# Patient Record
Sex: MALE | Race: WHITE | NOT HISPANIC OR LATINO | URBAN - METROPOLITAN AREA
[De-identification: names, ages, dates, MRNs, and addresses within clinical notes are randomized per-mention and may not be internally consistent; named-entity substitution may affect disease eponyms.]

---

## 2017-01-03 ENCOUNTER — OFFICE VISIT - RIVER FALLS (OUTPATIENT)
Dept: FAMILY MEDICINE | Facility: CLINIC | Age: 82
End: 2017-01-03

## 2017-01-09 ENCOUNTER — OFFICE VISIT - RIVER FALLS (OUTPATIENT)
Dept: FAMILY MEDICINE | Facility: CLINIC | Age: 82
End: 2017-01-09

## 2017-01-09 ASSESSMENT — MIFFLIN-ST. JEOR: SCORE: 1741.79

## 2017-01-17 ENCOUNTER — OFFICE VISIT - RIVER FALLS (OUTPATIENT)
Dept: FAMILY MEDICINE | Facility: CLINIC | Age: 82
End: 2017-01-17

## 2017-01-23 ENCOUNTER — OFFICE VISIT - RIVER FALLS (OUTPATIENT)
Dept: FAMILY MEDICINE | Facility: CLINIC | Age: 82
End: 2017-01-23

## 2017-01-31 ENCOUNTER — OFFICE VISIT - RIVER FALLS (OUTPATIENT)
Dept: FAMILY MEDICINE | Facility: CLINIC | Age: 82
End: 2017-01-31

## 2017-02-02 ENCOUNTER — OFFICE VISIT - RIVER FALLS (OUTPATIENT)
Dept: FAMILY MEDICINE | Facility: CLINIC | Age: 82
End: 2017-02-02

## 2017-02-02 ASSESSMENT — MIFFLIN-ST. JEOR: SCORE: 1728.19

## 2017-02-10 ENCOUNTER — OFFICE VISIT - RIVER FALLS (OUTPATIENT)
Dept: FAMILY MEDICINE | Facility: CLINIC | Age: 82
End: 2017-02-10

## 2017-03-01 ENCOUNTER — OFFICE VISIT - RIVER FALLS (OUTPATIENT)
Dept: FAMILY MEDICINE | Facility: CLINIC | Age: 82
End: 2017-03-01

## 2017-03-07 ENCOUNTER — OFFICE VISIT - RIVER FALLS (OUTPATIENT)
Dept: FAMILY MEDICINE | Facility: CLINIC | Age: 82
End: 2017-03-07

## 2017-03-07 ASSESSMENT — MIFFLIN-ST. JEOR: SCORE: 1736.35

## 2017-03-13 ENCOUNTER — OFFICE VISIT - RIVER FALLS (OUTPATIENT)
Dept: FAMILY MEDICINE | Facility: CLINIC | Age: 82
End: 2017-03-13

## 2017-03-21 ENCOUNTER — OFFICE VISIT - RIVER FALLS (OUTPATIENT)
Dept: FAMILY MEDICINE | Facility: CLINIC | Age: 82
End: 2017-03-21

## 2017-03-29 ENCOUNTER — OFFICE VISIT - RIVER FALLS (OUTPATIENT)
Dept: FAMILY MEDICINE | Facility: CLINIC | Age: 82
End: 2017-03-29

## 2017-04-06 ENCOUNTER — OFFICE VISIT - RIVER FALLS (OUTPATIENT)
Dept: FAMILY MEDICINE | Facility: CLINIC | Age: 82
End: 2017-04-06

## 2017-04-06 ASSESSMENT — MIFFLIN-ST. JEOR: SCORE: 1750.87

## 2017-04-13 ENCOUNTER — OFFICE VISIT - RIVER FALLS (OUTPATIENT)
Dept: FAMILY MEDICINE | Facility: CLINIC | Age: 82
End: 2017-04-13

## 2017-04-25 LAB
CREAT SERPL-MCNC: 1.81 MG/DL
GLUCOSE BLD-MCNC: 106 MG/DL

## 2017-04-28 ENCOUNTER — OFFICE VISIT - RIVER FALLS (OUTPATIENT)
Dept: FAMILY MEDICINE | Facility: CLINIC | Age: 82
End: 2017-04-28

## 2017-04-28 LAB
CREAT SERPL-MCNC: 1.81 MG/DL
GLUCOSE BLD-MCNC: 106 MG/DL

## 2017-04-28 ASSESSMENT — MIFFLIN-ST. JEOR: SCORE: 1732.72

## 2017-05-02 ENCOUNTER — OFFICE VISIT - RIVER FALLS (OUTPATIENT)
Dept: FAMILY MEDICINE | Facility: CLINIC | Age: 82
End: 2017-05-02

## 2017-05-03 LAB
CREAT SERPL-MCNC: 2.07 MG/DL (ref 0.7–1.18)
GLUCOSE BLD-MCNC: 172 MG/DL (ref 65–99)

## 2017-05-12 ENCOUNTER — OFFICE VISIT - RIVER FALLS (OUTPATIENT)
Dept: FAMILY MEDICINE | Facility: CLINIC | Age: 82
End: 2017-05-12

## 2017-05-12 ASSESSMENT — MIFFLIN-ST. JEOR: SCORE: 1755.4

## 2017-05-22 ENCOUNTER — OFFICE VISIT - RIVER FALLS (OUTPATIENT)
Dept: FAMILY MEDICINE | Facility: CLINIC | Age: 82
End: 2017-05-22

## 2017-05-23 ENCOUNTER — COMMUNICATION - RIVER FALLS (OUTPATIENT)
Dept: FAMILY MEDICINE | Facility: CLINIC | Age: 82
End: 2017-05-23

## 2017-05-23 LAB
CREAT SERPL-MCNC: 1.8 MG/DL (ref 0.7–1.18)
GLUCOSE BLD-MCNC: 99 MG/DL (ref 65–99)

## 2017-05-26 ENCOUNTER — OFFICE VISIT - RIVER FALLS (OUTPATIENT)
Dept: FAMILY MEDICINE | Facility: CLINIC | Age: 82
End: 2017-05-26

## 2017-05-30 LAB
CREAT SERPL-MCNC: 1.68 MG/DL
GLUCOSE BLD-MCNC: 102 MG/DL

## 2017-06-01 ENCOUNTER — OFFICE VISIT - RIVER FALLS (OUTPATIENT)
Dept: FAMILY MEDICINE | Facility: CLINIC | Age: 82
End: 2017-06-01

## 2017-06-01 ASSESSMENT — MIFFLIN-ST. JEOR
SCORE: 1782.62
SCORE: 1786.25

## 2017-06-06 ENCOUNTER — OFFICE VISIT - RIVER FALLS (OUTPATIENT)
Dept: FAMILY MEDICINE | Facility: CLINIC | Age: 82
End: 2017-06-06

## 2017-06-06 ENCOUNTER — AMBULATORY - RIVER FALLS (OUTPATIENT)
Dept: FAMILY MEDICINE | Facility: CLINIC | Age: 82
End: 2017-06-06

## 2017-06-06 ASSESSMENT — MIFFLIN-ST. JEOR: SCORE: 1782.62

## 2017-06-21 ENCOUNTER — AMBULATORY - RIVER FALLS (OUTPATIENT)
Dept: FAMILY MEDICINE | Facility: CLINIC | Age: 82
End: 2017-06-21

## 2017-06-22 ENCOUNTER — OFFICE VISIT - RIVER FALLS (OUTPATIENT)
Dept: FAMILY MEDICINE | Facility: CLINIC | Age: 82
End: 2017-06-22

## 2017-06-22 ASSESSMENT — MIFFLIN-ST. JEOR: SCORE: 1796.22

## 2017-07-06 ENCOUNTER — AMBULATORY - RIVER FALLS (OUTPATIENT)
Dept: FAMILY MEDICINE | Facility: CLINIC | Age: 82
End: 2017-07-06

## 2017-07-07 LAB
CREAT SERPL-MCNC: 2.05 MG/DL (ref 0.7–1.18)
GLUCOSE BLD-MCNC: 113 MG/DL (ref 65–99)

## 2017-07-20 ENCOUNTER — OFFICE VISIT - RIVER FALLS (OUTPATIENT)
Dept: FAMILY MEDICINE | Facility: CLINIC | Age: 82
End: 2017-07-20

## 2017-07-27 ENCOUNTER — AMBULATORY - RIVER FALLS (OUTPATIENT)
Dept: FAMILY MEDICINE | Facility: CLINIC | Age: 82
End: 2017-07-27

## 2017-07-31 ENCOUNTER — AMBULATORY - RIVER FALLS (OUTPATIENT)
Dept: FAMILY MEDICINE | Facility: CLINIC | Age: 82
End: 2017-07-31

## 2017-08-01 LAB
CREAT SERPL-MCNC: 1.64 MG/DL (ref 0.7–1.18)
GLUCOSE BLD-MCNC: 111 MG/DL (ref 65–99)

## 2017-08-07 ENCOUNTER — OFFICE VISIT - RIVER FALLS (OUTPATIENT)
Dept: FAMILY MEDICINE | Facility: CLINIC | Age: 82
End: 2017-08-07

## 2017-08-07 ASSESSMENT — MIFFLIN-ST. JEOR: SCORE: 1791.69

## 2017-08-08 LAB
CREAT SERPL-MCNC: 1.82 MG/DL
GLUCOSE BLD-MCNC: 92 MG/DL

## 2017-08-15 ENCOUNTER — AMBULATORY - RIVER FALLS (OUTPATIENT)
Dept: FAMILY MEDICINE | Facility: CLINIC | Age: 82
End: 2017-08-15

## 2017-08-16 LAB
CREAT SERPL-MCNC: 1.35 MG/DL (ref 0.7–1.18)
GLUCOSE BLD-MCNC: 96 MG/DL (ref 65–99)
HBA1C MFR BLD: 5.3 %

## 2017-08-24 ENCOUNTER — AMBULATORY - RIVER FALLS (OUTPATIENT)
Dept: FAMILY MEDICINE | Facility: CLINIC | Age: 82
End: 2017-08-24

## 2017-08-31 ENCOUNTER — AMBULATORY - RIVER FALLS (OUTPATIENT)
Dept: FAMILY MEDICINE | Facility: CLINIC | Age: 82
End: 2017-08-31

## 2017-09-14 ENCOUNTER — AMBULATORY - RIVER FALLS (OUTPATIENT)
Dept: FAMILY MEDICINE | Facility: CLINIC | Age: 82
End: 2017-09-14

## 2017-09-28 ENCOUNTER — AMBULATORY - RIVER FALLS (OUTPATIENT)
Dept: FAMILY MEDICINE | Facility: CLINIC | Age: 82
End: 2017-09-28

## 2017-10-27 ENCOUNTER — AMBULATORY - RIVER FALLS (OUTPATIENT)
Dept: FAMILY MEDICINE | Facility: CLINIC | Age: 82
End: 2017-10-27

## 2017-12-04 ENCOUNTER — AMBULATORY - RIVER FALLS (OUTPATIENT)
Dept: FAMILY MEDICINE | Facility: CLINIC | Age: 82
End: 2017-12-04

## 2017-12-12 ENCOUNTER — OFFICE VISIT - RIVER FALLS (OUTPATIENT)
Dept: FAMILY MEDICINE | Facility: CLINIC | Age: 82
End: 2017-12-12

## 2017-12-12 ENCOUNTER — COMMUNICATION - RIVER FALLS (OUTPATIENT)
Dept: FAMILY MEDICINE | Facility: CLINIC | Age: 82
End: 2017-12-12

## 2017-12-12 ASSESSMENT — MIFFLIN-ST. JEOR: SCORE: 1791.69

## 2017-12-13 LAB
CREAT SERPL-MCNC: 1.6 MG/DL (ref 0.7–1.11)
GLUCOSE BLD-MCNC: 83 MG/DL (ref 65–99)

## 2017-12-19 ENCOUNTER — AMBULATORY - RIVER FALLS (OUTPATIENT)
Dept: FAMILY MEDICINE | Facility: CLINIC | Age: 82
End: 2017-12-19

## 2017-12-29 ENCOUNTER — OFFICE VISIT - RIVER FALLS (OUTPATIENT)
Dept: FAMILY MEDICINE | Facility: CLINIC | Age: 82
End: 2017-12-29

## 2017-12-29 ASSESSMENT — MIFFLIN-ST. JEOR: SCORE: 1787.15

## 2017-12-30 LAB
CREAT SERPL-MCNC: 1.55 MG/DL (ref 0.7–1.11)
GLUCOSE BLD-MCNC: 95 MG/DL (ref 65–99)
HBA1C MFR BLD: 5.5 %

## 2018-01-01 ENCOUNTER — OFFICE VISIT - RIVER FALLS (OUTPATIENT)
Dept: FAMILY MEDICINE | Facility: CLINIC | Age: 83
End: 2018-01-01

## 2018-01-01 ENCOUNTER — AMBULATORY - RIVER FALLS (OUTPATIENT)
Dept: FAMILY MEDICINE | Facility: CLINIC | Age: 83
End: 2018-01-01

## 2018-01-01 LAB
CREAT SERPL-MCNC: 1.84 MG/DL (ref 0.7–1.11)
GLUCOSE BLD-MCNC: 107 MG/DL (ref 65–99)

## 2018-01-01 ASSESSMENT — MIFFLIN-ST. JEOR
SCORE: 1809.83
SCORE: 1823.44

## 2018-02-05 ENCOUNTER — AMBULATORY - RIVER FALLS (OUTPATIENT)
Dept: FAMILY MEDICINE | Facility: CLINIC | Age: 83
End: 2018-02-05

## 2018-02-23 ENCOUNTER — AMBULATORY - RIVER FALLS (OUTPATIENT)
Dept: FAMILY MEDICINE | Facility: CLINIC | Age: 83
End: 2018-02-23

## 2018-02-26 ENCOUNTER — OFFICE VISIT - RIVER FALLS (OUTPATIENT)
Dept: FAMILY MEDICINE | Facility: CLINIC | Age: 83
End: 2018-02-26

## 2018-02-26 ASSESSMENT — MIFFLIN-ST. JEOR: SCORE: 1832.51

## 2018-02-27 LAB
CREAT SERPL-MCNC: 1.55 MG/DL (ref 0.7–1.11)
GLUCOSE BLD-MCNC: 79 MG/DL (ref 65–99)

## 2018-03-13 ENCOUNTER — AMBULATORY - RIVER FALLS (OUTPATIENT)
Dept: FAMILY MEDICINE | Facility: CLINIC | Age: 83
End: 2018-03-13

## 2018-03-14 LAB
CHOLEST SERPL-MCNC: 89 MG/DL
CHOLEST/HDLC SERPL: 3 {RATIO}
HDLC SERPL-MCNC: 30 MG/DL
LDLC SERPL CALC-MCNC: 42 MG/DL
NONHDLC SERPL-MCNC: 59 MG/DL
TRIGL SERPL-MCNC: 85 MG/DL

## 2018-03-27 ENCOUNTER — OFFICE VISIT - RIVER FALLS (OUTPATIENT)
Dept: FAMILY MEDICINE | Facility: CLINIC | Age: 83
End: 2018-03-27

## 2018-03-27 ENCOUNTER — AMBULATORY - RIVER FALLS (OUTPATIENT)
Dept: FAMILY MEDICINE | Facility: CLINIC | Age: 83
End: 2018-03-27

## 2018-03-28 LAB
CREAT SERPL-MCNC: 1.56 MG/DL (ref 0.7–1.11)
GLUCOSE BLD-MCNC: 98 MG/DL (ref 65–99)

## 2018-04-24 ENCOUNTER — OFFICE VISIT - RIVER FALLS (OUTPATIENT)
Dept: FAMILY MEDICINE | Facility: CLINIC | Age: 83
End: 2018-04-24

## 2018-04-24 ENCOUNTER — AMBULATORY - RIVER FALLS (OUTPATIENT)
Dept: FAMILY MEDICINE | Facility: CLINIC | Age: 83
End: 2018-04-24

## 2018-04-24 ASSESSMENT — MIFFLIN-ST. JEOR: SCORE: 1825.25

## 2018-05-01 ENCOUNTER — OFFICE VISIT - RIVER FALLS (OUTPATIENT)
Dept: FAMILY MEDICINE | Facility: CLINIC | Age: 83
End: 2018-05-01

## 2018-05-01 ASSESSMENT — MIFFLIN-ST. JEOR: SCORE: 1814.37

## 2018-05-02 LAB — PSA SERPL-MCNC: 0.5 NG/ML

## 2018-05-04 ENCOUNTER — AMBULATORY - RIVER FALLS (OUTPATIENT)
Dept: FAMILY MEDICINE | Facility: CLINIC | Age: 83
End: 2018-05-04

## 2018-05-04 LAB
CREAT SERPL-MCNC: 1.83 MG/DL
GLUCOSE BLD-MCNC: 118 MG/DL

## 2018-05-24 ENCOUNTER — OFFICE VISIT - RIVER FALLS (OUTPATIENT)
Dept: FAMILY MEDICINE | Facility: CLINIC | Age: 83
End: 2018-05-24

## 2018-05-24 ASSESSMENT — MIFFLIN-ST. JEOR: SCORE: 1846.12

## 2018-05-31 ENCOUNTER — OFFICE VISIT - RIVER FALLS (OUTPATIENT)
Dept: FAMILY MEDICINE | Facility: CLINIC | Age: 83
End: 2018-05-31

## 2018-05-31 ASSESSMENT — MIFFLIN-ST. JEOR: SCORE: 1805.3

## 2018-06-01 ENCOUNTER — AMBULATORY - RIVER FALLS (OUTPATIENT)
Dept: FAMILY MEDICINE | Facility: CLINIC | Age: 83
End: 2018-06-01

## 2018-06-01 LAB
CREAT SERPL-MCNC: 2.03 MG/DL (ref 0.7–1.11)
GLUCOSE BLD-MCNC: 92 MG/DL (ref 65–99)

## 2018-06-05 LAB
CREAT SERPL-MCNC: 2.02 MG/DL
GLUCOSE BLD-MCNC: 98 MG/DL

## 2018-06-18 ENCOUNTER — AMBULATORY - RIVER FALLS (OUTPATIENT)
Dept: FAMILY MEDICINE | Facility: CLINIC | Age: 83
End: 2018-06-18

## 2018-06-19 LAB
CREAT SERPL-MCNC: 1.83 MG/DL (ref 0.7–1.11)
GLUCOSE BLD-MCNC: 114 MG/DL (ref 65–99)

## 2018-06-22 ENCOUNTER — OFFICE VISIT - RIVER FALLS (OUTPATIENT)
Dept: FAMILY MEDICINE | Facility: CLINIC | Age: 83
End: 2018-06-22

## 2018-06-29 ENCOUNTER — OFFICE VISIT - RIVER FALLS (OUTPATIENT)
Dept: FAMILY MEDICINE | Facility: CLINIC | Age: 83
End: 2018-06-29

## 2018-06-29 ASSESSMENT — MIFFLIN-ST. JEOR: SCORE: 1841.58

## 2018-07-02 ENCOUNTER — OFFICE VISIT - RIVER FALLS (OUTPATIENT)
Dept: FAMILY MEDICINE | Facility: CLINIC | Age: 83
End: 2018-07-02

## 2018-07-09 ENCOUNTER — OFFICE VISIT - RIVER FALLS (OUTPATIENT)
Dept: FAMILY MEDICINE | Facility: CLINIC | Age: 83
End: 2018-07-09

## 2018-07-09 LAB
BUN SERPL-MCNC: 61 MG/DL (ref 7–25)
BUN/CREAT RATIO - HISTORICAL: 27 (ref 6–22)
CALCIUM SERPL-MCNC: 9.5 MG/DL (ref 8.6–10.3)
CHLORIDE BLD-SCNC: 102 MMOL/L (ref 98–110)
CO2 SERPL-SCNC: 26 MMOL/L (ref 20–32)
CREAT SERPL-MCNC: 2.26 MG/DL (ref 0.7–1.11)
EGFRCR SERPLBLD CKD-EPI 2021: 26 ML/MIN/1.73M2
GLUCOSE BLD-MCNC: 98 MG/DL (ref 65–99)
POTASSIUM BLD-SCNC: 4.8 MMOL/L (ref 3.5–5.3)
SODIUM SERPL-SCNC: 137 MMOL/L (ref 135–146)

## 2018-07-10 ENCOUNTER — AMBULATORY - RIVER FALLS (OUTPATIENT)
Dept: FAMILY MEDICINE | Facility: CLINIC | Age: 83
End: 2018-07-10

## 2018-07-11 LAB
CREAT SERPL-MCNC: 1.92 MG/DL (ref 0.7–1.11)
GLUCOSE BLD-MCNC: 108 MG/DL (ref 65–99)

## 2018-07-20 ENCOUNTER — OFFICE VISIT - RIVER FALLS (OUTPATIENT)
Dept: FAMILY MEDICINE | Facility: CLINIC | Age: 83
End: 2018-07-20

## 2018-07-20 ENCOUNTER — AMBULATORY - RIVER FALLS (OUTPATIENT)
Dept: FAMILY MEDICINE | Facility: CLINIC | Age: 83
End: 2018-07-20

## 2018-07-20 ASSESSMENT — MIFFLIN-ST. JEOR: SCORE: 1864.26

## 2018-07-21 LAB — HBA1C MFR BLD: 5.9 %

## 2018-07-23 ENCOUNTER — OFFICE VISIT - RIVER FALLS (OUTPATIENT)
Dept: FAMILY MEDICINE | Facility: CLINIC | Age: 83
End: 2018-07-23

## 2018-07-24 ENCOUNTER — OFFICE VISIT - RIVER FALLS (OUTPATIENT)
Dept: FAMILY MEDICINE | Facility: CLINIC | Age: 83
End: 2018-07-24

## 2018-07-27 ENCOUNTER — AMBULATORY - RIVER FALLS (OUTPATIENT)
Dept: FAMILY MEDICINE | Facility: CLINIC | Age: 83
End: 2018-07-27

## 2018-07-28 LAB
CREAT SERPL-MCNC: 1.82 MG/DL (ref 0.7–1.11)
GLUCOSE BLD-MCNC: 129 MG/DL (ref 65–99)

## 2018-08-07 ENCOUNTER — AMBULATORY - RIVER FALLS (OUTPATIENT)
Dept: FAMILY MEDICINE | Facility: CLINIC | Age: 83
End: 2018-08-07

## 2018-08-20 ENCOUNTER — OFFICE VISIT - RIVER FALLS (OUTPATIENT)
Dept: FAMILY MEDICINE | Facility: CLINIC | Age: 83
End: 2018-08-20

## 2018-08-23 ENCOUNTER — OFFICE VISIT - RIVER FALLS (OUTPATIENT)
Dept: FAMILY MEDICINE | Facility: CLINIC | Age: 83
End: 2018-08-23

## 2018-08-23 ASSESSMENT — MIFFLIN-ST. JEOR: SCORE: 1855.19

## 2018-09-17 ENCOUNTER — AMBULATORY - RIVER FALLS (OUTPATIENT)
Dept: FAMILY MEDICINE | Facility: CLINIC | Age: 83
End: 2018-09-17

## 2018-09-21 LAB
CREAT SERPL-MCNC: 2.13 MG/DL
GLUCOSE BLD-MCNC: 120 MG/DL

## 2019-01-01 ENCOUNTER — AMBULATORY - RIVER FALLS (OUTPATIENT)
Dept: FAMILY MEDICINE | Facility: CLINIC | Age: 84
End: 2019-01-01

## 2019-01-01 ENCOUNTER — OFFICE VISIT - RIVER FALLS (OUTPATIENT)
Dept: FAMILY MEDICINE | Facility: CLINIC | Age: 84
End: 2019-01-01

## 2019-01-01 ENCOUNTER — COMMUNICATION - RIVER FALLS (OUTPATIENT)
Dept: FAMILY MEDICINE | Facility: CLINIC | Age: 84
End: 2019-01-01

## 2019-01-01 LAB
A/G RATIO - HISTORICAL: 0.8 MG/DL
A/G RATIO - HISTORICAL: 0.8 MG/DL
A/G RATIO - HISTORICAL: 0.9
A/G RATIO - HISTORICAL: 1.09
A/G RATIO - HISTORICAL: 1.4 (ref 1–2.5)
AFP: NORMAL
AFP: NORMAL
ALBUMIN SERPL-MCNC: 2.9 G/DL
ALBUMIN SERPL-MCNC: 2.9 G/DL
ALBUMIN SERPL-MCNC: 3.1 G/DL
ALBUMIN SERPL-MCNC: 3.7 G/DL
ALBUMIN SERPL-MCNC: 3.7 G/DL
ALBUMIN SERPL-MCNC: 4 GM/DL (ref 3.6–5.1)
ALBUMIN UR-MCNC: ABNORMAL G/DL
ALP SERPL-CCNC: 215 UNIT/L
ALP SERPL-CCNC: 215 UNIT/L
ALP SERPL-CCNC: 247 UNIT/L (ref 40–115)
ALP SERPL-CCNC: 294 UNIT/L
ALP SERPL-CCNC: 317 UNIT/L
ALP SERPL-CCNC: 332 UNIT/L
ALT SERPL W P-5'-P-CCNC: 11 UNIT/L
ALT SERPL W P-5'-P-CCNC: 17 UNIT/L (ref 9–46)
ALT SERPL W P-5'-P-CCNC: 20 UNIT/L
ALT SERPL W P-5'-P-CCNC: 25 UNIT/L
ANION GAP SERPL CALCULATED.3IONS-SCNC: 11 MEQ/L
ANION GAP SERPL CALCULATED.3IONS-SCNC: 12.6 MEQ/L
ANION GAP SERPL CALCULATED.3IONS-SCNC: 7 MEQ/L
ANION GAP SERPL CALCULATED.3IONS-SCNC: 8 MEQ/L
ANION GAP SERPL CALCULATED.3IONS-SCNC: 9 MEQ/L
ANION GAP SERPL CALCULATED.3IONS-SCNC: 9 MEQ/L
AST SERPL W P-5'-P-CCNC: 17 UNIT/L
AST SERPL W P-5'-P-CCNC: 20 UNIT/L (ref 10–35)
AST SERPL W P-5'-P-CCNC: 21 UNIT/L
AST SERPL W P-5'-P-CCNC: 21 UNIT/L
AST SERPL W P-5'-P-CCNC: 24 UNIT/L
AST SERPL W P-5'-P-CCNC: 27 UNIT/L
BACTERIA SPEC CULT: NORMAL
BASOPHILS # BLD MANUAL: 0 CELLS/UL
BASOPHILS NFR BLD AUTO: 0.3 %
BASOPHILS NFR BLD AUTO: 0.3 %
BASOPHILS NFR BLD AUTO: 0.4 %
BILIRUB SERPL-MCNC: 0.5 MG/DL
BILIRUB SERPL-MCNC: 0.5 MG/DL
BILIRUB SERPL-MCNC: 0.8 MG/DL (ref 0.2–1.2)
BILIRUB SERPL-MCNC: 0.9 MG/DL
BILIRUB SERPL-MCNC: 1.8 MG/DL
BILIRUB SERPL-MCNC: 1.8 MG/DL
BILIRUB UR QL STRIP: NEGATIVE
BUN SERPL-MCNC: 16 MG/DL
BUN SERPL-MCNC: 37 MG/DL
BUN SERPL-MCNC: 39 MG/DL (ref 7–25)
BUN SERPL-MCNC: 40 MG/DL (ref 7–25)
BUN SERPL-MCNC: 41 MG/DL
BUN SERPL-MCNC: 41 MG/DL (ref 7–25)
BUN SERPL-MCNC: 45 MG/DL
BUN SERPL-MCNC: 45 MG/DL (ref 7–25)
BUN SERPL-MCNC: 46 MG/DL (ref 7–25)
BUN SERPL-MCNC: 48 MG/DL (ref 7–25)
BUN SERPL-MCNC: 55 MG/DL (ref 7–25)
BUN SERPL-MCNC: 56 MG/DL
BUN/CREAT RATIO - HISTORICAL: 19
BUN/CREAT RATIO - HISTORICAL: 20.27
BUN/CREAT RATIO - HISTORICAL: 20.27
BUN/CREAT RATIO - HISTORICAL: 22
BUN/CREAT RATIO - HISTORICAL: 22 (ref 6–22)
BUN/CREAT RATIO - HISTORICAL: 23
BUN/CREAT RATIO - HISTORICAL: 23 (ref 6–22)
BUN/CREAT RATIO - HISTORICAL: 24 (ref 6–22)
BUN/CREAT RATIO - HISTORICAL: 25 (ref 6–22)
BUN/CREAT RATIO - HISTORICAL: 26
BUN/CREAT RATIO - HISTORICAL: 26
BUN/CREAT RATIO - HISTORICAL: 28 (ref 6–22)
CALCIUM SERPL-MCNC: 8.5 MG/DL (ref 8.6–10.3)
CALCIUM SERPL-MCNC: 8.6 MG/DL (ref 8.6–10.3)
CALCIUM SERPL-MCNC: 8.6 MG/DL (ref 8.6–10.3)
CALCIUM SERPL-MCNC: 8.7 MEQ/DL
CALCIUM SERPL-MCNC: 8.8 MEQ/DL
CALCIUM SERPL-MCNC: 8.9 MEQ/DL
CALCIUM SERPL-MCNC: 9 MG/DL (ref 8.6–10.3)
CALCIUM SERPL-MCNC: 9.1 MEQ/DL
CALCIUM SERPL-MCNC: 9.2 MG/DL (ref 8.6–10.3)
CALCIUM SERPL-MCNC: 9.3 MG/DL (ref 8.6–10.3)
CALCIUM SERPL-MCNC: 9.4 MG/DL (ref 8.6–10.3)
CALCIUM SERPL-MCNC: 9.7 MEQ/DL
CHLORIDE BLD-SCNC: 100 MEQ/L
CHLORIDE BLD-SCNC: 100 MMOL/L (ref 98–110)
CHLORIDE BLD-SCNC: 101 MEQ/L
CHLORIDE BLD-SCNC: 101 MMOL/L (ref 98–110)
CHLORIDE BLD-SCNC: 102 MEQ/L
CHLORIDE BLD-SCNC: 102 MMOL/L (ref 98–110)
CHLORIDE BLD-SCNC: 103 MMOL/L (ref 98–110)
CHLORIDE BLD-SCNC: 105 MMOL/L (ref 98–110)
CHLORIDE BLD-SCNC: 96 MEQ/L
CHLORIDE BLD-SCNC: 97 MEQ/L
CHLORIDE BLD-SCNC: 97 MEQ/L
CHLORIDE BLD-SCNC: 98 MMOL/L (ref 98–110)
CHLORIDE BLD-SCNC: 99 MEQ/L
CHLORIDE BLD-SCNC: 99 MMOL/L (ref 98–110)
CHOLEST SERPL-MCNC: 82 MG/DL
CHOLEST/HDLC SERPL: 2.5 {RATIO}
CO2 SERPL-SCNC: 25 MEQ/L
CO2 SERPL-SCNC: 26 MEQ/L
CO2 SERPL-SCNC: 26 MEQ/L
CO2 SERPL-SCNC: 26 MMOL/L (ref 20–32)
CO2 SERPL-SCNC: 27 MEQ/L
CO2 SERPL-SCNC: 27 MMOL/L (ref 20–32)
CO2 SERPL-SCNC: 29 MEQ/L
CO2 SERPL-SCNC: 29 MMOL/L (ref 20–32)
CO2 SERPL-SCNC: 29 MMOL/L (ref 20–32)
CO2 SERPL-SCNC: 32 MEQ/L
CO2 SERPL-SCNC: 32 MEQ/L
CO2 SERPL-SCNC: 32 MMOL/L (ref 20–32)
CREAT SERPL-MCNC: 0.84 MG/DL
CREAT SERPL-MCNC: 1.58 MG/DL
CREAT SERPL-MCNC: 1.63 MG/DL (ref 0.7–1.11)
CREAT SERPL-MCNC: 1.64 MG/DL (ref 0.7–1.11)
CREAT SERPL-MCNC: 1.66 MG/DL
CREAT SERPL-MCNC: 1.7 MG/DL (ref 0.7–1.11)
CREAT SERPL-MCNC: 1.73 MG/DL
CREAT SERPL-MCNC: 1.78 MG/DL (ref 0.7–1.11)
CREAT SERPL-MCNC: 1.94 MG/DL (ref 0.7–1.11)
CREAT SERPL-MCNC: 1.98 MG/DL (ref 0.7–1.11)
CREAT SERPL-MCNC: 2.22 MG/DL
CREAT SERPL-MCNC: 2.22 MG/DL
CREAT SERPL-MCNC: 2.34 MG/DL (ref 0.7–1.11)
CREAT SERPL-MCNC: 2.44 MG/DL
CREAT SERPL-MCNC: 2.56 MG/DL
DIGOXIN - HISTORICAL: 1.2 MCG/L (ref 0.8–2)
EGFRCR SERPLBLD CKD-EPI 2021: 25 ML/MIN/1.73M2
EGFRCR SERPLBLD CKD-EPI 2021: 31 ML/MIN/1.73M2
EGFRCR SERPLBLD CKD-EPI 2021: 32 ML/MIN/1.73M2
EGFRCR SERPLBLD CKD-EPI 2021: 35 ML/MIN/1.73M2
EGFRCR SERPLBLD CKD-EPI 2021: 37 ML/MIN/1.73M2
EGFRCR SERPLBLD CKD-EPI 2021: 39 ML/MIN/1.73M2
EGFRCR SERPLBLD CKD-EPI 2021: 39 ML/MIN/1.73M2
EOSINOPHIL # BLD MANUAL: 0.1 CELLS/UL
EOSINOPHIL # BLD MANUAL: 0.2 CELLS/UL
EOSINOPHIL # BLD MANUAL: 0.3 CELLS/UL
EOSINOPHIL # BLD MANUAL: 0.3 CELLS/UL
EOSINOPHIL # BLD MANUAL: 0.4 CELLS/UL
EOSINOPHIL NFR BLD AUTO: 2.5 %
EOSINOPHIL NFR BLD AUTO: 3 %
EOSINOPHIL NFR BLD AUTO: 3.2 %
EOSINOPHIL NFR BLD AUTO: 4.3 %
EOSINOPHIL NFR BLD AUTO: 4.4 %
EOSINOPHIL NFR BLD AUTO: 5.5 %
EOSINOPHIL NFR BLD AUTO: 6.5 %
ERYTHROCYTE [DISTWIDTH] IN BLOOD BY AUTOMATED COUNT: 14.9 %
ERYTHROCYTE [DISTWIDTH] IN BLOOD BY AUTOMATED COUNT: 15.6 % (ref 11–15)
ERYTHROCYTE [DISTWIDTH] IN BLOOD BY AUTOMATED COUNT: 16.9 % (ref 11–15)
ERYTHROCYTE [DISTWIDTH] IN BLOOD BY AUTOMATED COUNT: 17 %
ERYTHROCYTE [DISTWIDTH] IN BLOOD BY AUTOMATED COUNT: 17.6 %
ERYTHROCYTE [DISTWIDTH] IN BLOOD BY AUTOMATED COUNT: 18 %
ERYTHROCYTE [DISTWIDTH] IN BLOOD BY AUTOMATED COUNT: 18.6 %
ERYTHROCYTE [DISTWIDTH] IN BLOOD BY AUTOMATED COUNT: 19.1 %
ERYTHROCYTE [DISTWIDTH] IN BLOOD BY AUTOMATED COUNT: 20 %
ERYTHROCYTE [DISTWIDTH] IN BLOOD BY AUTOMATED COUNT: 22.4 %
FERRITIN SERPL-MCNC: 754.1 NG/ML
FERRITIN SERPL-MCNC: 919.4 NG/ML
GFR ESTIMATE EXT - HISTORICAL: 26 ML/MIN
GFR ESTIMATE EXT - HISTORICAL: 30 ML/MIN
GFR ESTIMATE EXT - HISTORICAL: 30 ML/MIN
GFR ESTIMATE EXT - HISTORICAL: 38 ML/MIN
GFR ESTIMATE EXT - HISTORICAL: 40 ML/MIN
GFR ESTIMATE EXT - HISTORICAL: 42 ML/MIN
GLOBULIN: 2.9 (ref 1.9–3.7)
GLOBULIN: 3.4 G/DL
GLOBULIN: 3.6 G/DL
GLOBULIN: 3.9 G/DL
GLUCOSE BLD-MCNC: 100 MG/DL
GLUCOSE BLD-MCNC: 117 MG/DL
GLUCOSE BLD-MCNC: 85 MG/DL (ref 65–99)
GLUCOSE BLD-MCNC: 90 MG/DL
GLUCOSE BLD-MCNC: 91 MG/DL
GLUCOSE BLD-MCNC: 91 MG/DL
GLUCOSE BLD-MCNC: 91 MG/DL (ref 65–99)
GLUCOSE BLD-MCNC: 92 MG/DL
GLUCOSE BLD-MCNC: 92 MG/DL
GLUCOSE BLD-MCNC: 94 MG/DL (ref 65–99)
GLUCOSE BLD-MCNC: 95 MG/DL (ref 65–99)
GLUCOSE BLD-MCNC: 97 MG/DL (ref 65–99)
GLUCOSE BLD-MCNC: 97 MG/DL (ref 65–99)
GLUCOSE BLD-MCNC: 98 MG/DL (ref 65–99)
GLUCOSE UR STRIP-MCNC: NEGATIVE MG/DL
HBV SURFACE AG SERPL QL IA: NORMAL
HBV SURFACE AG SERPL QL IA: NORMAL
HCT VFR BLD AUTO: 23.3 %
HCT VFR BLD AUTO: 25 %
HCT VFR BLD AUTO: 25.4 %
HCT VFR BLD AUTO: 25.4 % (ref 38.5–50)
HCT VFR BLD AUTO: 25.9 %
HCT VFR BLD AUTO: 26.1 % (ref 38.5–50)
HCT VFR BLD AUTO: 26.5 %
HCT VFR BLD AUTO: 28.2 %
HCT VFR BLD AUTO: 33.1 %
HCT VFR BLD AUTO: 38.2 %
HCV AB SERPL QL IA: NORMAL
HDLC SERPL-MCNC: 33 MG/DL
HEP A AB, TOTAL: REACTIVE
HEP A AB, TOTAL: REACTIVE
HEP B CORE ANTIBODY: NEGATIVE
HEP B CORE ANTIBODY: NEGATIVE
HEP BE AB: NEGATIVE
HGB BLD-MCNC: 10.3 G/DL
HGB BLD-MCNC: 12.9 G/DL
HGB BLD-MCNC: 7.2 G/DL
HGB BLD-MCNC: 7.7 G/DL
HGB BLD-MCNC: 7.7 G/DL
HGB BLD-MCNC: 7.7 GM/DL (ref 13.2–17.1)
HGB BLD-MCNC: 7.9 G/DL
HGB BLD-MCNC: 8.1 G/DL
HGB BLD-MCNC: 8.2 GM/DL (ref 13.2–17.1)
HGB BLD-MCNC: 8.4 G/DL
HGB BLD-MCNC: 8.6 G/DL
HGB BLD-MCNC: 8.6 GM/DL (ref 13.2–17.1)
HGB UR QL STRIP: NEGATIVE
INR PPP: 1.1
INR PPP: 1.3
INR PPP: 1.4
INR PPP: 1.7
INR PPP: 1.8
INR PPP: 1.9
INR PPP: 1.9
INR PPP: 2
INR PPP: 2
INR PPP: 2.3
INR PPP: 2.4
INR PPP: 4
INR PPP: 4.1
INR PPP: 4.5
IRON: 15 NMOL/L
IRON: 21 NMOL/L
IRON: 22 NMOL/L
KETONES UR STRIP-MCNC: NEGATIVE MG/DL
LDLC SERPL CALC-MCNC: 35 MG/DL
LEUKOCYTE ESTERASE UR QL STRIP: NEGATIVE
LYMPHOCYTES # BLD MANUAL: 0.5 CELLS/UL
LYMPHOCYTES # BLD MANUAL: 0.6 CELLS/UL
LYMPHOCYTES # BLD MANUAL: 0.6 CELLS/UL
LYMPHOCYTES # BLD MANUAL: 0.8 CELLS/UL
LYMPHOCYTES # BLD MANUAL: 0.8 CELLS/UL
LYMPHOCYTES # BLD MANUAL: 1 CELLS/UL
LYMPHOCYTES # BLD MANUAL: 1 CELLS/UL
LYMPHOCYTES NFR BLD AUTO: 11 %
LYMPHOCYTES NFR BLD AUTO: 13.6 %
LYMPHOCYTES NFR BLD AUTO: 16.5 %
LYMPHOCYTES NFR BLD AUTO: 16.8 %
LYMPHOCYTES NFR BLD AUTO: 18.7 %
LYMPHOCYTES NFR BLD AUTO: 8.3 %
LYMPHOCYTES NFR BLD AUTO: 9.6 %
MCH RBC QN AUTO: 27.8 PG
MCH RBC QN AUTO: 28.1 PG (ref 27–33)
MCH RBC QN AUTO: 28.3 PG
MCH RBC QN AUTO: 28.6 PG
MCH RBC QN AUTO: 28.8 PG
MCH RBC QN AUTO: 28.8 PG (ref 27–33)
MCH RBC QN AUTO: 28.9 PG
MCH RBC QN AUTO: 28.9 PG
MCH RBC QN AUTO: 29.3 PG
MCH RBC QN AUTO: 31.2 PG
MCHC RBC AUTO-ENTMCNC: 30.3 GM/DL
MCHC RBC AUTO-ENTMCNC: 30.3 GM/DL (ref 32–36)
MCHC RBC AUTO-ENTMCNC: 30.5 GM/DL
MCHC RBC AUTO-ENTMCNC: 30.5 GM/DL
MCHC RBC AUTO-ENTMCNC: 30.6 GM/DL
MCHC RBC AUTO-ENTMCNC: 30.8 GM/DL
MCHC RBC AUTO-ENTMCNC: 30.9 GM/DL
MCHC RBC AUTO-ENTMCNC: 31.1 GM/DL
MCHC RBC AUTO-ENTMCNC: 31.4 GM/DL (ref 32–36)
MCHC RBC AUTO-ENTMCNC: 33.8 GM/DL
MCV RBC AUTO: 91 FL
MCV RBC AUTO: 91.6 FL (ref 80–100)
MCV RBC AUTO: 92 FL
MCV RBC AUTO: 92.7 FL (ref 80–100)
MCV RBC AUTO: 93 FL
MCV RBC AUTO: 93 FL
MCV RBC AUTO: 94 FL
MCV RBC AUTO: 94 FL
MCV RBC AUTO: 95 FL
MCV RBC AUTO: 96 FL
MONOCYTES # BLD MANUAL: 0.5 CELLS/UL
MONOCYTES # BLD MANUAL: 0.5 CELLS/UL
MONOCYTES # BLD MANUAL: 0.6 CELLS/UL
MONOCYTES NFR BLD AUTO: 10.4 %
MONOCYTES NFR BLD AUTO: 10.4 %
MONOCYTES NFR BLD AUTO: 10.5 %
MONOCYTES NFR BLD AUTO: 11 %
MONOCYTES NFR BLD AUTO: 12 %
MONOCYTES NFR BLD AUTO: 9 %
MONOCYTES NFR BLD AUTO: 9 %
NEUTROPHILS # BLD AUTO: 75.9 %
NEUTROPHILS # BLD AUTO: 76.4 %
NEUTROPHILS # BLD MANUAL: 3.3 CELLS/UL
NEUTROPHILS # BLD MANUAL: 3.3 CELLS/UL
NEUTROPHILS # BLD MANUAL: 4 CELLS/UL
NEUTROPHILS # BLD MANUAL: 4.3 CELLS/UL
NEUTROPHILS # BLD MANUAL: 5 CELLS/UL
NEUTROPHILS NFR BLD AUTO: 65.9 %
NEUTROPHILS NFR BLD AUTO: 66.3 %
NEUTROPHILS NFR BLD AUTO: 68.4 %
NEUTROPHILS NFR BLD AUTO: 73 %
NEUTROPHILS NFR BLD AUTO: 75.3 %
NITRATE UR QL: NEGATIVE
NONHDLC SERPL-MCNC: 49 MG/DL
PH UR STRIP: 5.5 [PH] (ref 5–8)
PLATELET # BLD AUTO: 129 X10
PLATELET # BLD AUTO: 155 X10
PLATELET # BLD AUTO: 160 X10
PLATELET # BLD AUTO: 203 X10
PLATELET # BLD AUTO: 210 X10
PLATELET # BLD AUTO: 211 X10
PLATELET # BLD AUTO: 217 10*3/UL (ref 140–400)
PLATELET # BLD AUTO: 228 X10
PLATELET # BLD AUTO: 231 10*3/UL (ref 140–400)
PLATELET # BLD AUTO: 240 X10
PMV BLD: 10.2 FL
PMV BLD: 10.7 FL
PMV BLD: 10.9 FL
PMV BLD: 8.2 FL
PMV BLD: 8.6 FL
PMV BLD: 8.9 FL
PMV BLD: 9.3 FL (ref 7.5–12.5)
PMV BLD: 9.4 FL
PMV BLD: 9.7 FL
PMV BLD: 9.9 FL (ref 7.5–12.5)
POTASSIUM BLD-SCNC: 3.4 MEQ/L
POTASSIUM BLD-SCNC: 3.7 MMOL/L (ref 3.5–5.3)
POTASSIUM BLD-SCNC: 3.9 MEQ/L
POTASSIUM BLD-SCNC: 3.9 MEQ/L
POTASSIUM BLD-SCNC: 4.2 MEQ/L
POTASSIUM BLD-SCNC: 4.2 MEQ/L
POTASSIUM BLD-SCNC: 4.2 MMOL/L (ref 3.5–5.3)
POTASSIUM BLD-SCNC: 4.4 MMOL/L (ref 3.5–5.3)
POTASSIUM BLD-SCNC: 4.4 MMOL/L (ref 3.5–5.3)
POTASSIUM BLD-SCNC: 4.5 MMOL/L (ref 3.5–5.3)
POTASSIUM BLD-SCNC: 4.6 MMOL/L (ref 3.5–5.3)
POTASSIUM BLD-SCNC: 4.7 MEQ/L
POTASSIUM BLD-SCNC: 5 MEQ/L
POTASSIUM BLD-SCNC: 5 MMOL/L (ref 3.5–5.3)
PROT SERPL-MCNC: 6.3 GM/DL
PROT SERPL-MCNC: 6.5 GM/DL
PROT SERPL-MCNC: 6.9 GM/DL (ref 6.1–8.1)
PROT SERPL-MCNC: 7 GM/DL
PROT SERPL-MCNC: 7.1 GM/DL
PROT SERPL-MCNC: 7.1 GM/DL
PROTHROMBIN TIME: 10.8 S (ref 9–11.5)
PROTHROMBIN TIME: 13.9 S (ref 9–11.5)
PROTHROMBIN TIME: 15.8 S
PROTHROMBIN TIME: 17.5 S (ref 9–11.5)
PROTHROMBIN TIME: 18.4 S (ref 9–11.5)
PROTHROMBIN TIME: 22 S
RBC # BLD AUTO: 2.46 X10
RBC # BLD AUTO: 2.66 X10
RBC # BLD AUTO: 2.67 X10
RBC # BLD AUTO: 2.74 10*6/UL (ref 4.2–5.8)
RBC # BLD AUTO: 2.76 X10
RBC # BLD AUTO: 2.85 10*6/UL (ref 4.2–5.8)
RBC # BLD AUTO: 2.86 X10
RBC # BLD AUTO: 3.09 X10
RBC # BLD AUTO: 3.56 X10
RBC # BLD AUTO: 4.14 X10
SODIUM SERPL-SCNC: 131 MMOL/L (ref 135–146)
SODIUM SERPL-SCNC: 132 MEQ/L
SODIUM SERPL-SCNC: 134 MEQ/L
SODIUM SERPL-SCNC: 134 MEQ/L
SODIUM SERPL-SCNC: 134 MMOL/L (ref 135–146)
SODIUM SERPL-SCNC: 136 MEQ/L
SODIUM SERPL-SCNC: 136 MMOL/L (ref 135–146)
SODIUM SERPL-SCNC: 137 MMOL/L (ref 135–146)
SODIUM SERPL-SCNC: 137 MMOL/L (ref 135–146)
SODIUM SERPL-SCNC: 138 MEQ/L
SODIUM SERPL-SCNC: 138 MEQ/L
SODIUM SERPL-SCNC: 138 MMOL/L (ref 135–146)
SODIUM SERPL-SCNC: 139 MEQ/L
SODIUM SERPL-SCNC: 139 MMOL/L (ref 135–146)
SP GR UR STRIP: 1.01 (ref 1–1.03)
TRIGL SERPL-MCNC: 64 MG/DL
WBC # BLD AUTO: 4.7 X10
WBC # BLD AUTO: 4.9 X10
WBC # BLD AUTO: 5.1 X10
WBC # BLD AUTO: 5.3 10*3/UL (ref 3.8–10.8)
WBC # BLD AUTO: 5.3 X10
WBC # BLD AUTO: 5.4 10*3/UL (ref 3.8–10.8)
WBC # BLD AUTO: 5.5 X10
WBC # BLD AUTO: 5.6 X10
WBC # BLD AUTO: 5.9 X10
WBC # BLD AUTO: 6.6 X10

## 2019-01-01 ASSESSMENT — MIFFLIN-ST. JEOR
SCORE: 1750.86
SCORE: 1764.47
SCORE: 1601.18
SCORE: 1755.4
SCORE: 1651.07
SCORE: 1632.93
SCORE: 1818.9
SCORE: 1673.75
SCORE: 1764.47
SCORE: 1750.86
SCORE: 1660.14
SCORE: 1800.76
SCORE: 1687.36
SCORE: 1773.54

## 2019-11-11 ENCOUNTER — OFFICE VISIT - RIVER FALLS (OUTPATIENT)
Dept: FAMILY MEDICINE | Facility: CLINIC | Age: 84
End: 2019-11-11

## 2022-02-11 VITALS
WEIGHT: 182 LBS | DIASTOLIC BLOOD PRESSURE: 60 MMHG | BODY MASS INDEX: 25.2 KG/M2 | TEMPERATURE: 97.6 F | RESPIRATION RATE: 20 BRPM | HEART RATE: 82 BPM | BODY MASS INDEX: 24 KG/M2 | HEIGHT: 75 IN | BODY MASS INDEX: 24.87 KG/M2 | TEMPERATURE: 96.8 F | HEART RATE: 76 BPM | DIASTOLIC BLOOD PRESSURE: 58 MMHG | OXYGEN SATURATION: 91 % | HEART RATE: 99 BPM | HEIGHT: 75 IN | WEIGHT: 201.6 LBS | WEIGHT: 189 LBS | TEMPERATURE: 97 F | HEART RATE: 80 BPM | DIASTOLIC BLOOD PRESSURE: 50 MMHG | SYSTOLIC BLOOD PRESSURE: 103 MMHG | SYSTOLIC BLOOD PRESSURE: 98 MMHG | DIASTOLIC BLOOD PRESSURE: 58 MMHG | TEMPERATURE: 97.4 F | BODY MASS INDEX: 23.5 KG/M2 | WEIGHT: 218 LBS | SYSTOLIC BLOOD PRESSURE: 90 MMHG | SYSTOLIC BLOOD PRESSURE: 96 MMHG | DIASTOLIC BLOOD PRESSURE: 54 MMHG | HEART RATE: 70 BPM | HEART RATE: 77 BPM | SYSTOLIC BLOOD PRESSURE: 96 MMHG | BODY MASS INDEX: 24.62 KG/M2 | HEIGHT: 75 IN | OXYGEN SATURATION: 94 % | WEIGHT: 199 LBS | TEMPERATURE: 97.1 F | BODY MASS INDEX: 22.63 KG/M2 | SYSTOLIC BLOOD PRESSURE: 94 MMHG | WEIGHT: 198 LBS | SYSTOLIC BLOOD PRESSURE: 90 MMHG | TEMPERATURE: 97.5 F | WEIGHT: 195 LBS | WEIGHT: 193 LBS | BODY MASS INDEX: 24.25 KG/M2 | HEIGHT: 75 IN | HEART RATE: 76 BPM | HEART RATE: 72 BPM | WEIGHT: 201 LBS | TEMPERATURE: 97.6 F | DIASTOLIC BLOOD PRESSURE: 60 MMHG | DIASTOLIC BLOOD PRESSURE: 56 MMHG | HEIGHT: 75 IN | SYSTOLIC BLOOD PRESSURE: 96 MMHG | DIASTOLIC BLOOD PRESSURE: 64 MMHG | DIASTOLIC BLOOD PRESSURE: 62 MMHG | HEIGHT: 75 IN | BODY MASS INDEX: 24.99 KG/M2 | HEIGHT: 75 IN | SYSTOLIC BLOOD PRESSURE: 90 MMHG | BODY MASS INDEX: 27.1 KG/M2 | HEART RATE: 72 BPM | OXYGEN SATURATION: 95 %

## 2022-02-11 VITALS
HEART RATE: 77 BPM | DIASTOLIC BLOOD PRESSURE: 75 MMHG | DIASTOLIC BLOOD PRESSURE: 52 MMHG | BODY MASS INDEX: 26.75 KG/M2 | SYSTOLIC BLOOD PRESSURE: 102 MMHG | OXYGEN SATURATION: 98 % | HEART RATE: 74 BPM | HEIGHT: 75 IN | WEIGHT: 218 LBS | SYSTOLIC BLOOD PRESSURE: 107 MMHG | TEMPERATURE: 97.3 F | SYSTOLIC BLOOD PRESSURE: 98 MMHG | HEIGHT: 75 IN | BODY MASS INDEX: 27.1 KG/M2 | WEIGHT: 220 LBS | DIASTOLIC BLOOD PRESSURE: 68 MMHG | WEIGHT: 215 LBS | SYSTOLIC BLOOD PRESSURE: 112 MMHG | HEIGHT: 75 IN | DIASTOLIC BLOOD PRESSURE: 68 MMHG | HEIGHT: 75 IN | DIASTOLIC BLOOD PRESSURE: 69 MMHG | HEART RATE: 92 BPM | WEIGHT: 215 LBS | BODY MASS INDEX: 27.35 KG/M2 | BODY MASS INDEX: 26.73 KG/M2 | WEIGHT: 204.6 LBS | SYSTOLIC BLOOD PRESSURE: 100 MMHG | SYSTOLIC BLOOD PRESSURE: 96 MMHG | DIASTOLIC BLOOD PRESSURE: 66 MMHG | WEIGHT: 216 LBS | HEART RATE: 88 BPM | WEIGHT: 214 LBS | SYSTOLIC BLOOD PRESSURE: 102 MMHG | DIASTOLIC BLOOD PRESSURE: 67 MMHG | SYSTOLIC BLOOD PRESSURE: 103 MMHG | BODY MASS INDEX: 26.73 KG/M2 | DIASTOLIC BLOOD PRESSURE: 60 MMHG | HEIGHT: 75 IN | HEART RATE: 89 BPM | BODY MASS INDEX: 25.57 KG/M2 | SYSTOLIC BLOOD PRESSURE: 100 MMHG | OXYGEN SATURATION: 98 % | HEART RATE: 96 BPM | BODY MASS INDEX: 26.86 KG/M2 | DIASTOLIC BLOOD PRESSURE: 58 MMHG | HEART RATE: 76 BPM

## 2022-02-11 VITALS
BODY MASS INDEX: 30.35 KG/M2 | BODY MASS INDEX: 29 KG/M2 | HEIGHT: 73 IN | WEIGHT: 218.8 LBS | HEIGHT: 73 IN | DIASTOLIC BLOOD PRESSURE: 70 MMHG | DIASTOLIC BLOOD PRESSURE: 58 MMHG | BODY MASS INDEX: 29.42 KG/M2 | BODY MASS INDEX: 28.89 KG/M2 | DIASTOLIC BLOOD PRESSURE: 56 MMHG | WEIGHT: 218 LBS | SYSTOLIC BLOOD PRESSURE: 122 MMHG | HEART RATE: 72 BPM | WEIGHT: 223 LBS | DIASTOLIC BLOOD PRESSURE: 62 MMHG | HEIGHT: 73 IN | HEART RATE: 78 BPM | SYSTOLIC BLOOD PRESSURE: 88 MMHG | HEIGHT: 73 IN | DIASTOLIC BLOOD PRESSURE: 65 MMHG | HEART RATE: 73 BPM | DIASTOLIC BLOOD PRESSURE: 73 MMHG | SYSTOLIC BLOOD PRESSURE: 100 MMHG | WEIGHT: 229.8 LBS | WEIGHT: 225 LBS | SYSTOLIC BLOOD PRESSURE: 96 MMHG | BODY MASS INDEX: 30.46 KG/M2 | SYSTOLIC BLOOD PRESSURE: 101 MMHG | HEART RATE: 89 BPM | TEMPERATURE: 98.8 F | BODY MASS INDEX: 29.55 KG/M2 | HEART RATE: 80 BPM | BODY MASS INDEX: 29.69 KG/M2 | SYSTOLIC BLOOD PRESSURE: 111 MMHG | WEIGHT: 222 LBS | DIASTOLIC BLOOD PRESSURE: 69 MMHG | HEART RATE: 85 BPM | SYSTOLIC BLOOD PRESSURE: 95 MMHG | HEIGHT: 73 IN | WEIGHT: 229 LBS | DIASTOLIC BLOOD PRESSURE: 64 MMHG | HEART RATE: 80 BPM | HEIGHT: 73 IN | SYSTOLIC BLOOD PRESSURE: 100 MMHG

## 2022-02-11 VITALS
DIASTOLIC BLOOD PRESSURE: 66 MMHG | DIASTOLIC BLOOD PRESSURE: 62 MMHG | SYSTOLIC BLOOD PRESSURE: 108 MMHG | DIASTOLIC BLOOD PRESSURE: 74 MMHG | HEART RATE: 76 BPM | SYSTOLIC BLOOD PRESSURE: 96 MMHG | SYSTOLIC BLOOD PRESSURE: 108 MMHG | HEART RATE: 76 BPM

## 2022-02-11 VITALS
DIASTOLIC BLOOD PRESSURE: 70 MMHG | BODY MASS INDEX: 28.77 KG/M2 | TEMPERATURE: 97.9 F | HEIGHT: 75 IN | HEIGHT: 75 IN | SYSTOLIC BLOOD PRESSURE: 122 MMHG | WEIGHT: 229 LBS | DIASTOLIC BLOOD PRESSURE: 76 MMHG | SYSTOLIC BLOOD PRESSURE: 102 MMHG | HEART RATE: 81 BPM | WEIGHT: 236 LBS | BODY MASS INDEX: 29.34 KG/M2 | DIASTOLIC BLOOD PRESSURE: 78 MMHG | HEIGHT: 75 IN | HEART RATE: 74 BPM | SYSTOLIC BLOOD PRESSURE: 121 MMHG | BODY MASS INDEX: 29.12 KG/M2 | WEIGHT: 231.4 LBS | HEART RATE: 89 BPM | SYSTOLIC BLOOD PRESSURE: 115 MMHG | TEMPERATURE: 97.7 F | OXYGEN SATURATION: 96 % | BODY MASS INDEX: 28.47 KG/M2 | HEART RATE: 88 BPM | DIASTOLIC BLOOD PRESSURE: 64 MMHG | HEIGHT: 75 IN

## 2022-02-11 VITALS
HEART RATE: 72 BPM | WEIGHT: 217 LBS | HEART RATE: 80 BPM | WEIGHT: 215 LBS | DIASTOLIC BLOOD PRESSURE: 72 MMHG | WEIGHT: 220 LBS | BODY MASS INDEX: 29.16 KG/M2 | HEIGHT: 73 IN | SYSTOLIC BLOOD PRESSURE: 114 MMHG | SYSTOLIC BLOOD PRESSURE: 106 MMHG | BODY MASS INDEX: 28.37 KG/M2 | SYSTOLIC BLOOD PRESSURE: 127 MMHG | BODY MASS INDEX: 28.76 KG/M2 | SYSTOLIC BLOOD PRESSURE: 108 MMHG | HEIGHT: 73 IN | DIASTOLIC BLOOD PRESSURE: 80 MMHG | HEART RATE: 80 BPM | DIASTOLIC BLOOD PRESSURE: 62 MMHG | DIASTOLIC BLOOD PRESSURE: 60 MMHG | HEART RATE: 64 BPM | TEMPERATURE: 97.6 F

## 2022-02-11 VITALS
BODY MASS INDEX: 27.85 KG/M2 | HEART RATE: 69 BPM | DIASTOLIC BLOOD PRESSURE: 58 MMHG | DIASTOLIC BLOOD PRESSURE: 66 MMHG | SYSTOLIC BLOOD PRESSURE: 98 MMHG | WEIGHT: 224 LBS | SYSTOLIC BLOOD PRESSURE: 110 MMHG | BODY MASS INDEX: 28.97 KG/M2 | HEART RATE: 80 BPM | HEART RATE: 68 BPM | WEIGHT: 223 LBS | HEIGHT: 75 IN | SYSTOLIC BLOOD PRESSURE: 94 MMHG | DIASTOLIC BLOOD PRESSURE: 58 MMHG | DIASTOLIC BLOOD PRESSURE: 71 MMHG | DIASTOLIC BLOOD PRESSURE: 70 MMHG | DIASTOLIC BLOOD PRESSURE: 54 MMHG | OXYGEN SATURATION: 96 % | HEART RATE: 85 BPM | HEIGHT: 75 IN | BODY MASS INDEX: 27.73 KG/M2 | WEIGHT: 233 LBS | HEIGHT: 75 IN | SYSTOLIC BLOOD PRESSURE: 114 MMHG | HEART RATE: 78 BPM | DIASTOLIC BLOOD PRESSURE: 66 MMHG | TEMPERATURE: 97.5 F | SYSTOLIC BLOOD PRESSURE: 110 MMHG | SYSTOLIC BLOOD PRESSURE: 113 MMHG | HEART RATE: 72 BPM | SYSTOLIC BLOOD PRESSURE: 126 MMHG

## 2022-02-11 VITALS
SYSTOLIC BLOOD PRESSURE: 105 MMHG | BODY MASS INDEX: 27.98 KG/M2 | SYSTOLIC BLOOD PRESSURE: 113 MMHG | SYSTOLIC BLOOD PRESSURE: 108 MMHG | HEART RATE: 91 BPM | TEMPERATURE: 98.1 F | BODY MASS INDEX: 28.07 KG/M2 | HEIGHT: 75 IN | WEIGHT: 229 LBS | BODY MASS INDEX: 27.85 KG/M2 | DIASTOLIC BLOOD PRESSURE: 60 MMHG | TEMPERATURE: 97 F | WEIGHT: 224 LBS | SYSTOLIC BLOOD PRESSURE: 108 MMHG | HEART RATE: 72 BPM | HEART RATE: 72 BPM | HEART RATE: 77 BPM | DIASTOLIC BLOOD PRESSURE: 62 MMHG | HEIGHT: 73 IN | DIASTOLIC BLOOD PRESSURE: 62 MMHG | DIASTOLIC BLOOD PRESSURE: 62 MMHG | SYSTOLIC BLOOD PRESSURE: 100 MMHG | SYSTOLIC BLOOD PRESSURE: 102 MMHG | HEIGHT: 75 IN | HEART RATE: 64 BPM | DIASTOLIC BLOOD PRESSURE: 74 MMHG | WEIGHT: 224.6 LBS | HEART RATE: 96 BPM | BODY MASS INDEX: 30.35 KG/M2 | DIASTOLIC BLOOD PRESSURE: 70 MMHG | WEIGHT: 225 LBS

## 2022-02-11 VITALS
HEIGHT: 75 IN | DIASTOLIC BLOOD PRESSURE: 60 MMHG | BODY MASS INDEX: 28.6 KG/M2 | BODY MASS INDEX: 28.35 KG/M2 | SYSTOLIC BLOOD PRESSURE: 91 MMHG | TEMPERATURE: 96.9 F | WEIGHT: 230 LBS | OXYGEN SATURATION: 97 % | DIASTOLIC BLOOD PRESSURE: 70 MMHG | TEMPERATURE: 97.2 F | DIASTOLIC BLOOD PRESSURE: 65 MMHG | HEIGHT: 75 IN | HEART RATE: 77 BPM | WEIGHT: 229.4 LBS | HEART RATE: 108 BPM | TEMPERATURE: 97.5 F | DIASTOLIC BLOOD PRESSURE: 64 MMHG | DIASTOLIC BLOOD PRESSURE: 60 MMHG | DIASTOLIC BLOOD PRESSURE: 60 MMHG | WEIGHT: 226 LBS | SYSTOLIC BLOOD PRESSURE: 106 MMHG | HEART RATE: 73 BPM | SYSTOLIC BLOOD PRESSURE: 106 MMHG | DIASTOLIC BLOOD PRESSURE: 64 MMHG | HEIGHT: 75 IN | HEIGHT: 75 IN | SYSTOLIC BLOOD PRESSURE: 104 MMHG | SYSTOLIC BLOOD PRESSURE: 102 MMHG | BODY MASS INDEX: 28.1 KG/M2 | SYSTOLIC BLOOD PRESSURE: 108 MMHG | WEIGHT: 228 LBS | BODY MASS INDEX: 28.67 KG/M2 | BODY MASS INDEX: 28.72 KG/M2 | SYSTOLIC BLOOD PRESSURE: 113 MMHG | HEART RATE: 90 BPM | WEIGHT: 231 LBS | HEART RATE: 76 BPM

## 2022-02-11 VITALS
DIASTOLIC BLOOD PRESSURE: 60 MMHG | SYSTOLIC BLOOD PRESSURE: 118 MMHG | SYSTOLIC BLOOD PRESSURE: 102 MMHG | DIASTOLIC BLOOD PRESSURE: 56 MMHG

## 2022-02-11 VITALS — DIASTOLIC BLOOD PRESSURE: 48 MMHG | SYSTOLIC BLOOD PRESSURE: 100 MMHG | TEMPERATURE: 99.5 F | HEART RATE: 64 BPM

## 2022-02-12 VITALS
BODY MASS INDEX: 29.84 KG/M2 | HEIGHT: 75 IN | SYSTOLIC BLOOD PRESSURE: 115 MMHG | BODY MASS INDEX: 27.5 KG/M2 | TEMPERATURE: 97.9 F | OXYGEN SATURATION: 96 % | SYSTOLIC BLOOD PRESSURE: 108 MMHG | DIASTOLIC BLOOD PRESSURE: 68 MMHG | SYSTOLIC BLOOD PRESSURE: 110 MMHG | SYSTOLIC BLOOD PRESSURE: 104 MMHG | WEIGHT: 235 LBS | HEART RATE: 84 BPM | HEART RATE: 80 BPM | BODY MASS INDEX: 28.23 KG/M2 | SYSTOLIC BLOOD PRESSURE: 100 MMHG | OXYGEN SATURATION: 95 % | HEART RATE: 68 BPM | DIASTOLIC BLOOD PRESSURE: 62 MMHG | RESPIRATION RATE: 20 BRPM | SYSTOLIC BLOOD PRESSURE: 120 MMHG | WEIGHT: 75 LBS | HEART RATE: 84 BPM | DIASTOLIC BLOOD PRESSURE: 68 MMHG | HEIGHT: 75 IN | WEIGHT: 240 LBS | BODY MASS INDEX: 29.22 KG/M2 | SYSTOLIC BLOOD PRESSURE: 106 MMHG | DIASTOLIC BLOOD PRESSURE: 73 MMHG | BODY MASS INDEX: 9.37 KG/M2 | WEIGHT: 220 LBS | WEIGHT: 238 LBS | HEART RATE: 91 BPM | WEIGHT: 227 LBS | BODY MASS INDEX: 29.59 KG/M2 | DIASTOLIC BLOOD PRESSURE: 79 MMHG | BODY MASS INDEX: 29 KG/M2 | TEMPERATURE: 98.1 F | HEART RATE: 85 BPM | DIASTOLIC BLOOD PRESSURE: 65 MMHG | HEART RATE: 70 BPM | DIASTOLIC BLOOD PRESSURE: 62 MMHG | HEIGHT: 75 IN | HEIGHT: 75 IN | WEIGHT: 232 LBS

## 2022-02-16 NOTE — NURSING NOTE
Comprehensive Intake Entered On:  3/21/2019 7:57 AM CDT    Performed On:  3/21/2019 7:49 AM CDT by Radha Juan               Summary   Chief Complaint :   Cardio f/u.    Menstrual Status :   N/A   Weight Measured :   215 lb(Converted to: 215 lb 0 oz, 97.52 kg)    Height Measured :   75 in(Converted to: 6 ft 3 in, 190.50 cm)    Body Mass Index :   26.87 kg/m2 (HI)    Body Surface Area :   2.27 m2   Systolic Blood Pressure :   107 mmHg   Diastolic Blood Pressure :   68 mmHg   Mean Arterial Pressure :   81 mmHg   Peripheral Pulse Rate :   76 bpm   Radha Juan - 3/21/2019 7:49 AM CDT   Health Status   Allergies Verified? :   Yes   Medication History Verified? :   Yes   Medical History Verified? :   Yes   Pre-Visit Planning Status :   Completed   Tobacco Use? :   Never smoker   Radha Juan - 3/21/2019 7:49 AM CDT   Consents   Consent for Immunization Exchange :   Consent Granted   Consent for Immunizations to Providers :   Consent Granted   Radha Juan - 3/21/2019 7:49 AM CDT   Meds / Allergies   (As Of: 3/21/2019 7:57:03 AM CDT)   Allergies (Active)   adhesive tape  Estimated Onset Date:   Unspecified ; Created By:   Floridalma Calderon; Reaction Status:   Active ; Category:   Other ; Substance:   adhesive tape ; Type:   Allergy ; Updated By:   Floridalma Calderon; Reviewed Date:   3/21/2019 7:52 AM CDT      No Known Medication Allergies  Estimated Onset Date:   Unspecified ; Created By:   Ellen Parker MA; Reaction Status:   Active ; Category:   Drug ; Substance:   No Known Medication Allergies ; Type:   Allergy ; Updated By:   Ellen Parker MA; Reviewed Date:   3/21/2019 7:52 AM CDT        Medication List   (As Of: 3/21/2019 7:57:03 AM CDT)   Prescription/Discharge Order    warfarin 3 mg oral tablet  :   warfarin 3 mg oral tablet ; Status:   Prescribed ; Ordered As Mnemonic:   warfarin 3 mg oral tablet ; Simple Display Line:   1 tab(s), Oral, daily, 90 unknown unit ; Ordering Provider:    Shravan Berrios MD; Catalog Code:   warfarin ; Order Dt/Tm:   1/8/2019 3:42:05 PM          tamsulosin  :   tamsulosin ; Status:   Prescribed ; Ordered As Mnemonic:   tamsulosin 0.4 mg oral capsule ; Simple Display Line:   0.4 mg, 1 cap(s), Oral, daily, 30 cap(s), 0 Refill(s) ; Ordering Provider:   Shravan Berrios MD; Catalog Code:   tamsulosin ; Order Dt/Tm:   7/20/2018 2:46:14 PM          metroNIDAZOLE topical  :   metroNIDAZOLE topical ; Status:   Prescribed ; Ordered As Mnemonic:   MetroGel 1% topical gel ; Simple Display Line:   1 nazario, Topical, daily, 1 tubes, 5 Refill(s) ; Ordering Provider:   Rissa Arauz MD; Catalog Code:   metroNIDAZOLE topical ; Order Dt/Tm:   6/29/2018 3:46:29 PM          clotrimazole topical  :   clotrimazole topical ; Status:   Prescribed ; Ordered As Mnemonic:   clotrimazole 1% topical cream ; Simple Display Line:   1 nazario, TOP, BID, for 7 day(s), 30 gm, 0 Refill(s) ; Ordering Provider:   Rissa Arauz MD; Catalog Code:   clotrimazole topical ; Order Dt/Tm:   6/22/2018 3:35:41 PM          Misc Prescription  :   Misc Prescription ; Status:   Prescribed ; Ordered As Mnemonic:   [id number] URINARY DRAIN BAG 2000ML MG BEAD ; Simple Display Line:   See Instructions, USE AS DIRECTED, 1 unknown unit ; Ordering Provider:   Shravan Berrios MD; Catalog Code:   Miscellaneous Prescription ; Order Dt/Tm:   6/4/2018 7:34:17 AM          allopurinol 300 mg oral tablet  :   allopurinol 300 mg oral tablet ; Status:   Prescribed ; Ordered As Mnemonic:   allopurinol 300 mg oral tablet ; Simple Display Line:   See Instructions, TAKE ONE TABLET BY MOUTH ONCE DAILY, 90 unknown unit, 3 Refill(s) ; Ordering Provider:   Shravan Berrios MD; Catalog Code:   allopurinol ; Order Dt/Tm:   5/10/2018 11:15:36 AM          mirtazapine  :   mirtazapine ; Status:   Prescribed ; Ordered As Mnemonic:   mirtazapine 15 mg oral tablet ; Simple Display Line:   15 mg, 1 tab(s), PO, Once a day (at bedtime), 30 tab(s), 5  Refill(s) ; Ordering Provider:   Shravan Berrios MD; Catalog Code:   mirtazapine ; Order Dt/Tm:   3/27/2018 11:38:20 AM          predniSONE  :   predniSONE ; Status:   Prescribed ; Ordered As Mnemonic:   predniSONE 20 mg oral tablet ; Simple Display Line:   See Instructions, 2 po daily for 3-5 days prn gout, 30 EA, 0 Refill(s) ; Ordering Provider:   Shravan Berrios MD; Catalog Code:   predniSONE ; Order Dt/Tm:   12/29/2017 10:06:41 AM          atorvastatin 40 mg oral tablet  :   atorvastatin 40 mg oral tablet ; Status:   Prescribed ; Ordered As Mnemonic:   atorvastatin 40 mg oral tablet ; Simple Display Line:   See Instructions, TAKE ONE TABLET BY MOUTH AT BEDTIME, 90 unknown unit ; Ordering Provider:   Shravan Berrios MD; Catalog Code:   atorvastatin ; Order Dt/Tm:   11/13/2017 11:13:28 AM          Metoprolol Succinate ER 25 mg oral tablet, extended release  :   Metoprolol Succinate ER 25 mg oral tablet, extended release ; Status:   Prescribed ; Ordered As Mnemonic:   Metoprolol Succinate ER 25 mg oral tablet, extended release ; Simple Display Line:   See Instructions, TAKE ONE-HALF TABLET BY MOUTH TWICE DAILY, 30 unknown unit, 11 Refill(s) ; Ordering Provider:   Shravan Berrios MD; Catalog Code:   metoprolol ; Order Dt/Tm:   9/25/2017 1:33:08 PM          omeprazole  :   omeprazole ; Status:   Prescribed ; Ordered As Mnemonic:   omeprazole 20 mg oral delayed release capsule ; Simple Display Line:   20 mg, 1 cap(s), po, daily, 90 cap(s), 3 Refill(s) ; Ordering Provider:   Shravan Berrios MD; Catalog Code:   omeprazole ; Order Dt/Tm:   1/9/2017 12:26:06 PM          potassium chloride 20 mEq oral tablet, extended release  :   potassium chloride 20 mEq oral tablet, extended release ; Status:   Prescribed ; Ordered As Mnemonic:   potassium chloride 20 mEq oral tablet, extended release ; Simple Display Line:   See Instructions, 3 tab BID, 30 unknown unit, 5 Refill(s) ; Ordering Provider:   Shravan Berrios MD; Catalog  Code:   potassium chloride ; Order Dt/Tm:   4/13/2016 11:25:17 AM          nitroglycerin  :   nitroglycerin ; Status:   Prescribed ; Ordered As Mnemonic:   nitroglycerin 0.4 mg sublingual tablet ; Simple Display Line:   0.4 mg, 1 tab(s), SL, q5min, not to exceed 3 doses/15 min--if pain persists, seek medical attention, 25 tab(s), PRN: for chest pain ; Ordering Provider:   Shravan Berrios MD; Catalog Code:   nitroglycerin ; Order Dt/Tm:   8/28/2014 11:35:30 AM            Home Meds    spironolactone  :   spironolactone ; Status:   Documented ; Ordered As Mnemonic:   spironolactone 50 mg oral tablet ; Simple Display Line:   50 mg, 1 tab(s), Oral, daily, 0 Refill(s) ; Catalog Code:   spironolactone ; Order Dt/Tm:   3/1/2019 3:52:14 PM          cyanocobalamin  :   cyanocobalamin ; Status:   Documented ; Ordered As Mnemonic:   Vitamin B12 1000 mcg/mL injectable solution ; Simple Display Line:   1,000 mcg, im, qmonth, 0 Refill(s) ; Catalog Code:   cyanocobalamin ; Order Dt/Tm:   7/24/2017 11:12:05 AM          metOLazone  :   metOLazone ; Status:   Documented ; Ordered As Mnemonic:   metOLazone 2.5 mg oral tablet ; Simple Display Line:   See Instructions, 1 tab(s) po every other daily for weight > 224#. Per Dr Quintana 2/7/19 increase metolazone 1-2 times per week to acheive weight of 220 lbs, 0 Refill(s) ; Catalog Code:   metOLazone ; Order Dt/Tm:   4/19/2017 10:48:13 AM          digoxin  :   digoxin ; Status:   Documented ; Ordered As Mnemonic:   digoxin 125 mcg (0.125 mg) oral tablet ; Simple Display Line:   125 mcg, 1 tab(s), po, daily, 0 Refill(s) ; Catalog Code:   digoxin ; Order Dt/Tm:   4/19/2017 10:47:22 AM          furosemide  :   furosemide ; Status:   Documented ; Ordered As Mnemonic:   furosemide 80 mg oral tablet ; Simple Display Line:   80 mg, 1 tab(s), po, bid, 80 mg AM and 40 mg PM, 0 Refill(s) ; Catalog Code:   furosemide ; Order Dt/Tm:   3/15/2017 3:19:58 PM          senna  :   senna ; Status:   Documented  ; Ordered As Mnemonic:   Senokot 8.6 mg oral tablet ; Simple Display Line:   1-2 tab(s), PO, Once a day (at bedtime), PRN: for constipation ; Catalog Code:   senna ; Order Dt/Tm:   12/20/2016 2:00:45 PM          acetaminophen  :   acetaminophen ; Status:   Documented ; Ordered As Mnemonic:   acetaminophen 325 mg oral tablet ; Simple Display Line:   650 mg, 2 tab(s), po, q4 hrs, not to exceed 4000 mg/day, 0 Refill(s) ; Catalog Code:   acetaminophen ; Order Dt/Tm:   12/20/2016 1:58:24 PM          multivitamin  :   multivitamin ; Status:   Documented ; Ordered As Mnemonic:   Protegra oral tablet ; Simple Display Line:   1 tab(s), po, daily ; Catalog Code:   multivitamin ; Order Dt/Tm:   6/1/2015 8:38:03 AM

## 2022-02-16 NOTE — PROGRESS NOTES
Chief Complaint    f/u [facility] 6/7-6/13 for GI bleed. EGD done 6/8. Productive cough with yellow/milky sputum.  History of Present Illness      Patient was hospitalized at [facility] from June 7-13 with upper GI bleeding due to gastric polyps.  He had an EGD.  Was transfused a unit of FFP and 5 units of red cells.  Tight red cell was negative.  He underwent therapeutic paracentesis.  He has had a cough with yellowish sputum.  This is been going on prior to hospitalization.  He feels tired.  His stools are brown.  No abdominal pain or confusion.  His diuretic doses were decreased to 40 daily of furosemide and 50 daily of spironolactone.  His warfarin was discontinued.  Review of Systems      No PND orthopnea.  He has developed edema.  No chest pain.  No fever or chills.  No abdominal pain or confusion.  No dysuria.  No incontinence.  Physical Exam   Vitals & Measurements    T: 97.5   F (Tympanic)  HR: 70(Peripheral)  BP: 96/60  SpO2: 91%     WT: 199 lb       Weight is down 20 pounds with paracentesis.  Patient appears comfortable.  Alert and oriented.  Sclera anicteric.  Device right chest.  Cardiac exam is regular.  Lungs are clear to auscultation and percussion.  Abdomen is soft and nontender.  2+ edema about a third of the way up the shin..  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         Ordered:          44493 transitional care manage service 7 day discharge (Charge), Quantity: 1, 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, chronic  Acquired arteriovenous malformation of colon  AF (Atrial Fibrillation)  Cirrhosis  Right heart failure                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,  chronic (D50.0)         We will check hemoglobin, INR, BMP today.         Ordered:          53272 transitional care manage service 7 day discharge (Charge), Quantity: 1, 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, chronic  Acquired arteriovenous malformation of colon  AF (Atrial Fibrillation)  Cirrhosis  Right heart failure                Cirrhosis (K74.60)         Status post paracentesis.  Esophageal varices not noted.         Ordered:          16452 transitional care manage service 7 day discharge (Charge), Quantity: 1, 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, chronic  Acquired arteriovenous malformation of colon  AF (Atrial Fibrillation)  Cirrhosis  Right heart failure                Right heart failure (I50.9)         I think this is worsened with some pulmonary congestion and edema.  Will check lab work today.  Likely increase diuretics by doubling them.  Follow-up next week.         Ordered:          [id number] transitional care manage service 7 day discharge (Charge), Quantity: 1, 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, chronic  Acquired arteriovenous malformation of colon  AF (Atrial Fibrillation)  Cirrhosis  Right heart failure                Orders:         omeprazole, = 1 cap(s) ( 20 mg ), po, daily, # 90 cap(s), 3 Refill(s), Type: Maintenance, Pharmacy: [facility], (Completed)         potassium chloride, See Instructions, Instructions: 2 tab BID, # 30 unknown unit, 5 Refill(s), Type: Soft Stop, Pharmacy: [facility], (Completed)         warfarin, See Instructions, Instructions: 3mg T, TH, S Yung. 1.5 mg MWF, # 100 EA, 3 Refill(s), Type: Soft Stop, Pharmacy: [facility], (Completed)         Basic Metabolic Panel* ([facility]), Specimen Type: Serum, Collection Date: 06/18/19 12:09:00 CDT         CBC (h/h, RBC, indices, WBC, Plt)* ([facility]), Specimen Type: Blood, Collection Date: 06/18/19 12:09:00 CDT         Prothrombin time-INR* ([facility]), Specimen Type: Blood, Collection Date: 06/18/19 12:09:00 CDT         Return to Clinic (Request), RFV: Cardio FU  with Dr Quintana, Return in 1 month         Return to Clinic (Request), RFV: labs per JDL         Return to Clinic (Request), Return in 2 weeks         Return to Clinic (Request), RFV: Waiting INR, BMP, CBC., Return in 5/8/19, Instructions: Copy to Dr. Joyner         Return to Clinic (Request), Return in 1 week         Return to Clinic (Request), RFV: F/U CHF, Return in 6-8 weeks  Patient Information     Name:JACINTO JIN      Address:      [address] [place] [id number]     Sex:Male     YOB: 1935     [phone]     Emergency Contact:GERA JIN     MRN:[id number]     FIN:[id number]     Location:[facility]     Date of Service:06/18/2019      Primary Care Physician:       Shravan Berrios MD, [phone]      Attending Physician:       Shravan Berrios MD, [phone]  Problem List/Past Medical History    Ongoing     Acquired arteriovenous malformation of colon       Comments: Treated.     Acute kidney injury (nontraumatic)     AF (Atrial Fibrillation)     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, chronic     Anemia of renal disease     Anticardiolipin syndrome     Anticoagulated     Ascites       Comments: Negative cytology 12/6/18     B12 deficiency     BPH with urinary obstruction     CAD (coronary artery disease)     Cardiorenal syndrome     CHB (complete heart block)     Chronic diastolic CHF (congestive heart failure), NYHA class 3     Cirrhosis     Depression     Diverticulosis     Dupuytren's contracture of right hand     Gout     H/O acute pancreatitis     High cholesterol     History of acute bacterial endocarditis     History of coronary artery bypass graft     History of GI bleed       Comments: Again 04/30/2019     History of tricuspid valve replacement     Hx of colonic polyp     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     IBS (irritable bowel syndrome)     ICD (implantable cardioverter-defibrillator) infection     Inguinal hernia, right      Iron deficiency anemia     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     Nonischemic dilated cardiomyopathy     Obesity     KEYONA (obstructive sleep apnea)       Comments: Adequate titration to BIPAP 16/11 on 10/15/2013     Osteoarthritis of both knees     Paralysis, diaphragm       Comments: Left     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     PUD (peptic ulcer disease)     Right heart failure     Stage 3 chronic kidney disease     Tricuspid valve insufficiency     Vitamin B 12 deficiency    Historical     BE (bacterial endocarditis)     Colon polyps     History of sepsis     Inpatient stay       Comments: @[facility] - Jaw pain, possible anginal equivalent     Inpatient stay       Comments: @[facility] - Infected pacemaker     Inpatient stay       Comments: @[facility] - Diverticulitis     Inpatient stay       Comments: @[facility] - S/P tricuspid valve replacement with worsening right ventricular function     Inpatient stay       Comments: @[facility] - Severe symptomatic tricuspid valvular insufficiency. Chronic right heart failure.     Inpatient stay       Comments: @[facility] - Acute on chronic right heart failure     Inpatient stay       Comments: @[facility] - Anemia     Inpatient stay       Comments: @[facility], [place] - GI bleeding, suspected upper source     Inpatient stay       Comments: @[facility], [place] - Acute GI bleeding     Other and Unspecified Noninfectious Gastroenteritis and Colitis  Procedure/Surgical History     Esophagogastroduodenoscopy (06/08/2019)      Comments: Indication: GI bleeding      Sedation: Fentanyl 50 mcg, Versed 2 mg      Findings: Multiple gastric polyps one of which was oozing. Esophagus and duodenum normal      Rec: D/C PPI, H2 blocker, hold Warfarin.     Left Extracapsular Cataract extraction with intraocular lens implant (05/28/2019)      Comments: Left.. Dr. Cornelius.     Esophagogastroduodenoscopy (02/12/2019)      Colonoscopy (12/07/2018)      Comments: Indication: GIB      Sedation: fentanyl 50 mcg, Versed 1 mg      Findings: Diverticulosis, 2 small AVMs with bleeding in ascending treated with APC      Rec: Consider further workup based on tu to treatemetn.     Esophagogastroduodenoscopy and biopsy (12/07/2018)      Comments: Indication: GIB      Sedatoin: Fentanyl 50 mcg, Versed 2 mg      findings: Negative with negative duodenal biopsy      Rec: Colonoscopy.     Abdominal paracentesis (12/06/2018)      Comments: Negative cytology.     Bone marrow biopsy (07/12/2017)     Colonoscopy (05/27/2017)      Comments: Cecal tubular adenoma, pandiverticulosis. w/polypectomy.     Esophagogastroduodenoscopy (05/27/2017)      Comments: gastritis, fundic gland polyps.     TVR - Tricuspid valve replacement (11/29/2016)     Cardiac angiogram (11/01/2016)      Comments: Summary/Conclusions      PRESENTATION / INDICATIONS        Pre-surgical evaluation for cardiac surgery        Severe TR by echo      DIAGNOSTIC - CORONARY        Co-dominant coronary artery system        The left main artery was normal in appearance and free of obstructive disease.        Chronic total occlusion of the proximal LAD        The circumflex artery has minimal disease.        The RCA has moderate disease.        No change since 2013      DIAGNOSTIC - GRAFTS        Chronic total occlusion in the proximal anastomosis of the SVG graft to the RCA        The SVG to the ramus intermediate is patent        The sequential graft to the 1st diagonal is patent.        The sequential graft limb from the diagonal to the LAD is patent.        No change since 2013      HEMODYNAMICS        The LVEDP is mildly elevated        Severely elevated right atrial pressures        No evidence of intracardiac shunting.     Limited thoracotomy (08/23/2016)      Comments: Left mini thoracotomy and placement of two epicardial screw in leads.  Implant of left pectoral pacer  estefania..     Implantation of intravenous single chamber cardiac pacemaker system (08/04/2016)     Removal of automatic cardiac defibrillator (08/04/2016)      Comments: Generator and leads.     Pacemaker change (04/08/2015)     Cardiac angiogram (08/02/2013)      Comments: Summary/Conclusions      PRESENTATION / INDICATIONS      Chest pain      DIAGNOSTIC - CORONARY      Right dominant coronary artery system      DIAGNOSTIC SUMMARY      100% stenosis in the Proximal LAD      30% stenosis in the 1st Marginal      30% stenosis in the Proximal RCA      50% stenosis in the Mid RCA      100% stenosis in the Aorta graft to Distal RCA      DIAGNOSTIC - GRAFTS      The sequential graft to the diagonal branch is patent.      The sequential graft from the diagonal to the LAD is patent.      The SVG to the ramus intermediate is patent      The SVG to RCA is chronically occluded      HEMODYNAMICS      The LVEDP is mildly elevated      RECOMMENDATIONS & PLAN      Medical Rx- no significant change from previous angiogram, there is dramatic mismatch in size between the SVG's and the target arteries with slow filling of the LAD.     angiogrqam (02/03/2012)      Comments: Summary/Conclusions      PRESENTATION / INDICATIONS      Dyspnea and fatigue.      DIAGNOSTIC - CORONARY      Right dominant coronary artery system.      LMCA is normal.      LAD is occluded proximally after the 1st septal branch.      LCx has mild luminal irregularities.  OM1 has 20-30% proximal stenosis.      RCA has 30-40% diffuse disease in the mid segment and otherwise has mild diffuse luminal irregularities.      DIAGNOSTIC - GRAFTS      The sequential SVG to the diagonal and then LAD is widely patent.  There is a significant size mismatch between the graft and the native vessels.  The diagonal and distal LAD have no obvious lesions but are small caliber (< 2.0 mm vessels).  There is some backfilling into the mid LAD.      The SVG to the ramus is widely  "patent.  There is a significant size mismatch between the graft and the native vessel.  The ramus has no obvious lesion but is small caliber (< 2.0 mm vessel).        The SVG to the RCA is chronically occluded.      LEFT VENTRICULAR FUNCTION      Left ventricular function is mildly reduced with EF of 42% and mild global hypokinesis.  No significant MR.      HEMODYNAMICS      The LVEDP is 6 mmHg.  No significant gradient across the aortic valve.      RA 11, RV 29/10, PA 34/17 (mean 25), PCWP 18      Amarilys CO 5.1, TDCO 4.6      RECOMMENDATIONS & PLAN      Consider alternative etiology for symptoms.      Lasix dose decreased to 40 mg QD given relative hypotension and patient reporting feeling \"dry membranes.\".     Colonoscopy (12/15/2006)      Comments: Diverticulosis. No polyps..     Colonoscopy (06/22/2001)      Comments: hyperplastic polyp.     Cholecystectomy (06.2001)     CABG - Coronary artery bypass graft (2000)     History of Back Surgery (2000)     Colonoscopy (02/17/1993)      Comments: 1 adenoma, 1 hyperplastic, diverticulosis..     Left shoulder surgery (1991)     Ablation for atrial fibrillation      Comments: Subsequent pacemaker dependence..     Cholecystectomy     Colonoscopy     Esophagogastroduodenoscopy  Medications        nitroglycerin 0.4 mg sublingual tablet: 0.4 mg, 1 tab(s), SL, q5min, not to exceed 3 doses/15 min--if pain persists, seek medical attention, 25 tab(s), PRN: for chest pain.        Protegra oral tablet: 1 tab(s), po, daily.        acetaminophen 325 mg oral tablet: 650 mg, 2 tab(s), po, q4 hrs, not to exceed 4000 mg/day, 0 Refill(s).        furosemide 80 mg oral tablet: 40 mg, 0.5 tab(s), po, daily, 0 Refill(s).        digoxin 125 mcg (0.125 mg) oral tablet: 125 mcg, 1 tab(s), po, daily, 0 Refill(s).        Vitamin B12 1000 mcg/mL injectable solution: 1,000 mcg, im, qmonth, 0 Refill(s).        predniSONE 20 mg oral tablet: See Instructions, 2 po daily for 3-5 days prn gout, 30 EA, 0 " Refill(s).        672209 URINARY DRAIN BAG 2000ML MG BEAD: See Instructions, USE AS DIRECTED, 1 unknown unit.        clotrimazole 1% topical cream: 1 nazario, TOP, BID, for 7 day(s), 30 gm, 0 Refill(s).        MetroGel 1% topical gel: 1 nazario, Topical, daily, 1 tubes, 5 Refill(s).        tamsulosin 0.4 mg oral capsule: 0.4 mg, 1 cap(s), Oral, daily, 30 cap(s), 0 Refill(s).        spironolactone 50 mg oral tablet: 50 mg, 1 tab(s), Oral, daily, 0 Refill(s).        mirtazapine 15 mg oral tablet: 1 tab(s), Oral, qhs, 30 unknown unit, 11 Refill(s).        atorvastatin 10 mg oral tablet: 10 mg, 1 tab(s), Oral, hs, 30 tab(s), 0 Refill(s).        Metoprolol Succinate ER 25 mg oral tablet, extended release: See Instructions, TAKE ONE-HALF TABLET BY MOUTH TWICE DAILY, 180 EA, 3 Refill(s).        allopurinol 300 mg oral tablet: 300 mg, 1 tab(s), Oral, daily, for 90 day(s), 90 tab(s), 3 Refill(s).        Vitamin K1: 100 mcg, Oral, daily, for 14 day(s), 0 Refill(s).        famotidine 20 mg oral tablet: 20 mg, 1 tab(s), Oral, bid, 0 Refill(s).         Allergies    No Known Medication Allergies    adhesive tape  Social History    Smoking Status - 05/31/2019     Former smoker     Alcohol      Past, 03/30/2018     Employment/School      Employed, Work/School description: [employer] 11/11/2010     Exercise      Exercise type: Walking., 11/11/2010     Tobacco      Former smoker, quit more than 30 days ago, 08/23/2018  Family History    Aneurysm: Father.    Heart disease: Mother.  Immunizations      Vaccine Date Status      influenza virus vaccine, inactivated 11/27/2018 Given      influenza virus vaccine, inactivated 09/28/2015 Given      pneumococcal (PCV13) 04/21/2015 Given      influenza virus vaccine, inactivated 08/28/2014 Given      influenza virus vaccine, inactivated 10/29/2012 Given      influenza virus vaccine, inactivated 09/23/2011 Given      influenza virus vaccine, inactivated 10/26/2010 Given      influenza 10/23/2008  Recorded      Td 09/01/2005 Recorded      pneumococcal 11/28/2001 Recorded  Lab Results          Lab Results (Last 4 results within 90 days)           Sodium Level: 136 [135 mmol/L - 145 mmol/L] (05/08/19 08:48:00)          Sodium Level: 135 [135 mmol/L - 145 mmol/L] (04/30/19 14:45:00)          Sodium Level: 136 mmol/L [135 mmol/L - 146 mmol/L] (04/18/19 08:48:00)          Sodium Level: 137 mmol/L [135 mmol/L - 146 mmol/L] (04/02/19 09:01:00)          Sodium Level TR: 132 mEq/L (06/14/19 09:51:00)          Sodium Level TR: 134 mEq/L (05/17/19 12:02:00)          Potassium Level: 5.5 High [3.5 mmol/L - 5 mmol/L] (05/08/19 08:48:00)          Potassium Level: 4.8 [3.5 mmol/L - 5 mmol/L] (04/30/19 14:45:00)          Potassium Level: 4.6 mmol/L [3.5 mmol/L - 5.3 mmol/L] (04/18/19 08:48:00)          Potassium Level: 4.8 mmol/L [3.5 mmol/L - 5.3 mmol/L] (04/02/19 09:01:00)          Potassium Level TR: 5 mEq/L (06/14/19 09:51:00)          Potassium Level TR: 4.7 mEq/L (05/17/19 12:02:00)          Chloride Level: 100 [98 mmol/L - 110 mmol/L] (05/08/19 08:48:00)          Chloride Level: 99 [98 mmol/L - 110 mmol/L] (04/30/19 14:45:00)          Chloride Level: 102 mmol/L [98 mmol/L - 110 mmol/L] (04/18/19 08:48:00)          Chloride Level: 102 mmol/L [98 mmol/L - 110 mmol/L] (04/02/19 09:01:00)          Chloride TR: 100 mEq/L (06/14/19 09:51:00)          Chloride TR: 96 mEq/L (05/17/19 12:02:00)          CO2 Level: 27 [21 mmol/L - 31 mmol/L] (05/08/19 08:48:00)          CO2 Level: 27 [21 mmol/L - 31 mmol/L] (04/30/19 14:45:00)          CO2 Level: 27 mmol/L [20 mmol/L - 32 mmol/L] (04/18/19 08:48:00)          CO2 Level: 26 mmol/L [20 mmol/L - 32 mmol/L] (04/02/19 09:01:00)          CO2 TR: 25 mEq/L (06/14/19 09:51:00)          CO2 TR: 29 mEq/L (05/17/19 12:02:00)          AGAP: 9 [5  - 18] (05/08/19 08:48:00)          AGAP: 9 [5  - 18] (04/30/19 14:45:00)          Anion Gap TR: 7 mEq/L (06/14/19 09:51:00)          Anion Gap TR:  9 mEq/L (05/17/19 12:02:00)          Glucose Level: 109 High [65 mg/dL - 100 mg/dL] (05/08/19 08:48:00)          Glucose Level: 109 High [65 mg/dL - 100 mg/dL] (04/30/19 14:45:00)          Glucose Level: 98 mg/dL [65 mg/dL - 99 mg/dL] (04/18/19 08:48:00)          Glucose Level: 98 mg/dL [65 mg/dL - 99 mg/dL] (04/02/19 09:01:00)          Glucose Level TR: 91 mg/dL (06/14/19 09:51:00)          Glucose Level TR: 91 mg/dL (05/17/19 12:02:00)          BUN: 64 High [8 mg/dL - 25 mg/dL] (05/08/19 08:48:00)          BUN: 67 High [8 mg/dL - 25 mg/dL] (04/30/19 14:45:00)          BUN: 55 mg/dL High [7 mg/dL - 25 mg/dL] (04/18/19 08:48:00)          BUN: 61 mg/dL High [7 mg/dL - 25 mg/dL] (04/02/19 09:01:00)          BUN TR: 37 mg/dL (06/14/19 09:51:00)          BUN TR: 56 mg/dL (05/17/19 12:02:00)          Creatinine Level: 2.51 High [0.72 mg/dL - 1.25 mg/dL] (05/08/19 08:48:00)          Creatinine Level: 2.46 High [0.72 mg/dL - 1.25 mg/dL] (04/30/19 14:45:00)          Creatinine Level: 2.34 mg/dL High [0.7 mg/dL - 1.11 mg/dL] (04/18/19 08:48:00)          Creatinine Level: 2.26 mg/dL High [0.7 mg/dL - 1.11 mg/dL] (04/02/19 09:01:00)          Creatinine TR: 1.66 mg/dL (06/14/19 09:51:00)          Creatinine TR: 2.44 mg/dL (05/17/19 12:02:00)          Creatinine TR: 2.56 mg/dL (05/02/19 05:32:00)          BUN/Creat Ratio: 25 High [10  - 20] (05/08/19 08:48:00)          BUN/Creat Ratio: 27 High [10  - 20] (04/30/19 14:45:00)          BUN/Creat Ratio: 24 High [6  - 22] (04/18/19 08:48:00)          BUN/Creat Ratio: 27 High [6  - 22] (04/02/19 09:01:00)          BUN/Creatinine Ratio TR: 22 (06/14/19 09:51:00)          BUN/Creatinine Ratio TR: 23 (05/17/19 12:02:00)          eGFR: 25 mL/min/1.73m2 Low (04/18/19 08:48:00)          eGFR: 26 mL/min/1.73m2 Low (04/02/19 09:01:00)          eGFR TR: 40 mL/min (06/14/19 09:51:00)          eGFR TR: 26 mL/min (05/17/19 12:02:00)          eGFR : 30 Low (05/08/19 08:48:00)           eGFR : 31 Low (04/30/19 14:45:00)          eGFR African American: 29 mL/min/1.73m2 Low (04/18/19 08:48:00)          eGFR African American: 30 mL/min/1.73m2 Low (04/02/19 09:01:00)          eGFR Non-: 25 Low (05/08/19 08:48:00)          eGFR Non-: 25 Low (04/30/19 14:45:00)          Calcium Level: 9.5 [8.5 mg/dL - 10.5 mg/dL] (05/08/19 08:48:00)          Calcium Level: 9.7 [8.5 mg/dL - 10.5 mg/dL] (04/30/19 14:45:00)          Calcium Level: 9.4 mg/dL [8.6 mg/dL - 10.3 mg/dL] (04/18/19 08:48:00)          Calcium Level: 9.5 mg/dL [8.6 mg/dL - 10.3 mg/dL] (04/02/19 09:01:00)          Calcium TR: 8.7 mEq/dL (06/14/19 09:51:00)          Calcium TR: 8.9 mEq/dL (05/17/19 12:02:00)          Bilirubin Total TR: 0.9 mg/dL (06/14/19 09:51:00)          Alkaline Phosphatase TR: 294 unit/L (06/14/19 09:51:00)          AST TR: 17 unit/L (06/14/19 09:51:00)          ALT TR: 11 unit/L (06/14/19 09:51:00)          Protein Total TR: 6.3 gm/dL (06/14/19 09:51:00)          Albumin Level TR: 2.9 g/dL (06/14/19 09:51:00)          Globulin TR: 3.4 g/dL (06/14/19 09:51:00)          A/G Ratio TR: 0.9 (06/14/19 09:51:00)          Cholesterol: 82 mg/dL (04/02/19 09:01:00)          Non-HDL Cholesterol: 49 (04/02/19 09:01:00)          HDL: 33 mg/dL Low (04/02/19 09:01:00)          Cholesterol/HDL Ratio: 2.5 (04/02/19 09:01:00)          LDL: 35 (04/02/19 09:01:00)          Triglyceride: 64 mg/dL (04/02/19 09:01:00)          Iron TR: 15 nmol/L (06/14/19 09:51:00)          Iron TR: 22 nmol/L (05/30/19 12:10:00)          Ferritin TR: 919.4 ng/mL (06/14/19 09:51:00)          WBC: 6.5 [4.5  - 11] (05/08/19 08:48:00)          WBC TR: 5.3 x10^3/uL (06/14/19 09:42:00)          WBC TR: 6.6 x10^3/uL (05/30/19 12:10:00)          WBC TR: 5.6 x10^3/uL (05/17/19 12:02:00)          RBC: 2.95 Low [4.3  - 5.9] (05/08/19 08:48:00)          RBC TR: 2.86 x10^6/uL (06/14/19 09:42:00)          RBC TR: 2.46 x10^6/uL  (05/30/19 12:10:00)          RBC TR: 2.67 x10^6/uL (05/17/19 12:02:00)          Hgb: 8.6 Low [13.5 g/dL - 17.5 g/dL] (05/08/19 08:48:00)          Hgb: 7.7 Low [13.5 g/dL - 17.5 g/dL] (04/30/19 14:45:00)          Hgb: 8.6 gm/dL Low [13.2 gm/dL - 17.1 gm/dL] (04/18/19 08:48:00)          Hgb TR: 8.1 g/dL (06/14/19 09:42:00)          Hgb TR: 7.2 g/dL (05/30/19 12:10:00)          Hgb TR: 7.7 g/dL (05/17/19 12:02:00)          Hgb TR: 8.4 g/dL (05/02/19 05:32:00)          Hct: 28.0 Low [37 % - 53 %] (05/08/19 08:48:00)          Hct TR: 26.5 % (06/14/19 09:42:00)          Hct TR: 23.3 % (05/30/19 12:10:00)          Hct TR: 25 % (05/17/19 12:02:00)          MCV: 95 [80 fL - 100 fL] (05/08/19 08:48:00)          MCV: 94 [80 fL - 100 fL] (04/30/19 14:45:00)          MCV TR: 93 fL (06/14/19 09:42:00)          MCV TR: 95 fL (05/30/19 12:10:00)          MCV TR: 94 fL (05/17/19 12:02:00)          MCH: 29.2 [26 pg - 34 pg] (05/08/19 08:48:00)          MCH TR: 28.3 pg (06/14/19 09:42:00)          MCH TR: 29.3 pg (05/30/19 12:10:00)          MCH TR: 28.8 pg (05/17/19 12:02:00)          MCHC: 30.7 Low [32 g/dL - 36 g/dL] (05/08/19 08:48:00)          MCHC TR: 30.6 gm/dL (06/14/19 09:42:00)          MCHC TR: 30.9 gm/dL (05/30/19 12:10:00)          MCHC TR: 30.8 gm/dL (05/17/19 12:02:00)          RDW: 17.4 High [11.5 % - 15.5 %] (05/08/19 08:48:00)          RDW TR: 17.6 % (06/14/19 09:42:00)          RDW TR: 18 % (05/30/19 12:10:00)          RDW TR: 17 % (05/17/19 12:02:00)          Platelet: 143 [140  - 440] (05/08/19 08:48:00)          Platelet TR: 203 x10^3/uL (06/14/19 09:42:00)          Platelet TR: 211 x10^3/uL (05/30/19 12:10:00)          Platelet TR: 160 x10^3/uL (05/17/19 12:02:00)          MPV: 11.0 [6.5 fL - 11 fL] (05/08/19 08:48:00)          MPV (Mean Platelet Volume) TR: 8.9 fL (06/14/19 09:42:00)          MPV (Mean Platelet Volume) TR: 9.4 fL (05/30/19 12:10:00)          MPV (Mean Platelet Volume) TR: 10.7 fL (05/17/19  12:02:00)          Neutrophils Abs# TR: 75.9 % (05/30/19 12:10:00)          Neutrophils Abs# TR: 76.4 % (05/17/19 12:02:00)          Lymphocytes TR: 11 % (06/14/19 09:42:00)          Lymphocytes TR: 8.3 % (05/30/19 12:10:00)          Lymphocytes TR: 9.6 % (05/17/19 12:02:00)          Absolute Lymphocytes TR: 0.6 cells/uL (06/14/19 09:42:00)          Absolute Lymphocytes TR: 0.6 cells/uL (05/30/19 12:10:00)          Absolute Lymphocytes TR: 0.5 cells/uL (05/17/19 12:02:00)          Neutrophils TR: 75.3 % (06/14/19 09:42:00)          Absolute Neutrophils TR: 4 cells/uL (06/14/19 09:42:00)          Absolute Neutrophils TR: 5 cells/uL (05/30/19 12:10:00)          Absolute Neutrophils TR: 4.3 cells/uL (05/17/19 12:02:00)          Monocytes TR: 9 % (06/14/19 09:42:00)          Monocytes TR: 9 % (05/30/19 12:10:00)          Monocytes TR: 10.4 % (05/17/19 12:02:00)          Absolute Monocytes TR: 0.5 cells/uL (06/14/19 09:42:00)          Absolute Monocytes TR: 0.6 cells/uL (05/30/19 12:10:00)          Absolute Monocytes TR: 0.6 cells/uL (05/17/19 12:02:00)          Eosinophils TR: 4.3 % (06/14/19 09:42:00)          Eosinophils TR: 6.5 % (05/30/19 12:10:00)          Eosinophils TR: 3.2 % (05/17/19 12:02:00)          Absolute Eosinophils TR: 0.2 cells/uL (06/14/19 09:42:00)          Absolute Eosinophils TR: 0.4 cells/uL (05/30/19 12:10:00)          Absolute Eosinophils TR: 0.2 cells/uL (05/17/19 12:02:00)          Basophils TR: 0.4 % (06/14/19 09:42:00)          Basophils TR: 0.3 % (05/30/19 12:10:00)          Basophils TR: 0.4 % (05/17/19 12:02:00)          Absolute Basophils TR: 0 cells/uL (06/14/19 09:42:00)          Absolute Basophils TR: 0 cells/uL (05/30/19 12:10:00)          Absolute Basophils TR: 0 cells/uL (05/17/19 12:02:00)          Protime: 21.0 High [11.9  - 13.9] (05/08/19 08:48:00)          Protime: 24.1 High [11.9  - 13.9] (04/30/19 14:45:00)          PT: 17.5 High [9  - 11.5] (04/02/19 09:01:00)          PT  TR: 15.8 (06/13/19 10:07:00)          PT TR: 22 (05/17/19 12:02:00)          INR: 4.5 (06/07/19 14:33:00)          INR: 2 (05/17/19 17:18:00)          INR: 1.9 (05/08/19 12:23:00)          INR: 1.9 High (05/08/19 08:48:00)          INR TR: 1.3 (06/13/19 10:07:00)          INR TR: 2 (05/17/19 12:02:00)          INR TR: 1.9 (05/02/19 05:32:00)          Digoxin Level: 1.2 mcg/L [0.8 mcg/L - 2 mcg/L] (04/18/19 08:48:00)

## 2022-02-16 NOTE — RESULTS
Anticoagulation Therapy Entered On:  1/18/2019 3:44 PM CST    Performed On:  1/18/2019 3:40 PM CST by Mackenzie Quintana RN               Warfarin Management   Week 1 Sunday Dose :   1.5    Week 1 Monday Dose :   3    Week 1 Tuesday Dose :   1.5    Week 1 Wednesday Dose :   1.5    Week 1 Thursday Dose :   3    Week 1 Friday Dose :   1.5    Week 1 Saturday Dose :   1.5    Week 1 Dose Total :   13.5    Anticoagulation Goal :    Lab Test performed by:  [facility]  [address] [place] [id number]   Phone # [phone]  Fax # [phone]  Lab Draw   Planned Duration :   Indefinite   Indication :   Atrial fibrillation   Warfarin Management Comments :   [facility]  [address] [facility], [address]  [phone]  Capillary Specimen     International Normalization Ratio :   2.4    INR Range :   2.0 - 3.0   INR Therapeutic Range :   Yes   Warfarin Abnormal Findings :   none   Anticoagulation Recheck :   1 week   Warfarin Physician :   Shravan Berrios MD   Warfarin Special Instructions :   INR = 2.4 per POC Patient is to stay on 3mg warfarin on Mon and Thurs and 1.5mg the rest of the days of the week. Recheck INR in 1 week per protocol. Patient advised in office.,   Mackenzie Quintana RN - 1/18/2019 3:41 PM CST

## 2022-02-16 NOTE — TELEPHONE ENCOUNTER
---------------------  From: Domenica CANTU, Flavia MERCADO   Sent: 6/19/2019 1:59:04 PM CDT  Subject: General Message     Warfarin discontinued while pt in hospital for Upper GI bleed  INR card sent to [facility] for scanning  Patient de-activated in [facility] system

## 2022-02-16 NOTE — PROGRESS NOTES
Chief Complaint  follow up CHF  History of Present Illness  Oriented has completed his 5 iron infusions the next and last one is in 2 days. ?He feels his energy level and exercise tolerance has improved. ?His weight has been holding steady at 223 pounds and he will use metolazone the days he weighs more than that. ?He has a skin tear on his right arm from trauma yesterday been difficult to stop the bleeding may have addressed. ?No edema or chest pain. ?No other bleeding. ?No rectal bleeding or melena though he did have a Hemoccult positive's stool in the emergency room. ?Appetite is improved  Review of Systems  No fever, chills, dysuria, headache. ?He does not snore.  Problem List/Past Medical History  Ongoing  Acute kidney injury (nontraumatic)  AF (Atrial Fibrillation)  Anticardiolipin Syndrome  Anticoagulated  Ascites  CAD (Coronary Artery Disease)  CHB (complete heart block)  CHF (Congestive Heart Failure)  Colon Polyps  Diverticulosis  Dupuytren's contracture of right hand  GOUT  H/O acute pancreatitis  High Cholesterol  History of acute bacterial endocarditis  History of coronary artery bypass graft  History of tricuspid valve replacement  IBS (Irritable Bowel Syndrome)  ICD (implantable cardioverter-defibrillator) infection  Iron deficiency anemia  Nonischemic dilated cardiomyopathy  Obesity  KEYONA (Obstructive Sleep Apnea)  Osteoarthritis of both knees  Paralysis, diaphragm  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  Right heart failure  Tricuspid valve insufficiency  Type 2 diabetes mellitus  Resolved  BE (bacterial endocarditis)  History of sepsis  Inpatient stay  Inpatient stay  Inpatient stay  Inpatient stay  Inpatient stay  Inpatient stay  Other and Unspecified Noninfectious Gastroenteritis and Colitis  Procedure/Surgical History  TVR - Tricuspid valve replacement (11/29/2016),  Cardiac angiogram (11/01/2016),  Limited thoracotomy (08/23/2016),  Implantation of intravenous  single chamber cardiac pacemaker system (08/04/2016),  Removal of automatic cardiac defibrillator (08/04/2016),  Pacemaker change (04/08/2015),  Cardiac angiogram (08/02/2013),  angiogrqam (02/03/2012),  Colonoscopy (12/15/2006),  Colonoscopy (06/22/2001),  Cholecystectomy (06/2001),  CABG - Coronary artery bypass graft (2000),  History of Back Surgery (2000),  Colonoscopy (02/17/1993),  Left shoulder surgery (1991),  Ablation for atrial fibrillation,  Cholecystectomy,  Esophagogastroduodenoscopy.  Home Medications  acetaminophen 325 mg oral tablet, 650 mg, 2 tab(s), po, q4 hrs  aspirin 81 mg oral tablet, 81 mg, 1 tab(s), po, daily  atorvastatin 40 mg oral tablet, 2 refills  digoxin 125 mcg (0.125 mg) oral tablet, 125 mcg, 1 tab(s), po, daily  furosemide 80 mg oral tablet, 80 mg, 1 tab(s), po, bid  Lactobacillus Caps  melatonin 3 mg oral tablet, 3 mg, 1 tab(s), po, hs  metOLazone 2.5 mg oral tablet, See Instructions  Metoprolol Succinate ER 25 mg oral tablet, extended release, 5 refills  nitroglycerin 0.4 mg sublingual tablet, 0.4 mg, 1 tab(s), sl, q5 min, PRN, 3 refills  omeprazole 20 mg oral delayed release capsule, 20 mg, 1 cap(s), po, bid, 3 refills  potassium chloride 20 mEq oral tablet, extended release, 5 refills  Protegra oral tablet, 1 tab(s), po, daily  Sen[place]on-S, 2 tab(s), po, hs  Senokot 8.6 mg oral tablet, 1-2 tab(s), po, hs, PRN  Theragran, 1 tab(s), po, daily  triamcinolone 0.1% topical cream, 1 nazario, top, bid  Venofer 20 mg/mL intravenous solution, 200 mg, iv, 2x/wk  warfarin 3 mg oral tablet, 11 refills  Allergies  adhesive tape  Social History  Employment and Education  Employed, Work/School description: Farmer.  Exercise and Physical Activity  Exercise type: Walking.  Family History  Aneurysm: Father.  Heart disease: Mother.  Immunizations  Physical Exam  Vitals & Measurements  HR:?89?(Peripheral)?  BP:?122/73?  HT:?73?in?  WT:?223?lb?  BMI:?29.42?  Patient appears comfortable. ?Good spirits.  ?Alert and oriented. ?Chest is clear. ?Cardiac exam is regular. ?No edema. ?Right forearm has about 11 cm skin tear still losing a little bit on the dressing when and it is redressed. ?No evidence of infection.  Lab Results  INR today is 3.2. ?On May 7 electrolytes normal BUN 36 creatinine improved to 1.95 calcium normal hemoglobin stable at 8.5.  Diagnostic Results  Assessment/Plan  AF (Atrial Fibrillation)  Paced.  Anticoagulated  He is having some bleeding and INR is a little supratherapeutic. ?Will decrease warfarin slightly and repeat an INR next week.  CAD (Coronary Artery Disease)  No angina.  Iron deficiency anemia  Completing an iron infusion. ?Retic, hemoglobin next week. ?Endoscopies when stabilized.  Paralysis, diaphragm  Right heart failure  Seems relatively stable he will take his metolazone 4 weeks over 223 pounds  Skin tear of right upper arm without complication  We will keep it covered until healed and report any problems.  Orders:  Return (Request)

## 2022-02-16 NOTE — NURSING NOTE
Comprehensive Intake Entered On:  5/3/2019 4:40 PM CDT    Performed On:  5/3/2019 4:36 PM CDT by Radha Brooks CMA               Summary   Chief Complaint :   Hospital f/u   Menstrual Status :   N/A   Weight Measured :   214 lb(Converted to: 214 lb 0 oz, 97.07 kg)    Height/Length Estimated :   75 in(Converted to: 6 ft 3 in, 190.50 cm)    Systolic Blood Pressure :   100 mmHg   Diastolic Blood Pressure :   66 mmHg   Mean Arterial Pressure :   77 mmHg   Peripheral Pulse Rate :   74 bpm   BP Site :   Left arm   BP Method :   Manual   Oxygen Saturation :   98 %   Radha Brooks CMA - 5/3/2019 4:36 PM CDT   Health Status   Allergies Verified? :   Yes   Medication History Verified? :   Yes   Medical History Verified? :   Yes   Pre-Visit Planning Status :   Completed   Tobacco Use? :   Never smoker   Radha Brooks CMA - 5/3/2019 4:36 PM CDT   Consents   Consent for Immunization Exchange :   Consent Granted   Consent for Immunizations to Providers :   Consent Granted   Radha Brooks CMA - 5/3/2019 4:36 PM CDT   Meds / Allergies   (As Of: 5/3/2019 4:40:46 PM CDT)   Allergies (Active)   adhesive tape  Estimated Onset Date:   Unspecified ; Created By:   Floridalma Calderon; Reaction Status:   Active ; Category:   Other ; Substance:   adhesive tape ; Type:   Allergy ; Updated By:   Floridalma Calderon; Reviewed Date:   5/3/2019 4:37 PM CDT      No Known Medication Allergies  Estimated Onset Date:   Unspecified ; Created By:   Ellen Parker MA; Reaction Status:   Active ; Category:   Drug ; Substance:   No Known Medication Allergies ; Type:   Allergy ; Updated By:   Ellen Parker MA; Reviewed Date:   5/3/2019 4:37 PM CDT        Medication List   (As Of: 5/3/2019 4:40:46 PM CDT)   Prescription/Discharge Order    allopurinol 300 mg oral tablet  :   allopurinol 300 mg oral tablet ; Status:   Prescribed ; Ordered As Mnemonic:   allopurinol 300 mg oral tablet ; Simple Display Line:   See Instructions, TAKE ONE TABLET BY MOUTH ONCE DAILY, 90  unknown unit, 3 Refill(s) ; Ordering Provider:   Shravan Berrios MD; Catalog Code:   allopurinol ; Order Dt/Tm:   5/10/2018 11:15:36 AM          atorvastatin  :   atorvastatin ; Status:   Prescribed ; Ordered As Mnemonic:   atorvastatin 10 mg oral tablet ; Simple Display Line:   10 mg, 1 tab(s), Oral, hs, 30 tab(s), 0 Refill(s) ; Ordering Provider:   Shravan Berrios MD; Catalog Code:   atorvastatin ; Order Dt/Tm:   5/3/2019 11:54:45 AM          atorvastatin 40 mg oral tablet  :   atorvastatin 40 mg oral tablet ; Status:   Discontinued ; Ordered As Mnemonic:   atorvastatin 40 mg oral tablet ; Simple Display Line:   See Instructions, TAKE ONE TABLET BY MOUTH AT BEDTIME, 90 unknown unit ; Ordering Provider:   Shravan Berrios MD; Catalog Code:   atorvastatin ; Order Dt/Tm:   11/13/2017 11:13:28 AM          clotrimazole topical  :   clotrimazole topical ; Status:   Prescribed ; Ordered As Mnemonic:   clotrimazole 1% topical cream ; Simple Display Line:   1 nazario, TOP, BID, for 7 day(s), 30 gm, 0 Refill(s) ; Ordering Provider:   Rissa Arauz MD; Catalog Code:   clotrimazole topical ; Order Dt/Tm:   6/22/2018 3:35:41 PM          Metoprolol Succinate ER 25 mg oral tablet, extended release  :   Metoprolol Succinate ER 25 mg oral tablet, extended release ; Status:   Prescribed ; Ordered As Mnemonic:   Metoprolol Succinate ER 25 mg oral tablet, extended release ; Simple Display Line:   See Instructions, TAKE ONE-HALF TABLET BY MOUTH TWICE DAILY, 30 unknown unit, 11 Refill(s) ; Ordering Provider:   Shravan Berrios MD; Catalog Code:   metoprolol ; Order Dt/Tm:   9/25/2017 1:33:08 PM          metroNIDAZOLE topical  :   metroNIDAZOLE topical ; Status:   Prescribed ; Ordered As Mnemonic:   MetroGel 1% topical gel ; Simple Display Line:   1 nazario, Topical, daily, 1 tubes, 5 Refill(s) ; Ordering Provider:   Rissa Arauz MD; Catalog Code:   metroNIDAZOLE topical ; Order Dt/Tm:   6/29/2018 3:46:29 PM          mirtazapine 15 mg  oral tablet  :   mirtazapine 15 mg oral tablet ; Status:   Prescribed ; Ordered As Mnemonic:   mirtazapine 15 mg oral tablet ; Simple Display Line:   1 tab(s), Oral, qhs, 30 unknown unit, 11 Refill(s) ; Ordering Provider:   Shravan Berrios MD; Catalog Code:   mirtazapine ; Order Dt/Tm:   4/18/2019 8:36:08 AM          Misc Prescription  :   Misc Prescription ; Status:   Prescribed ; Ordered As Mnemonic:   [id number] URINARY DRAIN BAG 2000ML MG BEAD ; Simple Display Line:   See Instructions, USE AS DIRECTED, 1 unknown unit ; Ordering Provider:   Shravan Berrios MD; Catalog Code:   Miscellaneous Prescription ; Order Dt/Tm:   6/4/2018 7:34:17 AM          nitroglycerin  :   nitroglycerin ; Status:   Prescribed ; Ordered As Mnemonic:   nitroglycerin 0.4 mg sublingual tablet ; Simple Display Line:   0.4 mg, 1 tab(s), SL, q5min, not to exceed 3 doses/15 min--if pain persists, seek medical attention, 25 tab(s), PRN: for chest pain ; Ordering Provider:   Shravan Berrios MD; Catalog Code:   nitroglycerin ; Order Dt/Tm:   8/28/2014 11:35:30 AM          omeprazole  :   omeprazole ; Status:   Prescribed ; Ordered As Mnemonic:   omeprazole 20 mg oral delayed release capsule ; Simple Display Line:   20 mg, 1 cap(s), po, daily, 90 cap(s), 3 Refill(s) ; Ordering Provider:   Shravan Berrios MD; Catalog Code:   omeprazole ; Order Dt/Tm:   1/9/2017 12:26:06 PM          potassium chloride 20 mEq oral tablet, extended release  :   potassium chloride 20 mEq oral tablet, extended release ; Status:   Prescribed ; Ordered As Mnemonic:   potassium chloride 20 mEq oral tablet, extended release ; Simple Display Line:   See Instructions, 3 tab BID, 30 unknown unit, 5 Refill(s) ; Ordering Provider:   Shravan Berrios MD; Catalog Code:   potassium chloride ; Order Dt/Tm:   4/13/2016 11:25:17 AM          predniSONE  :   predniSONE ; Status:   Prescribed ; Ordered As Mnemonic:   predniSONE 20 mg oral tablet ; Simple Display Line:   See Instructions, 2 po  daily for 3-5 days prn gout, 30 EA, 0 Refill(s) ; Ordering Provider:   Shravan Berrios MD; Catalog Code:   predniSONE ; Order Dt/Tm:   12/29/2017 10:06:41 AM          tamsulosin  :   tamsulosin ; Status:   Prescribed ; Ordered As Mnemonic:   tamsulosin 0.4 mg oral capsule ; Simple Display Line:   0.4 mg, 1 cap(s), Oral, daily, 30 cap(s), 0 Refill(s) ; Ordering Provider:   Shravan Berrios MD; Catalog Code:   tamsulosin ; Order Dt/Tm:   7/20/2018 2:46:14 PM          warfarin  :   warfarin ; Status:   Prescribed ; Ordered As Mnemonic:   warfarin 3 mg oral tablet ; Simple Display Line:   1 tab(s), Oral, daily, or use as directed, 90 tab(s), 3 Refill(s) ; Ordering Provider:   Shravan Berrios MD; Catalog Code:   warfarin ; Order Dt/Tm:   4/23/2019 7:29:25 PM            Home Meds    acetaminophen  :   acetaminophen ; Status:   Documented ; Ordered As Mnemonic:   acetaminophen 325 mg oral tablet ; Simple Display Line:   650 mg, 2 tab(s), po, q4 hrs, not to exceed 4000 mg/day, 0 Refill(s) ; Catalog Code:   acetaminophen ; Order Dt/Tm:   12/20/2016 1:58:24 PM          cyanocobalamin  :   cyanocobalamin ; Status:   Documented ; Ordered As Mnemonic:   Vitamin B12 1000 mcg/mL injectable solution ; Simple Display Line:   1,000 mcg, im, qmonth, 0 Refill(s) ; Catalog Code:   cyanocobalamin ; Order Dt/Tm:   7/24/2017 11:12:05 AM          digoxin  :   digoxin ; Status:   Documented ; Ordered As Mnemonic:   digoxin 125 mcg (0.125 mg) oral tablet ; Simple Display Line:   125 mcg, 1 tab(s), po, daily, 0 Refill(s) ; Catalog Code:   digoxin ; Order Dt/Tm:   4/19/2017 10:47:22 AM          ferrous sulfate  :   ferrous sulfate ; Status:   Documented ; Ordered As Mnemonic:   ferrous sulfate ; Simple Display Line:   Oral, 0 Refill(s) ; Catalog Code:   ferrous sulfate ; Order Dt/Tm:   4/30/2019 2:48:23 PM          furosemide  :   furosemide ; Status:   Documented ; Ordered As Mnemonic:   furosemide 80 mg oral tablet ; Simple Display Line:   40  mg, 0.5 tab(s), po, bid, 0 Refill(s) ; Catalog Code:   furosemide ; Order Dt/Tm:   3/15/2017 3:19:58 PM          multivitamin  :   multivitamin ; Status:   Documented ; Ordered As Mnemonic:   Protegra oral tablet ; Simple Display Line:   1 tab(s), po, daily ; Catalog Code:   multivitamin ; Order Dt/Tm:   6/1/2015 8:38:03 AM          senna  :   senna ; Status:   Documented ; Ordered As Mnemonic:   Senokot 8.6 mg oral tablet ; Simple Display Line:   1-2 tab(s), PO, Once a day (at bedtime), PRN: for constipation ; Catalog Code:   senna ; Order Dt/Tm:   12/20/2016 2:00:45 PM          spironolactone  :   spironolactone ; Status:   Documented ; Ordered As Mnemonic:   spironolactone 50 mg oral tablet ; Simple Display Line:   50 mg, 1 tab(s), Oral, bid, increased to 50 mg BID per Dr Quintana 3/21/19, 0 Refill(s) ; Catalog Code:   spironolactone ; Order Dt/Tm:   3/1/2019 3:52:14 PM

## 2022-02-16 NOTE — NURSING NOTE
Comprehensive Intake Entered On:  2/7/2019 10:51 AM CST    Performed On:  2/7/2019 10:43 AM CST by Radha Juan               Summary   Chief Complaint :   F/u afib.   Menstrual Status :   N/A   Weight Measured :   226 lb(Converted to: 226 lb 0 oz, 102.51 kg)    Height Measured :   75 in(Converted to: 6 ft 3 in, 190.50 cm)    Body Mass Index :   28.24 kg/m2 (HI)    Body Surface Area :   2.33 m2   Systolic Blood Pressure :   91 mmHg   Diastolic Blood Pressure :   60 mmHg   Mean Arterial Pressure :   70 mmHg   Peripheral Pulse Rate :   73 bpm   Galarza RMRadha FAN - 2/7/2019 10:43 AM CST   Health Status   Allergies Verified? :   Yes   Medication History Verified? :   Yes   Medical History Verified? :   Yes   Pre-Visit Planning Status :   Completed   Tobacco Use? :   Never smoker   Radha Juan - 2/7/2019 10:43 AM CST   Meds / Allergies   (As Of: 2/7/2019 10:51:22 AM CST)   Allergies (Active)   adhesive tape  Estimated Onset Date:   Unspecified ; Created By:   Floridalma Calderon; Reaction Status:   Active ; Category:   Other ; Substance:   adhesive tape ; Type:   Allergy ; Updated By:   Floridalma Calderon; Reviewed Date:   2/7/2019 10:51 AM CST      No Known Medication Allergies  Estimated Onset Date:   Unspecified ; Created By:   Ellen Parker MA; Reaction Status:   Active ; Category:   Drug ; Substance:   No Known Medication Allergies ; Type:   Allergy ; Updated By:   Ellen Parker MA; Reviewed Date:   2/7/2019 10:51 AM CST        Medication List   (As Of: 2/7/2019 10:51:22 AM CST)   Prescription/Discharge Order    warfarin 3 mg oral tablet  :   warfarin 3 mg oral tablet ; Status:   Prescribed ; Ordered As Mnemonic:   warfarin 3 mg oral tablet ; Simple Display Line:   1 tab(s), Oral, daily, 90 unknown unit ; Ordering Provider:   Shravan Berrios MD; Catalog Code:   warfarin ; Order Dt/Tm:   1/8/2019 3:42:05 PM          tamsulosin  :   tamsulosin ; Status:   Prescribed ; Ordered As Mnemonic:   tamsulosin 0.4  mg oral capsule ; Simple Display Line:   0.4 mg, 1 cap(s), Oral, daily, 30 cap(s), 0 Refill(s) ; Ordering Provider:   Shravan Berrios MD; Catalog Code:   tamsulosin ; Order Dt/Tm:   7/20/2018 2:46:14 PM          metroNIDAZOLE topical  :   metroNIDAZOLE topical ; Status:   Prescribed ; Ordered As Mnemonic:   MetroGel 1% topical gel ; Simple Display Line:   1 nazario, Topical, daily, 1 tubes, 5 Refill(s) ; Ordering Provider:   Rissa Arauz MD; Catalog Code:   metroNIDAZOLE topical ; Order Dt/Tm:   6/29/2018 3:46:29 PM          clotrimazole topical  :   clotrimazole topical ; Status:   Prescribed ; Ordered As Mnemonic:   clotrimazole 1% topical cream ; Simple Display Line:   1 nazario, TOP, BID, for 7 day(s), 30 gm, 0 Refill(s) ; Ordering Provider:   Rissa Arauz MD; Catalog Code:   clotrimazole topical ; Order Dt/Tm:   6/22/2018 3:35:41 PM          Misc Prescription  :   Misc Prescription ; Status:   Prescribed ; Ordered As Mnemonic:   [id number] URINARY DRAIN BAG 2000ML MG BEAD ; Simple Display Line:   See Instructions, USE AS DIRECTED, 1 unknown unit ; Ordering Provider:   Shravan Berrios MD; Catalog Code:   Miscellaneous Prescription ; Order Dt/Tm:   6/4/2018 7:34:17 AM          allopurinol 300 mg oral tablet  :   allopurinol 300 mg oral tablet ; Status:   Prescribed ; Ordered As Mnemonic:   allopurinol 300 mg oral tablet ; Simple Display Line:   See Instructions, TAKE ONE TABLET BY MOUTH ONCE DAILY, 90 unknown unit, 3 Refill(s) ; Ordering Provider:   Shravan Berrios MD; Catalog Code:   allopurinol ; Order Dt/Tm:   5/10/2018 11:15:36 AM          mirtazapine  :   mirtazapine ; Status:   Prescribed ; Ordered As Mnemonic:   mirtazapine 15 mg oral tablet ; Simple Display Line:   15 mg, 1 tab(s), PO, Once a day (at bedtime), 30 tab(s), 5 Refill(s) ; Ordering Provider:   Shravan Berrios MD; Catalog Code:   mirtazapine ; Order Dt/Tm:   3/27/2018 11:38:20 AM          predniSONE  :   predniSONE ; Status:   Prescribed ;  Ordered As Mnemonic:   predniSONE 20 mg oral tablet ; Simple Display Line:   See Instructions, 2 po daily for 3-5 days prn gout, 30 EA, 0 Refill(s) ; Ordering Provider:   Shravan Berrios MD; Catalog Code:   predniSONE ; Order Dt/Tm:   12/29/2017 10:06:41 AM          atorvastatin 40 mg oral tablet  :   atorvastatin 40 mg oral tablet ; Status:   Prescribed ; Ordered As Mnemonic:   atorvastatin 40 mg oral tablet ; Simple Display Line:   See Instructions, TAKE ONE TABLET BY MOUTH AT BEDTIME, 90 unknown unit ; Ordering Provider:   Shravan Berrios MD; Catalog Code:   atorvastatin ; Order Dt/Tm:   11/13/2017 11:13:28 AM          Metoprolol Succinate ER 25 mg oral tablet, extended release  :   Metoprolol Succinate ER 25 mg oral tablet, extended release ; Status:   Prescribed ; Ordered As Mnemonic:   Metoprolol Succinate ER 25 mg oral tablet, extended release ; Simple Display Line:   See Instructions, TAKE ONE-HALF TABLET BY MOUTH TWICE DAILY, 30 unknown unit, 11 Refill(s) ; Ordering Provider:   Shravan Berrios MD; Catalog Code:   metoprolol ; Order Dt/Tm:   9/25/2017 1:33:08 PM          omeprazole  :   omeprazole ; Status:   Prescribed ; Ordered As Mnemonic:   omeprazole 20 mg oral delayed release capsule ; Simple Display Line:   20 mg, 1 cap(s), po, daily, 90 cap(s), 3 Refill(s) ; Ordering Provider:   Shravan Berrios MD; Catalog Code:   omeprazole ; Order Dt/Tm:   1/9/2017 12:26:06 PM          potassium chloride 20 mEq oral tablet, extended release  :   potassium chloride 20 mEq oral tablet, extended release ; Status:   Prescribed ; Ordered As Mnemonic:   potassium chloride 20 mEq oral tablet, extended release ; Simple Display Line:   See Instructions, 3 tab BID, 30 unknown unit, 5 Refill(s) ; Ordering Provider:   Shravan Berrios MD; Catalog Code:   potassium chloride ; Order Dt/Tm:   4/13/2016 11:25:17 AM          nitroglycerin  :   nitroglycerin ; Status:   Prescribed ; Ordered As Mnemonic:   nitroglycerin 0.4 mg  sublingual tablet ; Simple Display Line:   0.4 mg, 1 tab(s), SL, q5min, not to exceed 3 doses/15 min--if pain persists, seek medical attention, 25 tab(s), PRN: for chest pain ; Ordering Provider:   Shravan Berrios MD; Catalog Code:   nitroglycerin ; Order Dt/Tm:   8/28/2014 11:35:30 AM            Home Meds    cyanocobalamin  :   cyanocobalamin ; Status:   Documented ; Ordered As Mnemonic:   Vitamin B12 1000 mcg/mL injectable solution ; Simple Display Line:   1,000 mcg, im, qmonth, 0 Refill(s) ; Catalog Code:   cyanocobalamin ; Order Dt/Tm:   7/24/2017 11:12:05 AM          metOLazone  :   metOLazone ; Status:   Documented ; Ordered As Mnemonic:   metOLazone 2.5 mg oral tablet ; Simple Display Line:   See Instructions, 1 tab(s) po every other daily for weight > 224#, 0 Refill(s) ; Catalog Code:   metOLazone ; Order Dt/Tm:   4/19/2017 10:48:13 AM          digoxin  :   digoxin ; Status:   Documented ; Ordered As Mnemonic:   digoxin 125 mcg (0.125 mg) oral tablet ; Simple Display Line:   125 mcg, 1 tab(s), po, daily, 0 Refill(s) ; Catalog Code:   digoxin ; Order Dt/Tm:   4/19/2017 10:47:22 AM          furosemide  :   furosemide ; Status:   Documented ; Ordered As Mnemonic:   furosemide 80 mg oral tablet ; Simple Display Line:   80 mg, 1 tab(s), po, bid, take at 8am and 2pm, 0 Refill(s) ; Catalog Code:   furosemide ; Order Dt/Tm:   3/15/2017 3:19:58 PM          senna  :   senna ; Status:   Documented ; Ordered As Mnemonic:   Senokot 8.6 mg oral tablet ; Simple Display Line:   1-2 tab(s), PO, Once a day (at bedtime), PRN: for constipation ; Catalog Code:   senna ; Order Dt/Tm:   12/20/2016 2:00:45 PM          acetaminophen  :   acetaminophen ; Status:   Documented ; Ordered As Mnemonic:   acetaminophen 325 mg oral tablet ; Simple Display Line:   650 mg, 2 tab(s), po, q4 hrs, not to exceed 4000 mg/day, 0 Refill(s) ; Catalog Code:   acetaminophen ; Order Dt/Tm:   12/20/2016 1:58:24 PM          multivitamin  :   multivitamin  ; Status:   Documented ; Ordered As Mnemonic:   Protegra oral tablet ; Simple Display Line:   1 tab(s), po, daily ; Catalog Code:   multivitamin ; Order Dt/Tm:   6/1/2015 8:38:03 AM

## 2022-02-16 NOTE — CARE COORDINATION
Sources of Information:  [ ] Patient, family member, or caregiver (Please list):  [ ] Hospital discharge summary  [ ] Hospital fax  [x ] List of recent hospitalizations or ED visits  [ ] Other:     Discharged From:  ED   Discharge Date: 7/26/17    Diagnosis/Problem: diarrhea, shoulder pain/problem, ans SOB    Medication Changes: [ ] Yes [ x] No   Medication List Updated: [ ] Yes [ x] No    Needs Referral or Lab: [ x] Yes [ ] No  Stool studies ordered today    Needs Follow-up Appointment:  [x ] Within 7 days of discharge (highly complex visit)  [ ] Within 14 days of discharge (moderately complex visit)    Appointment Made With: none  Date: none    I called and spoke to Orin-he states he is not feeling very well today. Denied admission at ED visit. I asked if he was planning to f/u with JDL and he thinks he will probably wait until next week. Did drop off stool sample today-said he needed to use an enema today to get his bowels moving...wonders if his diverticulitis is acting up.     Advised will take about 1 week to get results back. Advised to cont. to monitor but to just call if he wants to come in sooner-he agreed.

## 2022-02-16 NOTE — CARE COORDINATION
Flavia advised CC yesterday that Mamta was going to be admitted for a possible GI bleed and pt's wife was concerned about all the appts she had arranged for him. I called and advised to call me if she needs any assistance getting appts cancelled/rescheduled.

## 2022-02-16 NOTE — LETTER
(Inserted Image. Unable to display)   June 26, 2019        JACINTO JIN  [address]  [place], [place] [id number]        Dear JACINTO,      Thank you for selecting [facility] (previously [facility]) for your healthcare needs.    Our records indicate you are due for the following services:     Follow-up office visit    To schedule an appointment or if you have further questions, please contact your primary clinic:   [facility]       [phone]   [facility]       [phone]              [facility]     [phone]      Powered by [employer]    Sincerely,    Shravan Berrios M.D., FACP

## 2022-02-16 NOTE — TELEPHONE ENCOUNTER
---------------------  From: Dianne GOFF, Natasha LAZCANO   To: Davonte MORELOS, Rissa; Phone Messages Pool ([phone]_[facility]);     Cc: Agustina MORELOS, [place];      Sent: 4/3/2019 12:10:08 PM CDT !  Subject: abnormal labs     Please see elevated creatinine. Reporting per protocol for creatinine >2. JDL out of clinic the rest of this week.  Previous creatinine from 3/14/19 was 1.94    Natasha Dean LPN      Results:  Date Result Name Ind Value Ref Range   4/2/2019 9:01 AM Sodium Level  137 mmol/L (135 - 146)   4/2/2019 9:01 AM Potassium Level  4.8 mmol/L (3.5 - 5.3)   4/2/2019 9:01 AM Chloride Level  102 mmol/L (98 - 110)   4/2/2019 9:01 AM CO2 Level  26 mmol/L (20 - 32)   4/2/2019 9:01 AM Glucose Level  98 mg/dL (65 - 99)   4/2/2019 9:01 AM BUN ((H)) 61 mg/dL (7 - 25)   4/2/2019 9:01 AM Creatinine Level ((H)) 2.26 mg/dL (0.70 - 1.11)   4/2/2019 9:01 AM BUN/Creat Ratio ((H)) 27 (6 - 22)   4/2/2019 9:01 AM eGFR ((L)) 26 mL/min/1.73m2 (> OR = 60 - )   4/2/2019 9:01 AM eGFR African American ((L)) 30 mL/min/1.73m2 (> OR = 60 - )   4/2/2019 9:01 AM Calcium Level  9.5 mg/dL (8.6 - 10.3)   4/2/2019 9:01 AM Cholesterol  82 mg/dL ( - <200)   4/2/2019 9:01 AM Non-HDL Cholesterol  49 ( - <130)   4/2/2019 9:01 AM HDL ((L)) 33 mg/dL (>40 - )   4/2/2019 9:01 AM Cholesterol/HDL Ratio  2.5 ( - <5.0)   4/2/2019 9:01 AM LDL  35    4/2/2019 9:01 AM Triglyceride  64 mg/dL ( - <150)   4/2/2019 9:01 AM PT ((H)) 17.5 (9.0 - 11.5)   4/2/2019 9:01 AM INR ((H)) 1.8---------------------  From: Rissa Arauz MD   To: [facility] ([facility]);     Sent: 4/3/2019 2:41:00 PM CDT  Subject: FW: abnormal labs     this is not new or patient, he could follow up with his pcp or with Dr. Pichardo---------------------  From: Zee Quezada CMA ([facility] ([facility])   To: Suresh Pichardo MD;     Sent: 4/3/2019 3:45:27 PM CDT  Subject: FW: abnormal labsfwd to BRM---------------------  From: Suresh Pichardo MD   To: [facility]  ([facility]);     Sent: 4/3/2019 3:50:54 PM CDT  Subject: RE: abnormal labs     can wait for JDLnoted---------------------  From: Mag Vargas CMA ([facility])   To: Shravan Berrios MD;     Sent: 4/4/2019 9:18:59 AM CDT  Subject: FW: abnormal labs

## 2022-02-16 NOTE — TELEPHONE ENCOUNTER
---------------------  From: Mackenzie Quintana RN   Sent: 5/8/2019 12:23:22 PM CDT    Per RIAN patient and wife advised the Hgb today is 8.6 stable. Potassium is 5.5 elevated. Creatinine and BUN are elevated but stable. He will need to stop potassium supplement today.  Recheck Hgb, INR and BMP in 2 weeks.

## 2022-02-16 NOTE — LETTER
(Inserted Image. Unable to display)                                                                                                [address] [address]                                                January 04, 2019Re: JACINTO JIN1935[facility] Facility[facility] [address]. [place], [place]  [address]Rf: Dr. JenkinsThe following patient has been referred to your office/practice:  JACINTO JOHNSONAppointment:  1/14/2019 @ 2:30pmLocation:  TellerPlease refer to the attached  clinical documentation for a summary of ORIN's care.  Please do not hesitate to contact our office if any additional clinical questions arise.  All relevant records and transition of care documents should be mailed or faxed.Your assistance in providing continuity of care is appreciated. Sincerely, [facility] [place] [place] [address](P) [phone](F) [phone]

## 2022-02-16 NOTE — PROGRESS NOTES
Patient:   JACINTO JIN            MRN: [id number]            FIN: [id number]               Age:   81 years     Sex:  Male     :  1935   Associated Diagnoses:   None   Author:   Agustina MORELOS, ALEX SwainN  2017  S: Patient presents with several days of right elbow swelling.  He has had olecranon bursitis in the past.  Has had gout.  No fever or systemic symptoms.  He has been ill all year and is finally getting better. [facility]  His PHQ9 score there was 15 and I reviewed that with him.  He and his wife both feel that he is getting better.  Has no suicidal thoughts.  He is beginning to sleep better.    MEDICATIONS, ALLERGIES, PROBLEM LIST:  Reviewed.    O: Blood pressure 127/80.  Pulse 80.  Weight stable at 220 pounds.  The right olecranon bursa is moderately swollen, not tense.  There is some tenderness.  Mild warmth.  There is no erythema.  Elbow itself is nontender with normal range of motion.  Strength, sensation, pulses intact in the right arm.    A: Olecranon bursitis in a patient who has had gout.   I discussed with him the risks and benefits of tapping it to relieve symptoms and make a diagnosis of crystalline arthropathy verses infection and he would like to proceed.  Risks of bleeding and infection were discussed.  PROCEDURE:  The right olecranon bursa is prepped with betadine solution. Using a 25 gauge needle, 1 cc of 1% lidocaine without epinephrine was injected into the subcutaneous area for anesthesia.  Following this a 22 gauge needle was introduced into the bursa and 2 cc of bloody fluid removed. Patient tolerated the procedure well and there were no complications.  The fluid will be sent for cell count crystal prep and culture.  P: Await results.    Does not appear to be an acute infectious process requiring immediate antibiotics.  If he has a fever or is worse in any way, has bleeding, he will return.  His INR was 2.3 on [place]. 3.    D:    01/09/2017  T:   01/09/2017     Shravan Berrios MD, FACP/st

## 2022-02-16 NOTE — LETTER
(Inserted Image. Unable to display)     August 16, 2019      JACINTO JIN  [address]  [place], [place] [id number]          Dear JACINTO,      Thank you for selecting [facility] ([facility]) for your healthcare needs.      Our records indicate you are due for the following services:     Follow-up office visit.      To schedule an appointment or if you have further questions, please contact your primary clinic:   [facility]       [phone]   [facility]       [phone]              [facility]     [phone]      Powered by [employer]    Sincerely,    Shravan Berrios MD, FACP

## 2022-02-16 NOTE — LETTER
(Inserted Image. Unable to display)   February 04, 2019      JACINTO JIN      [address]  [place], [place] [id number]        Dear JACINTO,    Thank you for selecting [facility] ([facility]viously [facility]) for your healthcare needs.  Below you will find the results of your recent tests done at our clinic.    Results are acceptable.      Result Name Current Result Previous Result Reference Range   Sodium Level (mmol/L)  138 2/1/2019  139 12/26/2018 135 - 146   Potassium Level (mmol/L)  3.7 2/1/2019  3.5 12/26/2018 3.5 - 5.3   Chloride Level (mmol/L)  98 2/1/2019  98 12/26/2018 98 - 110   CO2 Level (mmol/L)  32 2/1/2019  32 12/26/2018 20 - 32   Glucose Level (mg/dL)  94 2/1/2019 ((H)) 107 12/26/2018 65 - 99   BUN (mg/dL) ((H)) 40 2/1/2019 ((H)) 38 12/26/2018 7 - 25   Creatinine Level (mg/dL) ((H)) 1.78 2/1/2019 ((H)) 1.84 12/26/2018 0.70 - 1.11   eGFR (mL/min/1.73m2) ((L)) 35 2/1/2019  > OR = 60 -    Calcium Level (mg/dL)  9.0 2/1/2019  9.1 12/26/2018 8.6 - 10.3   INR ((H)) 1.8 2/1/2019  2.3 1/25/2019        Please contact me or my assistant at [phone] if you have any questions.     Sincerely,        Shravan Berrios MD

## 2022-02-16 NOTE — PROGRESS NOTES
Chief Complaint  2 days rectal bleeding, left wrist banged up yesterday  History of Present Illness  Vicki has had 2 bowel movements in 2 days with right red blood. ?No fever, chills, abdominal pain, melena, nausea, vomiting.  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 ?Recently ferritin is been low normal. ?He was hospitalized due to black watery stools recently, but has not had red rectal bleeding. ?On May 27 upper endoscopy revealed multiple gastric polyps and gastritis which was biopsied. ?Colonoscopy at that same time revealed pandiverticulosis a 9 mm polyp was hot snared in the cecum no other abnormalities were seen. ?Path report reveals a fundic gland polyp in the stomach negative for gastritis and Helicobacter pylori tubular adenoma of the colon.  Review of Systems  No increased dyspnea, chest pain, or edema. ?No headache. ?He has a new skin tear on the left forearm. ?Skin tear in the right arm is improving.  Problem List/Past Medical History  Ongoing  Acute kidney injury (nontraumatic)  AF (Atrial Fibrillation)  Anticardiolipin syndrome  Anticoagulated  Ascites  CAD (coronary artery disease)  Cardiorenal syndrome  CHB (complete heart block)  CHF (Congestive Heart Failure)  Colon polyps  Diverticulosis  Dupuytren's contracture of right hand  Gout  H/O acute pancreatitis  High cholesterol  History of acute bacterial endocarditis  History of coronary artery bypass graft  History of tricuspid valve replacement  IBS (irritable bowel syndrome)  ICD (implantable cardioverter-defibrillator) infection  Iron deficiency anemia  Nonischemic dilated cardiomyopathy  Obesity  KEYONA (obstructive sleep apnea)  Osteoarthritis of both knees  Paralysis, diaphragm  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  Right heart failure  Tricuspid valve insufficiency  Type 2 diabetes  mellitus  Resolved  BE (bacterial endocarditis)  History of sepsis  Inpatient stay  Inpatient stay  Inpatient stay  Inpatient stay  Inpatient stay  Inpatient stay  Inpatient stay  Other and Unspecified Noninfectious Gastroenteritis and Colitis  Procedure/Surgical History  Colonoscopy (05/27/2017),  Esophagogastroduodenoscopy (05/27/2017),  TVR - Tricuspid valve replacement (11/29/2016),  Cardiac angiogram (11/01/2016),  Limited thoracotomy (08/23/2016),  Implantation of intravenous single chamber cardiac pacemaker system (08/04/2016),  Removal of automatic cardiac defibrillator (08/04/2016),  Pacemaker change (04/08/2015),  Cardiac angiogram (08/02/2013),  angiogrqam (02/03/2012),  Colonoscopy (12/15/2006),  Colonoscopy (06/22/2001),  Cholecystectomy (06/2001),  CABG - Coronary artery bypass graft (2000),  History of Back Surgery (2000),  Colonoscopy (02/17/1993),  Left shoulder surgery (1991),  Ablation for atrial fibrillation,  Cholecystectomy,  Colonoscopy,  Esophagogastroduodenoscopy.  Home Medications  acetaminophen 325 mg oral tablet, 650 mg, 2 tab(s), po, q4 hrs  aspirin 81 mg oral tablet, 81 mg, 1 tab(s), po, daily  atorvastatin 40 mg oral tablet, 2 refills  digoxin 125 mcg (0.125 mg) oral tablet, 125 mcg, 1 tab(s), po, daily  furosemide 80 mg oral tablet, 80 mg, 1 tab(s), po, bid  metOLazone 2.5 mg oral tablet, See Instructions  Metoprolol Succinate ER 25 mg oral tablet, extended release, 5 refills  nitroglycerin 0.4 mg sublingual tablet, 0.4 mg, 1 tab(s), sl, q5 min, PRN, 3 refills  omeprazole 20 mg oral delayed release capsule, 40 mg, 2 cap(s), po, bid, 3 refills  potassium chloride 20 mEq oral tablet, extended release, 5 refills  Protegra oral tablet, 1 tab(s), po, daily  Senexon-S, 2 tab(s), po, hs  Senokot 8.6 mg oral tablet, 1-2 tab(s), po, hs, PRN  triamcinolone 0.1% topical cream, 1 nazario, top, bid  warfarin 3 mg oral tablet, 11 refills  Allergies  adhesive tape  Social History  Employment and  Education  Employed, Work/School description: Farmer.  Exercise and Physical Activity  Exercise type: Walking.  Family History  Aneurysm: Father.  Heart disease: Mother.  Immunizations  Physical Exam  Vitals & Measurements  HR:?91?(Peripheral)?  BP:?113/74?  HT:?73?in?  WT:?229?lb?  BMI:?30.21?  Patient is a bit pale which is not changed. ?Chest reveals modestly diminished breath sounds left base consistent with his paralyzed hemidiaphragm. ?Cardiac exam is regular systolic murmur unchanged. ?No edema. ?Abdomen soft and nontender and rectal exam there?red blood present no rectal mass. ?On anoscopy no other additional abnormalities were seen. ?No rectal tenderness.  Lab Results  Hemoglobin improved to 8.6 from 7.9. ?White count and platelets normal. ?INR 2.2. ?BUN 48 creatinine 2.1.  Diagnostic Results  Assessment/Plan  Anticoagulated  INR therapeutic. ?Repeating a week.  Hematochezia  Differential includes diverticular bleed, post polypectomy bleed less likely. ?Doubt diverticulitis or ischemic colitis given the absence of pain and tenderness. ?He is hemodynamically stable and hemoglobin has not dropped.  Patient will report any further bleeding. ?If he has significant bleeding or symptoms associated with it he will go to the emergency room immediately. ?We will repeat a CBC at follow-up next week.  Iron deficiency anemia  Iron has been repleted intravenously.  Right heart failure  No evidence of failure on exam today. ?We will repeat a basic metabolic profile at follow-up next week.  Orders:  Return to Clinic (Request)

## 2022-02-16 NOTE — CARE COORDINATION
ACTION PLAN   Goal(s): Cardiac management    CC to follow-up with Mamta REDDY. Do monthly chart review. Mamta/wife very good about calling if they need something.     Today s Date: 12/16/15                                                                     Goal(s) completion date: ongoing     Steps to be taken to manage CHF (cardio has been managing)    Baseline weight: 213-214lbs When will I accomplish these steps Barriers Status   (4/19/17) -Bumex d/c    -Lasix 80mg BID    -Digoxin 0.125mcg    -Metolazone prn to get weights down    Cardio f/u in 3 mo (9/2017)   (4/26/27) ED visit- was advised to take Metolazone qd until appt w/ [facility] on 4/28/17. Has lost 1lbs so far.    Cardio appt scheduled w/ Lilian on 6/1.    (5/2/17) Appt w/ [facility] completed. Metolazone now on hold again. Wt down to 214 lbs which is his dry weight. Recheck w/ [facility] in 2 weeks. Labs at Infusion    (5/4/17) Weight up to 220. Per [facility] no med change/no Metolazone. Needs to be on TID dosing of potassium d/t hypokalemia. Recheck BMP on Sun (5/8). Discussed sodium intake-sticking to 1500mg and will monitor this closely. CC to check in next wk.     (5/9/17) Wt up to 223lbs. Per [facility] OK to take qd metolazone if weight >222lbs. F/u appt w/ [facility] on Friday 5/12, repeat BMP and INR in 1 week.     (6/27/17) Per cardio-cont. w/ current diuretics. Need to see hematology ([facility]). Cardio f/u in 3 mo and BMP in 2 weeks.     (7/12/17) No change in labs-keep cardio f/u as scheduled (due in Sept 2017)       Steps to be taken to manage uncontrolled iron deficiency anemia When will I accomplish these steps Barriers Status   Saw Hugec on 6/23.   1. Scheduled bone marrow biopsy to r/o lymphoma.    2. Get Injectafer 750mg IV weekly x4    3. U/s of Liver    4. Have Methylmalonic acid level checked    5. F/u with Huge[facility] in 1mo w/ CBC and iron studies.    6. Also noted to have Vitamin B12 deficiency so will be getting wkly infusions x 3 and then switch to monthly  injections.   (7/12/17)    1. Bone marrow biopsy scheduled on 7/12/17    2. Completing infusions    3. U/s done    4. Labs done    5. F/u w/ [facility] scheduled on 7/21 and labs to be completed on 7/12    6. Completing infusions. Chart updated w/ this info.    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 Mamta will start Aranesp 200mcg q 2 weeks. Start Vitamin b12 mo & start allopurinol 300mg qd. F/u w/ [facility] in 3 mo plus labs.         Steps to be taken to manage When will I accomplish these steps Barriers Status

## 2022-02-16 NOTE — PROGRESS NOTES
Patient:   JACINTO JIN            MRN: [id number]            FIN: [id number]               Age:   83 years     Sex:  Male     :  1935   Associated Diagnoses:   Abdominal pain in male   Author:   Johnny Medellin MD      Visit Information      Date of Service: 2018 10:47 am  Performing Location: [facility]  Encounter#: [id number]      Primary Care Provider (PCP):  Shravan Berrios MD    NPI# [id number]      Referring Provider:  Johnny Medellin MD    NPI# [id number]      Chief Complaint   2018 10:54 AM CST   Black stool.  Concern for GI bleed.        History of Present Illness   stools darker for a week  Coal black today  1 stool a day  lower abd pain 1 week getting worse  feeling weaker  On keflex 1 day for uti      Review of Systems   Constitutional:  Negative except as documented in history of present illness.    Ear/Nose/Mouth/Throat:  Negative.    Respiratory:  Negative.    Cardiovascular:  Negative.    Gastrointestinal:  Negative except as documented in history of present illness.    Genitourinary:  Negative except as documented in history of present illness.    Integumentary:  Negative.    Neurologic:  Negative.       Health Status   Allergies:    Allergic Reactions (Selected)  Severity Not Documented  Adhesive tape (No reactions were documented)  No Known Medication Allergies   Medications:  (Selected)   Prescriptions  Prescribed  [id number] URINARY DRAIN BAG 2000ML MG BEAD: See Instructions, Instructions: USE AS DIRECTED, # 1 unknown unit, Type: Soft Stop, Pharmacy: [facility], USE AS DIRECTED  Keflex 500 mg oral capsule: = 1 cap(s) ( 500 mg ), PO, QID, # 28 cap(s), 0 Refill(s), Type: Maintenance, Pharmacy: [facility], 1 cap(s) Oral qid,x7 day(s)  Metoprolol Succinate ER 25 mg oral tablet, extended release: See Instructions, Instructions: TAKE ONE-HALF TABLET BY MOUTH TWICE DAILY, # 30 unknown unit, 11 Refill(s), Type: Soft Stop, Pharmacy: [facility]  MetroGel 1% topical  gel: 1 nazario, Topical, daily, # 1 tubes, 5 Refill(s), Type: Maintenance, Pharmacy: [facility], 1 nazario Topical daily  allopurinol 300 mg oral tablet: See Instructions, Instructions: TAKE ONE TABLET BY MOUTH ONCE DAILY, # 90 unknown unit, 3 Refill(s), Type: Soft Stop, Pharmacy: [facility], TAKE ONE TABLET BY MOUTH ONCE DAILY  atorvastatin 40 mg oral tablet: See Instructions, Instructions: TAKE ONE TABLET BY MOUTH AT BEDTIME, # 90 unknown unit, Type: Soft Stop, Pharmacy: [facility]  clotrimazole 1% topical cream: 1 nazario, TOP, BID, # 30 gm, 0 Refill(s), Type: Maintenance, Pharmacy: [facility], 1 nazario top bid,x7 day(s)  mirtazapine 15 mg oral tablet: 1 tab(s) ( 15 mg ), PO, Once a day (at bedtime), # 30 tab(s), 5 Refill(s), Type: Maintenance, Pharmacy: [facility], 1 tab(s) po hs  nitroglycerin 0.4 mg sublingual tablet: 1 tab(s) ( 0.4 mg ), SL, q5min, Instructions: not to exceed 3 doses/15 min--if pain persists, seek medical attention, PRN: for chest pain, # 25 tab(s), 3 Refill(s), Type: Maintenance, Pharmacy: [facility], 1 tab(s) sl q5 min,PRN:for chest pain,Instr...  omeprazole 20 mg oral delayed release capsule: = 1 cap(s) ( 20 mg ), po, daily, # 90 cap(s), 3 Refill(s), Type: Maintenance, Pharmacy: [facility]  potassium chloride 20 mEq oral tablet, extended release: See Instructions, Instructions: 3 tab BID, # 30 unknown unit, 5 Refill(s), Type: Soft Stop, Pharmacy: [facility]  predniSONE 20 mg oral tablet: See Instructions, Instructions: 2 po daily for 3-5 days prn gout, # 30 EA, 0 Refill(s), Type: Maintenance, Pharmacy: [facility], 2 po daily for 3-5 days prn gout  tamsulosin 0.4 mg oral capsule: 1 cap(s) ( 0.4 mg ), Oral, daily, # 30 cap(s), 0 Refill(s), Type: Maintenance, Pharmacy: [facility], 1 cap(s) Oral daily  warfarin 3 mg oral tablet: = 1 tab(s) ( 3 mg ), Oral, daily, # 90 tab(s), 0 Refill(s), Type: Maintenance, Pharmacy: [facility]  Documented Medications  Documented  Protegra oral tablet: 1  tab(s), po, daily, 0 Refill(s), Type: Maintenance  Senokot 8.6 mg oral tablet: 1-2 tab(s), PO, Once a day (at bedtime), PRN: for constipation, Type: Maintenance  Vitamin B12 1000 mcg/mL injectable solution: ( 1,000 mcg ), im, qmonth, 0 Refill(s), Type: Maintenance  acetaminophen 325 mg oral tablet: 2 tab(s) ( 650 mg ), po, q4 hrs, Instructions: not to exceed 4000 mg/day, 0 Refill(s), Type: Maintenance  digoxin 125 mcg (0.125 mg) oral tablet: 1 tab(s) ( 125 mcg ), po, daily, 0 Refill(s), Type: Maintenance  furosemide 80 mg oral tablet: 1 tab(s) ( 80 mg ), po, bid, Instructions: Per Dr. Quintana on 3/7/2017, 0 Refill(s), Type: Maintenance  metOLazone 2.5 mg oral tablet: See Instructions, Instructions: 1 tab(s) po every other daily for weight > 227#, 0 Refill(s), Type: Maintenance,    Medications          *denotes recorded medication          [id number] URINARY DRAIN BAG 2000ML MG BEAD: See Instructions, USE AS DIRECTED, 1 unknown unit.          *acetaminophen 325 mg oral tablet: 650 mg, 2 tab(s), po, q4 hrs, not to exceed 4000 mg/day, 0 Refill(s).          allopurinol 300 mg oral tablet: See Instructions, TAKE ONE TABLET BY MOUTH ONCE DAILY, 90 unknown unit, 3 Refill(s).          atorvastatin 40 mg oral tablet: See Instructions, TAKE ONE TABLET BY MOUTH AT BEDTIME, 90 unknown unit.          Keflex 500 mg oral capsule: 500 mg, 1 cap(s), PO, QID, for 7 day(s), 28 cap(s), 0 Refill(s).          clotrimazole 1% topical cream: 1 nazario, TOP, BID, for 7 day(s), 30 gm, 0 Refill(s).          *Vitamin B12 1000 mcg/mL injectable solution: 1,000 mcg, im, qmonth, 0 Refill(s).          *digoxin 125 mcg (0.125 mg) oral tablet: 125 mcg, 1 tab(s), po, daily, 0 Refill(s).          *furosemide 80 mg oral tablet: 80 mg, 1 tab(s), po, bid, Per Dr. Quintana on 3/7/2017, 0 Refill(s).          *metOLazone 2.5 mg oral tablet: See Instructions, 1 tab(s) po every other daily for weight > 227#, 0 Refill(s).          Metoprolol Succinate ER 25 mg oral  tablet, extended release: See Instructions, TAKE ONE-HALF TABLET BY MOUTH TWICE DAILY, 30 unknown unit, 11 Refill(s).          MetroGel 1% topical gel: 1 nazario, Topical, daily, 1 tubes, 5 Refill(s).          mirtazapine 15 mg oral tablet: 15 mg, 1 tab(s), PO, Once a day (at bedtime), 30 tab(s), 5 Refill(s).          *Protegra oral tablet: 1 tab(s), po, daily.          nitroglycerin 0.4 mg sublingual tablet: 0.4 mg, 1 tab(s), SL, q5min, not to exceed 3 doses/15 min--if pain persists, seek medical attention, 25 tab(s), PRN: for chest pain.          omeprazole 20 mg oral delayed release capsule: 20 mg, 1 cap(s), po, daily, 90 cap(s), 3 Refill(s).          potassium chloride 20 mEq oral tablet, extended release: See Instructions, 3 tab BID, 30 unknown unit, 5 Refill(s).          predniSONE 20 mg oral tablet: See Instructions, 2 po daily for 3-5 days prn gout, 30 EA, 0 Refill(s).          *Senokot 8.6 mg oral tablet: 1-2 tab(s), PO, Once a day (at bedtime), PRN: for constipation.          tamsulosin 0.4 mg oral capsule: 0.4 mg, 1 cap(s), Oral, daily, 30 cap(s), 0 Refill(s).          warfarin 3 mg oral tablet: 3 mg, 1 tab(s), Oral, daily, for 90 day(s), 90 tab(s), 0 Refill(s).     Problem list:    All Problems (Selected)  Anemia of renal disease / SNOMED CT 566907282 / Confirmed  Anticoagulated / SNOMED CT 37251057 / Confirmed  Anticardiolipin syndrome / SNOMED CT 3546148630 / Confirmed  CAD (coronary artery disease) / SNOMED CT 5209659532 / Confirmed  Ascites / SNOMED CT 8376129563 / Confirmed  Benign hypertensive CKD / SNOMED CT 2416603 / Confirmed  Benign prostatic hyperplasia (BPH) with urinary urgency / SNOMED CT 974235732 / Confirmed  BPH with urinary obstruction / SNOMED CT 0386135780 / Confirmed  Cardiorenal syndrome / SNOMED CT 6380365413 / Confirmed  AF (Atrial Fibrillation) / SNOMED CT 9750564673 / Confirmed  Stage 3 chronic kidney disease / SNOMED CT 4485766352 / Confirmed  B12 deficiency / SNOMED CT 650979063 /  Confirmed  IBS (irritable bowel syndrome) / SNOMED CT 7605940786 / Confirmed  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 / SNOMED CT 3536142069 / Confirmed  Vitamin B 12 deficiency / SNOMED CT 3404099724 / Confirmed  Osteoarthritis of both knees / SNOMED CT 2220292519 / Confirmed  Depression / SNOMED CT 66629097 / Confirmed  ICD (implantable cardioverter-defibrillator) infection / SNOMED CT 264889863 / Confirmed  Diverticulosis / SNOMED CT 5997119043 / Confirmed  Dupuytren's contracture of right hand / SNOMED CT 8946162887 / Confirmed  Gout / SNOMED CT 138911603 / Confirmed  CHF (Congestive Heart Failure) / SNOMED CT 962588546 / Confirmed  CHB (complete heart block) / SNOMED CT 1991946821 / Confirmed  H/O acute pancreatitis / SNOMED CT 2878944433 / Confirmed  History of acute bacterial endocarditis / SNOMED CT 2920376681 / Confirmed  Hx of colonic polyp / SNOMED CT 3837214991 / Confirmed  History of tricuspid valve replacement / SNOMED CT 8458150005 / Confirmed  High cholesterol / SNOMED CT 10510547 / Confirmed  Hypertensive HF (heart failure) / SNOMED CT 79730914 / Confirmed  Acute kidney injury (nontraumatic) / SNOMED CT 4390117825 / Confirmed  Iron deficiency anemia / SNOMED CT 225511148 / Confirmed  MGUS (monoclonal gammopathy of unknown significance) / SNOMED CT 369201234 / Confirmed  Nonischemic dilated cardiomyopathy / SNOMED CT 9817097189 / Confirmed  Obesity / SNOMED CT 4169765408 / Confirmed  KEYONA (obstructive sleep apnea) / SNOMED CT 727395448 / Confirmed  Paralysis, diaphragm / SNOMED CT 044497613 / Confirmed  History of coronary artery bypass graft / SNOMED CT 5931500034 / Confirmed  Right heart failure / SNOMED CT 360290557 / Confirmed  Inguinal hernia, right / SNOMED CT 657832111 / Confirmed  Tricuspid valve insufficiency / SNOMED CT [id number] / Confirmed      Histories   Past Medical History:    Active  H/O acute pancreatitis (3190343502):  Onset on 12/6/2016 at 81 years.  History of tricuspid valve replacement (7455476837): Onset in the month of 11/2016 at 80 years  History of acute bacterial endocarditis (5414298510): Onset on 7/23/2016 at 80 years.  Anticardiolipin syndrome (6310333631): Onset on 1/1/2005 at 69 years.  KEYONA (obstructive sleep apnea) (532386778): Onset on 1/1/2003 at 67 years.  Comments:  11/1/2013 CDT 10:58 AM CDT - Shravan Berrios MD  Adequate titration to BIPAP 16/11 on 10/15/2013  AF (Atrial Fibrillation) (4894507541)  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 (2726145592)  IBS (irritable bowel syndrome) (3292121198)  CAD (coronary artery disease) (7191414661)  Gout (507661504)  CHF (Congestive Heart Failure) (112010251)  Obesity (8923529519)  Anticoagulated (70920412)  High cholesterol (01537996)  Dupuytren's contracture of right hand (8818126572)  Acute kidney injury (nontraumatic) (1651202707)  CHB (complete heart block) (3248573490)  Nonischemic dilated cardiomyopathy (1673030313)  ICD (implantable cardioverter-defibrillator) infection (471879661)  History of coronary artery bypass graft (2318040909)  Tricuspid valve insufficiency ([id number])  Ascites (0172519237)  Right heart failure (645480419)  Cardiorenal syndrome (4103411897)  Vitamin B 12 deficiency (6225469293)  Resolved  Inpatient stay (785711331): Onset on 5/26/2017 at 81 years.  Resolved on 5/28/2017 at 81 years.  Comments:  5/31/2017 CDT 10:36 PM Floridalma Jarrell  @[facility] Anemia  Inpatient stay (161240033): Onset on 12/13/2016 at 81 years.  Resolved on 12/19/2016 at 81 years.  Comments:  1/3/2017 CST 9:01 PM Floridalma Brody  @[facility] on chronic right heart failure  Inpatient stay (068381663): Onset on 12/6/2016 at 81 years.  Resolved on 12/13/2016 at 81 years.  Comments:  12/20/2016 CST 6:02 AM Floridalma Brody  @[facility]  - S/P tricuspid valve replacement with worsening right ventricular  function  Inpatient stay (494486371): Onset on 11/29/2016 at 81 years.  Resolved on 12/6/2016 at 81 years.  Comments:  12/20/2016 CST 10:22 PM Floridalma Brody  @[facility] - Severe symptomatic tricuspid valvular insufficiency. Chronic right heart failure.  History of sepsis (4635312789): Onset on 7/26/2016 at 80 years.  Resolved.  Inpatient stay (565054204): Onset on 9/28/2015 at 79 years.  Resolved on 10/2/2015 at 79 years.  Comments:  12/20/2016 CST 6:03 AM Floridalma Brody  @[facility] - Diverticulitis  Inpatient stay (250099038): Onset on 5/17/2015 at 79 years.  Resolved on 5/29/2015 at 79 years.  Comments:  12/20/2016 CST 6:03 AM Floridalma Brody  @[facility] - Infected pacemaker  Inpatient stay (859767043): Onset on 7/30/2013 at 77 years.  Resolved on 7/30/2013 at 77 years.  Comments:  12/20/2016 CST 6:02 AM Floridalma Brody  @[facility] - Jaw pain, possible anginal equivalent  Colon polyps (991432049):  Resolved.  Other and Unspecified Noninfectious Gastroenteritis and Colitis (558.9):  Resolved.  BE (bacterial endocarditis) (283326867):  Resolved.   Family History:    Aneurysm  Father  Heart disease  Mother     Procedure history:    Bone marrow biopsy (SNOMED CT 351106320) performed by Codie Woods NP on 7/12/2017 at 81 Years.  Esophagogastroduodenoscopy (SNOMED CT 029001661) performed by Shaq Bailon MD on 5/27/2017 at 81 Years.  Comments:  6/1/2017 1:15 PM Shravan Alvarez MD  gastritis, fundic gland polyps  Colonoscopy (SNOMED CT 755240989) on 5/27/2017 at 81 Years.  Comments:  6/1/2017 1:16 PM Shravan Alvarez MD  Cecal tubular adenoma, pandiverticulosis    5/30/2017 3:36 PM CDT - Mag Vargas CMA  w/polypectomy  TVR - Tricuspid valve replacement (SNOMED CT 7614647476) performed by Ned Craig MD on 11/29/2016 at 81 Years.  Cardiac angiogram (SNOMED CT 7966665112) performed by Augustine on 11/1/2016 at 80 Years.  Comments:  11/3/2016 12:05 PM JANIE - Agustina MORELOS,  Shravan  Summary/Conclusions  PRESENTATION / INDICATIONS    Pre-surgical evaluation for cardiac surgery    Severe TR by echo  DIAGNOSTIC - CORONARY    Co-dominant coronary artery system    The left main artery was normal in appearance and free of obstructive disease.    Chronic total occlusion of the proximal LAD    The circumflex artery has minimal disease.    The RCA has moderate disease.    No change since 2013  DIAGNOSTIC - GRAFTS    Chronic total occlusion in the proximal anastomosis of the SVG graft to the RCA    The SVG to the ramus intermediate is patent    The sequential graft to the 1st diagonal is patent.    The sequential graft limb from the diagonal to the LAD is patent.    No change since 2013  HEMODYNAMICS    The LVEDP is mildly elevated    Severely elevated right atrial pressures    No evidence of intracardiac shunting  Limited thoracotomy (SNOMED CT 1478443928) performed by Sathya Romero MD on 8/23/2016 at 80 Years.  Comments:  8/29/2016 2:45 PM Floridalma Jarrell  Left mini thoracotomy and placement of two epicardial screw in leads.  Implant of left pectoral pacer generatoe.  Removal of automatic cardiac defibrillator (SNOMED CT 592237579) performed by Samson Ibrahim MD on 8/4/2016 at 80 Years.  Comments:  8/29/2016 2:49 PM Floridalma Jarrell  Generator and leads  Implantation of intravenous single chamber cardiac pacemaker system (SNOMED CT 3404862022) on 8/4/2016 at 80 Years.  Pacemaker change on 4/8/2015 at 79 Years.  Cardiac angiogram (SNOMED CT 8069591691) on 8/2/2013 at 77 Years.  Comments:  8/8/2013 2:45 PM JANIE - Shravan Berrios MD  Summary/Conclusions  PRESENTATION / INDICATIONS   Chest pain  DIAGNOSTIC - CORONARY  Right dominant coronary artery system  DIAGNOSTIC SUMMARY  100% stenosis in the Proximal LAD  30% stenosis in the 1st Marginal  30% stenosis in the Proximal RCA  50% stenosis in the Mid RCA  100% stenosis in the Aorta graft to Distal RCA  DIAGNOSTIC - GRAFTS  The  "sequential graft to the diagonal branch is patent.  The sequential graft from the diagonal to the LAD is patent.  The SVG to the ramus intermediate is patent  The SVG to RCA is chronically occluded  HEMODYNAMICS  The LVEDP is mildly elevated  RECOMMENDATIONS & PLAN  Medical Rx- no significant change from previous angiogram, there is dramatic mismatch in size between the SVG's and the target arteries with slow filling of the LAD        angiogrqam on 2/3/2012 at 76 Years.  Comments:  4/24/2012 3:33 PM CDT - Shravan Berrios MD  Summary/Conclusions  PRESENTATION / INDICATIONS   Dyspnea and fatigue.  DIAGNOSTIC - CORONARY  Right dominant coronary artery system.  LMCA is normal.  LAD is occluded proximally after the 1st septal branch.  LCx has mild luminal irregularities.  OM1 has 20-30% proximal stenosis.  RCA has 30-40% diffuse disease in the mid segment and otherwise has mild diffuse luminal irregularities.  DIAGNOSTIC - GRAFTS  The sequential SVG to the diagonal and then LAD is widely patent.  There is a significant size mismatch between the graft and the native vessels.  The diagonal and distal LAD have no obvious lesions but are small caliber (< 2.0 mm vessels).  There is some backfilling into the mid LAD.  The SVG to the ramus is widely patent.  There is a significant size mismatch between the graft and the native vessel.  The ramus has no obvious lesion but is small caliber (< 2.0 mm vessel).    The SVG to the RCA is chronically occluded.  LEFT VENTRICULAR FUNCTION  Left ventricular function is mildly reduced with EF of 42% and mild global hypokinesis.  No significant MR.  HEMODYNAMICS  The LVEDP is 6 mmHg.  No significant gradient across the aortic valve.  RA 11, RV 29/10, PA 34/17 (mean 25), PCWP 18  Amarilys CO 5.1, TDCO 4.6  RECOMMENDATIONS & PLAN  Consider alternative etiology for symptoms.  Lasix dose decreased to 40 mg QD given relative hypotension and patient reporting feeling \"dry membranes.\"       "       Colonoscopy (SNOMED CT 636684042) on 12/15/2006 at 71 Years.  Comments:  8/8/2013 4:06 PM Shravan Alvarez MD  Diverticulosis. No polyps.  Colonoscopy on 6/22/2001 at 65 Years.  Comments:  11/15/2010 8:21 AM CHYNA - Shravan Berrios MD  hyperplastic polyp  Cholecystectomy (SNOMED CT 43497745) in the month of 6/2001 at 65 Years.  CABG - Coronary artery bypass graft (SNOMED CT 328015877) in 2000 at 65 Years.  History of Back Surgery (ICD-9-CM V45.89) in 2000 at 65 Years.  Colonoscopy (SNOMED CT 292205636) performed by Rufus on 2/17/1993 at 57 Years.  Comments:  8/8/2013 4:06 PM Shravan Alvarez MD  1 adenoma, 1 hyperplastic, diverticulosis.  Left shoulder surgery in 1991 at 56 Years.  Esophagogastroduodenoscopy (SNOMED CT 002193761).  Cholecystectomy (SNOMED CT 58756474).  Ablation for atrial fibrillation.  Comments:  5/2/2011 1:56 PM Madeline Murray  Subsequent pacemaker dependence.  Colonoscopy (SNOMED CT 279575824) performed by Shaq Bailon MD.   Social History:        Alcohol Assessment            Past      Tobacco Assessment            Former smoker, quit more than 30 days ago                     Comments:                      08/23/2018 - Andrew Bailon CMA                     Pt quit 2/5/1963      Employment and Education Assessment            Employed, Work/School description: Farmer.      Exercise and Physical Activity Assessment            Exercise type: Walking.      Physical Examination   Vital Signs   12/5/2018 10:54 AM CST Temperature Tympanic 97.5 DegF  LOW    Peripheral Pulse Rate 77 bpm    Systolic Blood Pressure 108 mmHg    Diastolic Blood Pressure 65 mmHg    Mean Arterial Pressure 79 mmHg      Measurements from flowsheet : Measurements   12/5/2018 10:54 AM CST   Weight Measured - Standard                229.4 lb     General:  Alert and oriented, No acute distress.    Neck:  Supple.    Respiratory:  Lungs are clear to auscultation, Respirations are non-labored.    Cardiovascular:   Normal rate.    Gastrointestinal:  Soft.         Abdomen: Lower quadrant, Tenderness.    Genitourinary:  No costovertebral angle tenderness.    Neurologic:  Alert, Oriented.       Impression and Plan   Diagnosis     Abdominal pain in male (EPD10-GF R10.9).     Course:  Worsening.    Plan:  gi bleed Abd pain poss diverticulitis  admit to f[facility]ardo.    Patient Instructions:       Counseled: Patient, Spouse, Regarding diagnosis.

## 2022-02-16 NOTE — PROGRESS NOTES
Chief Complaint    follow up, black stool since starting ferrous sulfate  History of Present Illness      Patient complains that his stools have been dark black since starting iron about a week ago.  No abdominal pain.  Not having frequent stools.  Review of Systems      No chest pain, increased dyspnea, edema.  No confusion.  Physical Exam   Vitals & Measurements    HR: 92(Peripheral)  BP: 103/69     HT: 75 in  WT: 215 lb  BMI: 26.87       Patient appears comfortable.  Alert and oriented.  Chest clear.  Cardiac exam is regular.  Pacemaker present.  1+ edema at the top of the sock.  Abdomen is tense but nontender.  Large inguinal hernia.  Assessment/Plan       Acute blood loss anemia (D62)         One would have to suspect that the melanotic stools are related to iron, however hemoglobin has been dropping from over 10 in early March to 7.7 today.  Will send the patient to the ED likely need admission at [place].         Ordered:          Basic Metabolic Panel (Request), Priority: Urgent, Acute blood loss anemia  Chronic diastolic CHF (congestive heart failure), NYHA class 3  Stage 3 chronic kidney disease          Hemoglobin (Request), Priority: Urgent, Acute blood loss anemia                AF (Atrial Fibrillation) (I48.2)         Stable.         Ordered:          PT (Request), Priority: Urgent, Instructions: Please notify Unit [facility] INR staff with results, Anticoagulated  AF (Atrial Fibrillation)                Anticoagulated (Z79.01)         Therapeutic.         Ordered:          PT (Request), Priority: Urgent, Instructions: Please notify Unit 2 INR staff with results, Anticoagulated  AF (Atrial Fibrillation)                Chronic diastolic CHF (congestive heart failure), NYHA class 3 (I50.32)         Seems well compensated.         Ordered:          Basic Metabolic Panel (Request), Priority: Urgent, Acute blood loss anemia  Chronic diastolic CHF (congestive heart failure), NYHA class 3  Stage 3 chronic kidney  disease                Stage 3 chronic kidney disease (N18.3)         Creatinine increasing perhaps related to GI bleeding.         Ordered:          Basic Metabolic Panel (Request), Priority: Urgent, Acute blood loss anemia  Chronic diastolic CHF (congestive heart failure), NYHA class 3  Stage 3 chronic kidney disease           Patient Information     Name:JACINTO JIN      Address:      [address] [place] [id number]     Sex:Male     YOB: 1935     [phone]     Emergency Contact:GERA JIN     MRN:[id number]     FIN:[id number]     Location:[facility]     Date of Service:04/30/2019      Primary Care Physician:       Shravan Berrios MD, [phone]      Attending Physician:       Shravan Berrios MD, [phone]  Problem List/Past Medical History    Ongoing     Acquired arteriovenous malformation of colon       Comments: Treated.     Acute kidney injury (nontraumatic)     AF (Atrial Fibrillation)     Anemia of renal disease     Anticardiolipin syndrome     Anticoagulated     Ascites       Comments: Negative cytology 12/6/18     B12 deficiency     BPH with urinary obstruction     CAD (coronary artery disease)     Cardiorenal syndrome     CHB (complete heart block)     Chronic diastolic CHF (congestive heart failure), NYHA class 3     Cirrhosis     Depression     Diverticulosis     Dupuytren's contracture of right hand     Gout     H/O acute pancreatitis     High cholesterol     History of acute bacterial endocarditis     History of coronary artery bypass graft     History of GI bleed     History of tricuspid valve replacement     Hx of colonic polyp     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     IBS (irritable bowel syndrome)     ICD (implantable cardioverter-defibrillator) infection     Inguinal hernia, right     Iron deficiency anemia     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      Nonischemic dilated cardiomyopathy     Obesity     KEYONA (obstructive sleep apnea)       Comments: Adequate titration to BIPAP 16/11 on 10/15/2013     Osteoarthritis of both knees     Paralysis, diaphragm       Comments: Left     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     PUD (peptic ulcer disease)     Right heart failure     Stage 3 chronic kidney disease     Tricuspid valve insufficiency     Vitamin B 12 deficiency    Historical     BE (bacterial endocarditis)     Colon polyps     History of sepsis     Inpatient stay       Comments: @[facility] - Jaw pain, possible anginal equivalent     Inpatient stay       Comments: @[facility] - Infected pacemaker     Inpatient stay       Comments: @[facility] - Diverticulitis     Inpatient stay       Comments: @[facility] - S/P tricuspid valve replacement with worsening right ventricular function     Inpatient stay       Comments: @[facility] - Severe symptomatic tricuspid valvular insufficiency. Chronic right heart failure.     Inpatient stay       Comments: @[facility] - Acute on chronic right heart failure     Inpatient stay       Comments: @[facility] - Anemia     Inpatient stay       Comments: @[facility] [place] - GI bleeding, suspected upper source     Other and Unspecified Noninfectious Gastroenteritis and Colitis  Procedure/Surgical History     Esophagogastroduodenoscopy (02/12/2019)     Colonoscopy (12/07/2018)      Comments: Indication: [facility]      Sedation: fentanyl 50 mcg, Versed 1 mg      Findings: Diverticulosis, 2 small AVMs with bleeding in ascending treated with APC      Rec: Consider further workup based on resbridget to treatemetn.     Esophagogastroduodenoscopy and biopsy (12/07/2018)      Comments: Indication: [facility]      Sedatoin: Fentanyl 50 mcg, Versed 2 mg      findings: Negative with negative duodenal biopsy      Rec: Colonoscopy.     Abdominal paracentesis (12/06/2018)      Comments: Negative cytology.     Bone marrow biopsy (07/12/2017)      Colonoscopy (05/27/2017)      Comments: Cecal tubular adenoma, pandiverticulosis. w/polypectomy.     Esophagogastroduodenoscopy (05/27/2017)      Comments: gastritis, fundic gland polyps.     TVR - Tricuspid valve replacement (11/29/2016)     Cardiac angiogram (11/01/2016)      Comments: Summary/Conclusions      PRESENTATION / INDICATIONS        Pre-surgical evaluation for cardiac surgery        Severe TR by echo      DIAGNOSTIC - CORONARY        Co-dominant coronary artery system        The left main artery was normal in appearance and free of obstructive disease.        Chronic total occlusion of the proximal LAD        The circumflex artery has minimal disease.        The RCA has moderate disease.        No change since 2013      DIAGNOSTIC - GRAFTS        Chronic total occlusion in the proximal anastomosis of the SVG graft to the RCA        The SVG to the ramus intermediate is patent        The sequential graft to the 1st diagonal is patent.        The sequential graft limb from the diagonal to the LAD is patent.        No change since 2013      HEMODYNAMICS        The LVEDP is mildly elevated        Severely elevated right atrial pressures        No evidence of intracardiac shunting.     Limited thoracotomy (08/23/2016)      Comments: Left mini thoracotomy and placement of two epicardial screw in leads.  Implant of left pectoral pacer generatoe..     Implantation of intravenous single chamber cardiac pacemaker system (08/04/2016)     Removal of automatic cardiac defibrillator (08/04/2016)      Comments: Generator and leads.     Pacemaker change (04/08/2015)     Cardiac angiogram (08/02/2013)      Comments: Summary/Conclusions      PRESENTATION / INDICATIONS      Chest pain      DIAGNOSTIC - CORONARY      Right dominant coronary artery system      DIAGNOSTIC SUMMARY      100% stenosis in the Proximal LAD      30% stenosis in the 1st Marginal      30% stenosis in the Proximal RCA      50% stenosis in the Mid  RCA      100% stenosis in the Aorta graft to Distal RCA      DIAGNOSTIC - GRAFTS      The sequential graft to the diagonal branch is patent.      The sequential graft from the diagonal to the LAD is patent.      The SVG to the ramus intermediate is patent      The SVG to RCA is chronically occluded      HEMODYNAMICS      The LVEDP is mildly elevated      RECOMMENDATIONS & PLAN      Medical Rx- no significant change from previous angiogram, there is dramatic mismatch in size between the SVG's and the target arteries with slow filling of the LAD.     angiogrqam (02/03/2012)      Comments: Summary/Conclusions      PRESENTATION / INDICATIONS      Dyspnea and fatigue.      DIAGNOSTIC - CORONARY      Right dominant coronary artery system.      LMCA is normal.      LAD is occluded proximally after the 1st septal branch.      LCx has mild luminal irregularities.  OM1 has 20-30% proximal stenosis.      RCA has 30-40% diffuse disease in the mid segment and otherwise has mild diffuse luminal irregularities.      DIAGNOSTIC - GRAFTS      The sequential SVG to the diagonal and then LAD is widely patent.  There is a significant size mismatch between the graft and the native vessels.  The diagonal and distal LAD have no obvious lesions but are small caliber (< 2.0 mm vessels).  There is some backfilling into the mid LAD.      The SVG to the ramus is widely patent.  There is a significant size mismatch between the graft and the native vessel.  The ramus has no obvious lesion but is small caliber (< 2.0 mm vessel).        The SVG to the RCA is chronically occluded.      LEFT VENTRICULAR FUNCTION      Left ventricular function is mildly reduced with EF of 42% and mild global hypokinesis.  No significant MR.      HEMODYNAMICS      The LVEDP is 6 mmHg.  No significant gradient across the aortic valve.      RA 11, RV 29/10, PA 34/17 (mean 25), PCWP 18      Amarilys CO 5.1, TDCO 4.6      RECOMMENDATIONS & PLAN      Consider alternative  "etiology for symptoms.      Lasix dose decreased to 40 mg QD given relative hypotension and patient reporting feeling \"dry membranes.\".     Colonoscopy (12/15/2006)      Comments: Diverticulosis. No polyps..     Colonoscopy (06/22/2001)      Comments: hyperplastic polyp.     Cholecystectomy (06.2001)     CABG - Coronary artery bypass graft (2000)     History of Back Surgery (2000)     Colonoscopy (02/17/1993)      Comments: 1 adenoma, 1 hyperplastic, diverticulosis..     Left shoulder surgery (1991)     Ablation for atrial fibrillation      Comments: Subsequent pacemaker dependence..     Cholecystectomy     Colonoscopy     Esophagogastroduodenoscopy  Medications        nitroglycerin 0.4 mg sublingual tablet: 0.4 mg, 1 tab(s), SL, q5min, not to exceed 3 doses/15 min--if pain persists, seek medical attention, 25 tab(s), PRN: for chest pain.        Protegra oral tablet: 1 tab(s), po, daily.        potassium chloride 20 mEq oral tablet, extended release: See Instructions, 3 tab BID, 30 unknown unit, 5 Refill(s).        acetaminophen 325 mg oral tablet: 650 mg, 2 tab(s), po, q4 hrs, not to exceed 4000 mg/day, 0 Refill(s).        Senokot 8.6 mg oral tablet: 1-2 tab(s), PO, Once a day (at bedtime), PRN: for constipation.        omeprazole 20 mg oral delayed release capsule: 20 mg, 1 cap(s), po, daily, 90 cap(s), 3 Refill(s).        furosemide 80 mg oral tablet: 40 mg, 0.5 tab(s), po, bid, 0 Refill(s).        digoxin 125 mcg (0.125 mg) oral tablet: 125 mcg, 1 tab(s), po, daily, 0 Refill(s).        Vitamin B12 1000 mcg/mL injectable solution: 1,000 mcg, im, qmonth, 0 Refill(s).        Metoprolol Succinate ER 25 mg oral tablet, extended release: See Instructions, TAKE ONE-HALF TABLET BY MOUTH TWICE DAILY, 30 unknown unit, 11 Refill(s).        atorvastatin 40 mg oral tablet: See Instructions, TAKE ONE TABLET BY MOUTH AT BEDTIME, 90 unknown unit.        predniSONE 20 mg oral tablet: See Instructions, 2 po daily for 3-5 days prn " gout, 30 EA, 0 Refill(s).        allopurinol 300 mg oral tablet: See Instructions, TAKE ONE TABLET BY MOUTH ONCE DAILY, 90 unknown unit, 3 Refill(s).        [id number] URINARY DRAIN BAG 2000ML MG BEAD: See Instructions, USE AS DIRECTED, 1 unknown unit.        clotrimazole 1% topical cream: 1 nazario, TOP, BID, for 7 day(s), 30 gm, 0 Refill(s).        MetroGel 1% topical gel: 1 nazario, Topical, daily, 1 tubes, 5 Refill(s).        tamsulosin 0.4 mg oral capsule: 0.4 mg, 1 cap(s), Oral, daily, 30 cap(s), 0 Refill(s).        spironolactone 50 mg oral tablet: 50 mg, 1 tab(s), Oral, bid, increased to 50 mg BID per Dr Quintana 3/21/19, 0 Refill(s).        mirtazapine 15 mg oral tablet: 1 tab(s), Oral, qhs, 30 unknown unit, 11 Refill(s).        warfarin 3 mg oral tablet: 1 tab(s), Oral, daily, or use as directed, 90 tab(s), 3 Refill(s).        ferrous sulfate: Oral, 0 Refill(s).         Allergies    No Known Medication Allergies    adhesive tape  Social History    Smoking Status - 03/28/2019     Never smoker     Alcohol      Past, 03/30/2018     Employment and Education      Employed, Work/School description: [employer] 11/11/2010     Exercise and Physical Activity      Exercise type: Walking., 11/11/2010     Tobacco      Former smoker, quit more than 30 days ago, 08/23/2018  Family History    Aneurysm: Father.    Heart disease: Mother.  Immunizations      Vaccine Date Status      influenza virus vaccine, inactivated 11/27/2018 Given      influenza virus vaccine, inactivated 09/28/2015 Given      pneumococcal (PCV13) 04/21/2015 Given      influenza virus vaccine, inactivated 08/28/2014 Given      influenza virus vaccine, inactivated 10/29/2012 Given      influenza virus vaccine, inactivated 09/23/2011 Given      influenza virus vaccine, inactivated 10/26/2010 Given      influenza 10/23/2008 Recorded      Td 09/01/2005 Recorded      pneumococcal 11/28/2001 Recorded  Lab Results          Lab Results (Last 4 results within 90 days)            Sodium Level: 135 [135 mmol/L - 145 mmol/L] (04/30/19 14:45:00)          Sodium Level: 136 mmol/L [135 mmol/L - 146 mmol/L] (04/18/19 08:48:00)          Sodium Level: 137 mmol/L [135 mmol/L - 146 mmol/L] (04/02/19 09:01:00)          Sodium Level: 137 mmol/L [135 mmol/L - 146 mmol/L] (03/14/19 08:54:00)          Sodium Level TR: 138 mEq/L (02/27/19 10:02:00)          Sodium Level TR: 138 mEq/L (02/14/19 11:22:00)          Potassium Level: 4.8 [3.5 mmol/L - 5 mmol/L] (04/30/19 14:45:00)          Potassium Level: 4.6 mmol/L [3.5 mmol/L - 5.3 mmol/L] (04/18/19 08:48:00)          Potassium Level: 4.8 mmol/L [3.5 mmol/L - 5.3 mmol/L] (04/02/19 09:01:00)          Potassium Level: 4.2 mmol/L [3.5 mmol/L - 5.3 mmol/L] (03/14/19 08:54:00)          Potassium Level TR: 3.9 mEq/L (02/27/19 10:02:00)          Potassium Level TR: 3.9 mEq/L (02/14/19 11:22:00)          Chloride Level: 99 [98 mmol/L - 110 mmol/L] (04/30/19 14:45:00)          Chloride Level: 102 mmol/L [98 mmol/L - 110 mmol/L] (04/18/19 08:48:00)          Chloride Level: 102 mmol/L [98 mmol/L - 110 mmol/L] (04/02/19 09:01:00)          Chloride Level: 103 mmol/L [98 mmol/L - 110 mmol/L] (03/14/19 08:54:00)          Chloride TR: 97 mEq/L (02/27/19 10:02:00)          Chloride TR: 97 mEq/L (02/14/19 11:22:00)          CO2 Level: 27 [21 mmol/L - 31 mmol/L] (04/30/19 14:45:00)          CO2 Level: 27 mmol/L [20 mmol/L - 32 mmol/L] (04/18/19 08:48:00)          CO2 Level: 26 mmol/L [20 mmol/L - 32 mmol/L] (04/02/19 09:01:00)          CO2 Level: 27 mmol/L [20 mmol/L - 32 mmol/L] (03/14/19 08:54:00)          CO2 TR: 32 mEq/L (02/27/19 10:02:00)          CO2 TR: 32 mEq/L (02/14/19 11:22:00)          AGAP: 9 [5  - 18] (04/30/19 14:45:00)          Anion Gap TR: 12.6 mEq/L (02/27/19 10:02:00)          Glucose Level: 109 High [65 mg/dL - 100 mg/dL] (04/30/19 14:45:00)          Glucose Level: 98 mg/dL [65 mg/dL - 99 mg/dL] (04/18/19 08:48:00)          Glucose Level: 98 mg/dL [65  mg/dL - 99 mg/dL] (04/02/19 09:01:00)          Glucose Level: 91 mg/dL [65 mg/dL - 99 mg/dL] (03/14/19 08:54:00)          Glucose Level TR: 92 mg/dL (02/27/19 10:02:00)          Glucose Level TR: 92 mg/dL (02/14/19 11:22:00)          BUN: 67 High [8 mg/dL - 25 mg/dL] (04/30/19 14:45:00)          BUN: 55 mg/dL High [7 mg/dL - 25 mg/dL] (04/18/19 08:48:00)          BUN: 61 mg/dL High [7 mg/dL - 25 mg/dL] (04/02/19 09:01:00)          BUN: 48 mg/dL High [7 mg/dL - 25 mg/dL] (03/14/19 08:54:00)          BUN TR: 45 mg/dL (02/27/19 10:02:00)          BUN TR: 45 mg/dL (02/14/19 11:22:00)          Creatinine Level: 2.46 High [0.72 mg/dL - 1.25 mg/dL] (04/30/19 14:45:00)          Creatinine Level: 2.34 mg/dL High [0.7 mg/dL - 1.11 mg/dL] (04/18/19 08:48:00)          Creatinine Level: 2.26 mg/dL High [0.7 mg/dL - 1.11 mg/dL] (04/02/19 09:01:00)          Creatinine Level: 1.94 mg/dL High [0.7 mg/dL - 1.11 mg/dL] (03/14/19 08:54:00)          Creatinine TR: 2.22 mg/dL (02/27/19 10:02:00)          Creatinine TR: 2.22 mg/dL (02/14/19 11:22:00)          BUN/Creat Ratio: 27 High [10  - 20] (04/30/19 14:45:00)          BUN/Creat Ratio: 24 High [6  - 22] (04/18/19 08:48:00)          BUN/Creat Ratio: 27 High [6  - 22] (04/02/19 09:01:00)          BUN/Creat Ratio: 25 High [6  - 22] (03/14/19 08:54:00)          BUN/Creatinine Ratio TR: 20.27 (02/27/19 10:02:00)          BUN/Creatinine Ratio TR: 20.27 (02/14/19 11:22:00)          eGFR: 25 mL/min/1.73m2 Low (04/18/19 08:48:00)          eGFR: 26 mL/min/1.73m2 Low (04/02/19 09:01:00)          eGFR: 32 mL/min/1.73m2 Low (03/14/19 08:54:00)          eGFR: 31 mL/min/1.73m2 Low (03/01/19 16:05:00)          eGFR TR: 30 mL/min (02/27/19 10:02:00)          eGFR TR: 30 mL/min (02/14/19 11:22:00)          eGFR : 31 Low (04/30/19 14:45:00)          eGFR African American: 29 mL/min/1.73m2 Low (04/18/19 08:48:00)          eGFR African American: 30 mL/min/1.73m2 Low (04/02/19 09:01:00)           eGFR African American: 37 mL/min/1.73m2 Low (03/14/19 08:54:00)          eGFR Non-: 25 Low (04/30/19 14:45:00)          Calcium Level: 9.7 [8.5 mg/dL - 10.5 mg/dL] (04/30/19 14:45:00)          Calcium Level: 9.4 mg/dL [8.6 mg/dL - 10.3 mg/dL] (04/18/19 08:48:00)          Calcium Level: 9.5 mg/dL [8.6 mg/dL - 10.3 mg/dL] (04/02/19 09:01:00)          Calcium Level: 9.2 mg/dL [8.6 mg/dL - 10.3 mg/dL] (03/14/19 08:54:00)          Calcium TR: 8.7 mEq/dL (02/27/19 10:02:00)          Calcium TR: 8.7 mEq/dL (02/14/19 11:22:00)          Bilirubin Total: 0.8 mg/dL [0.2 mg/dL - 1.2 mg/dL] (03/14/19 08:54:00)          Bilirubin Total TR: 1.8 mg/dL (02/27/19 10:02:00)          Bilirubin Total TR: 1.8 mg/dL (02/14/19 11:22:00)          Alkaline Phosphatase: 247 unit/L High [40 unit/L - 115 unit/L] (03/14/19 08:54:00)          Alkaline Phosphatase TR: 215 unit/L (02/27/19 10:02:00)          Alkaline Phosphatase TR: 215 unit/L (02/14/19 11:22:00)          AST/SGOT: 20 unit/L [10 unit/L - 35 unit/L] (03/14/19 08:54:00)          AST TR: 21 unit/L (02/27/19 10:02:00)          AST TR: 21 unit/L (02/14/19 11:22:00)          ALT/SGPT: 17 unit/L [9 unit/L - 46 unit/L] (03/14/19 08:54:00)          ALT TR: 20 unit/L (02/27/19 10:02:00)          ALT TR: 20 unit/L (02/14/19 11:22:00)          Protein Total: 6.9 gm/dL [6.1 gm/dL - 8.1 gm/dL] (03/14/19 08:54:00)          Protein Total TR: 7.1 gm/dL (02/27/19 10:02:00)          Protein Total TR: 7.1 gm/dL (02/14/19 11:22:00)          Albumin Level: 4 gm/dL [3.6 gm/dL - 5.1 gm/dL] (03/14/19 08:54:00)          Albumin Level TR: 3.7 g/dL (02/27/19 10:02:00)          Albumin Level TR: 3.7 g/dL (02/14/19 11:22:00)          Globulin: 2.9 [1.9  - 3.7] (03/14/19 08:54:00)          A/G Ratio: 1.4 [1  - 2.5] (03/14/19 08:54:00)          A/G Ratio TR: 1.09 (02/27/19 10:02:00)          Cholesterol: 82 mg/dL (04/02/19 09:01:00)          Non-HDL Cholesterol: 49 (04/02/19 09:01:00)           HDL: 33 mg/dL Low (04/02/19 09:01:00)          Cholesterol/HDL Ratio: 2.5 (04/02/19 09:01:00)          LDL: 35 (04/02/19 09:01:00)          Triglyceride: 64 mg/dL (04/02/19 09:01:00)          AFP TR: Low (02/27/19 10:02:00)          AFP TR: Low (02/14/19 11:22:00)          WBC TR: 5.9 x10^3/uL (03/01/19 16:28:00)          WBC TR: 4.9 x10^3/uL (02/01/19 14:10:00)          RBC TR: 3.56 x10^6/uL (03/01/19 16:28:00)          RBC TR: 3.09 x10^6/uL (02/01/19 14:10:00)          Hgb: 7.7 Low [13.5 g/dL - 17.5 g/dL] (04/30/19 14:45:00)          Hgb: 8.6 gm/dL Low [13.2 gm/dL - 17.1 gm/dL] (04/18/19 08:48:00)          Hgb TR: 10.3 g/dL (03/01/19 16:28:00)          Hgb TR: 8.6 g/dL (02/01/19 14:10:00)          Hct TR: 33.1 % (03/01/19 16:28:00)          Hct TR: 28.2 % (02/01/19 14:10:00)          MCV: 94 [80 fL - 100 fL] (04/30/19 14:45:00)          MCV TR: 93 fL (03/01/19 16:28:00)          MCV TR: 91 fL (02/01/19 14:10:00)          MCH TR: 28.9 pg (03/01/19 16:28:00)          MCH TR: 27.8 pg (02/01/19 14:10:00)          MCHC TR: 31.1 gm/dL (03/01/19 16:28:00)          MCHC TR: 30.5 gm/dL (02/01/19 14:10:00)          RDW TR: 22.4 % (03/01/19 16:28:00)          RDW TR: 20 % (02/01/19 14:10:00)          Platelet TR: 155 x10^3/uL (03/01/19 16:28:00)          Platelet TR: 129 x10^3/uL (02/01/19 14:10:00)          MPV (Mean Platelet Volume) TR: 10.9 fL (03/01/19 16:28:00)          MPV (Mean Platelet Volume) TR: 9.7 fL (02/01/19 14:10:00)          Lymphocytes TR: 16.5 % (03/01/19 16:28:00)          Lymphocytes TR: 16.8 % (02/01/19 14:10:00)          Absolute Lymphocytes TR: 1 cells/uL (03/01/19 16:28:00)          Absolute Lymphocytes TR: 0.8 cells/uL (02/01/19 14:10:00)          Neutrophils TR: 68.4 % (03/01/19 16:28:00)          Neutrophils TR: 66.3 % (02/01/19 14:10:00)          Absolute Neutrophils TR: 4 cells/uL (03/01/19 16:28:00)          Absolute Neutrophils TR: 3.3 cells/uL (02/01/19 14:10:00)          Monocytes TR: 10.4 %  (03/01/19 16:28:00)          Monocytes TR: 11 % (02/01/19 14:10:00)          Absolute Monocytes TR: 0.6 cells/uL (03/01/19 16:28:00)          Absolute Monocytes TR: 0.5 cells/uL (02/01/19 14:10:00)          Eosinophils TR: 4.4 % (03/01/19 16:28:00)          Eosinophils TR: 5.5 % (02/01/19 14:10:00)          Absolute Eosinophils TR: 0.3 cells/uL (03/01/19 16:28:00)          Absolute Eosinophils TR: 0.3 cells/uL (02/01/19 14:10:00)          Basophils TR: 0.3 % (03/01/19 16:28:00)          Basophils TR: 0.4 % (02/01/19 14:10:00)          Absolute Basophils TR: 0 cells/uL (03/01/19 16:28:00)          Absolute Basophils TR: 0 cells/uL (02/01/19 14:10:00)          Protime: 24.1 High [11.9  - 13.9] (04/30/19 14:45:00)          PT: 17.5 High [9  - 11.5] (04/02/19 09:01:00)          PT: 13.9 High [9  - 11.5] (03/01/19 16:05:00)          PT: 18.4 High [9  - 11.5] (02/01/19 16:00:00)          INR: 2.2 High (04/30/19 14:45:00)          INR: 1.7 (04/15/19 10:10:00)          INR: 1.8 (04/03/19 14:42:00)          INR: 1.8 High (04/02/19 09:01:00)          Hep A Ab TR: Reactive (02/27/19 10:02:00)          Hep A Ab TR: Reactive (02/14/19 11:22:00)          Hep B Core Ab TR: Negative (02/27/19 10:02:00)          Hep B Core Ab TR: Negative (02/14/19 11:22:00)          Hep Be Ab TR: Negative (02/27/19 10:02:00)          Hep Bs Ag TR: Non-Reactive (02/14/19 11:22:00)          Hep Bs Ab TR: Non-Reactive (02/14/19 11:22:00)          Hep C Ab TR: Non-Reactive (02/14/19 11:22:00)          Digoxin Level: 1.2 mcg/L [0.8 mcg/L - 2 mcg/L] (04/18/19 08:48:00)

## 2022-02-16 NOTE — PROGRESS NOTES
Chief Complaint    Patient here to discuss hospic care  History of Present Illness      Or and had his cataract surgery.  He had a therapeutic paracentesis and is feeling much better.  He is walking with a cane alone.  Energy levels improved as he is has his edema.  He is in home care and had a discussion with him about hospice care the benefits of such that they are not ready to go to hospice at this time as he feels he benefits from his visits to the infusion center and from the abdominal paracentesis.  No PND orthopnea.  No chest pain dyspnea or fever..  No abdominal pain.  Review of Systems      Weight is down 20 pounds after the paracentesis.  No headache.  He is feeling stronger after the paracentesis.  No diarrhea melena or rectal bleeding.  No nausea or vomiting.  He has had some pressure sores on his back.  Physical Exam   Vitals & Measurements    T: 97.4   F (Tympanic)  HR: 76(Peripheral)  BP: 90/50     HT: 75 in  WT: 182 lb  BMI: 22.75       Patient is a chronically ill-appearing man in no distress she is wearing dark glasses.  Sclera are anicteric.  He is alert and oriented.  Chest clear.  Cardiac exam regular.  Device right chest.  1+ ankle edema.  Abdomen soft and nontender.  Large inguinal hernia.  No asterixis.  Speech is fluent and memory is intact.  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         Ordered:          85794 office outpatient visit 25 minutes (Charge), Quantity: 1, Right heart failure  Stage 3 chronic kidney disease  Iron deficiency anemia  Cardiac cirrhosis                Iron deficiency anemia (D50.9)         Continue to monitor with hematology.         Ordered:          01432 office outpatient visit 25 minutes (Charge), Quantity: 1, Right heart failure  Stage 3 chronic kidney disease  Iron deficiency anemia  Cardiac cirrhosis                Right heart failure (I50.9)         Stable.         Ordered:          42769  office outpatient visit 25 minutes (Charge), Quantity: 1, Right heart failure  Stage 3 chronic kidney disease  Iron deficiency anemia  Cardiac cirrhosis                Stage 3 chronic kidney disease (N18.3)         Improved.  BMP with next blood draw next week.         Ordered:          41770 office outpatient visit 25 minutes (Charge), Quantity: 1, Right heart failure  Stage 3 chronic kidney disease  Iron deficiency anemia  Cardiac cirrhosis                Orders:         Return to Clinic (Request), Return in 2-3 weeks         Return to Clinic (Request), Return in 1 week  Patient Information     Name:JACINTO JIN      Address:      [address] [place] [id number]     Sex:Male     YOB: 1935     [phone]     Emergency Contact:GERA JIN     MRN:[id number]     FIN:[id number]     Location:[facility]     Date of Service:07/26/2019      Primary Care Physician:       Shravan Berrios MD, [phone]      Attending Physician:       Shravan Berrios MD, [phone]  Problem List/Past Medical History    Ongoing     Acquired arteriovenous malformation of colon       Comments: Treated.     Acute kidney injury (nontraumatic)     AF (Atrial Fibrillation)     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, chronic     Anemia of renal disease     Anticardiolipin syndrome     Anticoagulated     Ascites       Comments: Negative cytology 12/6/18     B12 deficiency     BPH with urinary obstruction     CAD (coronary artery disease)     Cardiac cirrhosis     Cardiorenal syndrome     CHB (complete heart block)     Chronic diastolic CHF (congestive heart failure), NYHA class 3     Depression     Diverticulosis     Dupuytren's contracture of right hand     Gout     H/O acute pancreatitis     High cholesterol     History of acute bacterial endocarditis     History of coronary artery bypass graft     History of GI bleed       Comments: Again 04/30/2019     History of tricuspid valve  replacement     Hx of colonic polyp     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     IBS (irritable bowel syndrome)     ICD (implantable cardioverter-defibrillator) infection     Inguinal hernia, right     Iron deficiency anemia     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     Nonischemic dilated cardiomyopathy     Obesity     KEYONA (obstructive sleep apnea)       Comments: Adequate titration to BIPAP 16/11 on 10/15/2013     Osteoarthritis of both knees     Paralysis, diaphragm       Comments: Left     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     PUD (peptic ulcer disease)     Right heart failure     Stage 3 chronic kidney disease     Tricuspid valve insufficiency     Vitamin B 12 deficiency    Historical     BE (bacterial endocarditis)     Colon polyps     History of sepsis     Inpatient stay       Comments: @[facility] - Jaw pain, possible anginal equivalent     Inpatient stay       Comments: @[facility] - Infected pacemaker     Inpatient stay       Comments: @[facility] - Diverticulitis     Inpatient stay       Comments: @[facility] - S/P tricuspid valve replacement with worsening right ventricular function     Inpatient stay       Comments: @[facility] - Severe symptomatic tricuspid valvular insufficiency. Chronic right heart failure.     Inpatient stay       Comments: @[facility] - Acute on chronic right heart failure     Inpatient stay       Comments: @[facility] - Anemia     Inpatient stay       Comments: @[place], [place] - GI bleeding, suspected upper source     Inpatient stay       Comments: @[place] - Acute GI bleeding     Other and Unspecified Noninfectious Gastroenteritis and Colitis  Procedure/Surgical History     Extracapsular cataract extraction and insertion of intraocular lens (07/11/2019)      Comments: Right..     Esophagogastroduodenoscopy (06/08/2019)      Comments: Indication: GI bleeding      Sedation:  Fentanyl 50 mcg, Versed 2 mg      Findings: Multiple gastric polyps one of which was oozing. Esophagus and duodenum normal      Rec: D/C PPI, H2 blocker, hold Warfarin.     Left Extracapsular Cataract extraction with intraocular lens implant (05/28/2019)      Comments: Left.. Dr. Cornelius.     Esophagogastroduodenoscopy (02/12/2019)     Colonoscopy (12/07/2018)      Comments: Indication: GIB      Sedation: fentanyl 50 mcg, Versed 1 mg      Findings: Diverticulosis, 2 small AVMs with bleeding in ascending treated with APC      Rec: Consider further workup based on resgabriele to treatemetn.     Esophagogastroduodenoscopy and biopsy (12/07/2018)      Comments: Indication: GIB      Sedatoin: Fentanyl 50 mcg, Versed 2 mg      findings: Negative with negative duodenal biopsy      Rec: Colonoscopy.     Abdominal paracentesis (12/06/2018)      Comments: Negative cytology.     Bone marrow biopsy (07/12/2017)     Colonoscopy (05/27/2017)      Comments: Cecal tubular adenoma, pandiverticulosis. w/polypectomy.     Esophagogastroduodenoscopy (05/27/2017)      Comments: gastritis, fundic gland polyps.     TVR - Tricuspid valve replacement (11/29/2016)     Cardiac angiogram (11/01/2016)      Comments: Summary/Conclusions      PRESENTATION / INDICATIONS        Pre-surgical evaluation for cardiac surgery        Severe TR by echo      DIAGNOSTIC - CORONARY        Co-dominant coronary artery system        The left main artery was normal in appearance and free of obstructive disease.        Chronic total occlusion of the proximal LAD        The circumflex artery has minimal disease.        The RCA has moderate disease.        No change since 2013      DIAGNOSTIC - GRAFTS        Chronic total occlusion in the proximal anastomosis of the SVG graft to the RCA        The SVG to the ramus intermediate is patent        The sequential graft to the 1st diagonal is patent.        The sequential graft limb from the diagonal to the LAD is patent.         No change since 2013      HEMODYNAMICS        The LVEDP is mildly elevated        Severely elevated right atrial pressures        No evidence of intracardiac shunting.     Limited thoracotomy (08/23/2016)      Comments: Left mini thoracotomy and placement of two epicardial screw in leads.  Implant of left pectoral pacer generatoe..     Implantation of intravenous single chamber cardiac pacemaker system (08/04/2016)     Removal of automatic cardiac defibrillator (08/04/2016)      Comments: Generator and leads.     Pacemaker change (04/08/2015)     Cardiac angiogram (08/02/2013)      Comments: Summary/Conclusions      PRESENTATION / INDICATIONS      Chest pain      DIAGNOSTIC - CORONARY      Right dominant coronary artery system      DIAGNOSTIC SUMMARY      100% stenosis in the Proximal LAD      30% stenosis in the 1st Marginal      30% stenosis in the Proximal RCA      50% stenosis in the Mid RCA      100% stenosis in the Aorta graft to Distal RCA      DIAGNOSTIC - GRAFTS      The sequential graft to the diagonal branch is patent.      The sequential graft from the diagonal to the LAD is patent.      The SVG to the ramus intermediate is patent      The SVG to RCA is chronically occluded      HEMODYNAMICS      The LVEDP is mildly elevated      RECOMMENDATIONS & PLAN      Medical Rx- no significant change from previous angiogram, there is dramatic mismatch in size between the SVG's and the target arteries with slow filling of the LAD.     angiogrqam (02/03/2012)      Comments: Summary/Conclusions      PRESENTATION / INDICATIONS      Dyspnea and fatigue.      DIAGNOSTIC - CORONARY      Right dominant coronary artery system.      LMCA is normal.      LAD is occluded proximally after the 1st septal branch.      LCx has mild luminal irregularities.  OM1 has 20-30% proximal stenosis.      RCA has 30-40% diffuse disease in the mid segment and otherwise has mild diffuse luminal irregularities.      DIAGNOSTIC - GRAFTS       "The sequential SVG to the diagonal and then LAD is widely patent.  There is a significant size mismatch between the graft and the native vessels.  The diagonal and distal LAD have no obvious lesions but are small caliber (< 2.0 mm vessels).  There is some backfilling into the mid LAD.      The SVG to the ramus is widely patent.  There is a significant size mismatch between the graft and the native vessel.  The ramus has no obvious lesion but is small caliber (< 2.0 mm vessel).        The SVG to the RCA is chronically occluded.      LEFT VENTRICULAR FUNCTION      Left ventricular function is mildly reduced with EF of 42% and mild global hypokinesis.  No significant MR.      HEMODYNAMICS      The LVEDP is 6 mmHg.  No significant gradient across the aortic valve.      RA 11, RV 29/10, PA 34/17 (mean 25), PCWP 18      Amarilys CO 5.1, TDCO 4.6      RECOMMENDATIONS & PLAN      Consider alternative etiology for symptoms.      Lasix dose decreased to 40 mg QD given relative hypotension and patient reporting feeling \"dry membranes.\".     Colonoscopy (12/15/2006)      Comments: Diverticulosis. No polyps..     Colonoscopy (06/22/2001)      Comments: hyperplastic polyp.     Cholecystectomy (06.2001)     CABG - Coronary artery bypass graft (2000)     History of Back Surgery (2000)     Colonoscopy (02/17/1993)      Comments: 1 adenoma, 1 hyperplastic, diverticulosis..     Left shoulder surgery (1991)     Ablation for atrial fibrillation      Comments: Subsequent pacemaker dependence..     Cholecystectomy     Colonoscopy     Esophagogastroduodenoscopy  Medications    [id number] URINARY DRAIN BAG 2000ML MG BEAD, See Instructions    acetaminophen 325 mg oral tablet, 650 mg= 2 tab(s), Oral, q4 hrs    allopurinol 300 mg oral tablet, 300 mg= 1 tab(s), Oral, daily, 3 refills    atorvastatin 10 mg oral tablet, 10 mg= 1 tab(s), Oral, hs    clotrimazole 1% topical cream, 1 nazario, Topical, bid    digoxin 125 mcg (0.125 mg) oral tablet, 125 mcg= 1 " tab(s), Oral, daily    famotidine 20 mg oral tablet, 1 tab(s), Oral, bid, 3 refills    Flonase 50 mcg/inh nasal spray, 2 spray(s), Nasal, daily    furosemide 80 mg oral tablet, 40 mg= 0.5 tab(s), Oral, bid    Metoprolol Succinate ER 25 mg oral tablet, extended release, See Instructions, 3 refills    MetroGel 1% topical gel, 1 nazario, Topical, daily, 5 refills    midodrine 2.5 mg oral tablet, 5 mg= 2 tab(s), Oral, tid, 2 refills    mirtazapine 15 mg oral tablet, 1 tab(s), Oral, qhs, 11 refills    nitroglycerin 0.4 mg sublingual tablet, 0.4 mg= 1 tab(s), SL, q5 min, PRN, 3 refills    predniSONE 20 mg oral tablet, See Instructions    Protegra oral tablet, 1 tab(s), Oral, daily    spironolactone 50 mg oral tablet, 25 mg= 0.5 tab(s), Oral, bid, 2 refills    Vitamin B12 1000 mcg/mL injectable solution, 1000 mcg, IM, qmonth  Allergies    No Known Medication Allergies    adhesive tape  Social History    Smoking Status - 07/26/2019     Former smoker     Alcohol      Past, 03/30/2018     Employment/School      Employed, Work/School description: [employer] 11/11/2010     Exercise      Exercise type: Walking., 11/11/2010     Tobacco      Former smoker, quit more than 30 days ago, 08/23/2018  Family History    Aneurysm: Father.    Heart disease: Mother.  Immunizations      Vaccine Date Status      influenza virus vaccine, inactivated 11/27/2018 Given      influenza virus vaccine, inactivated 09/28/2015 Given      pneumococcal (PCV13) 04/21/2015 Given      influenza virus vaccine, inactivated 08/28/2014 Given      influenza virus vaccine, inactivated 10/29/2012 Given      influenza virus vaccine, inactivated 09/23/2011 Given      influenza virus vaccine, inactivated 10/26/2010 Given      influenza 10/23/2008 Recorded      Td 09/01/2005 Recorded      pneumococcal 11/28/2001 Recorded  Lab Results          Lab Results (Last 4 results within 90 days)           Sodium Level: 134 mmol/L Low [135 mmol/L - 146 mmol/L] (07/08/19 08:36:00)           Sodium Level: 131 mmol/L Low [135 mmol/L - 146 mmol/L] (06/18/19 12:31:00)          Sodium Level: 136 [135 mmol/L - 145 mmol/L] (05/08/19 08:48:00)          Sodium Level: 135 [135 mmol/L - 145 mmol/L] (04/30/19 14:45:00)          Sodium Level TR: 134 mEq/L (06/28/19 13:43:00)          Sodium Level TR: 139 mEq/L (06/27/19 13:51:00)          Sodium Level TR: 132 mEq/L (06/14/19 09:51:00)          Sodium Level TR: 134 mEq/L (05/17/19 12:02:00)          Potassium Level: 4.5 mmol/L [3.5 mmol/L - 5.3 mmol/L] (07/08/19 08:36:00)          Potassium Level: 5 mmol/L [3.5 mmol/L - 5.3 mmol/L] (06/18/19 12:31:00)          Potassium Level: 5.5 High [3.5 mmol/L - 5 mmol/L] (05/08/19 08:48:00)          Potassium Level: 4.8 [3.5 mmol/L - 5 mmol/L] (04/30/19 14:45:00)          Potassium Level TR: 4.2 mEq/L (06/28/19 13:43:00)          Potassium Level TR: 3.4 mEq/L (06/27/19 13:51:00)          Potassium Level TR: 5 mEq/L (06/14/19 09:51:00)          Potassium Level TR: 4.7 mEq/L (05/17/19 12:02:00)          Chloride Level: 100 mmol/L [98 mmol/L - 110 mmol/L] (07/08/19 08:36:00)          Chloride Level: 99 mmol/L [98 mmol/L - 110 mmol/L] (06/18/19 12:31:00)          Chloride Level: 100 [98 mmol/L - 110 mmol/L] (05/08/19 08:48:00)          Chloride Level: 99 [98 mmol/L - 110 mmol/L] (04/30/19 14:45:00)          Chloride TR: 99 mEq/L (06/28/19 13:43:00)          Chloride TR: 102 mEq/L (06/27/19 13:51:00)          Chloride TR: 100 mEq/L (06/14/19 09:51:00)          Chloride TR: 96 mEq/L (05/17/19 12:02:00)          CO2 Level: 26 mmol/L [20 mmol/L - 32 mmol/L] (07/08/19 08:36:00)          CO2 Level: 29 mmol/L [20 mmol/L - 32 mmol/L] (06/18/19 12:31:00)          CO2 Level: 27 [21 mmol/L - 31 mmol/L] (05/08/19 08:48:00)          CO2 Level: 27 [21 mmol/L - 31 mmol/L] (04/30/19 14:45:00)          CO2 TR: 26 mEq/L (06/28/19 13:43:00)          CO2 TR: 26 mEq/L (06/27/19 13:51:00)          CO2 TR: 25 mEq/L (06/14/19 09:51:00)          CO2  TR: 29 mEq/L (05/17/19 12:02:00)          AGAP: 9 [5  - 18] (05/08/19 08:48:00)          AGAP: 9 [5  - 18] (04/30/19 14:45:00)          Anion Gap TR: 9 mEq/L (06/28/19 13:43:00)          Anion Gap TR: 11 mEq/L (06/27/19 13:51:00)          Anion Gap TR: 7 mEq/L (06/14/19 09:51:00)          Anion Gap TR: 9 mEq/L (05/17/19 12:02:00)          Glucose Level: 97 mg/dL [65 mg/dL - 99 mg/dL] (07/08/19 08:36:00)          Glucose Level: 97 mg/dL [65 mg/dL - 99 mg/dL] (06/18/19 12:31:00)          Glucose Level: 109 High [65 mg/dL - 100 mg/dL] (05/08/19 08:48:00)          Glucose Level: 109 High [65 mg/dL - 100 mg/dL] (04/30/19 14:45:00)          Glucose Level TR: 100 mg/dL (06/28/19 13:43:00)          Glucose Level TR: 117 mg/dL (06/27/19 13:51:00)          Glucose Level TR: 91 mg/dL (06/14/19 09:51:00)          Glucose Level TR: 91 mg/dL (05/17/19 12:02:00)          BUN: 45 mg/dL High [7 mg/dL - 25 mg/dL] (07/08/19 08:36:00)          BUN: 39 mg/dL High [7 mg/dL - 25 mg/dL] (06/18/19 12:31:00)          BUN: 64 High [8 mg/dL - 25 mg/dL] (05/08/19 08:48:00)          BUN: 67 High [8 mg/dL - 25 mg/dL] (04/30/19 14:45:00)          BUN TR: 45 mg/dL (06/28/19 13:43:00)          BUN TR: 16 mg/dL (06/27/19 13:51:00)          BUN TR: 37 mg/dL (06/14/19 09:51:00)          BUN TR: 56 mg/dL (05/17/19 12:02:00)          Creatinine Level: 1.63 mg/dL High [0.7 mg/dL - 1.11 mg/dL] (07/08/19 08:36:00)          Creatinine Level: 1.64 mg/dL High [0.7 mg/dL - 1.11 mg/dL] (06/18/19 12:31:00)          Creatinine Level: 2.51 High [0.72 mg/dL - 1.25 mg/dL] (05/08/19 08:48:00)          Creatinine Level: 2.46 High [0.72 mg/dL - 1.25 mg/dL] (04/30/19 14:45:00)          Creatinine TR: 1.73 mg/dL (06/28/19 13:43:00)          Creatinine TR: 0.84 mg/dL (06/27/19 13:51:00)          Creatinine TR: 1.66 mg/dL (06/14/19 09:51:00)          Creatinine TR: 2.44 mg/dL (05/17/19 12:02:00)          BUN/Creat Ratio: 28 High [6  - 22] (07/08/19 08:36:00)           BUN/Creat Ratio: 24 High [6  - 22] (06/18/19 12:31:00)          BUN/Creat Ratio: 25 High [10  - 20] (05/08/19 08:48:00)          BUN/Creat Ratio: 27 High [10  - 20] (04/30/19 14:45:00)          BUN/Creatinine Ratio TR: 26 (06/28/19 13:43:00)          BUN/Creatinine Ratio TR: 19 (06/27/19 13:51:00)          BUN/Creatinine Ratio TR: 22 (06/14/19 09:51:00)          BUN/Creatinine Ratio TR: 23 (05/17/19 12:02:00)          eGFR: 39 mL/min/1.73m2 Low (07/08/19 08:36:00)          eGFR: 39 mL/min/1.73m2 Low (06/18/19 12:31:00)          eGFR TR: 38 mL/min (06/28/19 13:43:00)          eGFR TR: 40 mL/min (06/14/19 09:51:00)          eGFR TR: 26 mL/min (05/17/19 12:02:00)          eGFR African American: 45 mL/min/1.73m2 Low (07/08/19 08:36:00)          eGFR African American: 45 mL/min/1.73m2 Low (06/18/19 12:31:00)          eGFR : 30 Low (05/08/19 08:48:00)          eGFR : 31 Low (04/30/19 14:45:00)          eGFR Non-: 25 Low (05/08/19 08:48:00)          eGFR Non-: 25 Low (04/30/19 14:45:00)          Calcium Level: 8.6 mg/dL [8.6 mg/dL - 10.3 mg/dL] (07/08/19 08:36:00)          Calcium Level: 8.5 mg/dL Low [8.6 mg/dL - 10.3 mg/dL] (06/18/19 12:31:00)          Calcium Level: 9.5 [8.5 mg/dL - 10.5 mg/dL] (05/08/19 08:48:00)          Calcium Level: 9.7 [8.5 mg/dL - 10.5 mg/dL] (04/30/19 14:45:00)          Calcium TR: 8.8 mEq/dL (06/28/19 13:43:00)          Calcium TR: 9.7 mEq/dL (06/27/19 13:51:00)          Calcium TR: 8.7 mEq/dL (06/14/19 09:51:00)          Calcium TR: 8.9 mEq/dL (05/17/19 12:02:00)          Bilirubin Total TR: 0.5 mg/dL (06/28/19 13:43:00)          Bilirubin Total TR: 0.9 mg/dL (06/14/19 09:51:00)          Alkaline Phosphatase TR: 317 unit/L (06/28/19 13:43:00)          Alkaline Phosphatase TR: 294 unit/L (06/14/19 09:51:00)          AST TR: 24 unit/L (06/28/19 13:43:00)          AST TR: 17 unit/L (06/14/19 09:51:00)          ALT TR: 20 unit/L  (06/28/19 13:43:00)          ALT TR: 11 unit/L (06/14/19 09:51:00)          Protein Total TR: 6.5 gm/dL (06/28/19 13:43:00)          Protein Total TR: 6.3 gm/dL (06/14/19 09:51:00)          Albumin Level TR: 2.9 g/dL (06/28/19 13:43:00)          Albumin Level TR: 2.9 g/dL (06/14/19 09:51:00)          Globulin TR: 3.6 g/dL (06/28/19 13:43:00)          Globulin TR: 3.4 g/dL (06/14/19 09:51:00)          A/G Ratio TR: 0.8 mg/dL (06/28/19 13:43:00)          A/G Ratio TR: 0.9 (06/14/19 09:51:00)          Iron TR: 15 nmol/L (06/14/19 09:51:00)          Iron TR: 22 nmol/L (05/30/19 12:10:00)          Ferritin TR: 919.4 ng/mL (06/14/19 09:51:00)          WBC: 5.4 [3.8  - 10.8] (07/08/19 08:36:00)          WBC: 5.3 [3.8  - 10.8] (06/18/19 12:31:00)          WBC: 6.5 [4.5  - 11] (05/08/19 08:48:00)          WBC TR: 5.5 x10^3/uL (06/28/19 13:43:00)          WBC TR: 4.7 x10^3/uL (06/27/19 13:51:00)          WBC TR: 5.3 x10^3/uL (06/14/19 09:42:00)          WBC TR: 6.6 x10^3/uL (05/30/19 12:10:00)          RBC: 2.74 Low [4.2  - 5.8] (07/08/19 08:36:00)          RBC: 2.85 Low [4.2  - 5.8] (06/18/19 12:31:00)          RBC: 2.95 Low [4.3  - 5.9] (05/08/19 08:48:00)          RBC TR: 2.76 x10^6/uL (06/28/19 13:43:00)          RBC TR: 4.14 x10^6/uL (06/27/19 13:51:00)          RBC TR: 2.86 x10^6/uL (06/14/19 09:42:00)          RBC TR: 2.46 x10^6/uL (05/30/19 12:10:00)          Hgb: 7.7 gm/dL Low [13.2 gm/dL - 17.1 gm/dL] (07/08/19 08:36:00)          Hgb: 8.2 gm/dL Low [13.2 gm/dL - 17.1 gm/dL] (06/18/19 12:31:00)          Hgb: 8.6 Low [13.5 g/dL - 17.5 g/dL] (05/08/19 08:48:00)          Hgb: 7.7 Low [13.5 g/dL - 17.5 g/dL] (04/30/19 14:45:00)          Hgb TR: 7.9 g/dL (06/28/19 13:43:00)          Hgb TR: 12.9 g/dL (06/27/19 13:51:00)          Hgb TR: 8.1 g/dL (06/14/19 09:42:00)          Hgb TR: 7.2 g/dL (05/30/19 12:10:00)          Hct: 25.4 % Low [38.5 % - 50 %] (07/08/19 08:36:00)          Hct: 26.1 % Low [38.5 % - 50 %] (06/18/19  12:31:00)          Hct: 28.0 Low [37 % - 53 %] (05/08/19 08:48:00)          Hct TR: 25.9 % (06/28/19 13:43:00)          Hct TR: 38.2 % (06/27/19 13:51:00)          Hct TR: 26.5 % (06/14/19 09:42:00)          Hct TR: 23.3 % (05/30/19 12:10:00)          MCV: 92.7 fL [80 fL - 100 fL] (07/08/19 08:36:00)          MCV: 91.6 fL [80 fL - 100 fL] (06/18/19 12:31:00)          MCV: 95 [80 fL - 100 fL] (05/08/19 08:48:00)          MCV: 94 [80 fL - 100 fL] (04/30/19 14:45:00)          MCV TR: 94 fL (06/28/19 13:43:00)          MCV TR: 92 fL (06/27/19 13:51:00)          MCV TR: 93 fL (06/14/19 09:42:00)          MCV TR: 95 fL (05/30/19 12:10:00)          MCH: 28.1 pg [27 pg - 33 pg] (07/08/19 08:36:00)          MCH: 28.8 pg [27 pg - 33 pg] (06/18/19 12:31:00)          MCH: 29.2 [26 pg - 34 pg] (05/08/19 08:48:00)          MCH TR: 28.6 pg (06/28/19 13:43:00)          MCH TR: 31.2 pg (06/27/19 13:51:00)          MCH TR: 28.3 pg (06/14/19 09:42:00)          MCH TR: 29.3 pg (05/30/19 12:10:00)          MCHC: 30.3 gm/dL Low [32 gm/dL - 36 gm/dL] (07/08/19 08:36:00)          MCHC: 31.4 gm/dL Low [32 gm/dL - 36 gm/dL] (06/18/19 12:31:00)          MCHC: 30.7 Low [32 g/dL - 36 g/dL] (05/08/19 08:48:00)          MCHC TR: 30.5 gm/dL (06/28/19 13:43:00)          MCHC TR: 33.8 gm/dL (06/27/19 13:51:00)          MCHC TR: 30.6 gm/dL (06/14/19 09:42:00)          MCHC TR: 30.9 gm/dL (05/30/19 12:10:00)          RDW: 16.9 % High [11 % - 15 %] (07/08/19 08:36:00)          RDW: 15.6 % High [11 % - 15 %] (06/18/19 12:31:00)          RDW: 17.4 High [11.5 % - 15.5 %] (05/08/19 08:48:00)          RDW TR: 18.6 % (06/28/19 13:43:00)          RDW TR: 14.9 % (06/27/19 13:51:00)          RDW TR: 17.6 % (06/14/19 09:42:00)          RDW TR: 18 % (05/30/19 12:10:00)          Platelet: 217 [140  - 400] (07/08/19 08:36:00)          Platelet: 231 [140  - 400] (06/18/19 12:31:00)          Platelet: 143 [140  - 440] (05/08/19 08:48:00)          Platelet TR: 228  x10^3/uL (06/28/19 13:43:00)          Platelet TR: 210 x10^3/uL (06/27/19 13:51:00)          Platelet TR: 203 x10^3/uL (06/14/19 09:42:00)          Platelet TR: 211 x10^3/uL (05/30/19 12:10:00)          MPV: 9.3 fL [7.5 fL - 12.5 fL] (07/08/19 08:36:00)          MPV: 9.9 fL [7.5 fL - 12.5 fL] (06/18/19 12:31:00)          MPV: 11.0 [6.5 fL - 11 fL] (05/08/19 08:48:00)          MPV (Mean Platelet Volume) TR: 8.6 fL (06/28/19 13:43:00)          MPV (Mean Platelet Volume) TR: 10.2 fL (06/27/19 13:51:00)          MPV (Mean Platelet Volume) TR: 8.9 fL (06/14/19 09:42:00)          MPV (Mean Platelet Volume) TR: 9.4 fL (05/30/19 12:10:00)          Neutrophils Abs# TR: 75.9 % (05/30/19 12:10:00)          Neutrophils Abs# TR: 76.4 % (05/17/19 12:02:00)          Lymphocytes TR: 13.6 % (06/28/19 13:43:00)          Lymphocytes TR: 11 % (06/14/19 09:42:00)          Lymphocytes TR: 8.3 % (05/30/19 12:10:00)          Lymphocytes TR: 9.6 % (05/17/19 12:02:00)          Absolute Lymphocytes TR: 0.8 cells/uL (06/28/19 13:43:00)          Absolute Lymphocytes TR: 0.6 cells/uL (06/14/19 09:42:00)          Absolute Lymphocytes TR: 0.6 cells/uL (05/30/19 12:10:00)          Absolute Lymphocytes TR: 0.5 cells/uL (05/17/19 12:02:00)          Neutrophils TR: 73 % (06/28/19 13:43:00)          Neutrophils TR: 75.3 % (06/14/19 09:42:00)          Absolute Neutrophils TR: 4 cells/uL (06/28/19 13:43:00)          Absolute Neutrophils TR: 4 cells/uL (06/14/19 09:42:00)          Absolute Neutrophils TR: 5 cells/uL (05/30/19 12:10:00)          Absolute Neutrophils TR: 4.3 cells/uL (05/17/19 12:02:00)          Monocytes TR: 10.5 % (06/28/19 13:43:00)          Monocytes TR: 9 % (06/14/19 09:42:00)          Monocytes TR: 9 % (05/30/19 12:10:00)          Monocytes TR: 10.4 % (05/17/19 12:02:00)          Absolute Monocytes TR: 0.6 cells/uL (06/28/19 13:43:00)          Absolute Monocytes TR: 0.5 cells/uL (06/14/19 09:42:00)          Absolute Monocytes TR: 0.6  cells/uL (05/30/19 12:10:00)          Absolute Monocytes TR: 0.6 cells/uL (05/17/19 12:02:00)          Eosinophils TR: 2.5 % (06/28/19 13:43:00)          Eosinophils TR: 4.3 % (06/14/19 09:42:00)          Eosinophils TR: 6.5 % (05/30/19 12:10:00)          Eosinophils TR: 3.2 % (05/17/19 12:02:00)          Absolute Eosinophils TR: 0.1 cells/uL (06/28/19 13:43:00)          Absolute Eosinophils TR: 0.2 cells/uL (06/14/19 09:42:00)          Absolute Eosinophils TR: 0.4 cells/uL (05/30/19 12:10:00)          Absolute Eosinophils TR: 0.2 cells/uL (05/17/19 12:02:00)          Basophils TR: 0.4 % (06/28/19 13:43:00)          Basophils TR: 0.4 % (06/14/19 09:42:00)          Basophils TR: 0.3 % (05/30/19 12:10:00)          Basophils TR: 0.4 % (05/17/19 12:02:00)          Absolute Basophils TR: 0 cells/uL (06/28/19 13:43:00)          Absolute Basophils TR: 0 cells/uL (06/14/19 09:42:00)          Absolute Basophils TR: 0 cells/uL (05/30/19 12:10:00)          Absolute Basophils TR: 0 cells/uL (05/17/19 12:02:00)          Protime: 21.0 High [11.9  - 13.9] (05/08/19 08:48:00)          Protime: 24.1 High [11.9  - 13.9] (04/30/19 14:45:00)          PT: 10.8 [9  - 11.5] (06/18/19 12:31:00)          PT TR: 15.8 (06/13/19 10:07:00)          PT TR: 22 (05/17/19 12:02:00)          INR: 1.1 (06/18/19 12:31:00)          INR: 4.5 (06/07/19 14:33:00)          INR: 2 (05/17/19 17:18:00)          INR: 1.9 (05/08/19 12:23:00)          INR TR: 1.3 (06/13/19 10:07:00)          INR TR: 2 (05/17/19 12:02:00)          INR TR: 1.9 (05/02/19 05:32:00)

## 2022-02-16 NOTE — PROGRESS NOTES
Chief Complaint    follow up - finished meds  History of Present Illness      Patient has noticed increased weight, edema, abdominal girth over the past couple of weeks.  He is not taking his metolazone as prescribed and his weight is over 220 pounds at his home scale.  Currently he is to therapy and his home scale.  No PND orthopnea.  His urinary frequency urgency and nocturia little better after treatment with penicillin for 2 urine cultures growing strep viridans.  Sometimes has a sense is not getting her bladder completely empty.  Depression improved PHQ 9 is 13 has no suicidal thoughts.  Review of Systems      No bleeding, fever, chills, headache, falls.  Physical Exam   Vitals & Measurements    HR: 89(Peripheral)  BP: 115/76       Patient appears comfortable.  Not tachypneic.  Speaks in complete sentences.  Affect is normal.  He smiles easily.  Alert and oriented.  Chest reveals diminished breath sounds left base unchanged which are chronic.  Cardiac exam is regular soft murmur.  1+ edema.  Device in the left chest.  Prostate was checked last visit and was benign and 1+.  Assessment/Plan       Anticoagulated(Z79.01)        Stable.         Orders:          36440 office outpatient visit 25 minutes (Charge), Quantity: 1, Urinary frequency  Depression  Anticoagulated          Return to Clinic (Request), RFV: CHF, Return in 1 week       Depression(F32.9)        Modest improvement to date.         Orders:          [id number] office outpatient visit 25 minutes (Charge), Quantity: 1, Urinary frequency  Depression  Anticoagulated          Return to Clinic (Request), RFV: CHF, Return in 1 week       Right heart failure(I50.9)        Increased heart failure.  Patient uses metolazone daily for weight over 220 pounds and follow-up next week.         Orders:          Return to Clinic (Request), RFV: CHF, Return in 1 week       Urinary frequency(R35.0)        Patient has relatively chronic urinary symptoms with the benign  prostate exam and PSA.  Urine cultures grew strep viridans twice he was treated with penicillin with modest improvement in his symptoms.  Will refer to urology.         Orders:          [id number] office outpatient visit 25 minutes (Charge), Quantity: 1, Urinary frequency  Depression  Anticoagulated          Urology Consult (Request), Referred to: Tiffany or Cassia, Reason: BPH symptoms. UC with Strep twice. Pyuria, Urinary frequency  Patient Information     Name:JACINTO JIN      Address:      [address] [place] [address]      Sex:Male      YOB: 1935      [phone]      Emergency Contact:GERA JIN     MRN:[id number]     FIN:[id number]     Location:[facility]     Date of Service:05/24/2018      Primary Care Physician:       Shravan Berrios MD, [phone]      Attending Physician:       Shravan Berrios MD, [phone]  Problem List/Past Medical History    Ongoing     Acute kidney injury (nontraumatic)     AF (Atrial Fibrillation)     Anemia of renal disease     Anticardiolipin syndrome     Anticoagulated     Ascites     B12 deficiency     Benign hypertensive CKD     Benign prostatic hyperplasia (BPH) with urinary urgency     CAD (coronary artery disease)     Cardiorenal syndrome     CHB (complete heart block)     CHF (Congestive Heart Failure)     Depression     Diverticulosis     Dupuytren's contracture of right hand     Gout     H/O acute pancreatitis     High cholesterol     History of acute bacterial endocarditis     History of coronary artery bypass graft     History of tricuspid valve replacement     Hx of colonic polyp     Hypertensive HF (heart failure)     IBS (irritable bowel syndrome)     ICD (implantable cardioverter-defibrillator) infection     Iron deficiency anemia     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     Nonischemic dilated cardiomyopathy     Obesity     KEYONA (obstructive sleep apnea)       Comments: Adequate  titration to BIPAP 16/11 on 10/15/2013     Osteoarthritis of both knees     Paralysis, diaphragm       Comments: Left     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 Chronic right heart failure.     Inpatient stay       Comments: @[facility] - Anemia     Inpatient stay       Comments: @[facility] - Jaw pain, possible anginal equivalent     Inpatient stay       Comments: @[facility] - Infected pacemaker     Inpatient stay       Comments: @[facility] - Acute on chronic right heart failure     Inpatient stay       Comments: @[facility] - S/P tricuspid valve replacement with worsening right ventricular function     Other and Unspecified Noninfectious Gastroenteritis and Colitis  Procedure/Surgical History     Bone marrow biopsy (07/12/2017)     Colonoscopy (05/27/2017)     Esophagogastroduodenoscopy (05/27/2017)     TVR - Tricuspid valve replacement (11/29/2016)     Cardiac angiogram (11/01/2016)     Limited thoracotomy (08/23/2016)     Implantation of intravenous single chamber cardiac pacemaker system (08/04/2016)     Removal of automatic cardiac defibrillator (08/04/2016)     Pacemaker change (04/08/2015)     Cardiac angiogram (08/02/2013)     angiogrqam (02/03/2012)     Colonoscopy (12/15/2006)     Colonoscopy (06/22/2001)     Cholecystectomy (06/2001)     CABG - Coronary artery bypass graft (2000)     History of Back Surgery (2000)     Colonoscopy (02/17/1993)     Left shoulder surgery (1991)     Ablation for atrial fibrillation     Cholecystectomy     Colonoscopy     Esophagogastroduodenoscopy  Medications         nitroglycerin 0.4 mg sublingual tablet: 0.4 mg, 1 tab(s), SL, q5min, not to exceed 3 doses/15 min--if pain persists, seek medical attention, 25 tab(s), PRN: for chest pain.        Protegra oral tablet: 1 tab(s), po, daily.        potassium chloride 20 mEq oral tablet, extended release: See Instructions, 1 tab TID, 30 unknown unit, 5 Refill(s).        Senexon-S: 2 tab(s), po, hs, 0 Refill(s).        acetaminophen 325 mg oral tablet: 650 mg, 2 tab(s), po, q4 hrs, not to exceed 4000 mg/day, 0 Refill(s).        Senokot 8.6 mg oral tablet: 1-2 tab(s), PO, Once a day (at bedtime), PRN: for constipation.        omeprazole 20 mg oral delayed release capsule: 40 mg, 2 cap(s), po, bid, 90 cap(s), 3 Refill(s).        furosemide 80 mg oral tablet: 80 mg, 1 tab(s), po, bid, Per Dr. Quintana on 3/7/2017, 0 Refill(s).        digoxin 125 mcg (0.125 mg) oral tablet: 125 mcg, 1 tab(s), po, daily, 0 Refill(s).        metOLazone 2.5 mg oral tablet: See Instructions, 1 tab(s) po daily for weight > 222#, 0 Refill(s).        Vitamin B12 1000 mcg/mL injectable solution: 1,000 mcg, im, qmonth, 0 Refill(s).        Metoprolol Succinate ER 25 mg oral tablet, extended release: See Instructions, TAKE ONE-HALF TABLET BY MOUTH TWICE DAILY, 30 unknown unit, 11 Refill(s).        atorvastatin 40 mg oral tablet: See Instructions, TAKE ONE TABLET BY MOUTH AT BEDTIME, 90 unknown unit.        predniSONE 20 mg oral tablet: See Instructions, 2 po daily for 3-5 days prn gout, 30 EA, 0 Refill(s).        mirtazapine 15 mg oral tablet: 15 mg, 1 tab(s), PO, Once a day (at bedtime), 30 tab(s), 5 Refill(s).        penicillin V potassium 500 mg oral tablet: 500 mg, 1 tab(s), po, qid, for 14 day(s), 56 tab(s), 0 Refill(s).        allopurinol 300 mg oral tablet: See Instructions, TAKE ONE TABLET BY MOUTH ONCE DAILY, 90 unknown unit, 3 Refill(s).        warfarin 3 mg oral tablet: See Instructions, TAKE 1 & 1/2 TABLETS BY MOUTH ONCE DAILY SUN,TUES,THURS,SAT TAKE ONE  TABLET MON,WED,FRI, 40 unknown unit.                Allergies    No Known Medication Allergies    adhesive tape  Social History    Smoking Status - 05/01/2018     Never smoker     Alcohol      Past, 03/30/2018     Employment and Education      Employed, Work/School description: [employer] 11/11/2010     Exercise and Physical Activity      Exercise type: Walking., 11/11/2010  Family History    Aneurysm: Father.    Heart disease: Mother.  Immunizations      Vaccine Date Status      influenza virus vaccine, inactivated 09/28/2015 Given      pneumococcal (PCV13) 04/21/2015 Given      influenza virus vaccine, inactivated 08/28/2014 Given      influenza virus vaccine, inactivated 10/29/2012 Given      influenza virus vaccine, inactivated 09/23/2011 Given      influenza virus vaccine, inactivated 10/26/2010 Given      influenza 10/23/2008 Recorded      Td 09/01/2005 Recorded      pneumococcal 11/28/2001 Recorded  Lab Results          Lab Results (Last 4 results within 90 days)           Sodium Level: 140 mmol/L [135 mmol/L - 146 mmol/L] (03/27/18 12:00:00)          Sodium Level: 141 mmol/L [135 mmol/L - 146 mmol/L] (02/26/18 14:47:00)          Sodium Level TR: 141 mEq/L [11.7  - 14.1] (04/20/18 11:18:00)          Potassium Level: 3.9 mmol/L [3.5 mmol/L - 5.3 mmol/L] (03/27/18 12:00:00)          Potassium Level: 4.4 mmol/L [3.5 mmol/L - 5.3 mmol/L] (02/26/18 14:47:00)          Potassium Level TR: 3.5 mEq/L [11.7  - 14.1] (04/20/18 11:18:00)          Chloride Level: 99 mmol/L [98 mmol/L - 110 mmol/L] (03/27/18 12:00:00)          Chloride Level: 104 mmol/L [98 mmol/L - 110 mmol/L] (02/26/18 14:47:00)          Chloride TR: 100 mEq/L [11.7  - 14.1] (04/20/18 11:18:00)          CO2 Level: 36 mmol/L High [20 mmol/L - 31 mmol/L] (03/27/18 12:00:00)          CO2 Level: 31 mmol/L [20 mmol/L - 31 mmol/L] (02/26/18 14:47:00)          CO2 TR: 28 mEq/L [11.7  - 14.1] (04/20/18 11:18:00)          Anion Gap TR: 13 mEq/L [20 mmol/L - 31  mmol/L] (04/20/18 11:18:00)          Glucose Level: 98 mg/dL [65 mg/dL - 99 mg/dL] (03/27/18 12:00:00)          Glucose Level: 79 mg/dL [65 mg/dL - 99 mg/dL] (02/26/18 14:47:00)          Glucose Level TR: 118 mg/dL [11.7  - 14.1] (04/20/18 11:18:00)          BUN: 30 mg/dL High [7 mg/dL - 25 mg/dL] (03/27/18 12:00:00)          BUN: 28 mg/dL High [7 mg/dL - 25 mg/dL] (02/26/18 14:47:00)          BUN TR: 40 mg/dL [11.7  - 14.1] (04/20/18 11:18:00)          Creatinine Level: 1.56 mg/dL High [0.7 mg/dL - 1.11 mg/dL] (03/27/18 12:00:00)          Creatinine Level: 1.55 mg/dL High [0.7 mg/dL - 1.11 mg/dL] (02/26/18 14:47:00)          Creatinine TR: 1.83 mg/dL [11.7  - 14.1] (04/20/18 11:18:00)          BUN/Creat Ratio: 19 [6  - 22] (03/27/18 12:00:00)          BUN/Creat Ratio: 18 [6  - 22] (02/26/18 14:47:00)          BUN/Creatinine Ratio TR: 22 [20 mmol/L - 31 mmol/L] (04/20/18 11:18:00)          eGFR: 41 mL/min/1.73m2 Low [8.6 mg/dL - 10.3 mg/dL] (03/27/18 12:00:00)          eGFR: 42 mL/min/1.73m2 Low [8.6 mg/dL - 10.3 mg/dL] (02/26/18 14:47:00)          GFR Calculated TR: 43 mL/min [11.7  - 14.1] (04/20/18 11:18:00)          eGFR African American: 48 mL/min/1.73m2 Low [None  - Few] (03/27/18 12:00:00)          eGFR African American: 48 mL/min/1.73m2 Low [None  - Few] (02/26/18 14:47:00)          Calcium Level: 9.6 mg/dL [8.6 mg/dL - 10.3 mg/dL] (03/27/18 12:00:00)          Calcium Level: 9.2 mg/dL [8.6 mg/dL - 10.3 mg/dL] (02/26/18 14:47:00)          Calcium TR: 9.8 mEq/dL [11.7  - 14.1] (04/20/18 11:18:00)          Bilirubin Total TR: 1.5 mg/dL [11.7  - 14.1] (04/20/18 11:18:00)          Alkaline Phosphatase TR: 358 unit/L [11.7  - 14.1] (04/20/18 11:18:00)          AST TR: 27 unit/L [11.7  - 14.1] (04/20/18 11:18:00)          ALT TR: 28 unit/L [11.7  - 14.1] (04/20/18 11:18:00)          Protein Total TR: 7.7 gm/dL [11.7  - 14.1] (04/20/18 11:18:00)          Albumin Level TR: 4.3 g/dL [11.7  - 14.1] (04/20/18  11:18:00)          Globulin TR: 3.4 g/dL [11.7  - 14.1] (04/20/18 11:18:00)          A/G Ratio TR: 1.3 [11.7  - 14.1] (04/20/18 11:18:00)          Uric Acid (umol/L) TR: 7.1 umol/L [11.7  - 14.1] (04/20/18 11:18:00)          Cholesterol: 89 mg/dL [8.6 mg/dL - 10.3 mg/dL] (03/13/18 08:45:00)          Non-HDL Cholesterol: 59 [20 mmol/L - 31 mmol/L] (03/13/18 08:45:00)          HDL: 30 mg/dL Low [38.5 % - 50 %] (03/13/18 08:45:00)          Cholesterol/HDL Ratio: 3 [None  - 5] (03/13/18 08:45:00)          LDL: 42 [None  - 5] (03/13/18 08:45:00)          Triglyceride: 85 mg/dL [8.6 mg/dL - 10.3 mg/dL] (03/13/18 08:45:00)          Iron TR: 55 nmol/L [11.7  - 14.1] (04/20/18 11:18:00)          TIBC TR: 297 mg/dL [11.7  - 14.1] (04/20/18 11:18:00)          Ferritin TR: 263 ng/mL [11.7  - 14.1] (04/20/18 11:18:00)          B-Natriuretic Peptide (BNP): 91 pg/mL [None  - Few] (03/27/18 12:00:00)          B-Natriuretic Peptide (BNP): 87 pg/mL [None  - Few] (02/26/18 14:47:00)          PSA: 0.5 ng/mL [3.5 mmol/L - 5.3 mmol/L] (05/01/18 10:42:00)          WBC: 5.6 [3.8  - 10.8] (04/24/18 10:56:00)          WBC: 4.5 [3.8  - 10.8] (03/27/18 12:00:00)          WBC TR: 5.2 x10^3/uL [11.7  - 14.1] (04/20/18 11:18:00)          RBC: 3.15 Low [4.2  - 5.8] (04/24/18 10:56:00)          RBC: 3.51 Low [4.2  - 5.8] (03/27/18 12:00:00)          RBC TR: 3.13 x10^6/uL [11.7  - 14.1] (04/20/18 11:18:00)          Hgb: 10.1 gm/dL Low [13.2 gm/dL - 17.1 gm/dL] (04/24/18 10:56:00)          Hgb: 11.4 gm/dL Low [13.2 gm/dL - 17.1 gm/dL] (03/27/18 12:00:00)          Hgb: 11 gm/dL Low [13.2 gm/dL - 17.1 gm/dL] (02/26/18 14:47:00)          Hgb TR: 10.1 g/dL [11.7  - 14.1] (04/20/18 11:18:00)          Hct: 30.7 % Low [38.5 % - 50 %] (04/24/18 10:56:00)          Hct: 33.4 % Low [38.5 % - 50 %] (03/27/18 12:00:00)          Hct TR: 31.7 % [11.7  - 14.1] (04/20/18 11:18:00)          MCV: 97.5 fL [80 fL - 100 fL] (04/24/18 10:56:00)          MCV: 95.2 fL [80 fL  - 100 fL] (03/27/18 12:00:00)          MCV TR: 101 fL [11.7  - 14.1] (04/20/18 11:18:00)          MCH: 32.1 pg [27 pg - 33 pg] (04/24/18 10:56:00)          MCH: 32.5 pg [27 pg - 33 pg] (03/27/18 12:00:00)          MCH TR: 32.3 pg [11.7  - 14.1] (04/20/18 11:18:00)          MCHC: 32.9 gm/dL [32 gm/dL - 36 gm/dL] (04/24/18 10:56:00)          MCHC: 34.1 gm/dL [32 gm/dL - 36 gm/dL] (03/27/18 12:00:00)          MCHC TR: 31.9 gm/dL [11.7  - 14.1] (04/20/18 11:18:00)          RDW: 14.8 % [11 % - 15 %] (04/24/18 10:56:00)          RDW: 15.2 % High [11 % - 15 %] (03/27/18 12:00:00)          RDW TR: 16.2 % [11.7  - 14.1] (04/20/18 11:18:00)          Platelet: 112 Low [140  - 400] (04/24/18 10:56:00)          Platelet: 98 Low [140  - 400] (03/27/18 12:00:00)          Platelet TR: 117 x10^3/uL [11.7  - 14.1] (04/20/18 11:18:00)          MPV: 11.6 fL [7.5 fL - 12.5 fL] (04/24/18 10:56:00)          MPV: 11.3 fL [7.5 fL - 12.5 fL] (03/27/18 12:00:00)          MPV (Mean Platelet Volume) TR: 10.4 fL [20 mmol/L - 31 mmol/L] (04/20/18 11:18:00)          Lymphocytes TR: 17 % [20 mmol/L - 31 mmol/L] (04/20/18 11:18:00)          Absolute Lymphocytes TR: 0.9 cells/uL [20 mmol/L - 31 mmol/L] (04/20/18 11:18:00)          Neutrophils TR: 64.8 % [20 mmol/L - 31 mmol/L] (04/20/18 11:18:00)          Absolute Neutrophils TR: 3.4 cells/uL [20 mmol/L - 31 mmol/L] (04/20/18 11:18:00)          Monocytes TR: 13.2 % [20 mmol/L - 31 mmol/L] (04/20/18 11:18:00)          Absolute Monocytes TR: 0.7 cells/uL [20 mmol/L - 31 mmol/L] (04/20/18 11:18:00)          Eosinophils TR: 4.4 % [20 mmol/L - 31 mmol/L] (04/20/18 11:18:00)          Absolute Eosinophils TR: 0.2 cells/uL [20 mmol/L - 31 mmol/L] (04/20/18 11:18:00)          Basophils TR: 0.6 % [20 mmol/L - 31 mmol/L] (04/20/18 11:18:00)          Absolute Basophils TR: 0 cells/uL [20 mmol/L - 31 mmol/L] (04/20/18 11:18:00)          Protime: 22.6 High [11.7  - 14.1] (04/24/18 11:05:00)          Protime: 30.2  High [11.7  - 14.1] (03/13/18 08:51:00)          PT: 23.2 High [9  - 11.5] (02/26/18 14:47:00)          INR: 2.0 High [None  - 5] (04/24/18 11:05:00)          INR: 2.8 [None  - 5] (03/27/18 10:51:00)          INR: 3.0 High [None  - 5] (03/13/18 08:51:00)          INR: 2.3 High [None  - 5] (02/26/18 14:47:00)          UA pH: 7 [5  - 8] (05/04/18 14:32:00)          UA pH: 6.5 [5  - 8] (05/01/18 10:44:00)          UA Specific Gravity: 1.015 [1.001  - 1.035] (05/04/18 14:32:00)          UA Specific Gravity: 1.015 [1.001  - 1.035] (05/01/18 10:44:00)          UA Glucose: NEGATIVE [None  - Few] (05/04/18 14:32:00)          UA Glucose: NEGATIVE [None  - Few] (05/01/18 10:44:00)          UA Bilirubin: NEGATIVE [None  - Few] (05/04/18 14:32:00)          UA Bilirubin: NEGATIVE [None  - Few] (05/01/18 10:44:00)          UA Ketones: NEGATIVE [None  - Few] (05/04/18 14:32:00)          UA Ketones: NEGATIVE [None  - Few] (05/01/18 10:44:00)          Urine Occult Blood: 1+ Abnormal [None  - Few] (05/04/18 14:32:00)          Urine Occult Blood: TRACE Abnormal [None  - Few] (05/01/18 10:44:00)          UA Protein: TRACE Abnormal [None  - Few] (05/04/18 14:32:00)          UA Protein: NEGATIVE [None  - Few] (05/01/18 10:44:00)          UA Nitrite: NEGATIVE [None  - Few] (05/04/18 14:32:00)          UA Nitrite: NEGATIVE [None  - Few] (05/01/18 10:44:00)          UA Leukocyte Esterase: 2+ Abnormal [None  - Few] (05/04/18 14:32:00)          UA Leukocyte Esterase: 3+ Abnormal [None  - Few] (05/01/18 10:44:00)          UA WBC Clumps: Present [None  - Few] (05/04/18 14:51:00)          UA Epithelial Cells: None Seen [None  - Few] (05/04/18 14:51:00)          UA Epithelial Cells: Few [None  - Few] (05/01/18 11:06:00)          UA WBC: >100 [None  - 5] (05/04/18 14:51:00)          UA WBC: >100 [None  - 5] (05/01/18 11:06:00)          UA RBC: 6-10 [None  - 2] (05/04/18 14:51:00)          UA RBC: 11-25 [None  - 2] (05/01/18 11:06:00)          UA  Bacteria: Many [None  - Few] (05/04/18 14:51:00)          UA Bacteria: Moderate [None  - Few] (05/01/18 11:06:00)          Culture Urine: See comment Abnormal [6 - 22] (05/04/18 14:37:00)          Culture Urine: See comment Abnormal [6 - 22] (05/01/18 10:46:00)

## 2022-02-16 NOTE — LETTER
(Inserted Image. Unable to display)   July 31, 2019        JACINTO JIN      [address] [place], [place] [id number]        Dear JACINTO,    Thank you for selecting [facility] for your health care needs.  Below you will find the results of your recent test(s) done at our clinic.     Your urine culture was normal.  Your bladder symptoms are most likely due to an enlarged prostate.      Result Name Current Result Previous Result   Culture Urine  See comment 7/29/2019 ((A)) See comment 12/4/2018       Please contact me or my assistant at [phone] if you have any questions about your results.    Sincerely,        Miguel Hall MD        What do your labs mean?  Below is a glossary of commonly ordered labs:  LDL   Bad Cholesterol   HDL   Good Cholesterol  AST/ALT   Liver Function   Cr/Creatinine   Kidney Function  Microalbumin   Kidney Function  BUN   Kidney Function  PSA   Prostate    TSH   Thyroid Hormone  HgbA1c   Diabetes Test   Hgb (Hemoglobin)   Red Blood Cells

## 2022-02-16 NOTE — CARE COORDINATION
"Spoke to Mamta today    Discussed f/u appt needed with RIAN and he is scheduled to see him on 8/7. Mamta states he still is not feeling well. C/o \"sore\" head to the touch. Tried hot packs but didnt work so is not trying cold packs which seems to relieve pain. I offered appt w/ provider today but he declined-would prefer to monitor/use cold packs.     He is seeing JAKUA next week so can discuss this concern further at that time.    Clarence GROVER  "

## 2022-02-16 NOTE — NURSING NOTE
Comprehensive Intake Entered On:  6/18/2019 12:03 PM CDT    Performed On:  6/18/2019 11:46 AM CDT by Idalia Carson CMA               Summary   Chief Complaint :   f/u [facility] 6/7-6/13 for GI bleed. EGD done 6/8. Productive cough with yellow/milky sputum.    Advance Directive :   Yes   Menstrual Status :   N/A   Weight Measured :   199 lb(Converted to: 199 lb 0 oz, 90.26 kg)    Systolic Blood Pressure :   96 mmHg   Diastolic Blood Pressure :   60 mmHg   Mean Arterial Pressure :   72 mmHg   Peripheral Pulse Rate :   70 bpm   BP Site :   Right arm   Pulse Site :   Radial artery   BP Method :   Manual   HR Method :   Electronic   Temperature Tympanic :   97.5 DegF(Converted to: 36.4 DegC)  (LOW)    Oxygen Saturation :   91 % (LOW)    Idalia Carson CMA - 6/18/2019 11:46 AM CDT   Health Status   Allergies Verified? :   Yes   Medication History Verified? :   Yes   Pre-Visit Planning Status :   Completed   Idalia Carson CMA - 6/18/2019 11:46 AM CDT   Meds / Allergies   (As Of: 6/18/2019 12:03:22 PM CDT)   Allergies (Active)   adhesive tape  Estimated Onset Date:   Unspecified ; Created By:   Floridalma Calderon; Reaction Status:   Active ; Category:   Other ; Substance:   adhesive tape ; Type:   Allergy ; Updated By:   Floridalma Calderon; Reviewed Date:   5/31/2019 4:08 PM CDT      No Known Medication Allergies  Estimated Onset Date:   Unspecified ; Created By:   Ellen Parker MA; Reaction Status:   Active ; Category:   Drug ; Substance:   No Known Medication Allergies ; Type:   Allergy ; Updated By:   Ellen Parker MA; Reviewed Date:   5/31/2019 4:08 PM CDT        Medication List   (As Of: 6/18/2019 12:03:22 PM CDT)   Prescription/Discharge Order    allopurinol  :   allopurinol ; Status:   Prescribed ; Ordered As Mnemonic:   allopurinol 300 mg oral tablet ; Simple Display Line:   300 mg, 1 tab(s), Oral, daily, for 90 day(s), 90 tab(s), 3 Refill(s) ; Ordering Provider:   Shravan Berrios MD; Catalog Code:   allopurinol ; Order  Dt/Tm:   5/3/2019 4:58:18 PM          atorvastatin  :   atorvastatin ; Status:   Prescribed ; Ordered As Mnemonic:   atorvastatin 10 mg oral tablet ; Simple Display Line:   10 mg, 1 tab(s), Oral, hs, 30 tab(s), 0 Refill(s) ; Ordering Provider:   Shravan Berrios MD; Catalog Code:   atorvastatin ; Order Dt/Tm:   5/3/2019 11:54:45 AM          clotrimazole topical  :   clotrimazole topical ; Status:   Prescribed ; Ordered As Mnemonic:   clotrimazole 1% topical cream ; Simple Display Line:   1 nazario, TOP, BID, for 7 day(s), 30 gm, 0 Refill(s) ; Ordering Provider:   Rissa Arauz MD; Catalog Code:   clotrimazole topical ; Order Dt/Tm:   6/22/2018 3:35:41 PM          metoprolol  :   metoprolol ; Status:   Prescribed ; Ordered As Mnemonic:   Metoprolol Succinate ER 25 mg oral tablet, extended release ; Simple Display Line:   See Instructions, TAKE ONE-HALF TABLET BY MOUTH TWICE DAILY, 180 EA, 3 Refill(s) ; Ordering Provider:   Shravan Berrios MD; Catalog Code:   metoprolol ; Order Dt/Tm:   5/3/2019 4:58:09 PM          metroNIDAZOLE topical  :   metroNIDAZOLE topical ; Status:   Prescribed ; Ordered As Mnemonic:   MetroGel 1% topical gel ; Simple Display Line:   1 nazario, Topical, daily, 1 tubes, 5 Refill(s) ; Ordering Provider:   Rissa Arauz MD; Catalog Code:   metroNIDAZOLE topical ; Order Dt/Tm:   6/29/2018 3:46:29 PM          mirtazapine 15 mg oral tablet  :   mirtazapine 15 mg oral tablet ; Status:   Prescribed ; Ordered As Mnemonic:   mirtazapine 15 mg oral tablet ; Simple Display Line:   1 tab(s), Oral, qhs, 30 unknown unit, 11 Refill(s) ; Ordering Provider:   Shravan Berrios MD; Catalog Code:   mirtazapine ; Order Dt/Tm:   4/18/2019 8:36:08 AM          Misc Prescription  :   Misc Prescription ; Status:   Prescribed ; Ordered As Mnemonic:   [id number] URINARY DRAIN BAG 2000ML MG BEAD ; Simple Display Line:   See Instructions, USE AS DIRECTED, 1 unknown unit ; Ordering Provider:   Shravan Berrios MD; Catalog Code:    Miscellaneous Prescription ; Order Dt/Tm:   6/4/2018 7:34:17 AM          nitroglycerin  :   nitroglycerin ; Status:   Prescribed ; Ordered As Mnemonic:   nitroglycerin 0.4 mg sublingual tablet ; Simple Display Line:   0.4 mg, 1 tab(s), SL, q5min, not to exceed 3 doses/15 min--if pain persists, seek medical attention, 25 tab(s), PRN: for chest pain ; Ordering Provider:   Shravan Berrios MD; Catalog Code:   nitroglycerin ; Order Dt/Tm:   8/28/2014 11:35:30 AM          predniSONE  :   predniSONE ; Status:   Prescribed ; Ordered As Mnemonic:   predniSONE 20 mg oral tablet ; Simple Display Line:   See Instructions, 2 po daily for 3-5 days prn gout, 30 EA, 0 Refill(s) ; Ordering Provider:   Shravan Berrios MD; Catalog Code:   predniSONE ; Order Dt/Tm:   12/29/2017 10:06:41 AM          tamsulosin  :   tamsulosin ; Status:   Prescribed ; Ordered As Mnemonic:   tamsulosin 0.4 mg oral capsule ; Simple Display Line:   0.4 mg, 1 cap(s), Oral, daily, 30 cap(s), 0 Refill(s) ; Ordering Provider:   Shravan Berrios MD; Catalog Code:   tamsulosin ; Order Dt/Tm:   7/20/2018 2:46:14 PM          warfarin  :   warfarin ; Status:   Prescribed ; Ordered As Mnemonic:   warfarin 3 mg oral tablet ; Simple Display Line:   See Instructions, 3mg T, TH, S Yung. 1.5 mg MWF, 100 EA, 3 Refill(s) ; Ordering Provider:   Shravan Berrios MD; Catalog Code:   warfarin ; Order Dt/Tm:   4/23/2019 7:29:25 PM            Home Meds    acetaminophen  :   acetaminophen ; Status:   Documented ; Ordered As Mnemonic:   acetaminophen 325 mg oral tablet ; Simple Display Line:   650 mg, 2 tab(s), po, q4 hrs, not to exceed 4000 mg/day, 0 Refill(s) ; Catalog Code:   acetaminophen ; Order Dt/Tm:   12/20/2016 1:58:24 PM          cyanocobalamin  :   cyanocobalamin ; Status:   Documented ; Ordered As Mnemonic:   Vitamin B12 1000 mcg/mL injectable solution ; Simple Display Line:   1,000 mcg, im, qmonth, 0 Refill(s) ; Catalog Code:   cyanocobalamin ; Order Dt/Tm:   7/24/2017  11:12:05 AM          digoxin  :   digoxin ; Status:   Documented ; Ordered As Mnemonic:   digoxin 125 mcg (0.125 mg) oral tablet ; Simple Display Line:   125 mcg, 1 tab(s), po, daily, 0 Refill(s) ; Catalog Code:   digoxin ; Order Dt/Tm:   4/19/2017 10:47:22 AM          famotidine  :   famotidine ; Status:   Documented ; Ordered As Mnemonic:   famotidine 20 mg oral tablet ; Simple Display Line:   20 mg, 1 tab(s), Oral, bid, 0 Refill(s) ; Catalog Code:   famotidine ; Order Dt/Tm:   6/17/2019 3:31:03 PM          furosemide  :   furosemide ; Status:   Documented ; Ordered As Mnemonic:   furosemide 80 mg oral tablet ; Simple Display Line:   40 mg, 0.5 tab(s), po, daily, 0 Refill(s) ; Catalog Code:   furosemide ; Order Dt/Tm:   3/15/2017 3:19:58 PM          multivitamin  :   multivitamin ; Status:   Documented ; Ordered As Mnemonic:   Protegra oral tablet ; Simple Display Line:   1 tab(s), po, daily ; Catalog Code:   multivitamin ; Order Dt/Tm:   6/1/2015 8:38:03 AM          phytonadione  :   phytonadione ; Status:   Documented ; Ordered As Mnemonic:   Vitamin K1 ; Simple Display Line:   100 mcg, Oral, daily, for 14 day(s), 0 Refill(s) ; Catalog Code:   phytonadione ; Order Dt/Tm:   6/17/2019 3:31:19 PM          senna  :   senna ; Status:   Documented ; Ordered As Mnemonic:   Senokot 8.6 mg oral tablet ; Simple Display Line:   1-2 tab(s), PO, Once a day (at bedtime), PRN: for constipation ; Catalog Code:   senna ; Order Dt/Tm:   12/20/2016 2:00:45 PM          spironolactone  :   spironolactone ; Status:   Documented ; Ordered As Mnemonic:   spironolactone 50 mg oral tablet ; Simple Display Line:   50 mg, 1 tab(s), Oral, daily, 0 Refill(s) ; Catalog Code:   spironolactone ; Order Dt/Tm:   3/1/2019 3:52:14 PM

## 2022-02-16 NOTE — PROGRESS NOTES
Patient:   JACINTO JIN            MRN: [id number]            FIN: [id number]               Age:   81 years     Sex:  Male     :  1935   Associated Diagnoses:   Anticoagulated; AF (Atrial Fibrillation); Melena   Author:   Suresh Pichardo MD      Visit Information      Date of Service: 2017 01:47 pm  Performing Location: [facility]  Encounter#: [id number]      Primary Care Provider (PCP):  Shravan Berrios MD    NPI# [id number]      Referring Provider:  No referring provider recorded for selected visit.      Chief Complaint            Additional Information:No additional information recorded during visit.   Chief complaint and symptoms as noted above and confirmed with patient.  Recent lab and diagnostic studies reviewed with patient      History of Present Illness   (2017: Mike presents to clinic with four-day history of melenic stools.  He is followed by Dr. Ricks.  Recently completed a course of IV iron infusions.  Hemoglobin level last checked on May 22 which was 8.5.  He says it melenic stools are developed since that time.  Feels more lightheaded and weak.  He is maintained on warfarin for history of A. fib.       Review of Systems   Constitutional:  Weakness, Fatigue, No fever, No chills.    Eye:  Negative except as documented in history of present illness.    Ear/Nose/Mouth/Throat:  Negative except as documented in history of present illness.    Respiratory:  No shortness of breath.    Cardiovascular:  No chest pain, No palpitations, No peripheral edema, No syncope.    Gastrointestinal:  Diarrhea, No nausea, No vomiting, No abdominal pain.    Genitourinary:  No dysuria, No hematuria.    Hematology/Lymphatics:  Negative except as documented in history of present illness.    Endocrine:  No excessive thirst, No polyuria.    Immunologic:  No recurrent fevers.    Musculoskeletal:  No joint pain, No muscle pain.    Neurologic:  Alert and oriented X4, No numbness, No tingling, No  headache.       Health Status   Allergies:    Allergic Reactions (Selected)  Severity Not Documented  Adhesive tape (No reactions were documented)   Medications:  (Selected)   Prescriptions  Prescribed  Metoprolol Succinate ER 25 mg oral tablet, extended release: See Instructions, Instructions: TAKE ONE-HALF TABLET BY MOUTH TWICE DAILY FOR 30 DAYS, # 30 unknown unit, 5 Refill(s), Type: Soft Stop, Pharmacy: [facility]  atorvastatin 40 mg oral tablet: See Instructions, Instructions: TAKE ONE TABLET BY MOUTH AT BEDTIME, # 90 unknown unit, 2 Refill(s), Type: Soft Stop, Pharmacy: [facility], TAKE ONE TABLET BY MOUTH AT BEDTIME  nitroglycerin 0.4 mg sublingual tablet: 1 tab(s) ( 0.4 mg ), SL, q5min, Instructions: not to exceed 3 doses/15 min--if pain persists, seek medical attention, PRN: for chest pain, # 25 tab(s), 3 Refill(s), Type: Maintenance, Pharmacy: [facility], 1 tab(s) sl q5 min,PRN:for chest pain,Instr...  omeprazole 20 mg oral delayed release capsule: 1 cap(s) ( 20 mg ), po, bid, # 90 cap(s), 3 Refill(s), Type: Maintenance, Pharmacy: [facility]  potassium chloride 20 mEq oral tablet, extended release: See Instructions, Instructions: 1 tab TID, # 30 unknown unit, 5 Refill(s), Type: Soft Stop, Pharmacy: [facility]  triamcinolone 0.1% topical cream: 1 nazario, top, bid, # 120 gm, 0 Refill(s), Type: Maintenance, Pharmacy: [facility], 1 nazario top bid  warfarin 3 mg oral tablet: See Instructions, Instructions: TAKE 1 & 1/2 TABLETS BY MOUTH ONCE DAILY SUN,TUES,THURS,SAT TAKE ONE TABLET MON,WED,FRI, # 40 unknown unit, 11 Refill(s), Type: Soft Stop, Pharmacy: [facility]  Documented Medications  Documented  Protegra oral tablet: 1 tab(s), po, daily, 0 Refill(s), Type: Maintenance  Senexon-S: 2 tab(s), po, hs, 0 Refill(s), Type: Maintenance  Senokot 8.6 mg oral tablet: 1-2 tab(s), PO, Once a day (at bedtime), PRN: for constipation, Type: Maintenance  acetaminophen 325 mg oral tablet: 2 tab(s) ( 650 mg ), po, q4  hrs, Instructions: not to exceed 4000 mg/day, 0 Refill(s), Type: Maintenance  aspirin 81 mg oral tablet: 1 tab(s) ( 81 mg ), PO, Daily, 0  digoxin 125 mcg (0.125 mg) oral tablet: 1 tab(s) ( 125 mcg ), po, daily, 0 Refill(s), Type: Maintenance  furosemide 80 mg oral tablet: 1 tab(s) ( 80 mg ), po, bid, Instructions: Per Dr. Quintana on 3/7/2017, 0 Refill(s), Type: Maintenance  metOLazone 2.5 mg oral tablet: See Instructions, Instructions: 1 tab(s) po daily for weight > 222#, 0 Refill(s), Type: Maintenance   Problem list:    All Problems  Anticardiolipin Syndrome / ICD-9-.81 / Confirmed  Anticoagulated / ICD-9-CM V58.61 / Confirmed  Ascites / SNOMED CT 5881684657 / Confirmed  CAD (Coronary Artery Disease) / ICD-9-.00 / Confirmed  AF (Atrial Fibrillation) / SNOMED CT 3854508063 / Confirmed  Colon Polyps / ICD-9-.3 / Confirmed  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 / SNOMED CT 6786395898 / Confirmed  Osteoarthritis of both knees / SNOMED CT 3272984820 / Confirmed  ICD (implantable cardioverter-defibrillator) infection / SNOMED CT 023481727 / Confirmed  Diverticulosis / SNOMED CT 7803167027 / Confirmed  Dupuytren's contracture of right hand / ICD-9-.6 / Confirmed  GOUT / ICD-9- / Confirmed  CHF (Congestive Heart Failure) / SNOMED CT 918100375 / Confirmed  High Cholesterol / ICD-9-.0 / Confirmed  CHB (complete heart block) / SNOMED CT 8061565449 / Confirmed  H/O acute pancreatitis / SNOMED CT 0345035972 / Confirmed  History of acute bacterial endocarditis / SNOMED CT 0481367473 / Confirmed  History of tricuspid valve replacement / SNOMED CT 9927860232 / Confirmed  IBS (Irritable Bowel Syndrome) / ICD-9-.1 / Confirmed  Acute kidney injury (nontraumatic) / SNOMED CT 1605190038 / Confirmed  Iron deficiency anemia / SNOMED CT 081150292 / Confirmed  Nonischemic dilated cardiomyopathy / SNOMED CT 9823190604 / Confirmed  Obesity / ICD-9-.00 /  Confirmed  KEYONA (Obstructive Sleep Apnea) / ICD-9-.23 / Confirmed  Paralysis, diaphragm / SNOMED CT 669615512 / Confirmed  History of coronary artery bypass graft / SNOMED CT 5428493009 / Confirmed  Right heart failure / SNOMED CT 394037978 / Confirmed  Tricuspid valve insufficiency / SNOMED CT [id number] / Confirmed  Type 2 diabetes mellitus / SNOMED CT 101857867 / Confirmed  Inactive: PUD (Peptic Ulcer Disease) / ICD-9-.90  Resolved: BE (bacterial endocarditis) / SNOMED CT 012505595  Resolved: History of sepsis / SNOMED CT 5530338067  Resolved: Inpatient stay / SNOMED CT 017935329  Resolved: Inpatient stay / SNOMED CT 179501117  Resolved: Inpatient stay / SNOMED CT 397006536  Resolved: Inpatient stay / SNOMED CT 892425519  Resolved: Inpatient stay / SNOMED CT 659302729  Resolved: Inpatient stay / SNOMED CT 131135064  Resolved: Other and Unspecified Noninfectious Gastroenteritis and Colitis / ICD-9-.9  Canceled: Hypercholesteremia / ICD-9-.0      Histories   Past Medical History:    Active  H/O acute pancreatitis (5633058436): Onset on 12/6/2016 at 81 years.  History of tricuspid valve replacement (9572115891): Onset in the month of 11/2016 at 80 years  History of acute bacterial endocarditis (3687060171): Onset on 7/23/2016 at 80 years.  Anticardiolipin Syndrome (289.81): Onset on 1/1/2005 at 69 years.  KEYONA (Obstructive Sleep Apnea) (327.23): Onset on 1/1/2003 at 67 years.  Comments:  11/1/2013 CDT 10:58 AM [facility] - Shravan Berrios MD  Adequate titration to BIPAP 16/11 on 10/15/2013  AF (Atrial Fibrillation) (9391526165)  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 (9967389804)  IBS (Irritable Bowel Syndrome) (564.1)  Colon Polyps (211.3)  CAD (Coronary Artery Disease) (414.00)  GOUT (274)  CHF (Congestive Heart Failure) (675752905)  Obesity (278.00)  Anticoagulated (V58.61)  High Cholesterol (272.0)  Acute kidney injury (nontraumatic)  (6862193588)  CHB (complete heart block) (7232179681)  Nonischemic dilated cardiomyopathy (3534302563)  ICD (implantable cardioverter-defibrillator) infection (449124063)  History of coronary artery bypass graft (0363520053)  Tricuspid valve insufficiency ([id number])  Ascites (3216902039)  Right heart failure (105787990)  Type 2 diabetes mellitus (150683554)  Resolved  Inpatient stay (854099297): Onset on 12/13/2016 at 81 years.  Resolved on 12/19/2016 at 81 years.  Comments:  1/3/2017 CST 9:01 PM Floridalma Brody  @[facility]  Acute on chronic right heart failure  Inpatient stay (501901784): Onset on 12/6/2016 at 81 years.  Resolved on 12/13/2016 at 81 years.  Comments:  12/20/2016 CST 6:02 AM Floridalma Brody  @[facility]  - S/P tricuspid valve replacement with worsening right ventricular function  Inpatient stay (804048499): Onset on 11/29/2016 at 81 years.  Resolved on 12/6/2016 at 81 years.  Comments:  12/20/2016 CST 10:22 PM Floridalma Brody  @[facility] - Severe symptomatic tricuspid valvular insufficiency. Chronic right heart failure.  History of sepsis (7134641667): Onset on 7/26/2016 at 80 years.  Resolved.  Inpatient stay (803680361): Onset on 9/28/2015 at 79 years.  Resolved on 10/2/2015 at 79 years.  Comments:  12/20/2016 CST 6:03 AM Floridalma Brody  @[facility] - Diverticulitis  Inpatient stay (722377472): Onset on 5/17/2015 at 79 years.  Resolved on 5/29/2015 at 79 years.  Comments:  12/20/2016 CST 6:03 AM Floridalma Brody  @[facility] - Infected pacemaker  Inpatient stay (729887850): Onset on 7/30/2013 at 77 years.  Resolved on 7/30/2013 at 77 years.  Comments:  12/20/2016 CST 6:02 AM Floridalma Brody  @[facility] - Jaw pain, possible anginal equivalent  Other and Unspecified Noninfectious Gastroenteritis and Colitis (558.9):  Resolved.  BE (bacterial endocarditis) (354043378):  Resolved.   Family History:    Aneurysm  Father  Heart disease  Mother     Procedure history:    TVR -  Tricuspid valve replacement (9863166253) on 11/29/2016 at 81 Years.  Cardiac angiogram (8999840678) on 11/1/2016 at 80 Years.  Comments:  11/3/2016 12:05 PM - Shravan Berrios MD  Summary/Conclusions  PRESENTATION / INDICATIONS    Pre-surgical evaluation for cardiac surgery    Severe TR by echo  DIAGNOSTIC - CORONARY    Co-dominant coronary artery system    The left main artery was normal in appearance and free of obstructive disease.    Chronic total occlusion of the proximal LAD    The circumflex artery has minimal disease.    The RCA has moderate disease.    No change since 2013  DIAGNOSTIC - GRAFTS    Chronic total occlusion in the proximal anastomosis of the SVG graft to the RCA    The SVG to the ramus intermediate is patent    The sequential graft to the 1st diagonal is patent.    The sequential graft limb from the diagonal to the LAD is patent.    No change since 2013  HEMODYNAMICS    The LVEDP is mildly elevated    Severely elevated right atrial pressures    No evidence of intracardiac shunting  Limited thoracotomy (3769229070) on 8/23/2016 at 80 Years.  Comments:  8/29/2016 2:45 PM - Floridalma Calderon  Left mini thoracotomy and placement of two epicardial screw in leads.  Implant of left pectoral pacer generatoe.  Removal of automatic cardiac defibrillator (074574517) on 8/4/2016 at 80 Years.  Comments:  8/29/2016 2:49 PM - Floridalma Calderon  Generator and leads  Implantation of intravenous single chamber cardiac pacemaker system (1785960748) on 8/4/2016 at 80 Years.  Pacemaker change on 4/8/2015 at 79 Years.  Cardiac angiogram (5994256628) on 8/2/2013 at 77 Years.  Comments:  8/8/2013 2:45 PM - Shravan Berrios MD  Summary/Conclusions  PRESENTATION / INDICATIONS   Chest pain  DIAGNOSTIC - CORONARY  Right dominant coronary artery system  DIAGNOSTIC SUMMARY  100% stenosis in the Proximal LAD  30% stenosis in the 1st Marginal  30% stenosis in the Proximal RCA  50% stenosis in the Mid RCA  100% stenosis in the Aorta  graft to Distal RCA  DIAGNOSTIC - GRAFTS  The sequential graft to the diagonal branch is patent.  The sequential graft from the diagonal to the LAD is patent.  The SVG to the ramus intermediate is patent  The SVG to RCA is chronically occluded  HEMODYNAMICS  The LVEDP is mildly elevated  RECOMMENDATIONS & PLAN  Medical Rx- no significant change from previous angiogram, there is dramatic mismatch in size between the SVG's and the target arteries with slow filling of the LAD        angiogrqam on 2/3/2012 at 76 Years.  Comments:  4/24/2012 3:33 PM - Shravan Berrios MD  Summary/Conclusions  PRESENTATION / INDICATIONS   Dyspnea and fatigue.  DIAGNOSTIC - CORONARY  Right dominant coronary artery system.  LMCA is normal.  LAD is occluded proximally after the 1st septal branch.  LCx has mild luminal irregularities.  OM1 has 20-30% proximal stenosis.  RCA has 30-40% diffuse disease in the mid segment and otherwise has mild diffuse luminal irregularities.  DIAGNOSTIC - GRAFTS  The sequential SVG to the diagonal and then LAD is widely patent.  There is a significant size mismatch between the graft and the native vessels.  The diagonal and distal LAD have no obvious lesions but are small caliber (< 2.0 mm vessels).  There is some backfilling into the mid LAD.  The SVG to the ramus is widely patent.  There is a significant size mismatch between the graft and the native vessel.  The ramus has no obvious lesion but is small caliber (< 2.0 mm vessel).    The SVG to the RCA is chronically occluded.  LEFT VENTRICULAR FUNCTION  Left ventricular function is mildly reduced with EF of 42% and mild global hypokinesis.  No significant MR.  HEMODYNAMICS  The LVEDP is 6 mmHg.  No significant gradient across the aortic valve.  RA 11, RV 29/10, PA 34/17 (mean 25), PCWP 18  Amarilys CO 5.1, TDCO 4.6  RECOMMENDATIONS & PLAN  Consider alternative etiology for symptoms.  Lasix dose decreased to 40 mg QD given relative hypotension and patient reporting  "feeling \"dry membranes.\"             Colonoscopy (676048072) on 12/15/2006 at 71 Years.  Comments:  8/8/2013 4:06 PM - Shravan Berrios MD  Diverticulosis. No polyps.  Colonoscopy on 6/22/2001 at 65 Years.  Comments:  11/15/2010 8:21 AM - Shravan Berrios MD  hyperplastic polyp  Cholecystectomy (07564453) in the month of 6/2001 at 65 Years.  CABG - Coronary artery bypass graft (145867078) in 2000 at 65 Years.  History of Back Surgery (V45.89) in 2000 at 65 Years.  Colonoscopy (570656730) on 2/17/1993 at 57 Years.  Comments:  8/8/2013 4:06 PM - Shravan Berrios MD  1 adenoma, 1 hyperplastic, diverticulosis.  Left shoulder surgery in 1991 at 56 Years.  Esophagogastroduodenoscopy (853937770).  Cholecystectomy (33071918).  Ablation for atrial fibrillation.  Comments:  5/2/2011 1:56 PM - Madeline Gonzalez  Subsequent pacemaker dependence.   Social History:        Alcohol Assessment: Current            Current                     Comments:                      04/15/2010 - Mag Vargas CMA                     occasional      Tobacco Assessment: Denies Tobacco Use      Employment and Education Assessment            Employed, Work/School description: Farmer.      Exercise and Physical Activity Assessment            Exercise type: Walking.        Physical Examination   vital signs stable, as noted above   General:  Alert and oriented, No acute distress.    Eye:  Extraocular movements are intact.    HENT:  Normocephalic, Oral mucosa is moist.    Neck:  Supple, No carotid bruit, No jugular venous distention, No lymphadenopathy.    Respiratory:  Lungs are clear to auscultation, Respirations are non-labored.    Cardiovascular:  Normal rate, No murmur, No edema.    Gastrointestinal:  Soft, Non-distended, Normal bowel sounds, Some mild RLQ tenderness, no guarding.  TASIA: no gross blood/melena; gauaic positive.    Musculoskeletal:  Normal range of motion, Normal strength, No tenderness.    Neurologic:  Alert, Oriented, Normal motor " function, No focal deficits.    Cognition and Speech:  Oriented, Speech clear and coherent.    Psychiatric:  Appropriate mood & affect.       Review / Management   Results review:  Lab results   5/26/2017 2:37 PM CDT Sodium Level 140 mmol/L    Potassium Level 3.9 mmol/L    Chloride Level 100 mmol/L    CO2 Level 27 mmol/L    AGAP 13    Glucose Level 94 mg/dL    BUN 38 mg/dL    Creatinine 1.88 mg/dL    BUN/Creat Ratio 20    eGFR  42    eGFR Non-African American 35    Calcium Level 9.3 mg/dL    WBC 6.1    RBC 3.02    Hgb 8.1 g/dL    Hct 26.6 %    MCV 88 fL    MCH 26.8 pg    MCHC 30.5 g/dL    RDW 21.4 %    Platelet 178    MPV 9.6 fL    Protime 33.4    INR 3.4   5/23/2017 4:04 PM CDT PT 33.6  HI    INR 3.4  HI   5/23/2017 10:55 AM CDT INR 3.4   5/22/2017 10:59 AM CDT Sodium Level 136 mmol/L    Potassium Level 3.9 mmol/L    Chloride Level 98 mmol/L    CO2 Level 29 mmol/L    Glucose Level 99 mg/dL    BUN 29 mg/dL  HI    Creatinine 1.80 mg/dL  HI    BUN/Creat Ratio 16    eGFR 35 mL/min/1.73m2  LOW    eGFR African American 41 mL/min/1.73m2  LOW    Calcium Level 9.3 mg/dL    Hgb 8.5 gm/dL  LOW    Reticulocyte 1.8 %    Retic Abs# 60,300    PTT 44  HI   5/12/2017 2:19 PM CDT INR 3.2   5/7/2017 7:00 AM CDT Sodium Level  mEq/L    Potassium Level TR 4.1 mEq/L    Chloride  mEq/L    CO2 TR 27 mEq/L    Anion Gap TR 12 mEq/L    Glucose Level  mg/dL    BUN TR 36 mg/dL    Creatinine TR 1.95 mg/dL    BUN/Creatinine Ratio TR 18    eGFR TR 33 mL/min    Calcium TR 9.6 mEq/dL    Hgb TR 8.5 g/dL   5/2/2017 10:39 AM CDT Sodium Level 139 mmol/L    Potassium Level 3.3 mmol/L  LOW    Chloride Level 96 mmol/L  LOW    CO2 Level 32 mmol/L  HI    Glucose Level 172 mg/dL  HI    BUN 40 mg/dL  HI    Creatinine 2.07 mg/dL  HI    BUN/Creat Ratio 19    eGFR 30 mL/min/1.73m2  LOW    eGFR African American 34 mL/min/1.73m2  LOW    Calcium Level 9.8 mg/dL    PT 16.4  HI    INR 1.6  HI   4/28/2017 12:43 PM CDT Sodium Level 140  mmol/L    Potassium Level 3.4 mmol/L    Chloride Level 93 mmol/L    CO2 Level 35 mmol/L    AGAP 12    Glucose Level 85 mg/dL    BUN 43 mg/dL    Creatinine 2.08 mg/dL    BUN/Creat Ratio 21    eGFR  38    eGFR Non-African American 31    Calcium Level 10.1 mg/dL    Hgb 8.7 g/dL    MCV 82 fL   4/24/2017 2:46 PM CDT Sodium Level  mEq/L    Potassium Level TR 3.3 mEq/L    Chloride TR 97 mEq/L    CO2 TR 31 mEq/L    Glucose Level  mg/dL    BUN TR 37 mg/dL    Creatinine TR 1.81 mg/dL    BUN/Creatinine Ratio TR 20    eGFR TR 36 mL/min    Calcium TR 9.7 mEq/dL    Ferritin TR 34.2 ng/mL    BNP  pg/mL    WBC TR 4.7 x10^3/uL    RBC TR 3.11 x10^6/uL    Hgb TR 7.7 g/dL    Hct TR 25.7 %    MCV TR 83 fL    MCH TR 24.8 pg    MCHC TR 30.0 gm/dL    RDW TR 16.2 %    Platelet  x10^3/uL    MPV (Mean Platelet Volume) TR 9.4 fL    PT TR 21.3    INR TR 1.9   4/24/2017 12:24 PM CDT Sodium Level  mEq/L    Potassium Level TR 3.3 mEq/L    Chloride TR 97 mEq/L    CO2 TR 31 mEq/L    Anion Gap TR 13 mEq/L    Glucose Level  mg/dL    BUN TR 37 mg/dL    Creatinine TR 1.81 mg/dL    Calcium TR 9.7 mEq/dL    WBC TR 4.7 x10^3/uL    RBC TR 3.11 x10^6/uL    Hgb TR 7.7 g/dL    Hct TR 25.7 %    Platelet  x10^3/uL    PT TR 21.3    INR TR 1.9   4/13/2017 1:42 PM CDT INR 1.9   4/13/2017 11:13 AM CDT Sodium Level 139 mmol/L    Potassium Level 3.8 mmol/L    Chloride Level 98 mmol/L    CO2 Level 31 mmol/L    Glucose Level 97 mg/dL    BUN 51 mg/dL  HI    Creatinine 1.81 mg/dL  HI    BUN/Creat Ratio 28  HI    eGFR 35 mL/min/1.73m2  LOW    eGFR African American 40 mL/min/1.73m2  LOW    Calcium Level 10.2 mg/dL    Magnesium Level 2.1 mg/dL    TSH 2.73 mIU/L    B-Natriuretic Peptide 143 pg/mL  HI    PT 19.8  HI    INR 1.9  HI    Digoxin Level 0.6 mcg/L  LOW   3/29/2017 12:15 PM CDT INR 1.8   3/21/2017 9:28 AM CDT Cholesterol 107 mg/dL  LOW    Non-HDL 68    HDL 39 mg/dL  LOW    Chol/HDL Ratio 2.7    LDL 54     Triglyceride 70 mg/dL    PSA 0.5 ng/mL    PT 14.3  HI    INR 1.4  HI   3/13/2017 1:42 PM CDT INR 1.6   3/1/2017 12:10 PM CST INR 1.8   .       Impression and Plan   Diagnosis     Anticoagulated (NUK79-OJ Z79.01).     AF (Atrial Fibrillation) (RSX71-EI I48.2).     Melena (KKH10-LP K92.1).     - active melena in patient with history of PUD, persistent anemia on warfarin.  Hgb trending down from 8.5 (5/22) to 8.1 yoday, though concerned there may be more precipitous drop given melenic descriptiokns for the past 4 days.  INR 3.2  - discussion with JDL serving as hospitlalist; plans for admission here locally.  Likely need for endoscopy      Professional Services   Counseling Summary:  This was a 25 minute visit with greater than 50% of that time spent counseling the patient, and arrangements for inpatient admission.

## 2022-02-16 NOTE — PROGRESS NOTES
"Chief Complaint    burning with urination, urgency, the past few days. has had UTI's in the past, feels that same.  History of Present Illness      Patient here with concerns with pain that is increasing with urination. Does have some urgency with urination with small output. Does have to get up during the night to urinate more then usual.  He has BPH with obstruction.  Review of Systems      \"See HPI.  All other review of systems negative.\"  Physical Exam   Vitals & Measurements    T: 97.0   F (Tympanic)  HR: 72(Peripheral)  BP: 96/54     HT: 75 in  WT: 189 lb  BMI: 23.62             General:  Alert and oriented, No acute distress.             Eye:  Normal conjunctiva.             HENT:  Oral mucosa is moist.             Neck:  Supple.             Respiratory:  Respirations are non-labored.             Cardiovascular:  Normal rate, Regular rhythm, No edema.             Gastrointestinal:  Soft, non tender, slight distension           Genitourinary:  No flank pain             Musculoskeletal:  Normal gait.             Integumentary:  Warm, No rash.  pale           Psychiatric:  Cooperative, Appropriate mood & affect, Normal judgment.                  UA negative, culture pending  Assessment/Plan       Dysuria (R30.0)         Prevent urinary tract infections by drinking adequate water (6-8 glasses per day), void frequently (do not 'hold' your urine)         Urine culture order, will notify when results are available. Follow up with PCP       Orders:         Culture, Urine, Routine* ([facility]), Specimen Type: Urine (Clean Catch), Collection Date: 07/29/19 16:48:00 CDT         POC URINALYSIS, UA* ([facility]), Specimen Type: Urine, Collection Date: 07/29/19 16:48:00 CDT      ISabrina LPN, acted solely as a scribe for, and in the presence of Dr. Wilber Hall who performed the services.  Patient Information     Name:JACINTO JIN      Address:      [address] [place] [id number]     Sex:Male    "  YOB: 1935     [phone]     Emergency Contact:GERA JIN     MRN:[id number]     FIN:[id number]     Location[facility]     Date of Service:07/29/2019      Primary Care Physician:       Shravan Berrios MD, [phone]      Attending Physician:       Wilber Hall MD, [phone]  Problem List/Past Medical History    Ongoing     Acquired arteriovenous malformation of colon       Comments: Treated.     Acute kidney injury (nontraumatic)     AF (Atrial Fibrillation)     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, chronic     Anemia of renal disease     Anticardiolipin syndrome     Anticoagulated     Ascites       Comments: Negative cytology 12/6/18     B12 deficiency     BPH with urinary obstruction     CAD (coronary artery disease)     Cardiac cirrhosis     Cardiorenal syndrome     CHB (complete heart block)     Chronic diastolic CHF (congestive heart failure), NYHA class 3     Depression     Diverticulosis     Dupuytren's contracture of right hand     Gout     H/O acute pancreatitis     High cholesterol     History of acute bacterial endocarditis     History of coronary artery bypass graft     History of GI bleed       Comments: Again 04/30/2019     History of tricuspid valve replacement     Hx of colonic polyp     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     IBS (irritable bowel syndrome)     ICD (implantable cardioverter-defibrillator) infection     Inguinal hernia, right     Iron deficiency anemia     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     Nonischemic dilated cardiomyopathy     Obesity     KEYONA (obstructive sleep apnea)       Comments: Adequate titration to BIPAP 16/11 on 10/15/2013     Osteoarthritis of both knees     Paralysis, diaphragm       Comments: Left     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     PUD (peptic ulcer disease)     Right heart failure     Stage 3 chronic kidney  disease     Tricuspid valve insufficiency     Vitamin B 12 deficiency    Historical     BE (bacterial endocarditis)     Colon polyps     History of sepsis     Inpatient stay       Comments: @[facility] - Jaw pain, possible anginal equivalent     Inpatient stay       Comments: @[facility] - Infected pacemaker     Inpatient stay       Comments: @[facility] - Diverticulitis     Inpatient stay       Comments: @[facility] - S/P tricuspid valve replacement with worsening right ventricular function     Inpatient stay       Comments: @[facility] - Severe symptomatic tricuspid valvular insufficiency. Chronic right heart failure.     Inpatient stay       Comments: @[facility] - Acute on chronic right heart failure     Inpatient stay       Comments: @[facility] - Anemia     Inpatient stay       Comments: @[facility] [place] [place] - GI bleeding, suspected upper source     Inpatient stay       Comments: @[facility] [place] [place] - Acute GI bleeding     Other and Unspecified Noninfectious Gastroenteritis and Colitis  Procedure/Surgical History     Extracapsular cataract extraction and insertion of intraocular lens (07/11/2019)            Comments: Right..     Esophagogastroduodenoscopy (06/08/2019)            Comments: Indication: GI bleeding      Sedation: Fentanyl 50 mcg, Versed 2 mg      Findings: Multiple gastric polyps one of which was oozing. Esophagus and duodenum normal      Rec: D/C PPI, H2 blocker, hold Warfarin.     Left Extracapsular Cataract extraction with intraocular lens implant (05/28/2019)            Comments: Left.. Dr. Cornelius.     Esophagogastroduodenoscopy (02/12/2019)           Colonoscopy (12/07/2018)            Comments: Indication: GIB      Sedation: fentanyl 50 mcg, Versed 1 mg      Findings: Diverticulosis, 2 small AVMs with bleeding in ascending treated with APC      Rec: Consider further workup based on resbridget to treatemetn.     Esophagogastroduodenoscopy and biopsy (12/07/2018)            Comments: Indication: GIB       Sedatoin: Fentanyl 50 mcg, Versed 2 mg      findings: Negative with negative duodenal biopsy      Rec: Colonoscopy.     Abdominal paracentesis (12/06/2018)            Comments: Negative cytology.     Bone marrow biopsy (07/12/2017)           Colonoscopy (05/27/2017)            Comments: Cecal tubular adenoma, pandiverticulosis. w/polypectomy.     Esophagogastroduodenoscopy (05/27/2017)            Comments: gastritis, fundic gland polyps.     TVR - Tricuspid valve replacement (11/29/2016)           Cardiac angiogram (11/01/2016)            Comments: Summary/Conclusions      PRESENTATION / INDICATIONS        Pre-surgical evaluation for cardiac surgery        Severe TR by echo      DIAGNOSTIC - CORONARY        Co-dominant coronary artery system        The left main artery was normal in appearance and free of obstructive disease.        Chronic total occlusion of the proximal LAD        The circumflex artery has minimal disease.        The RCA has moderate disease.        No change since 2013      DIAGNOSTIC - GRAFTS        Chronic total occlusion in the proximal anastomosis of the SVG graft to the RCA        The SVG to the ramus intermediate is patent        The sequential graft to the 1st diagonal is patent.        The sequential graft limb from the diagonal to the LAD is patent.        No change since 2013      HEMODYNAMICS        The LVEDP is mildly elevated        Severely elevated right atrial pressures        No evidence of intracardiac shunting.     Limited thoracotomy (08/23/2016)            Comments: Left mini thoracotomy and placement of two epicardial screw in leads.  Implant of left pectoral pacer generatoe..     Implantation of intravenous single chamber cardiac pacemaker system (08/04/2016)           Removal of automatic cardiac defibrillator (08/04/2016)            Comments: Generator and leads.     Pacemaker change (04/08/2015)           Cardiac angiogram (08/02/2013)            Comments:  Summary/Conclusions      PRESENTATION / INDICATIONS      Chest pain      DIAGNOSTIC - CORONARY      Right dominant coronary artery system      DIAGNOSTIC SUMMARY      100% stenosis in the Proximal LAD      30% stenosis in the 1st Marginal      30% stenosis in the Proximal RCA      50% stenosis in the Mid RCA      100% stenosis in the Aorta graft to Distal RCA      DIAGNOSTIC - GRAFTS      The sequential graft to the diagonal branch is patent.      The sequential graft from the diagonal to the LAD is patent.      The SVG to the ramus intermediate is patent      The SVG to RCA is chronically occluded      HEMODYNAMICS      The LVEDP is mildly elevated      RECOMMENDATIONS & PLAN      Medical Rx- no significant change from previous angiogram, there is dramatic mismatch in size between the SVG's and the target arteries with slow filling of the LAD.     angiogrqam (02/03/2012)            Comments: Summary/Conclusions      PRESENTATION / INDICATIONS      Dyspnea and fatigue.      DIAGNOSTIC - CORONARY      Right dominant coronary artery system.      LMCA is normal.      LAD is occluded proximally after the 1st septal branch.      LCx has mild luminal irregularities.  OM1 has 20-30% proximal stenosis.      RCA has 30-40% diffuse disease in the mid segment and otherwise has mild diffuse luminal irregularities.      DIAGNOSTIC - GRAFTS      The sequential SVG to the diagonal and then LAD is widely patent.  There is a significant size mismatch between the graft and the native vessels.  The diagonal and distal LAD have no obvious lesions but are small caliber (< 2.0 mm vessels).  There is some backfilling into the mid LAD.      The SVG to the ramus is widely patent.  There is a significant size mismatch between the graft and the native vessel.  The ramus has no obvious lesion but is small caliber (< 2.0 mm vessel).        The SVG to the RCA is chronically occluded.      LEFT VENTRICULAR FUNCTION      Left ventricular function  "is mildly reduced with EF of 42% and mild global hypokinesis.  No significant MR.      HEMODYNAMICS      The LVEDP is 6 mmHg.  No significant gradient across the aortic valve.      RA 11, RV 29/10, PA 34/17 (mean 25), PCWP 18      Amarilys CO 5.1, TDCO 4.6      RECOMMENDATIONS & PLAN      Consider alternative etiology for symptoms.      Lasix dose decreased to 40 mg QD given relative hypotension and patient reporting feeling \"dry membranes.\".     Colonoscopy (12/15/2006)            Comments: Diverticulosis. No polyps..     Colonoscopy (06/22/2001)            Comments: hyperplastic polyp.     Cholecystectomy (06.2001)           CABG - Coronary artery bypass graft (2000)           History of Back Surgery (2000)           Colonoscopy (02/17/1993)            Comments: 1 adenoma, 1 hyperplastic, diverticulosis..     Left shoulder surgery (1991)           Ablation for atrial fibrillation            Comments: Subsequent pacemaker dependence..     Cholecystectomy           Colonoscopy           Esophagogastroduodenoscopy        Medications    [id number] URINARY DRAIN BAG 2000ML MG BEAD, See Instructions    acetaminophen 325 mg oral tablet, 650 mg= 2 tab(s), Oral, q4 hrs    allopurinol 300 mg oral tablet, 300 mg= 1 tab(s), Oral, daily, 3 refills    atorvastatin 10 mg oral tablet, 10 mg= 1 tab(s), Oral, hs    clotrimazole 1% topical cream, 1 nazario, Topical, bid    digoxin 125 mcg (0.125 mg) oral tablet, 125 mcg= 1 tab(s), Oral, daily    famotidine 20 mg oral tablet, 1 tab(s), Oral, bid, 3 refills    Flonase 50 mcg/inh nasal spray, 2 spray(s), Nasal, daily    furosemide 80 mg oral tablet, 40 mg= 0.5 tab(s), Oral, bid    Metoprolol Succinate ER 25 mg oral tablet, extended release, See Instructions, 3 refills    MetroGel 1% topical gel, 1 nazario, Topical, daily, 5 refills    midodrine 2.5 mg oral tablet, 5 mg= 2 tab(s), Oral, tid, 2 refills    mirtazapine 15 mg oral tablet, 1 tab(s), Oral, qhs, 11 refills    nitroglycerin 0.4 mg sublingual " tablet, 0.4 mg= 1 tab(s), SL, q5 min, PRN, 3 refills    predniSONE 20 mg oral tablet, See Instructions    Protegra oral tablet, 1 tab(s), Oral, daily    spironolactone 50 mg oral tablet, 25 mg= 0.5 tab(s), Oral, bid, 2 refills    Vitamin B12 1000 mcg/mL injectable solution, 1000 mcg, IM, qmonth  Allergies    No Known Medication Allergies    adhesive tape  Social History    Smoking Status - 07/26/2019     Former smoker     Alcohol      Past, 03/30/2018     Employment/School      Employed, Work/School description: [employer] 11/11/2010     Exercise      Exercise type: Walking., 11/11/2010     Tobacco      Former smoker, quit more than 30 days ago, 08/23/2018  Family History    Aneurysm: Father.    Heart disease: Mother.  Immunizations      Vaccine Date Status      influenza virus vaccine, inactivated 11/27/2018 Given      influenza virus vaccine, inactivated 09/28/2015 Given      pneumococcal (PCV13) 04/21/2015 Given      influenza virus vaccine, inactivated 08/28/2014 Given      influenza virus vaccine, inactivated 10/29/2012 Given      influenza virus vaccine, inactivated 09/23/2011 Given      influenza virus vaccine, inactivated 10/26/2010 Given      influenza 10/23/2008 Recorded      Td 09/01/2005 Recorded      pneumococcal 11/28/2001 Recorded  Lab Results       Lab Results (Last 4 results within 90 days)        Sodium Level: 134 mmol/L Low [135 mmol/L - 146 mmol/L] (07/08/19 08:36:00)       Sodium Level: 131 mmol/L Low [135 mmol/L - 146 mmol/L] (06/18/19 12:31:00)       Sodium Level: 136 [135 mmol/L - 145 mmol/L] (05/08/19 08:48:00)       Sodium Level TR: 136 mEq/L (07/18/19 08:29:00)       Sodium Level TR: 134 mEq/L (06/28/19 13:43:00)       Sodium Level TR: 139 mEq/L (06/27/19 13:51:00)       Sodium Level TR: 132 mEq/L (06/14/19 09:51:00)       Potassium Level: 4.5 mmol/L [3.5 mmol/L - 5.3 mmol/L] (07/08/19 08:36:00)       Potassium Level: 5 mmol/L [3.5 mmol/L - 5.3 mmol/L] (06/18/19 12:31:00)       Potassium  Level: 5.5 High [3.5 mmol/L - 5 mmol/L] (05/08/19 08:48:00)       Potassium Level TR: 4.2 mEq/L (07/18/19 08:29:00)       Potassium Level TR: 4.2 mEq/L (06/28/19 13:43:00)       Potassium Level TR: 3.4 mEq/L (06/27/19 13:51:00)       Potassium Level TR: 5 mEq/L (06/14/19 09:51:00)       Chloride Level: 100 mmol/L [98 mmol/L - 110 mmol/L] (07/08/19 08:36:00)       Chloride Level: 99 mmol/L [98 mmol/L - 110 mmol/L] (06/18/19 12:31:00)       Chloride Level: 100 [98 mmol/L - 110 mmol/L] (05/08/19 08:48:00)       Chloride TR: 101 mEq/L (07/18/19 08:29:00)       Chloride TR: 99 mEq/L (06/28/19 13:43:00)       Chloride TR: 102 mEq/L (06/27/19 13:51:00)       Chloride TR: 100 mEq/L (06/14/19 09:51:00)       CO2 Level: 26 mmol/L [20 mmol/L - 32 mmol/L] (07/08/19 08:36:00)       CO2 Level: 29 mmol/L [20 mmol/L - 32 mmol/L] (06/18/19 12:31:00)       CO2 Level: 27 [21 mmol/L - 31 mmol/L] (05/08/19 08:48:00)       CO2 TR: 27 mEq/L (07/18/19 08:29:00)       CO2 TR: 26 mEq/L (06/28/19 13:43:00)       CO2 TR: 26 mEq/L (06/27/19 13:51:00)       CO2 TR: 25 mEq/L (06/14/19 09:51:00)       AGAP: 9 [5  - 18] (05/08/19 08:48:00)       Anion Gap TR: 8 mEq/L (07/18/19 08:29:00)       Anion Gap TR: 9 mEq/L (06/28/19 13:43:00)       Anion Gap TR: 11 mEq/L (06/27/19 13:51:00)       Anion Gap TR: 7 mEq/L (06/14/19 09:51:00)       Glucose Level: 97 mg/dL [65 mg/dL - 99 mg/dL] (07/08/19 08:36:00)       Glucose Level: 97 mg/dL [65 mg/dL - 99 mg/dL] (06/18/19 12:31:00)       Glucose Level: 109 High [65 mg/dL - 100 mg/dL] (05/08/19 08:48:00)       Glucose Level TR: 90 mg/dL (07/18/19 08:29:00)       Glucose Level TR: 100 mg/dL (06/28/19 13:43:00)       Glucose Level TR: 117 mg/dL (06/27/19 13:51:00)       Glucose Level TR: 91 mg/dL (06/14/19 09:51:00)       BUN: 45 mg/dL High [7 mg/dL - 25 mg/dL] (07/08/19 08:36:00)       BUN: 39 mg/dL High [7 mg/dL - 25 mg/dL] (06/18/19 12:31:00)       BUN: 64 High [8 mg/dL - 25 mg/dL] (05/08/19 08:48:00)        BUN TR: 41 mg/dL (07/18/19 08:29:00)       BUN TR: 45 mg/dL (06/28/19 13:43:00)       BUN TR: 16 mg/dL (06/27/19 13:51:00)       BUN TR: 37 mg/dL (06/14/19 09:51:00)       Creatinine Level: 1.63 mg/dL High [0.7 mg/dL - 1.11 mg/dL] (07/08/19 08:36:00)       Creatinine Level: 1.64 mg/dL High [0.7 mg/dL - 1.11 mg/dL] (06/18/19 12:31:00)       Creatinine Level: 2.51 High [0.72 mg/dL - 1.25 mg/dL] (05/08/19 08:48:00)       Creatinine TR: 1.58 mg/dL (07/18/19 08:29:00)       Creatinine TR: 1.73 mg/dL (06/28/19 13:43:00)       Creatinine TR: 0.84 mg/dL (06/27/19 13:51:00)       Creatinine TR: 1.66 mg/dL (06/14/19 09:51:00)       BUN/Creat Ratio: 28 High [6  - 22] (07/08/19 08:36:00)       BUN/Creat Ratio: 24 High [6  - 22] (06/18/19 12:31:00)       BUN/Creat Ratio: 25 High [10  - 20] (05/08/19 08:48:00)       BUN/Creatinine Ratio TR: 26 (07/18/19 08:29:00)       BUN/Creatinine Ratio TR: 26 (06/28/19 13:43:00)       BUN/Creatinine Ratio TR: 19 (06/27/19 13:51:00)       BUN/Creatinine Ratio TR: 22 (06/14/19 09:51:00)       eGFR: 39 mL/min/1.73m2 Low (07/08/19 08:36:00)       eGFR: 39 mL/min/1.73m2 Low (06/18/19 12:31:00)       eGFR TR: 42 mL/min (07/18/19 08:29:00)       eGFR TR: 38 mL/min (06/28/19 13:43:00)       eGFR TR: 40 mL/min (06/14/19 09:51:00)       eGFR TR: 26 mL/min (05/17/19 12:02:00)       eGFR : 45 mL/min/1.73m2 Low (07/08/19 08:36:00)       eGFR : 45 mL/min/1.73m2 Low (06/18/19 12:31:00)       eGFR : 30 Low (05/08/19 08:48:00)       eGFR Non-: 25 Low (05/08/19 08:48:00)       Calcium Level: 8.6 mg/dL [8.6 mg/dL - 10.3 mg/dL] (07/08/19 08:36:00)       Calcium Level: 8.5 mg/dL Low [8.6 mg/dL - 10.3 mg/dL] (06/18/19 12:31:00)       Calcium Level: 9.5 [8.5 mg/dL - 10.5 mg/dL] (05/08/19 08:48:00)       Calcium TR: 9.1 mEq/dL (07/18/19 08:29:00)       Calcium TR: 8.8 mEq/dL (06/28/19 13:43:00)       Calcium TR: 9.7 mEq/dL (06/27/19 13:51:00)        Calcium TR: 8.7 mEq/dL (06/14/19 09:51:00)       Bilirubin Total TR: 0.5 mg/dL (07/18/19 08:29:00)       Bilirubin Total TR: 0.5 mg/dL (06/28/19 13:43:00)       Bilirubin Total TR: 0.9 mg/dL (06/14/19 09:51:00)       Alkaline Phosphatase TR: 332 unit/L (07/18/19 08:29:00)       Alkaline Phosphatase TR: 317 unit/L (06/28/19 13:43:00)       Alkaline Phosphatase TR: 294 unit/L (06/14/19 09:51:00)       AST TR: 27 unit/L (07/18/19 08:29:00)       AST TR: 24 unit/L (06/28/19 13:43:00)       AST TR: 17 unit/L (06/14/19 09:51:00)       ALT TR: 25 unit/L (07/18/19 08:29:00)       ALT TR: 20 unit/L (06/28/19 13:43:00)       ALT TR: 11 unit/L (06/14/19 09:51:00)       Protein Total TR: 7 gm/dL (07/18/19 08:29:00)       Protein Total TR: 6.5 gm/dL (06/28/19 13:43:00)       Protein Total TR: 6.3 gm/dL (06/14/19 09:51:00)       Albumin Level TR: 3.1 g/dL (07/18/19 08:29:00)       Albumin Level TR: 2.9 g/dL (06/28/19 13:43:00)       Albumin Level TR: 2.9 g/dL (06/14/19 09:51:00)       Globulin TR: 3.9 g/dL (07/18/19 08:29:00)       Globulin TR: 3.6 g/dL (06/28/19 13:43:00)       Globulin TR: 3.4 g/dL (06/14/19 09:51:00)       A/G Ratio TR: 0.8 mg/dL (07/18/19 08:29:00)       A/G Ratio TR: 0.8 mg/dL (06/28/19 13:43:00)       A/G Ratio TR: 0.9 (06/14/19 09:51:00)       Iron TR: 21 nmol/L (07/18/19 08:29:00)       Iron TR: 15 nmol/L (06/14/19 09:51:00)       Iron TR: 22 nmol/L (05/30/19 12:10:00)       Ferritin TR: 754.1 ng/mL (07/18/19 08:29:00)       Ferritin TR: 919.4 ng/mL (06/14/19 09:51:00)       WBC: 5.4 [3.8  - 10.8] (07/08/19 08:36:00)       WBC: 5.3 [3.8  - 10.8] (06/18/19 12:31:00)       WBC: 6.5 [4.5  - 11] (05/08/19 08:48:00)       WBC TR: 5.1 x10^3/uL (07/18/19 08:29:00)       WBC TR: 5.5 x10^3/uL (06/28/19 13:43:00)       WBC TR: 4.7 x10^3/uL (06/27/19 13:51:00)       WBC TR: 5.3 x10^3/uL (06/14/19 09:42:00)       RBC: 2.74 Low [4.2  - 5.8] (07/08/19 08:36:00)       RBC: 2.85 Low [4.2  - 5.8] (06/18/19 12:31:00)        RBC: 2.95 Low [4.3  - 5.9] (05/08/19 08:48:00)       RBC TR: 2.66 x10^6/uL (07/18/19 08:29:00)       RBC TR: 2.76 x10^6/uL (06/28/19 13:43:00)       RBC TR: 4.14 x10^6/uL (06/27/19 13:51:00)       RBC TR: 2.86 x10^6/uL (06/14/19 09:42:00)       Hgb: 7.7 gm/dL Low [13.2 gm/dL - 17.1 gm/dL] (07/08/19 08:36:00)       Hgb: 8.2 gm/dL Low [13.2 gm/dL - 17.1 gm/dL] (06/18/19 12:31:00)       Hgb: 8.6 Low [13.5 g/dL - 17.5 g/dL] (05/08/19 08:48:00)       Hgb TR: 7.7 g/dL (07/18/19 08:29:00)       Hgb TR: 7.9 g/dL (06/28/19 13:43:00)       Hgb TR: 12.9 g/dL (06/27/19 13:51:00)       Hgb TR: 8.1 g/dL (06/14/19 09:42:00)       Hct: 25.4 % Low [38.5 % - 50 %] (07/08/19 08:36:00)       Hct: 26.1 % Low [38.5 % - 50 %] (06/18/19 12:31:00)       Hct: 28.0 Low [37 % - 53 %] (05/08/19 08:48:00)       Hct TR: 25.4 % (07/18/19 08:29:00)       Hct TR: 25.9 % (06/28/19 13:43:00)       Hct TR: 38.2 % (06/27/19 13:51:00)       Hct TR: 26.5 % (06/14/19 09:42:00)       MCV: 92.7 fL [80 fL - 100 fL] (07/08/19 08:36:00)       MCV: 91.6 fL [80 fL - 100 fL] (06/18/19 12:31:00)       MCV: 95 [80 fL - 100 fL] (05/08/19 08:48:00)       MCV TR: 96 fL (07/18/19 08:29:00)       MCV TR: 94 fL (06/28/19 13:43:00)       MCV TR: 92 fL (06/27/19 13:51:00)       MCV TR: 93 fL (06/14/19 09:42:00)       MCH: 28.1 pg [27 pg - 33 pg] (07/08/19 08:36:00)       MCH: 28.8 pg [27 pg - 33 pg] (06/18/19 12:31:00)       MCH: 29.2 [26 pg - 34 pg] (05/08/19 08:48:00)       MCH TR: 28.9 pg (07/18/19 08:29:00)       MCH TR: 28.6 pg (06/28/19 13:43:00)       MCH TR: 31.2 pg (06/27/19 13:51:00)       MCH TR: 28.3 pg (06/14/19 09:42:00)       MCHC: 30.3 gm/dL Low [32 gm/dL - 36 gm/dL] (07/08/19 08:36:00)       MCHC: 31.4 gm/dL Low [32 gm/dL - 36 gm/dL] (06/18/19 12:31:00)       MCHC: 30.7 Low [32 g/dL - 36 g/dL] (05/08/19 08:48:00)       MCHC TR: 30.3 gm/dL (07/18/19 08:29:00)       MCHC TR: 30.5 gm/dL (06/28/19 13:43:00)       MCHC TR: 33.8 gm/dL (06/27/19 13:51:00)        MCHC TR: 30.6 gm/dL (06/14/19 09:42:00)       RDW: 16.9 % High [11 % - 15 %] (07/08/19 08:36:00)       RDW: 15.6 % High [11 % - 15 %] (06/18/19 12:31:00)       RDW: 17.4 High [11.5 % - 15.5 %] (05/08/19 08:48:00)       RDW TR: 19.1 % (07/18/19 08:29:00)       RDW TR: 18.6 % (06/28/19 13:43:00)       RDW TR: 14.9 % (06/27/19 13:51:00)       RDW TR: 17.6 % (06/14/19 09:42:00)       Platelet: 217 [140  - 400] (07/08/19 08:36:00)       Platelet: 231 [140  - 400] (06/18/19 12:31:00)       Platelet: 143 [140  - 440] (05/08/19 08:48:00)       Platelet TR: 240 x10^3/uL (07/18/19 08:29:00)       Platelet TR: 228 x10^3/uL (06/28/19 13:43:00)       Platelet TR: 210 x10^3/uL (06/27/19 13:51:00)       Platelet TR: 203 x10^3/uL (06/14/19 09:42:00)       MPV: 9.3 fL [7.5 fL - 12.5 fL] (07/08/19 08:36:00)       MPV: 9.9 fL [7.5 fL - 12.5 fL] (06/18/19 12:31:00)       MPV: 11.0 [6.5 fL - 11 fL] (05/08/19 08:48:00)       MPV (Mean Platelet Volume) TR: 8.2 fL (07/18/19 08:29:00)       MPV (Mean Platelet Volume) TR: 8.6 fL (06/28/19 13:43:00)       MPV (Mean Platelet Volume) TR: 10.2 fL (06/27/19 13:51:00)       MPV (Mean Platelet Volume) TR: 8.9 fL (06/14/19 09:42:00)       Neutrophils Abs# TR: 75.9 % (05/30/19 12:10:00)       Neutrophils Abs# TR: 76.4 % (05/17/19 12:02:00)       Lymphocytes TR: 18.7 % (07/18/19 08:29:00)       Lymphocytes TR: 13.6 % (06/28/19 13:43:00)       Lymphocytes TR: 11 % (06/14/19 09:42:00)       Lymphocytes TR: 8.3 % (05/30/19 12:10:00)       Absolute Lymphocytes TR: 1 cells/uL (07/18/19 08:29:00)       Absolute Lymphocytes TR: 0.8 cells/uL (06/28/19 13:43:00)       Absolute Lymphocytes TR: 0.6 cells/uL (06/14/19 09:42:00)       Absolute Lymphocytes TR: 0.6 cells/uL (05/30/19 12:10:00)       Neutrophils TR: 65.9 % (07/18/19 08:29:00)       Neutrophils TR: 73 % (06/28/19 13:43:00)       Neutrophils TR: 75.3 % (06/14/19 09:42:00)       Absolute Neutrophils TR: 3.3 cells/uL (07/18/19 08:29:00)       Absolute  Neutrophils TR: 4 cells/uL (06/28/19 13:43:00)       Absolute Neutrophils TR: 4 cells/uL (06/14/19 09:42:00)       Absolute Neutrophils TR: 5 cells/uL (05/30/19 12:10:00)       Monocytes TR: 12 % (07/18/19 08:29:00)       Monocytes TR: 10.5 % (06/28/19 13:43:00)       Monocytes TR: 9 % (06/14/19 09:42:00)       Monocytes TR: 9 % (05/30/19 12:10:00)       Absolute Monocytes TR: 0.6 cells/uL (07/18/19 08:29:00)       Absolute Monocytes TR: 0.6 cells/uL (06/28/19 13:43:00)       Absolute Monocytes TR: 0.5 cells/uL (06/14/19 09:42:00)       Absolute Monocytes TR: 0.6 cells/uL (05/30/19 12:10:00)       Eosinophils TR: 3 % (07/18/19 08:29:00)       Eosinophils TR: 2.5 % (06/28/19 13:43:00)       Eosinophils TR: 4.3 % (06/14/19 09:42:00)       Eosinophils TR: 6.5 % (05/30/19 12:10:00)       Absolute Eosinophils TR: 0.2 cells/uL (07/18/19 08:29:00)       Absolute Eosinophils TR: 0.1 cells/uL (06/28/19 13:43:00)       Absolute Eosinophils TR: 0.2 cells/uL (06/14/19 09:42:00)       Absolute Eosinophils TR: 0.4 cells/uL (05/30/19 12:10:00)       Basophils TR: 0.4 % (07/18/19 08:29:00)       Basophils TR: 0.4 % (06/28/19 13:43:00)       Basophils TR: 0.4 % (06/14/19 09:42:00)       Basophils TR: 0.3 % (05/30/19 12:10:00)       Absolute Basophils TR: 0 cells/uL (07/18/19 08:29:00)       Absolute Basophils TR: 0 cells/uL (06/28/19 13:43:00)       Absolute Basophils TR: 0 cells/uL (06/14/19 09:42:00)       Absolute Basophils TR: 0 cells/uL (05/30/19 12:10:00)       Protime: 21.0 High [11.9  - 13.9] (05/08/19 08:48:00)       PT: 10.8 [9  - 11.5] (06/18/19 12:31:00)       PT TR: 15.8 (06/13/19 10:07:00)       PT TR: 22 (05/17/19 12:02:00)       INR: 1.1 (06/18/19 12:31:00)       INR: 4.5 (06/07/19 14:33:00)       INR: 2 (05/17/19 17:18:00)       INR: 1.9 (05/08/19 12:23:00)       INR TR: 1.3 (06/13/19 10:07:00)       INR TR: 2 (05/17/19 12:02:00)       INR TR: 1.9 (05/02/19 05:32:00)       UA pH: 5.5 [5  - 8] (07/29/19 16:59:00)        UA Specific Gravity: 1.01 [1.001  - 1.035] (07/29/19 16:59:00)       UA Glucose: NEGATIVE [NEGATIVE  - NEGATIVE] (07/29/19 16:59:00)       UA Bilirubin: NEGATIVE [NEGATIVE  - NEGATIVE] (07/29/19 16:59:00)       UA Ketones: NEGATIVE [NEGATIVE  - NEGATIVE] (07/29/19 16:59:00)       Urine Occult Blood: NEGATIVE [NEGATIVE  - NEGATIVE] (07/29/19 16:59:00)       UA Protein: TRACE Abnormal [NEGATIVE  - NEGATIVE] (07/29/19 16:59:00)       UA Nitrite: NEGATIVE [NEGATIVE  - NEGATIVE] (07/29/19 16:59:00)       UA Leukocyte Esterase: NEGATIVE [NEGATIVE  - NEGATIVE] (07/29/19 16:59:00)

## 2022-02-16 NOTE — NURSING NOTE
Comprehensive Intake Entered On:  7/29/2019 4:30 PM CDT    Performed On:  7/29/2019 4:23 PM CDT by Sabrina Lazar LPN               Summary   Chief Complaint :   burning with urination, urgency, the past few days. has had UTI's in the past, feels that same.    Advance Directive :   Yes   Menstrual Status :   N/A   Weight Measured :   189 lb(Converted to: 189 lb 0 oz, 85.73 kg)    Height Measured :   75 in(Converted to: 6 ft 3 in, 190.50 cm)    Body Mass Index :   23.62 kg/m2   Body Surface Area :   2.13 m2   Systolic Blood Pressure :   96 mmHg   Diastolic Blood Pressure :   54 mmHg (LOW)    Mean Arterial Pressure :   68 mmHg   Peripheral Pulse Rate :   72 bpm   BP Site :   Right arm   Pulse Site :   Radial artery   BP Method :   Manual   HR Method :   Manual   Temperature Tympanic :   97.0 DegF(Converted to: 36.1 DegC)  (LOW)    Sabrina Lazar LPN - 7/29/2019 4:23 PM CDT   Health Status   Allergies Verified? :   Yes   Medication History Verified? :   Yes   Pre-Visit Planning Status :   Completed   Sabrina Lazar LPN - 7/29/2019 4:23 PM CDT   Consents   Consent for Immunization Exchange :   Consent Granted   Consent for Immunizations to Providers :   Consent Granted   Sabrina Lazar LPN - 7/29/2019 4:23 PM CDT   Meds / Allergies   (As Of: 7/29/2019 4:30:35 PM CDT)   Allergies (Active)   adhesive tape  Estimated Onset Date:   Unspecified ; Created By:   Floridalma Calderon; Reaction Status:   Active ; Category:   Other ; Substance:   adhesive tape ; Type:   Allergy ; Updated By:   Floridalma Calderon; Reviewed Date:   7/26/2019 4:21 PM CDT      No Known Medication Allergies  Estimated Onset Date:   Unspecified ; Created By:   Ellen Parker MA; Reaction Status:   Active ; Category:   Drug ; Substance:   No Known Medication Allergies ; Type:   Allergy ; Updated By:   Ellen Parker MA; Reviewed Date:   7/26/2019 4:21 PM CDT        Medication List   (As Of: 7/29/2019 4:30:35 PM CDT)   Prescription/Discharge Order     famotidine  :   famotidine ; Status:   Prescribed ; Ordered As Mnemonic:   famotidine 20 mg oral tablet ; Simple Display Line:   1 tab(s), Oral, bid, 180 EA, 3 Refill(s) ; Ordering Provider:   Shravan Berrios MD; Catalog Code:   famotidine ; Order Dt/Tm:   7/11/2019 3:48:29 PM          spironolactone  :   spironolactone ; Status:   Prescribed ; Ordered As Mnemonic:   spironolactone 50 mg oral tablet ; Simple Display Line:   25 mg, 0.5 tab(s), Oral, bid, 30 tab(s), 2 Refill(s) ; Ordering Provider:   Shravan Berrios MD; Catalog Code:   spironolactone ; Order Dt/Tm:   7/11/2019 11:58:45 AM          fluticasone nasal  :   fluticasone nasal ; Status:   Prescribed ; Ordered As Mnemonic:   Flonase 50 mcg/inh nasal spray ; Simple Display Line:   2 spray(s), Nasal, daily, for 30 day(s), 1 EA, 0 Refill(s) ; Ordering Provider:   Shravan Berrios MD; Catalog Code:   fluticasone nasal ; Order Dt/Tm:   7/8/2019 8:23:11 AM          midodrine  :   midodrine ; Status:   Prescribed ; Ordered As Mnemonic:   midodrine 2.5 mg oral tablet ; Simple Display Line:   5 mg, 2 tab(s), Oral, tid, for 30 day(s), 180 tab(s), 2 Refill(s) ; Ordering Provider:   Shravan Berrios MD; Catalog Code:   midodrine ; Order Dt/Tm:   6/28/2019 2:06:56 PM          allopurinol  :   allopurinol ; Status:   Prescribed ; Ordered As Mnemonic:   allopurinol 300 mg oral tablet ; Simple Display Line:   300 mg, 1 tab(s), Oral, daily, for 90 day(s), 90 tab(s), 3 Refill(s) ; Ordering Provider:   Shravan Berrios MD; Catalog Code:   allopurinol ; Order Dt/Tm:   5/3/2019 4:58:18 PM          metoprolol  :   metoprolol ; Status:   Prescribed ; Ordered As Mnemonic:   Metoprolol Succinate ER 25 mg oral tablet, extended release ; Simple Display Line:   See Instructions, TAKE ONE-HALF TABLET BY MOUTH TWICE DAILY, 180 EA, 3 Refill(s) ; Ordering Provider:   Shravan Berrios MD; Catalog Code:   metoprolol ; Order Dt/Tm:   5/3/2019 4:58:09 PM          atorvastatin  :   atorvastatin ;  Status:   Prescribed ; Ordered As Mnemonic:   atorvastatin 10 mg oral tablet ; Simple Display Line:   10 mg, 1 tab(s), Oral, hs, 30 tab(s), 0 Refill(s) ; Ordering Provider:   Shravan Berrios MD; Catalog Code:   atorvastatin ; Order Dt/Tm:   5/3/2019 11:54:45 AM          mirtazapine 15 mg oral tablet  :   mirtazapine 15 mg oral tablet ; Status:   Prescribed ; Ordered As Mnemonic:   mirtazapine 15 mg oral tablet ; Simple Display Line:   1 tab(s), Oral, qhs, 30 unknown unit, 11 Refill(s) ; Ordering Provider:   Shravan Berrios MD; Catalog Code:   mirtazapine ; Order Dt/Tm:   4/18/2019 8:36:08 AM          metroNIDAZOLE topical  :   metroNIDAZOLE topical ; Status:   Prescribed ; Ordered As Mnemonic:   MetroGel 1% topical gel ; Simple Display Line:   1 nazario, Topical, daily, 1 tubes, 5 Refill(s) ; Ordering Provider:   Rissa Arauz MD; Catalog Code:   metroNIDAZOLE topical ; Order Dt/Tm:   6/29/2018 3:46:29 PM          clotrimazole topical  :   clotrimazole topical ; Status:   Prescribed ; Ordered As Mnemonic:   clotrimazole 1% topical cream ; Simple Display Line:   1 nazario, TOP, BID, for 7 day(s), 30 gm, 0 Refill(s) ; Ordering Provider:   Rissa Arauz MD; Catalog Code:   clotrimazole topical ; Order Dt/Tm:   6/22/2018 3:35:41 PM          Misc Prescription  :   Misc Prescription ; Status:   Prescribed ; Ordered As Mnemonic:   [id number] URINARY DRAIN BAG 2000ML MG BEAD ; Simple Display Line:   See Instructions, USE AS DIRECTED, 1 unknown unit ; Ordering Provider:   Shravan Berrios MD; Catalog Code:   Miscellaneous Prescription ; Order Dt/Tm:   6/4/2018 7:34:17 AM          predniSONE  :   predniSONE ; Status:   Prescribed ; Ordered As Mnemonic:   predniSONE 20 mg oral tablet ; Simple Display Line:   See Instructions, 2 po daily for 3-5 days prn gout, 30 EA, 0 Refill(s) ; Ordering Provider:   Shravan Berrios MD; Catalog Code:   predniSONE ; Order Dt/Tm:   12/29/2017 10:06:41 AM          nitroglycerin  :   nitroglycerin ;  Status:   Prescribed ; Ordered As Mnemonic:   nitroglycerin 0.4 mg sublingual tablet ; Simple Display Line:   0.4 mg, 1 tab(s), SL, q5min, not to exceed 3 doses/15 min--if pain persists, seek medical attention, 25 tab(s), PRN: for chest pain ; Ordering Provider:   Shravan Berrios MD; Catalog Code:   nitroglycerin ; Order Dt/Tm:   8/28/2014 11:35:30 AM            Home Meds    cyanocobalamin  :   cyanocobalamin ; Status:   Documented ; Ordered As Mnemonic:   Vitamin B12 1000 mcg/mL injectable solution ; Simple Display Line:   1,000 mcg, im, qmonth, 0 Refill(s) ; Catalog Code:   cyanocobalamin ; Order Dt/Tm:   7/24/2017 11:12:05 AM          digoxin  :   digoxin ; Status:   Documented ; Ordered As Mnemonic:   digoxin 125 mcg (0.125 mg) oral tablet ; Simple Display Line:   125 mcg, 1 tab(s), po, daily, 0 Refill(s) ; Catalog Code:   digoxin ; Order Dt/Tm:   4/19/2017 10:47:22 AM          furosemide  :   furosemide ; Status:   Documented ; Ordered As Mnemonic:   furosemide 80 mg oral tablet ; Simple Display Line:   40 mg, 0.5 tab(s), po, bid, 0 Refill(s) ; Catalog Code:   furosemide ; Order Dt/Tm:   3/15/2017 3:19:58 PM          acetaminophen  :   acetaminophen ; Status:   Documented ; Ordered As Mnemonic:   acetaminophen 325 mg oral tablet ; Simple Display Line:   650 mg, 2 tab(s), po, q4 hrs, not to exceed 4000 mg/day, 0 Refill(s) ; Catalog Code:   acetaminophen ; Order Dt/Tm:   12/20/2016 1:58:24 PM          multivitamin  :   multivitamin ; Status:   Documented ; Ordered As Mnemonic:   Protegra oral tablet ; Simple Display Line:   1 tab(s), po, daily ; Catalog Code:   multivitamin ; Order Dt/Tm:   6/1/2015 8:38:03 AM

## 2022-02-16 NOTE — TELEPHONE ENCOUNTER
---------------------  From: Mag Vargas CMA   To: Shravan Berrios MD; Osman/Myrna LONDON;     Cc: Karla Naylor; Shoshana Orozco;      Sent: 9/30/2019 1:06:55 PM CDT  Subject: Passed away     Codie called and wanted me to let you know that Mamta passed away on Friday (9/27)---------------------  From: Karla Naylor   To: Mag Vargas CMA;     Sent: 9/30/2019 2:18:29 PM CDT  Subject: RE: Passed away     thanks.

## 2022-02-16 NOTE — PROGRESS NOTES
Patient:   JACINTO JIN            MRN: [id number]            FIN: [id number]               Age:   81 years     Sex:  Male     :  1935   Associated Diagnoses:   None   Author:   Shravan Berrios MD      2017  S: The patient was in the emergency room on  with complaint of increased breathing difficulty.  He has had a very difficult time with heart failure.  He was started on metolazone 2.5 mg daily and his weight came down 5 pounds as of today, it was 214 which is really his goal weight.  He has not had angina.  He has not had visible rectal bleeding, although his stool was Hemoccult positive when he was in the emergency room and it did drop.  He denies upper GI symptoms or abdominal pain.     Problem list, medications, and allergies reviewed.      O: Blood pressure 111/69.  Pulse 80.  Weight 218 pounds.  Chest is clear.  Cardiac exam is regular.  Abdomen is nontender.  Extremities without edema.  He is alert and oriented.   DATA:  From 2017 electrolytes normal.  BUN 37.  Creatinine 1.8.  Hemoglobin 7.7.  Ferritin 34.  .     2017   Hemoglobin 8.7.  creatinine up to 2.8.  BUN up to 43.  Potassium improved to 3.4.      A: 1.  Congestive heart failure.  I think the patient is about as dry as he can get at this point.  No evidence of fluid overload on exam.    2.  Anemia, which now appears to be iron deficient with positive Hemoccult and ferritin that is although normal, low normal.  Patient is chronically anticoagulated.     3.  Chronic atrial fibrillation.    4.  Paralyzed hemidiaphragm.      P: 1.  Discontinue metolazone.     2.  Basic metabolic profile in 3-4 days.    3.  Start the patient on iron infusions 200 mcg five doses over 14 days.    4.  EGD and colonoscopy when stabilized.      D:  2017  T:  2017 Shravan Berrios MD, FACP/sq

## 2022-02-16 NOTE — CARE COORDINATION
ACTION PLAN   Goal(s): Cardiac management    CC to follow-up with Mamta REDDY. Do monthly chart review. Mamta/wife very good about calling if they need something.     Today s Date: 12/16/15                                                                     Goal(s) completion date: ongoing     Steps to be taken to manage CHF (cardio has been managing)    Baseline weight: 220-224lbs When will I accomplish these steps Barriers Status   (4/19/17) -Bumex d/c    -Lasix 80mg BID    -Digoxin 0.125mcg    -Metolazone prn to get weights down    Cardio f/u in 3 mo (9/2017)   (4/26/27) ED visit- was advised to take Metolazone qd until appt w/ [facility] on 4/28/17. Has lost 1lbs so far.    Cardio appt scheduled w/ [facility] on 6/1.    (5/2/17) Appt w/ [facility] completed. Metolazone now on hold again. Wt down to 214 lbs which is his dry weight. Recheck w/ [facility] in 2 weeks. Labs at Infusion    (5/4/17) Weight up to 220. Per [facility] no med change/no Metolazone. Needs to be on TID dosing of potassium d/t hypokalemia. Recheck BMP on Sun (5/8). Discussed sodium intake-sticking to 1500mg and will monitor this closely. CC to check in next wk.     (5/9/17) Wt up to 223lbs. Per [facility] OK to take qd metolazone if weight >222lbs. F/u appt w/ [facility] on Friday 5/12, repeat BMP and INR in 1 week.     (6/27/17) Per cardio-cont. w/ current diuretics. Need to see hematology ([facility] [place]). Cardio f/u in 3 mo and BMP in 2 weeks.     (7/12/17) No change in labs-keep cardio f/u as scheduled (due in Sept 2017)    (12/5/17) Never completed f/u with cardio-appt with [facility] on 12/12/17    (12/18/17) OV completed-labs repeated/stable. Consider subcutaneous ICD placement. F/u in 6 mo (6/13/18)        Steps to be taken to manage uncontrolled iron deficiency anemia When will I accomplish these steps Barriers Status   (12/5/17) OV w/ Hugec on 10/20/17. Cont. w/ Vitamin B12 injections. Previous iron deficiency anemia resolved.     Due in April 2018 for  repeat labs and OV.              Steps to be taken to manage When will I accomplish these steps Barriers Status

## 2022-02-16 NOTE — PROGRESS NOTES
Chief Complaint    pt c/o cough, coughing up yellow phlem, home nurse worried about pneumonia; fluid in lungs  History of Present Illness      Patient has had a cough with clear to yellow sputum for several weeks.  It began while in the hospital.  No fever or chills.  No melena.  No abdominal pain.  No confusion.  No shortness of breath.  Continues to have edema which does not resolve overnight.  His blood pressure has been low limiting diuretic therapy.  Review of Systems      No angina, nausea, vomiting.  Patient is sleeping well.  Physical Exam   Vitals & Measurements    T: 97.6   F (Tympanic)  HR: 72(Peripheral)  BP: 90/58  SpO2: 94%     HT: 75 in  WT: 198 lb  BMI: 24.75       Patient appears comfortable.  Alert and oriented.  Chronically ill-appearing sclera anicteric.  H&T exam reveals a moist oropharynx.  Sinuses nontender.  Chest reveals diminished breath sounds at the left base chronically due to diaphragmatic paralysis.  Lungs are otherwise clear.  Cardiac exam is regular.  Pacemaker present.  Legs have a good 2+ edema to mid shin.  No asterixis.  Assessment/Plan       Acute bronchitis (J20.9)         Treat with antibiotic return if not better.         Ordered:          78227 office outpatient visit 25 minutes (Charge), Quantity: 1, 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, chronic  CAD (coronary artery disease)  Ascites  Stage 3 chronic kidney disease  Cirrhosis  Acute bronchitis  AF (Atrial Fibrillation)  Right heart failure                AF (Atrial Fibrillation) (I48.2)         Now off anticoagulants due to chronic blood loss.         Ordered:          92145 office outpatient visit 25 minutes (Charge), Quantity: 1, 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, chronic  CAD (coronary artery disease)  Ascites  Stage 3 chronic kidney disease  Cirrhosis  Acute bronchitis  AF (Atrial Fibrillation)  Right heart failure                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, chronic (D50.0)         Hemoglobin stable.         Ordered:           90656 office outpatient visit 25 minutes (Charge), Quantity: 1, 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, chronic  CAD (coronary artery disease)  Ascites  Stage 3 chronic kidney disease  Cirrhosis  Acute bronchitis  AF (Atrial Fibrillation)  Right heart failure                Ascites (R18.8)         Weight is stable still has ascites not tolerating increased diuretic doses due to hypotension.  Midodrine has been used in these situations to increase systolic blood pressure, I discussed this with Dr. Quintana, and will start low-dose midodrine with follow-up in a couple weeks.  If blood pressure is improved we will hope to increase diuretic therapy.         Ordered:          77107 office outpatient visit 25 minutes (Charge), Quantity: 1, 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, chronic  CAD (coronary artery disease)  Ascites  Stage 3 chronic kidney disease  Cirrhosis  Acute bronchitis  AF (Atrial Fibrillation)  Right heart failure                CAD (coronary artery disease) (I25.10)         Stable.         Ordered:          98861 office outpatient visit 25 minutes (Charge), Quantity: 1, 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, chronic  CAD (coronary artery disease)  Ascites  Stage 3 chronic kidney disease  Cirrhosis  Acute bronchitis  AF (Atrial Fibrillation)  Right heart failure                Cirrhosis (K74.60)         Due to right heart failure chronically.  Ascites and GI bleeding but not encephalopathy.         Ordered:          83793 office outpatient visit 25 minutes (Charge), Quantity: 1, 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, chronic  CAD (coronary artery disease)  Ascites  Stage 3 chronic kidney disease  Cirrhosis  Acute bronchitis  AF (Atrial Fibrillation)  Right heart failure                Right heart failure (I50.9)         Has significant edema but is not tolerating higher dose diuretic.         Ordered:          96345 office outpatient visit 25 minutes (Charge), Quantity: 1, 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, chronic  CAD (coronary artery  disease)  Ascites  Stage 3 chronic kidney disease  Cirrhosis  Acute bronchitis  AF (Atrial Fibrillation)  Right heart failure                Stage 3 chronic kidney disease (N18.3)         Unchanged.         Ordered:          76900 office outpatient visit 25 minutes (Charge), Quantity: 1, 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, chronic  CAD (coronary artery disease)  Ascites  Stage 3 chronic kidney disease  Cirrhosis  Acute bronchitis  AF (Atrial Fibrillation)  Right heart failure                Orders:         cefuroxime, = 1 tab(s) ( 500 mg ), Oral, bid, x 10 day(s), # 20 tab(s), 0 Refill(s), Type: Acute, Pharmacy: [facility], 1 tab(s) Oral bid,x10 day(s), (Ordered)         midodrine, = 2 tab(s) ( 5 mg ), Oral, tid, # 180 tab(s), 2 Refill(s), Type: Maintenance, Pharmacy: [facility], 2 tab(s) Oral tid,x30 day(s), (Ordered)         Return to Clinic (Request), Return in 2 weeks  Patient Information     Name:JACINTO JIN      Address:      [address] [place] [id number]     Sex:Male     YOB: 1935     [phone]     Emergency Contact:GERA JIN     MRN:[id number]     FIN:[id number]     Location:[facility]     Date of Service:06/28/2019      Primary Care Physician:       Shravan Berrios MD, [phone]      Attending Physician:       Shravan Berrios MD, [phone]  Problem List/Past Medical History    Ongoing     Acquired arteriovenous malformation of colon       Comments: Treated.     Acute kidney injury (nontraumatic)     AF (Atrial Fibrillation)     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, chronic     Anemia of renal disease     Anticardiolipin syndrome     Anticoagulated     Ascites       Comments: Negative cytology 12/6/18     B12 deficiency     BPH with urinary obstruction     CAD (coronary artery disease)     Cardiorenal syndrome     CHB (complete heart block)     Chronic diastolic CHF (congestive heart failure), NYHA class 3     Cirrhosis     Depression      Diverticulosis     Dupuytren's contracture of right hand     Gout     H/O acute pancreatitis     High cholesterol     History of acute bacterial endocarditis     History of coronary artery bypass graft     History of GI bleed       Comments: Again 04/30/2019     History of tricuspid valve replacement     Hx of colonic polyp     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     IBS (irritable bowel syndrome)     ICD (implantable cardioverter-defibrillator) infection     Inguinal hernia, right     Iron deficiency anemia     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     Nonischemic dilated cardiomyopathy     Obesity     KEYONA (obstructive sleep apnea)       Comments: Adequate titration to BIPAP 16/11 on 10/15/2013     Osteoarthritis of both knees     Paralysis, diaphragm       Comments: Left     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     PUD (peptic ulcer disease)     Right heart failure     Stage 3 chronic kidney disease     Tricuspid valve insufficiency     Vitamin B 12 deficiency    Historical     BE (bacterial endocarditis)     Colon polyps     History of sepsis     Inpatient stay       Comments: @[facility] - Jaw pain, possible anginal equivalent     Inpatient stay       Comments: @[facility] - Infected pacemaker     Inpatient stay       Comments: @[facility] - Diverticulitis     Inpatient stay       Comments: @[facility] - S/P tricuspid valve replacement with worsening right ventricular function     Inpatient stay       Comments: @[facility] - Severe symptomatic tricuspid valvular insufficiency. Chronic right heart failure.     Inpatient stay       Comments: @[facility] - Acute on chronic right heart failure     Inpatient stay       Comments: @[facility] - Anemia     Inpatient stay       Comments: @[place], [place] - GI bleeding, suspected upper source     Inpatient stay       Comments: @[facility], [place] - Acute GI bleeding     Other and  Unspecified Noninfectious Gastroenteritis and Colitis  Procedure/Surgical History     Esophagogastroduodenoscopy (06/08/2019)      Comments: Indication: GI bleeding      Sedation: Fentanyl 50 mcg, Versed 2 mg      Findings: Multiple gastric polyps one of which was oozing. Esophagus and duodenum normal      Rec: D/C PPI, H2 blocker, hold Warfarin.     Left Extracapsular Cataract extraction with intraocular lens implant (05/28/2019)      Comments: Left.. Dr. Cornelius.     Esophagogastroduodenoscopy (02/12/2019)     Colonoscopy (12/07/2018)      Comments: Indication: GIB      Sedation: fentanyl 50 mcg, Versed 1 mg      Findings: Diverticulosis, 2 small AVMs with bleeding in ascending treated with APC      Rec: Consider further workup based on tu to treatemetn.     Esophagogastroduodenoscopy and biopsy (12/07/2018)      Comments: Indication: GIB      Sedatoin: Fentanyl 50 mcg, Versed 2 mg      findings: Negative with negative duodenal biopsy      Rec: Colonoscopy.     Abdominal paracentesis (12/06/2018)      Comments: Negative cytology.     Bone marrow biopsy (07/12/2017)     Colonoscopy (05/27/2017)      Comments: Cecal tubular adenoma, pandiverticulosis. w/polypectomy.     Esophagogastroduodenoscopy (05/27/2017)      Comments: gastritis, fundic gland polyps.     TVR - Tricuspid valve replacement (11/29/2016)     Cardiac angiogram (11/01/2016)      Comments: Summary/Conclusions      PRESENTATION / INDICATIONS        Pre-surgical evaluation for cardiac surgery        Severe TR by echo      DIAGNOSTIC - CORONARY        Co-dominant coronary artery system        The left main artery was normal in appearance and free of obstructive disease.        Chronic total occlusion of the proximal LAD        The circumflex artery has minimal disease.        The RCA has moderate disease.        No change since 2013      DIAGNOSTIC - GRAFTS        Chronic total occlusion in the proximal anastomosis of the SVG graft to the RCA         The SVG to the ramus intermediate is patent        The sequential graft to the 1st diagonal is patent.        The sequential graft limb from the diagonal to the LAD is patent.        No change since 2013      HEMODYNAMICS        The LVEDP is mildly elevated        Severely elevated right atrial pressures        No evidence of intracardiac shunting.     Limited thoracotomy (08/23/2016)      Comments: Left mini thoracotomy and placement of two epicardial screw in leads.  Implant of left pectoral pacer generatoe..     Implantation of intravenous single chamber cardiac pacemaker system (08/04/2016)     Removal of automatic cardiac defibrillator (08/04/2016)      Comments: Generator and leads.     Pacemaker change (04/08/2015)     Cardiac angiogram (08/02/2013)      Comments: Summary/Conclusions      PRESENTATION / INDICATIONS      Chest pain      DIAGNOSTIC - CORONARY      Right dominant coronary artery system      DIAGNOSTIC SUMMARY      100% stenosis in the Proximal LAD      30% stenosis in the 1st Marginal      30% stenosis in the Proximal RCA      50% stenosis in the Mid RCA      100% stenosis in the Aorta graft to Distal RCA      DIAGNOSTIC - GRAFTS      The sequential graft to the diagonal branch is patent.      The sequential graft from the diagonal to the LAD is patent.      The SVG to the ramus intermediate is patent      The SVG to RCA is chronically occluded      HEMODYNAMICS      The LVEDP is mildly elevated      RECOMMENDATIONS & PLAN      Medical Rx- no significant change from previous angiogram, there is dramatic mismatch in size between the SVG's and the target arteries with slow filling of the LAD.     angiogrqam (02/03/2012)      Comments: Summary/Conclusions      PRESENTATION / INDICATIONS      Dyspnea and fatigue.      DIAGNOSTIC - CORONARY      Right dominant coronary artery system.      LMCA is normal.      LAD is occluded proximally after the 1st septal branch.      LCx has mild luminal  "irregularities.  OM1 has 20-30% proximal stenosis.      RCA has 30-40% diffuse disease in the mid segment and otherwise has mild diffuse luminal irregularities.      DIAGNOSTIC - GRAFTS      The sequential SVG to the diagonal and then LAD is widely patent.  There is a significant size mismatch between the graft and the native vessels.  The diagonal and distal LAD have no obvious lesions but are small caliber (< 2.0 mm vessels).  There is some backfilling into the mid LAD.      The SVG to the ramus is widely patent.  There is a significant size mismatch between the graft and the native vessel.  The ramus has no obvious lesion but is small caliber (< 2.0 mm vessel).        The SVG to the RCA is chronically occluded.      LEFT VENTRICULAR FUNCTION      Left ventricular function is mildly reduced with EF of 42% and mild global hypokinesis.  No significant MR.      HEMODYNAMICS      The LVEDP is 6 mmHg.  No significant gradient across the aortic valve.      RA 11, RV 29/10, PA 34/17 (mean 25), PCWP 18      Amarilys CO 5.1, TDCO 4.6      RECOMMENDATIONS & PLAN      Consider alternative etiology for symptoms.      Lasix dose decreased to 40 mg QD given relative hypotension and patient reporting feeling \"dry membranes.\".     Colonoscopy (12/15/2006)      Comments: Diverticulosis. No polyps..     Colonoscopy (06/22/2001)      Comments: hyperplastic polyp.     Cholecystectomy (06.2001)     CABG - Coronary artery bypass graft (2000)     History of Back Surgery (2000)     Colonoscopy (02/17/1993)      Comments: 1 adenoma, 1 hyperplastic, diverticulosis..     Left shoulder surgery (1991)     Ablation for atrial fibrillation      Comments: Subsequent pacemaker dependence..     Cholecystectomy     Colonoscopy     Esophagogastroduodenoscopy  Medications        nitroglycerin 0.4 mg sublingual tablet: 0.4 mg, 1 tab(s), SL, q5min, not to exceed 3 doses/15 min--if pain persists, seek medical attention, 25 tab(s), PRN: for chest pain.       "  Protegra oral tablet: 1 tab(s), po, daily.        acetaminophen 325 mg oral tablet: 650 mg, 2 tab(s), po, q4 hrs, not to exceed 4000 mg/day, 0 Refill(s).        furosemide 80 mg oral tablet: 40 mg, 0.5 tab(s), po, daily, 0 Refill(s).        digoxin 125 mcg (0.125 mg) oral tablet: 125 mcg, 1 tab(s), po, daily, 0 Refill(s).        Vitamin B12 1000 mcg/mL injectable solution: 1,000 mcg, im, qmonth, 0 Refill(s).        predniSONE 20 mg oral tablet: See Instructions, 2 po daily for 3-5 days prn gout, 30 EA, 0 Refill(s).        [id number] URINARY DRAIN BAG 2000ML MG BEAD: See Instructions, USE AS DIRECTED, 1 unknown unit.        clotrimazole 1% topical cream: 1 nazario, TOP, BID, for 7 day(s), 30 gm, 0 Refill(s).        MetroGel 1% topical gel: 1 nazario, Topical, daily, 1 tubes, 5 Refill(s).        tamsulosin 0.4 mg oral capsule: 0.4 mg, 1 cap(s), Oral, daily, 30 cap(s), 0 Refill(s).        spironolactone 50 mg oral tablet: 50 mg, 1 tab(s), Oral, daily, 0 Refill(s).        mirtazapine 15 mg oral tablet: 1 tab(s), Oral, qhs, 30 unknown unit, 11 Refill(s).        atorvastatin 10 mg oral tablet: 10 mg, 1 tab(s), Oral, hs, 30 tab(s), 0 Refill(s).        Metoprolol Succinate ER 25 mg oral tablet, extended release: See Instructions, TAKE ONE-HALF TABLET BY MOUTH TWICE DAILY, 180 EA, 3 Refill(s).        allopurinol 300 mg oral tablet: 300 mg, 1 tab(s), Oral, daily, for 90 day(s), 90 tab(s), 3 Refill(s).        Vitamin K1: 100 mcg, Oral, daily, for 14 day(s), 0 Refill(s).        famotidine 20 mg oral tablet: 20 mg, 1 tab(s), Oral, bid, 0 Refill(s).        midodrine 2.5 mg oral tablet: 5 mg, 2 tab(s), Oral, tid, for 30 day(s), 180 tab(s), 2 Refill(s).        cefuroxime 500 mg oral tablet: 500 mg, 1 tab(s), Oral, bid, for 10 day(s), 20 tab(s), 0 Refill(s).         Allergies    No Known Medication Allergies    adhesive tape  Social History    Smoking Status - 06/28/2019     Former smoker     Alcohol      Past, 03/30/2018      Employment/School      Employed, Work/School description: [employer] 11/11/2010     Exercise      Exercise type: Walking., 11/11/2010     Tobacco      Former smoker, quit more than 30 days ago, 08/23/2018  Family History    Aneurysm: Father.    Heart disease: Mother.  Immunizations      Vaccine Date Status      influenza virus vaccine, inactivated 11/27/2018 Given      influenza virus vaccine, inactivated 09/28/2015 Given      pneumococcal (PCV13) 04/21/2015 Given      influenza virus vaccine, inactivated 08/28/2014 Given      influenza virus vaccine, inactivated 10/29/2012 Given      influenza virus vaccine, inactivated 09/23/2011 Given      influenza virus vaccine, inactivated 10/26/2010 Given      influenza 10/23/2008 Recorded      Td 09/01/2005 Recorded      pneumococcal 11/28/2001 Recorded  Lab Results          Lab Results (Last 4 results within 90 days)           Sodium Level: 131 mmol/L Low [135 mmol/L - 146 mmol/L] (06/18/19 12:31:00)          Sodium Level: 136 [135 mmol/L - 145 mmol/L] (05/08/19 08:48:00)          Sodium Level: 135 [135 mmol/L - 145 mmol/L] (04/30/19 14:45:00)          Sodium Level: 136 mmol/L [135 mmol/L - 146 mmol/L] (04/18/19 08:48:00)          Sodium Level TR: 132 mEq/L (06/14/19 09:51:00)          Sodium Level TR: 134 mEq/L (05/17/19 12:02:00)          Potassium Level: 5 mmol/L [3.5 mmol/L - 5.3 mmol/L] (06/18/19 12:31:00)          Potassium Level: 5.5 High [3.5 mmol/L - 5 mmol/L] (05/08/19 08:48:00)          Potassium Level: 4.8 [3.5 mmol/L - 5 mmol/L] (04/30/19 14:45:00)          Potassium Level: 4.6 mmol/L [3.5 mmol/L - 5.3 mmol/L] (04/18/19 08:48:00)          Potassium Level TR: 5 mEq/L (06/14/19 09:51:00)          Potassium Level TR: 4.7 mEq/L (05/17/19 12:02:00)          Chloride Level: 99 mmol/L [98 mmol/L - 110 mmol/L] (06/18/19 12:31:00)          Chloride Level: 100 [98 mmol/L - 110 mmol/L] (05/08/19 08:48:00)          Chloride Level: 99 [98 mmol/L - 110 mmol/L] (04/30/19  14:45:00)          Chloride Level: 102 mmol/L [98 mmol/L - 110 mmol/L] (04/18/19 08:48:00)          Chloride TR: 100 mEq/L (06/14/19 09:51:00)          Chloride TR: 96 mEq/L (05/17/19 12:02:00)          CO2 Level: 29 mmol/L [20 mmol/L - 32 mmol/L] (06/18/19 12:31:00)          CO2 Level: 27 [21 mmol/L - 31 mmol/L] (05/08/19 08:48:00)          CO2 Level: 27 [21 mmol/L - 31 mmol/L] (04/30/19 14:45:00)          CO2 Level: 27 mmol/L [20 mmol/L - 32 mmol/L] (04/18/19 08:48:00)          CO2 TR: 25 mEq/L (06/14/19 09:51:00)          CO2 TR: 29 mEq/L (05/17/19 12:02:00)          AGAP: 9 [5  - 18] (05/08/19 08:48:00)          AGAP: 9 [5  - 18] (04/30/19 14:45:00)          Anion Gap TR: 7 mEq/L (06/14/19 09:51:00)          Anion Gap TR: 9 mEq/L (05/17/19 12:02:00)          Glucose Level: 97 mg/dL [65 mg/dL - 99 mg/dL] (06/18/19 12:31:00)          Glucose Level: 109 High [65 mg/dL - 100 mg/dL] (05/08/19 08:48:00)          Glucose Level: 109 High [65 mg/dL - 100 mg/dL] (04/30/19 14:45:00)          Glucose Level: 98 mg/dL [65 mg/dL - 99 mg/dL] (04/18/19 08:48:00)          Glucose Level TR: 91 mg/dL (06/14/19 09:51:00)          Glucose Level TR: 91 mg/dL (05/17/19 12:02:00)          BUN: 39 mg/dL High [7 mg/dL - 25 mg/dL] (06/18/19 12:31:00)          BUN: 64 High [8 mg/dL - 25 mg/dL] (05/08/19 08:48:00)          BUN: 67 High [8 mg/dL - 25 mg/dL] (04/30/19 14:45:00)          BUN: 55 mg/dL High [7 mg/dL - 25 mg/dL] (04/18/19 08:48:00)          BUN TR: 37 mg/dL (06/14/19 09:51:00)          BUN TR: 56 mg/dL (05/17/19 12:02:00)          Creatinine Level: 1.64 mg/dL High [0.7 mg/dL - 1.11 mg/dL] (06/18/19 12:31:00)          Creatinine Level: 2.51 High [0.72 mg/dL - 1.25 mg/dL] (05/08/19 08:48:00)          Creatinine Level: 2.46 High [0.72 mg/dL - 1.25 mg/dL] (04/30/19 14:45:00)          Creatinine Level: 2.34 mg/dL High [0.7 mg/dL - 1.11 mg/dL] (04/18/19 08:48:00)          Creatinine TR: 1.66 mg/dL (06/14/19 09:51:00)           Creatinine TR: 2.44 mg/dL (05/17/19 12:02:00)          Creatinine TR: 2.56 mg/dL (05/02/19 05:32:00)          BUN/Creat Ratio: 24 High [6  - 22] (06/18/19 12:31:00)          BUN/Creat Ratio: 25 High [10  - 20] (05/08/19 08:48:00)          BUN/Creat Ratio: 27 High [10  - 20] (04/30/19 14:45:00)          BUN/Creat Ratio: 24 High [6  - 22] (04/18/19 08:48:00)          BUN/Creatinine Ratio TR: 22 (06/14/19 09:51:00)          BUN/Creatinine Ratio TR: 23 (05/17/19 12:02:00)          eGFR: 39 mL/min/1.73m2 Low (06/18/19 12:31:00)          eGFR: 25 mL/min/1.73m2 Low (04/18/19 08:48:00)          eGFR: 26 mL/min/1.73m2 Low (04/02/19 09:01:00)          eGFR TR: 40 mL/min (06/14/19 09:51:00)          eGFR TR: 26 mL/min (05/17/19 12:02:00)          eGFR African American: 45 mL/min/1.73m2 Low (06/18/19 12:31:00)          eGFR : 30 Low (05/08/19 08:48:00)          eGFR : 31 Low (04/30/19 14:45:00)          eGFR African American: 29 mL/min/1.73m2 Low (04/18/19 08:48:00)          eGFR Non-: 25 Low (05/08/19 08:48:00)          eGFR Non-: 25 Low (04/30/19 14:45:00)          Calcium Level: 8.5 mg/dL Low [8.6 mg/dL - 10.3 mg/dL] (06/18/19 12:31:00)          Calcium Level: 9.5 [8.5 mg/dL - 10.5 mg/dL] (05/08/19 08:48:00)          Calcium Level: 9.7 [8.5 mg/dL - 10.5 mg/dL] (04/30/19 14:45:00)          Calcium Level: 9.4 mg/dL [8.6 mg/dL - 10.3 mg/dL] (04/18/19 08:48:00)          Calcium TR: 8.7 mEq/dL (06/14/19 09:51:00)          Calcium TR: 8.9 mEq/dL (05/17/19 12:02:00)          Bilirubin Total TR: 0.9 mg/dL (06/14/19 09:51:00)          Alkaline Phosphatase TR: 294 unit/L (06/14/19 09:51:00)          AST TR: 17 unit/L (06/14/19 09:51:00)          ALT TR: 11 unit/L (06/14/19 09:51:00)          Protein Total TR: 6.3 gm/dL (06/14/19 09:51:00)          Albumin Level TR: 2.9 g/dL (06/14/19 09:51:00)          Globulin TR: 3.4 g/dL (06/14/19 09:51:00)          A/G Ratio TR: 0.9  (06/14/19 09:51:00)          Cholesterol: 82 mg/dL (04/02/19 09:01:00)          Non-HDL Cholesterol: 49 (04/02/19 09:01:00)          HDL: 33 mg/dL Low (04/02/19 09:01:00)          Cholesterol/HDL Ratio: 2.5 (04/02/19 09:01:00)          LDL: 35 (04/02/19 09:01:00)          Triglyceride: 64 mg/dL (04/02/19 09:01:00)          Iron TR: 15 nmol/L (06/14/19 09:51:00)          Iron TR: 22 nmol/L (05/30/19 12:10:00)          Ferritin TR: 919.4 ng/mL (06/14/19 09:51:00)          WBC: 5.3 [3.8  - 10.8] (06/18/19 12:31:00)          WBC: 6.5 [4.5  - 11] (05/08/19 08:48:00)          WBC TR: 5.3 x10^3/uL (06/14/19 09:42:00)          WBC TR: 6.6 x10^3/uL (05/30/19 12:10:00)          WBC TR: 5.6 x10^3/uL (05/17/19 12:02:00)          RBC: 2.85 Low [4.2  - 5.8] (06/18/19 12:31:00)          RBC: 2.95 Low [4.3  - 5.9] (05/08/19 08:48:00)          RBC TR: 2.86 x10^6/uL (06/14/19 09:42:00)          RBC TR: 2.46 x10^6/uL (05/30/19 12:10:00)          RBC TR: 2.67 x10^6/uL (05/17/19 12:02:00)          Hgb: 8.2 gm/dL Low [13.2 gm/dL - 17.1 gm/dL] (06/18/19 12:31:00)          Hgb: 8.6 Low [13.5 g/dL - 17.5 g/dL] (05/08/19 08:48:00)          Hgb: 7.7 Low [13.5 g/dL - 17.5 g/dL] (04/30/19 14:45:00)          Hgb: 8.6 gm/dL Low [13.2 gm/dL - 17.1 gm/dL] (04/18/19 08:48:00)          Hgb TR: 8.1 g/dL (06/14/19 09:42:00)          Hgb TR: 7.2 g/dL (05/30/19 12:10:00)          Hgb TR: 7.7 g/dL (05/17/19 12:02:00)          Hgb TR: 8.4 g/dL (05/02/19 05:32:00)          Hct: 26.1 % Low [38.5 % - 50 %] (06/18/19 12:31:00)          Hct: 28.0 Low [37 % - 53 %] (05/08/19 08:48:00)          Hct TR: 26.5 % (06/14/19 09:42:00)          Hct TR: 23.3 % (05/30/19 12:10:00)          Hct TR: 25 % (05/17/19 12:02:00)          MCV: 91.6 fL [80 fL - 100 fL] (06/18/19 12:31:00)          MCV: 95 [80 fL - 100 fL] (05/08/19 08:48:00)          MCV: 94 [80 fL - 100 fL] (04/30/19 14:45:00)          MCV TR: 93 fL (06/14/19 09:42:00)          MCV TR: 95 fL (05/30/19 12:10:00)           MCV TR: 94 fL (05/17/19 12:02:00)          MCH: 28.8 pg [27 pg - 33 pg] (06/18/19 12:31:00)          MCH: 29.2 [26 pg - 34 pg] (05/08/19 08:48:00)          MCH TR: 28.3 pg (06/14/19 09:42:00)          MCH TR: 29.3 pg (05/30/19 12:10:00)          MCH TR: 28.8 pg (05/17/19 12:02:00)          MCHC: 31.4 gm/dL Low [32 gm/dL - 36 gm/dL] (06/18/19 12:31:00)          MCHC: 30.7 Low [32 g/dL - 36 g/dL] (05/08/19 08:48:00)          MCHC TR: 30.6 gm/dL (06/14/19 09:42:00)          MCHC TR: 30.9 gm/dL (05/30/19 12:10:00)          MCHC TR: 30.8 gm/dL (05/17/19 12:02:00)          RDW: 15.6 % High [11 % - 15 %] (06/18/19 12:31:00)          RDW: 17.4 High [11.5 % - 15.5 %] (05/08/19 08:48:00)          RDW TR: 17.6 % (06/14/19 09:42:00)          RDW TR: 18 % (05/30/19 12:10:00)          RDW TR: 17 % (05/17/19 12:02:00)          Platelet: 231 [140  - 400] (06/18/19 12:31:00)          Platelet: 143 [140  - 440] (05/08/19 08:48:00)          Platelet TR: 203 x10^3/uL (06/14/19 09:42:00)          Platelet TR: 211 x10^3/uL (05/30/19 12:10:00)          Platelet TR: 160 x10^3/uL (05/17/19 12:02:00)          MPV: 9.9 fL [7.5 fL - 12.5 fL] (06/18/19 12:31:00)          MPV: 11.0 [6.5 fL - 11 fL] (05/08/19 08:48:00)          MPV (Mean Platelet Volume) TR: 8.9 fL (06/14/19 09:42:00)          MPV (Mean Platelet Volume) TR: 9.4 fL (05/30/19 12:10:00)          MPV (Mean Platelet Volume) TR: 10.7 fL (05/17/19 12:02:00)          Neutrophils Abs# TR: 75.9 % (05/30/19 12:10:00)          Neutrophils Abs# TR: 76.4 % (05/17/19 12:02:00)          Lymphocytes TR: 11 % (06/14/19 09:42:00)          Lymphocytes TR: 8.3 % (05/30/19 12:10:00)          Lymphocytes TR: 9.6 % (05/17/19 12:02:00)          Absolute Lymphocytes TR: 0.6 cells/uL (06/14/19 09:42:00)          Absolute Lymphocytes TR: 0.6 cells/uL (05/30/19 12:10:00)          Absolute Lymphocytes TR: 0.5 cells/uL (05/17/19 12:02:00)          Neutrophils TR: 75.3 % (06/14/19 09:42:00)           Absolute Neutrophils TR: 4 cells/uL (06/14/19 09:42:00)          Absolute Neutrophils TR: 5 cells/uL (05/30/19 12:10:00)          Absolute Neutrophils TR: 4.3 cells/uL (05/17/19 12:02:00)          Monocytes TR: 9 % (06/14/19 09:42:00)          Monocytes TR: 9 % (05/30/19 12:10:00)          Monocytes TR: 10.4 % (05/17/19 12:02:00)          Absolute Monocytes TR: 0.5 cells/uL (06/14/19 09:42:00)          Absolute Monocytes TR: 0.6 cells/uL (05/30/19 12:10:00)          Absolute Monocytes TR: 0.6 cells/uL (05/17/19 12:02:00)          Eosinophils TR: 4.3 % (06/14/19 09:42:00)          Eosinophils TR: 6.5 % (05/30/19 12:10:00)          Eosinophils TR: 3.2 % (05/17/19 12:02:00)          Absolute Eosinophils TR: 0.2 cells/uL (06/14/19 09:42:00)          Absolute Eosinophils TR: 0.4 cells/uL (05/30/19 12:10:00)          Absolute Eosinophils TR: 0.2 cells/uL (05/17/19 12:02:00)          Basophils TR: 0.4 % (06/14/19 09:42:00)          Basophils TR: 0.3 % (05/30/19 12:10:00)          Basophils TR: 0.4 % (05/17/19 12:02:00)          Absolute Basophils TR: 0 cells/uL (06/14/19 09:42:00)          Absolute Basophils TR: 0 cells/uL (05/30/19 12:10:00)          Absolute Basophils TR: 0 cells/uL (05/17/19 12:02:00)          Protime: 21.0 High [11.9  - 13.9] (05/08/19 08:48:00)          Protime: 24.1 High [11.9  - 13.9] (04/30/19 14:45:00)          PT: 10.8 [9  - 11.5] (06/18/19 12:31:00)          PT: 17.5 High [9  - 11.5] (04/02/19 09:01:00)          PT TR: 15.8 (06/13/19 10:07:00)          PT TR: 22 (05/17/19 12:02:00)          INR: 1.1 (06/18/19 12:31:00)          INR: 4.5 (06/07/19 14:33:00)          INR: 2 (05/17/19 17:18:00)          INR: 1.9 (05/08/19 12:23:00)          INR TR: 1.3 (06/13/19 10:07:00)          INR TR: 2 (05/17/19 12:02:00)          INR TR: 1.9 (05/02/19 05:32:00)          Digoxin Level: 1.2 mcg/L [0.8 mcg/L - 2 mcg/L] (04/18/19 08:48:00)  Diagnostic Results   Lab today: Electrolytes normal, creatinine stable at  1.73, hemoglobin stable at 7.9, white count and platelets normal.  Alk phos 317 albumin 2.9 function tests otherwise normal.

## 2022-02-16 NOTE — NURSING NOTE
Patient presents for placement of indwelling moralez catheter per [facility]. Indwelling moralez cath, [place] 16 with 10 cc balloon is placed using sterile procedure. Glans penis is triple cleansed with Betadine and sterile catheter is passed into the bladder with minimal difficulty.  800cc dark yellow urine is received via patent catheter.  Patient will observe for S& Sx of UTI these reviewed and drink ample fluids. Be seen if any concerns. All aspects of  catheter home management is discussed with patient and wife. They state understanding of this and when to be seen. They will follow up with Urology as directed. Over 30 minutes was spent with patient for procedure and education.

## 2022-02-16 NOTE — CARE COORDINATION
Patient:   JACINTO JIN            MRN: [id number]            FIN: [id number]               Age:   83 years     Sex:  Male     :  1935   Associated Diagnoses:   None   Author:   Zee Quezada CMA      Care Coordination Hospital Discharge Note    Sources of Information:  [  X ] Patient, family member, or caregiver (Please list):  Spoke with Jacinto at 1150am on 19.  He verbalized understanding of his discharge instructions, f/u appointments and medications. He had no questions or concerns.  [ X  ] Hospital discharge summary  [   ] Hospital fax  [   ] List of recent hospitalizations or ED visits  [   ] Other:   Last Attending: Marshall Hirsch MD    Discharged From: [facility]   Admission Date: 19  Discharge Date: 19  Discharge Plan: [facility] to home  Diagnosis/Problem: Subacute GI bleeding    Medication Changes: [ X  ] Yes [   ] No   Medication List Updated: [X   ] Yes [   ] No Hospital Med List at Discharge Reconciled with Current Med List   Medications          *denotes recorded medication          284115 URINARY DRAIN BAG 2000ML MG BEAD: See Instructions, USE AS DIRECTED, 1 unknown unit.          *acetaminophen 325 mg oral tablet: 650 mg, 2 tab(s), po, q4 hrs, not to exceed 4000 mg/day, 0 Refill(s).          allopurinol 300 mg oral tablet: See Instructions, TAKE ONE TABLET BY MOUTH ONCE DAILY, 90 unknown unit, 3 Refill(s).          atorvastatin 10 mg oral tablet: 10 mg, 1 tab(s), Oral, hs, 30 tab(s), 0 Refill(s).          clotrimazole 1% topical cream: 1 nazario, TOP, BID, for 7 day(s), 30 gm, 0 Refill(s).          *Vitamin B12 1000 mcg/mL injectable solution: 1,000 mcg, im, qmonth, 0 Refill(s).          *digoxin 125 mcg (0.125 mg) oral tablet: 125 mcg, 1 tab(s), po, daily, 0 Refill(s).          *ferrous sulfate: Oral, 0 Refill(s).          *furosemide 80 mg oral tablet: 40 mg, 0.5 tab(s), po, bid, 0 Refill(s).          Metoprolol Succinate ER 25 mg oral tablet, extended release: See Instructions, TAKE  ONE-HALF TABLET BY MOUTH TWICE DAILY, 30 unknown unit, 11 Refill(s).          MetroGel 1% topical gel: 1 nazario, Topical, daily, 1 tubes, 5 Refill(s).          mirtazapine 15 mg oral tablet: 1 tab(s), Oral, qhs, 30 unknown unit, 11 Refill(s).          *Protegra oral tablet: 1 tab(s), po, daily.          nitroglycerin 0.4 mg sublingual tablet: 0.4 mg, 1 tab(s), SL, q5min, not to exceed 3 doses/15 min--if pain persists, seek medical attention, 25 tab(s), PRN: for chest pain.          omeprazole 20 mg oral delayed release capsule: 20 mg, 1 cap(s), po, daily, 90 cap(s), 3 Refill(s).          potassium chloride 20 mEq oral tablet, extended release: See Instructions, 3 tab BID, 30 unknown unit, 5 Refill(s).          predniSONE 20 mg oral tablet: See Instructions, 2 po daily for 3-5 days prn gout, 30 EA, 0 Refill(s).          *Senokot 8.6 mg oral tablet: 1-2 tab(s), PO, Once a day (at bedtime), PRN: for constipation.          *spironolactone 50 mg oral tablet: 50 mg, 1 tab(s), Oral, bid, increased to 50 mg BID per Dr Quintana 3/21/19, 0 Refill(s).          tamsulosin 0.4 mg oral capsule: 0.4 mg, 1 cap(s), Oral, daily, 30 cap(s), 0 Refill(s).          warfarin 3 mg oral tablet: 1 tab(s), Oral, daily, or use as directed, 90 tab(s), 3 Refill(s).      Needs Referral or Lab: [  X ] Yes [   ] No: INR and Hemoglobin for 05/3/19  Outpatient Provider Recommendations:  Recheck hemoglobin and INR in office tomorrow, Considering increasing Aranesp through hematology, Consider repeat endoscopy based on clinical course.  Restarted warfarin at previous dosing  Needs Follow-up Appointment:  [ X  ] Within 1-5 days of discharge (highly complex visit)  [   ] Within 14 days of discharge (moderately complex visit)    Appointment Made With:  RIAN  Date: 5/03/19    Additional Information Needed and Requested:  [   ] Yes:  [ X  ] No

## 2022-02-16 NOTE — RESULTS
Anticoagulation Therapy Entered On:  4/15/2019 10:12 AM CDT    Performed On:  4/15/2019 10:10 AM CDT by Mackenzie Quintana RN               Warfarin Management   Anticoagulation Recheck :   2 weeks   Warfarin Special Instructions :   INR = 1.7 per POC Patient is to take 1.5mg warfarin on Mon, Wed, and Fri and 3mg the rest of the days of the week recheck INR in2 weeks per [facility] Patient wife advised in call.   Mackenzie Quintana RN - 4/15/2019 11:51 AM CDT   Week 1 Sunday Dose :   1.5    Week 1 Monday Dose :   3    Week 1 Tuesday Dose :   1.5    Week 1 Wednesday Dose :   3    Week 1 Thursday Dose :   1.5    Week 1 Friday Dose :   3    Week 1 Saturday Dose :   1.5    Week 1 Dose Total :   15    Week 2 Sunday Dose :   1.5    Week 2 Monday Dose :   3    Week 2 Tuesday Dose :   1.5    Week 2 Wednesday Dose :   1.5    Week 2 Thursday Dose :   3    Week 2 Friday Dose :   1.5    Week 2 Saturday Dose :   1.5    Week 2 Dose Total :   13.5    Anticoagulation Goal :    Lab Test performed by:  [facility] Office  [address] [place] [id number]   Phone # [phone]  Fax # [phone]  Lab Draw   Planned Duration :   Indefinite   Indication :   Atrial fibrillation   Warfarin Management Comments :   [facility] Office  [address] [place] [address], [id number]  [phone]  Capillary Specimen     International Normalization Ratio :   1.7    INR Range :   2.0 - 3.0   Mackenzie Quintana RN - 4/15/2019 10:11 AM CDT   INR Therapeutic Range :   No   Mackenzie Quintana RN - 4/15/2019 10:13 AM CDT     Warfarin Abnormal Findings :   none   Warfarin Physician :   Agustina MORELOS, Mackenzie Smith RN - 4/15/2019 10:11 AM CDT

## 2022-02-16 NOTE — NURSING NOTE
Anticoagulation Therapy Entered On:  4/3/2019 2:55 PM CDT    Performed On:  4/3/2019 2:42 PM CDT by Mackenzie Quintana RN               Warfarin Management   Week 1 Sunday Dose :   1.5    Week 1 Monday Dose :   3    Week 1 Tuesday Dose :   1.5    Week 1 Wednesday Dose :   3    Week 1 Thursday Dose :   1.5    Week 1 Friday Dose :   3    Week 1 Saturday Dose :   1.5    Week 1 Dose Total :   15    Planned Duration :   Indefinite   International Normalization Ratio :   1.8    INR Range :   2.0 - 3.0   INR Therapeutic Range :   No   Mackenzie Quintana RN - 4/3/2019 2:42 PM CDT   Warfarin Management and Results Grid   Signs of Thrombolic :   No   Signs of Warfarin Intolerance :   No   Mackenzie Qunitana RN - 4/3/2019 2:42 PM CDT   Anticoagulation Recheck :   4/15/19   Warfarin Physician :   Shravan Berrios MD Special Instructions :   INR= 1.8 per [facility] on 4/2/19 Patient is to take 3mg warfarin on Mon, Wed, and Fri and 1.5mg the rest of the days of the week Recheck INR on 4/15/19 per [facility] Patient advised via call to home.   Mackenzie Quintana RN - 4/3/2019 2:42 PM CDT

## 2022-02-16 NOTE — TELEPHONE ENCOUNTER
JACINTO JIN : 1935 MRN: [id number]  PHONE NOTE:  Laboratories return.  Creatinine is stable at 1.63.  Electrolytes are otherwise unremarkable.  Hemoglobin is down about half a gram at 7.7.  Platelets and white count are normal.  I reviewed these findings with him.    P: He is having no melena or rectal bleeding.  He is on Aranesp and intravenous iron.  We will repeat hemoglobin at his follow up next week.  He will call or return if he has bleeding symptoms.    D:  2019  T:  2019                                                Shravan Berrios MD, FACP/sq

## 2022-02-16 NOTE — PROGRESS NOTES
Chief Complaint    follow up - still sore across abdomen  History of Present Illness      Patient's weight is down 10 pounds.  Still has edema and a sense of abdominal bloating which she often has with his right heart failure.  No fever or dyspnea.  Urinary frequency and occasional sense of not getting his bladder completely empty.  No leg weakness or paresthesias.  No incontinence.  No dysuria or hematuria.  No PND orthopnea.  Feels like his depression is improved.  He sleeping well.  Review of Systems      No fevers, chills, headache.  Physical Exam   Vitals & Measurements    HR: 85(Peripheral)  BP: 115/73       Patient appears comfortable.  Alert and oriented.  Chest reveals diminished breath sounds at the left base which are chronic.  Cardiac exam is regular.  Abdomen obese nontender.  Extremities have 1+ edema.  Assessment/Plan       Anticoagulated(Z79.01)        INR today.         Orders:          Prothrombin time-INR* ([facility]), Specimen Type: Blood, Collection Date: 05/31/18 14:01:00 CDT       Depression(F32.9)        Improved.         Orders:          Return to Clinic (Request), RFV: CHF, Return in 2 months       Right heart failure(I50.9)        BMP today.  Cardiology follow-up in 4 days as scheduled.         Orders:          Basic Metabolic Panel* ([facility]), Specimen Type: Serum, Collection Date: 05/31/18 13:57:00 CDT          Return to Clinic (Request), RFV: CHF, Return in 2 months       Urinary frequency(R35.0)        Patient is scheduled to see urology in 2 weeks.  We will get a renal ultrasound to rule out urinary retention.         Orders:          US Renal (Request), Urinary frequency       Orders:         penicillin V potassium, 1 tab(s) ( 500 mg ), po, qid, # 56 tab(s), 0 Refill(s), Type: Maintenance, Pharmacy: [facility], 1 tab(s) po qid,x14 day(s), (Completed)  Patient Information     Name:JACINTO JIN EDILBERTO      Address:      [address] [place] [address]      Sex:Male      YOB: 1935       [phone]      Emergency Contact:GERA JIN     MRN:[id number]     FIN:[id number]     Location:[facility]     Date of Service:05/31/2018      Primary Care Physician:       Shravan Berrios MD, [phone]      Attending Physician:       Shravan Berrios MD, [phone]  Problem List/Past Medical History    Ongoing     Acute kidney injury (nontraumatic)     AF (Atrial Fibrillation)     Anemia of renal disease     Anticardiolipin syndrome     Anticoagulated     Ascites     B12 deficiency     Benign hypertensive CKD     Benign prostatic hyperplasia (BPH) with urinary urgency     CAD (coronary artery disease)     Cardiorenal syndrome     CHB (complete heart block)     CHF (Congestive Heart Failure)     Depression     Diverticulosis     Dupuytren's contracture of right hand     Gout     H/O acute pancreatitis     High cholesterol     History of acute bacterial endocarditis     History of coronary artery bypass graft     History of tricuspid valve replacement     Hx of colonic polyp     Hypertensive HF (heart failure)     IBS (irritable bowel syndrome)     ICD (implantable cardioverter-defibrillator) infection     Iron deficiency anemia     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     Nonischemic dilated cardiomyopathy     Obesity     KEYONA (obstructive sleep apnea)       Comments: Adequate titration to BIPAP 16/11 on 10/15/2013     Osteoarthritis of both knees     Paralysis, diaphragm       Comments: Left     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     PUD (peptic ulcer disease)     Right heart failure     Stage 3 chronic kidney disease     Tricuspid valve insufficiency     Vitamin B 12 deficiency    Historical     BE (bacterial endocarditis)     Colon polyps     History of sepsis     Inpatient stay       Comments: @[facility] - Diverticulitis     Inpatient stay       Comments: @[facility] - Severe symptomatic tricuspid valvular  insufficiency. Chronic right heart failure.     Inpatient stay       Comments: @[facility] - Anemia     Inpatient stay       Comments: @[facility] - Jaw pain, possible anginal equivalent     Inpatient stay       Comments: @[facility] - Infected pacemaker     Inpatient stay       Comments: @[facility] - Acute on chronic right heart failure     Inpatient stay       Comments: @[facility] - S/P tricuspid valve replacement with worsening right ventricular function     Other and Unspecified Noninfectious Gastroenteritis and Colitis  Procedure/Surgical History     Bone marrow biopsy (07/12/2017)     Colonoscopy (05/27/2017)     Esophagogastroduodenoscopy (05/27/2017)     TVR - Tricuspid valve replacement (11/29/2016)     Cardiac angiogram (11/01/2016)     Limited thoracotomy (08/23/2016)     Implantation of intravenous single chamber cardiac pacemaker system (08/04/2016)     Removal of automatic cardiac defibrillator (08/04/2016)     Pacemaker change (04/08/2015)     Cardiac angiogram (08/02/2013)     angiogrqam (02/03/2012)     Colonoscopy (12/15/2006)     Colonoscopy (06/22/2001)     Cholecystectomy (06/2001)     CABG - Coronary artery bypass graft (2000)     History of Back Surgery (2000)     Colonoscopy (02/17/1993)     Left shoulder surgery (1991)     Ablation for atrial fibrillation     Cholecystectomy     Colonoscopy     Esophagogastroduodenoscopy  Medications        nitroglycerin 0.4 mg sublingual tablet: 0.4 mg, 1 tab(s), SL, q5min, not to exceed 3 doses/15 min--if pain persists, seek medical attention, 25 tab(s), PRN: for chest pain.        Protegra oral tablet: 1 tab(s), po, daily.        potassium chloride 20 mEq oral tablet, extended release: See Instructions, 1 tab TID, 30 unknown unit, 5 Refill(s).        Senexon-S: 2 tab(s), po, hs, 0 Refill(s).        acetaminophen 325 mg oral tablet: 650 mg, 2 tab(s), po, q4 hrs, not to exceed 4000 mg/day, 0 Refill(s).        Senokot 8.6 mg oral tablet: 1-2 tab(s), PO, Once a day (at bedtime),  PRN: for constipation.        omeprazole 20 mg oral delayed release capsule: 40 mg, 2 cap(s), po, bid, 90 cap(s), 3 Refill(s).        furosemide 80 mg oral tablet: 80 mg, 1 tab(s), po, bid, Per Dr. Quintana on 3/7/2017, 0 Refill(s).        digoxin 125 mcg (0.125 mg) oral tablet: 125 mcg, 1 tab(s), po, daily, 0 Refill(s).        metOLazone 2.5 mg oral tablet: See Instructions, 1 tab(s) po daily for weight > 222#, 0 Refill(s).        Vitamin B12 1000 mcg/mL injectable solution: 1,000 mcg, im, qmonth, 0 Refill(s).        Metoprolol Succinate ER 25 mg oral tablet, extended release: See Instructions, TAKE ONE-HALF TABLET BY MOUTH TWICE DAILY, 30 unknown unit, 11 Refill(s).        atorvastatin 40 mg oral tablet: See Instructions, TAKE ONE TABLET BY MOUTH AT BEDTIME, 90 unknown unit.        predniSONE 20 mg oral tablet: See Instructions, 2 po daily for 3-5 days prn gout, 30 EA, 0 Refill(s).        mirtazapine 15 mg oral tablet: 15 mg, 1 tab(s), PO, Once a day (at bedtime), 30 tab(s), 5 Refill(s).        allopurinol 300 mg oral tablet: See Instructions, TAKE ONE TABLET BY MOUTH ONCE DAILY, 90 unknown unit, 3 Refill(s).        warfarin 3 mg oral tablet: See Instructions, TAKE 1 & 1/2 TABLETS BY MOUTH ONCE DAILY SUN,TUES,THURS,SAT TAKE ONE TABLET MON,WED,FRI, 40 unknown unit.                Allergies    No Known Medication Allergies    adhesive tape  Social History    Smoking Status - 05/31/2018     Never smoker     Alcohol      Past, 03/30/2018     Employment and Education      Employed, Work/School description: [employer] 11/11/2010     Exercise and Physical Activity      Exercise type: Walking., 11/11/2010  Family History    Aneurysm: Father.    Heart disease: Mother.  Immunizations      Vaccine Date Status      influenza virus vaccine, inactivated 09/28/2015 Given      pneumococcal (PCV13) 04/21/2015 Given      influenza virus vaccine, inactivated 08/28/2014 Given      influenza virus vaccine, inactivated 10/29/2012 Given       influenza virus vaccine, inactivated 09/23/2011 Given      influenza virus vaccine, inactivated 10/26/2010 Given      influenza 10/23/2008 Recorded      Td 09/01/2005 Recorded      pneumococcal 11/28/2001 Recorded  Lab Results          Lab Results (Last 4 results within 90 days)           Sodium Level: 140 mmol/L [135 mmol/L - 146 mmol/L] (03/27/18 12:00:00)          Sodium Level TR: 141 mEq/L [11.7  - 14.1] (04/20/18 11:18:00)          Potassium Level: 3.9 mmol/L [3.5 mmol/L - 5.3 mmol/L] (03/27/18 12:00:00)          Potassium Level TR: 3.5 mEq/L [11.7  - 14.1] (04/20/18 11:18:00)          Chloride Level: 99 mmol/L [98 mmol/L - 110 mmol/L] (03/27/18 12:00:00)          Chloride TR: 100 mEq/L [11.7  - 14.1] (04/20/18 11:18:00)          CO2 Level: 36 mmol/L High [20 mmol/L - 31 mmol/L] (03/27/18 12:00:00)          CO2 TR: 28 mEq/L [11.7  - 14.1] (04/20/18 11:18:00)          Anion Gap TR: 13 mEq/L [20 mmol/L - 31 mmol/L] (04/20/18 11:18:00)          Glucose Level: 98 mg/dL [65 mg/dL - 99 mg/dL] (03/27/18 12:00:00)          Glucose Level TR: 118 mg/dL [11.7  - 14.1] (04/20/18 11:18:00)          BUN: 30 mg/dL High [7 mg/dL - 25 mg/dL] (03/27/18 12:00:00)          BUN TR: 40 mg/dL [11.7  - 14.1] (04/20/18 11:18:00)          Creatinine Level: 1.56 mg/dL High [0.7 mg/dL - 1.11 mg/dL] (03/27/18 12:00:00)          Creatinine TR: 1.83 mg/dL [11.7  - 14.1] (04/20/18 11:18:00)          BUN/Creat Ratio: 19 [6  - 22] (03/27/18 12:00:00)          BUN/Creatinine Ratio TR: 22 [20 mmol/L - 31 mmol/L] (04/20/18 11:18:00)          eGFR: 41 mL/min/1.73m2 Low [8.6 mg/dL - 10.3 mg/dL] (03/27/18 12:00:00)          GFR Calculated TR: 43 mL/min [11.7  - 14.1] (04/20/18 11:18:00)          eGFR African American: 48 mL/min/1.73m2 Low [None  - Few] (03/27/18 12:00:00)          Calcium Level: 9.6 mg/dL [8.6 mg/dL - 10.3 mg/dL] (03/27/18 12:00:00)          Calcium TR: 9.8 mEq/dL [11.7  - 14.1] (04/20/18 11:18:00)          Bilirubin Total TR: 1.5  mg/dL [11.7  - 14.1] (04/20/18 11:18:00)          Alkaline Phosphatase TR: 358 unit/L [11.7  - 14.1] (04/20/18 11:18:00)          AST TR: 27 unit/L [11.7  - 14.1] (04/20/18 11:18:00)          ALT TR: 28 unit/L [11.7  - 14.1] (04/20/18 11:18:00)          Protein Total TR: 7.7 gm/dL [11.7  - 14.1] (04/20/18 11:18:00)          Albumin Level TR: 4.3 g/dL [11.7  - 14.1] (04/20/18 11:18:00)          Globulin TR: 3.4 g/dL [11.7  - 14.1] (04/20/18 11:18:00)          A/G Ratio TR: 1.3 [11.7  - 14.1] (04/20/18 11:18:00)          Uric Acid (umol/L) TR: 7.1 umol/L [11.7  - 14.1] (04/20/18 11:18:00)          Cholesterol: 89 mg/dL [8.6 mg/dL - 10.3 mg/dL] (03/13/18 08:45:00)          Non-HDL Cholesterol: 59 [20 mmol/L - 31 mmol/L] (03/13/18 08:45:00)          HDL: 30 mg/dL Low [38.5 % - 50 %] (03/13/18 08:45:00)          Cholesterol/HDL Ratio: 3 [None  - 5] (03/13/18 08:45:00)          LDL: 42 [None  - 5] (03/13/18 08:45:00)          Triglyceride: 85 mg/dL [8.6 mg/dL - 10.3 mg/dL] (03/13/18 08:45:00)          Iron TR: 55 nmol/L [11.7  - 14.1] (04/20/18 11:18:00)          TIBC TR: 297 mg/dL [11.7  - 14.1] (04/20/18 11:18:00)          Ferritin TR: 263 ng/mL [11.7  - 14.1] (04/20/18 11:18:00)          B-Natriuretic Peptide (BNP): 91 pg/mL [None  - Few] (03/27/18 12:00:00)          PSA: 0.5 ng/mL [3.5 mmol/L - 5.3 mmol/L] (05/01/18 10:42:00)          WBC: 5.6 [3.8  - 10.8] (04/24/18 10:56:00)          WBC: 4.5 [3.8  - 10.8] (03/27/18 12:00:00)          WBC TR: 5.2 x10^3/uL [11.7  - 14.1] (04/20/18 11:18:00)          RBC: 3.15 Low [4.2  - 5.8] (04/24/18 10:56:00)          RBC: 3.51 Low [4.2  - 5.8] (03/27/18 12:00:00)          RBC TR: 3.13 x10^6/uL [11.7  - 14.1] (04/20/18 11:18:00)          Hgb: 10.1 gm/dL Low [13.2 gm/dL - 17.1 gm/dL] (04/24/18 10:56:00)          Hgb: 11.4 gm/dL Low [13.2 gm/dL - 17.1 gm/dL] (03/27/18 12:00:00)          Hgb TR: 10.1 g/dL [11.7  - 14.1] (04/20/18 11:18:00)          Hct: 30.7 % Low [38.5 % - 50 %]  (04/24/18 10:56:00)          Hct: 33.4 % Low [38.5 % - 50 %] (03/27/18 12:00:00)          Hct TR: 31.7 % [11.7  - 14.1] (04/20/18 11:18:00)          MCV: 97.5 fL [80 fL - 100 fL] (04/24/18 10:56:00)          MCV: 95.2 fL [80 fL - 100 fL] (03/27/18 12:00:00)          MCV TR: 101 fL [11.7  - 14.1] (04/20/18 11:18:00)          MCH: 32.1 pg [27 pg - 33 pg] (04/24/18 10:56:00)          MCH: 32.5 pg [27 pg - 33 pg] (03/27/18 12:00:00)          MCH TR: 32.3 pg [11.7  - 14.1] (04/20/18 11:18:00)          MCHC: 32.9 gm/dL [32 gm/dL - 36 gm/dL] (04/24/18 10:56:00)          MCHC: 34.1 gm/dL [32 gm/dL - 36 gm/dL] (03/27/18 12:00:00)          MCHC TR: 31.9 gm/dL [11.7  - 14.1] (04/20/18 11:18:00)          RDW: 14.8 % [11 % - 15 %] (04/24/18 10:56:00)          RDW: 15.2 % High [11 % - 15 %] (03/27/18 12:00:00)          RDW TR: 16.2 % [11.7  - 14.1] (04/20/18 11:18:00)          Platelet: 112 Low [140  - 400] (04/24/18 10:56:00)          Platelet: 98 Low [140  - 400] (03/27/18 12:00:00)          Platelet TR: 117 x10^3/uL [11.7  - 14.1] (04/20/18 11:18:00)          MPV: 11.6 fL [7.5 fL - 12.5 fL] (04/24/18 10:56:00)          MPV: 11.3 fL [7.5 fL - 12.5 fL] (03/27/18 12:00:00)          MPV (Mean Platelet Volume) TR: 10.4 fL [20 mmol/L - 31 mmol/L] (04/20/18 11:18:00)          Lymphocytes TR: 17 % [20 mmol/L - 31 mmol/L] (04/20/18 11:18:00)          Absolute Lymphocytes TR: 0.9 cells/uL [20 mmol/L - 31 mmol/L] (04/20/18 11:18:00)          Neutrophils TR: 64.8 % [20 mmol/L - 31 mmol/L] (04/20/18 11:18:00)          Absolute Neutrophils TR: 3.4 cells/uL [20 mmol/L - 31 mmol/L] (04/20/18 11:18:00)          Monocytes TR: 13.2 % [20 mmol/L - 31 mmol/L] (04/20/18 11:18:00)          Absolute Monocytes TR: 0.7 cells/uL [20 mmol/L - 31 mmol/L] (04/20/18 11:18:00)          Eosinophils TR: 4.4 % [20 mmol/L - 31 mmol/L] (04/20/18 11:18:00)          Absolute Eosinophils TR: 0.2 cells/uL [20 mmol/L - 31 mmol/L] (04/20/18 11:18:00)          Basophils  TR: 0.6 % [20 mmol/L - 31 mmol/L] (04/20/18 11:18:00)          Absolute Basophils TR: 0 cells/uL [20 mmol/L - 31 mmol/L] (04/20/18 11:18:00)          Protime: 22.6 High [11.7  - 14.1] (04/24/18 11:05:00)          Protime: 30.2 High [11.7  - 14.1] (03/13/18 08:51:00)          INR: 2.0 High [None  - 5] (04/24/18 11:05:00)          INR: 2.8 [None  - 5] (03/27/18 10:51:00)          INR: 3.0 High [None  - 5] (03/13/18 08:51:00)          UA pH: 7 [5  - 8] (05/04/18 14:32:00)          UA pH: 6.5 [5  - 8] (05/01/18 10:44:00)          UA Specific Gravity: 1.015 [1.001  - 1.035] (05/04/18 14:32:00)          UA Specific Gravity: 1.015 [1.001  - 1.035] (05/01/18 10:44:00)          UA Glucose: NEGATIVE [None  - Few] (05/04/18 14:32:00)          UA Glucose: NEGATIVE [None  - Few] (05/01/18 10:44:00)          UA Bilirubin: NEGATIVE [None  - Few] (05/04/18 14:32:00)          UA Bilirubin: NEGATIVE [None  - Few] (05/01/18 10:44:00)          UA Ketones: NEGATIVE [None  - Few] (05/04/18 14:32:00)          UA Ketones: NEGATIVE [None  - Few] (05/01/18 10:44:00)          Urine Occult Blood: 1+ Abnormal [None  - Few] (05/04/18 14:32:00)          Urine Occult Blood: TRACE Abnormal [None  - Few] (05/01/18 10:44:00)          UA Protein: TRACE Abnormal [None  - Few] (05/04/18 14:32:00)          UA Protein: NEGATIVE [None  - Few] (05/01/18 10:44:00)          UA Nitrite: NEGATIVE [None  - Few] (05/04/18 14:32:00)          UA Nitrite: NEGATIVE [None  - Few] (05/01/18 10:44:00)          UA Leukocyte Esterase: 2+ Abnormal [None  - Few] (05/04/18 14:32:00)          UA Leukocyte Esterase: 3+ Abnormal [None  - Few] (05/01/18 10:44:00)          UA WBC Clumps: Present [None  - Few] (05/04/18 14:51:00)          UA Epithelial Cells: None Seen [None  - Few] (05/04/18 14:51:00)          UA Epithelial Cells: Few [None  - Few] (05/01/18 11:06:00)          UA WBC: >100 [None  - 5] (05/04/18 14:51:00)          UA WBC: >100 [None  - 5] (05/01/18 11:06:00)           UA RBC: 6-10 [None  - 2] (05/04/18 14:51:00)          UA RBC: 11-25 [None  - 2] (05/01/18 11:06:00)          UA Bacteria: Many [None  - Few] (05/04/18 14:51:00)          UA Bacteria: Moderate [None  - Few] (05/01/18 11:06:00)          Culture Urine: See comment Abnormal [6 - 22] (05/04/18 14:37:00)          Culture Urine: See comment Abnormal [6 - 22] (05/01/18 10:46:00)

## 2022-02-16 NOTE — CARE COORDINATION
Pts wife Codie called and LM at 1127am. R/c at 11:50am    C/o severe HA's, bilateral temperal pain and dark complexion and loose stools. Vitals: /63, Pulse: 93, Temp: 89.6. Discussed low temp and she did take it sublingual x 1.     We discussed that he needs eval and decided to send to ED so he is able to be attented to immediately. Wife agreed and will wake him.    RBreslin CMA

## 2022-02-16 NOTE — TELEPHONE ENCOUNTER
---------------------  From: Flavia Metzger RN (eRx Pool ([id number]_[facility]))   To: Counts include 234 beds at the [facility] Message "[employer]" ([id number]_[place] [facility] [place]);     Sent: 7/11/2019 3:12:07 PM CDT  Subject: FW: Medication Management   Due Date/Time: 7/12/2019 1:19:00 PM CDT     Medication Refill Approval Required    PCP:   [facility]    Medication:   Famotidine 20 mg po bid    Last Filled:  not filled by [facility] previously      Return to Clinic order placed?  yes, due next week    Date of last office visit and reason:   7/8/19, pre op    Date of last labs pertaining to condition:  _    Note:  Please advise on refill request    Resource:   eRx pool            ** Patient matched by Flavia Metzger RN on 7/11/2019 3:08:17 PM CDT **      ------------------------------------------  From: [facility]  To: Shravan Berrios MD  Sent: July 11, 2019 1:19:56 PM CDT  Subject: Medication Management  Due: July 12, 2019 1:19:56 PM CDT    ** On Hold Pending Signature **  Drug: famotidine (famotidine 20 mg oral tablet)  TAKE 1 TABLET BY MOUTH TWICE DAILY  NEED NEW SCRIPT ON DISCHARGE ORDERS  Quantity: 2 unknown unit  Days Supply: 0         Refills: 0  Substitutions Allowed  Notes from Pharmacy:     Dispensed Drug: famotidine (famotidine 20 mg oral tablet)  TAKE 1 TABLET BY MOUTH TWICE DAILY  NEED NEW SCRIPT ON DISCHARGE ORDERS  Quantity: 2 unknown unit  Days Supply: 0         Refills: 0  Substitutions Allowed  Notes from Pharmacy:   ---------------------------------------------------------------  From: Osman/Myrna LONDON (Counts include 234 beds at the [facility] Message Pool ([id number]_[place] [facility] [place]))   To: Shravan Berrios MD;     Sent: 7/11/2019 3:13:10 PM CDT  Subject: FW: Medication Management   Due Date/Time: 7/12/2019 1:19:00 PM CDT---------------------  From: Shravan Berrios MD   To: [facility]    Sent: 7/11/2019 3:48:57 PM CDT  Subject: FW: Medication Management     ** Submitted: **  Order:famotidine (famotidine 20 mg oral tablet)  1 tab(s)  Oral  bid  Qty:  180 EA        Refills:  3           Substitutions Allowed     Route To Pharmacy - [facility]    Signed by Shravan Berrios MD  7/11/2019 3:48:00 PM    ** Documented **  Complete:famotidine (famotidine 20 mg oral tablet)   Signed by Shravan Berrios MD  7/11/2019 3:48:00 PM    ** Not Approved:  **  famotidine (FAMOTIDINE  20 MG  TAB TABLET)  TAKE 1 TABLET BY MOUTH TWICE DAILY  NEED NEW SCRIPT ON DISCHARGE ORDERS  Qty:  2 unknown unit        Days Supply:  0        Refills:  0          Substitutions Allowed     Route To Pharmacy - [facility]

## 2022-02-16 NOTE — CARE COORDINATION
Sources of Information:  [ ] Patient, family member, or caregiver (Please list):  [ ] Hospital discharge summary  [ ] Hospital fax  [ x] List of recent hospitalizations or ED visits  [ ] Other:     Discharged From: [facility] ED   Discharge Date: 4/24/17    Diagnosis/Problem: SOB    Medication Changes: [ x] Yes [ ] No   Medication List Updated: [ x] Yes [ ] No    Needs Referral or Lab: [ ] Yes [x ] No    Needs Follow-up Appointment:  [x ] Within 7 days of discharge (highly complex visit)  [ ] Within 14 days of discharge (moderately complex visit)    Appointment Made With: [facility]  Date: 4/28/17    Spoke to pt and pt's wife. Feeling better-has lost 1lbs already w/ increase in Metolazone. Mamta also wanted cardio to get in touch w/ him re: labs from 4/13. I have LM for [facility] nurse of this. Also transcribed recent labs from yesterday-confirmed f/u appt w/ [facility] on Friday. Advised to weigh daily and call if needed-they agreed.appt complete

## 2022-02-16 NOTE — PROGRESS NOTES
"Chief Complaint    Fluid in abdominal area, increasing the last two days. Concerns with unable to walk, weak.  History of Present Illness      Patient here for fluid build up the past few days. Paracentesis On 07/25/19 10L of fluids removed.      Weak the past two days. Has a boyle time moving legs. Chills, no fever.  Review of Systems      \"See HPI.  All other review of systems negative.\"  Physical Exam   Vitals & Measurements    T: 99.5   F (Tympanic)  HR: 64(Peripheral)  BP: 100/48                General:  Alert and oriented, No acute distress.            Eye:  Normal conjunctiva.            HENT:  Oral mucosa is moist.            Neck:  Supple.            Respiratory:  Respirations are non-labored.            Cardiovascular:  Normal rate, Regular rhythm, No edema.            Gastrointestinal:  Distended, tender, ascites present          Integumentary:  Warm, No rash.            Psychiatric:  Cooperative, Appropriate mood & affect, Normal judgment.             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      ISabrina LPN, acted solely as a scribe for, and in the presence of Dr. Wilber Hall who performed the services.  Patient Information     Name:JACINTO JIN      Address:      [address] [place] [id number]     Sex:Male     YOB: 1935     [phone]     Emergency Contact:GERA JIN     MRN:[id number]     FIN:[id number]     Location:[facility]     Date of Service:09/18/2019      Primary Care Physician:       Shravan Berrios MD, [phone]      Attending Physician:       Wliber Hall MD, [phone]  Problem List/Past Medical History    Ongoing     Acquired arteriovenous malformation of colon       Comments: Treated.     Acute kidney injury (nontraumatic)     AF (Atrial Fibrillation)     Anemia due to blood " loss, chronic     Anemia of renal disease     Anticardiolipin syndrome     Anticoagulated     Ascites       Comments: Negative cytology 12/6/18     B12 deficiency     BPH with urinary obstruction     CAD (coronary artery disease)     Cardiac cirrhosis     Cardiorenal syndrome     CHB (complete heart block)     Chronic diastolic CHF (congestive heart failure), NYHA class 3     Depression     Diverticulosis     Dupuytren's contracture of right hand     Gout     H/O acute pancreatitis     High cholesterol     History of acute bacterial endocarditis     History of coronary artery bypass graft     History of GI bleed       Comments: Again 04/30/2019     History of tricuspid valve replacement     Hx of colonic polyp     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     IBS (irritable bowel syndrome)     ICD (implantable cardioverter-defibrillator) infection     Inguinal hernia, right     Iron deficiency anemia     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     Nonischemic dilated cardiomyopathy     Obesity     KEYONA (obstructive sleep apnea)       Comments: Adequate titration to BIPAP 16/11 on 10/15/2013     Osteoarthritis of both knees     Paralysis, diaphragm       Comments: Left     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     PUD (peptic ulcer disease)     Right heart failure     Stage 3 chronic kidney disease     Tricuspid valve insufficiency     Vitamin B 12 deficiency    Historical     BE (bacterial endocarditis)     Colon polyps     History of sepsis     Inpatient stay       Comments: @[facility] - Jaw pain, possible anginal equivalent     Inpatient stay       Comments: @[facility] - Infected pacemaker     Inpatient stay       Comments: @[facility] - Diverticulitis     Inpatient stay       Comments: @[facility] - S/P tricuspid valve replacement with worsening right ventricular function     Inpatient stay       Comments: @[facility] - Severe symptomatic tricuspid  valvular insufficiency. Chronic right heart failure.     Inpatient stay       Comments: @[facility] - Acute on chronic right heart failure     Inpatient stay       Comments: @[facility] - Anemia     Inpatient stay       Comments: @[facility], [place] - GI bleeding, suspected upper source     Inpatient stay       Comments: @[facility], [place] - Acute GI bleeding     Other and Unspecified Noninfectious Gastroenteritis and Colitis  Procedure/Surgical History     Extracapsular cataract extraction and insertion of intraocular lens (07/11/2019)            Comments: Right..     Esophagogastroduodenoscopy (06/08/2019)            Comments: Indication: GI bleeding      Sedation: Fentanyl 50 mcg, Versed 2 mg      Findings: Multiple gastric polyps one of which was oozing. Esophagus and duodenum normal      Rec: D/C PPI, H2 blocker, hold Warfarin.     Left Extracapsular Cataract extraction with intraocular lens implant (05/28/2019)            Comments: Left.. Dr. Cornelius.     Esophagogastroduodenoscopy (02/12/2019)           Colonoscopy (12/07/2018)            Comments: Indication: GIB      Sedation: fentanyl 50 mcg, Versed 1 mg      Findings: Diverticulosis, 2 small AVMs with bleeding in ascending treated with APC      Rec: Consider further workup based on tu to treatemetn.     Esophagogastroduodenoscopy and biopsy (12/07/2018)            Comments: Indication: GIB      Sedatoin: Fentanyl 50 mcg, Versed 2 mg      findings: Negative with negative duodenal biopsy      Rec: Colonoscopy.     Abdominal paracentesis (12/06/2018)            Comments: Negative cytology.     Bone marrow biopsy (07/12/2017)           Colonoscopy (05/27/2017)            Comments: Cecal tubular adenoma, pandiverticulosis. w/polypectomy.     Esophagogastroduodenoscopy (05/27/2017)            Comments: gastritis, fundic gland polyps.     TVR - Tricuspid valve replacement (11/29/2016)           Cardiac angiogram (11/01/2016)             Comments: Summary/Conclusions      PRESENTATION / INDICATIONS        Pre-surgical evaluation for cardiac surgery        Severe TR by echo      DIAGNOSTIC - CORONARY        Co-dominant coronary artery system        The left main artery was normal in appearance and free of obstructive disease.        Chronic total occlusion of the proximal LAD        The circumflex artery has minimal disease.        The RCA has moderate disease.        No change since 2013      DIAGNOSTIC - GRAFTS        Chronic total occlusion in the proximal anastomosis of the SVG graft to the RCA        The SVG to the ramus intermediate is patent        The sequential graft to the 1st diagonal is patent.        The sequential graft limb from the diagonal to the LAD is patent.        No change since 2013      HEMODYNAMICS        The LVEDP is mildly elevated        Severely elevated right atrial pressures        No evidence of intracardiac shunting.     Limited thoracotomy (08/23/2016)            Comments: Left mini thoracotomy and placement of two epicardial screw in leads.  Implant of left pectoral pacer generatoe..     Implantation of intravenous single chamber cardiac pacemaker system (08/04/2016)           Removal of automatic cardiac defibrillator (08/04/2016)            Comments: Generator and leads.     Pacemaker change (04/08/2015)           Cardiac angiogram (08/02/2013)            Comments: Summary/Conclusions      PRESENTATION / INDICATIONS      Chest pain      DIAGNOSTIC - CORONARY      Right dominant coronary artery system      DIAGNOSTIC SUMMARY      100% stenosis in the Proximal LAD      30% stenosis in the 1st Marginal      30% stenosis in the Proximal RCA      50% stenosis in the Mid RCA      100% stenosis in the Aorta graft to Distal RCA      DIAGNOSTIC - GRAFTS      The sequential graft to the diagonal branch is patent.      The sequential graft from the diagonal to the LAD is patent.      The SVG to the ramus intermediate is  "patent      The SVG to RCA is chronically occluded      HEMODYNAMICS      The LVEDP is mildly elevated      RECOMMENDATIONS & PLAN      Medical Rx- no significant change from previous angiogram, there is dramatic mismatch in size between the SVG's and the target arteries with slow filling of the LAD.     angiogrqam (02/03/2012)            Comments: Summary/Conclusions      PRESENTATION / INDICATIONS      Dyspnea and fatigue.      DIAGNOSTIC - CORONARY      Right dominant coronary artery system.      LMCA is normal.      LAD is occluded proximally after the 1st septal branch.      LCx has mild luminal irregularities.  OM1 has 20-30% proximal stenosis.      RCA has 30-40% diffuse disease in the mid segment and otherwise has mild diffuse luminal irregularities.      DIAGNOSTIC - GRAFTS      The sequential SVG to the diagonal and then LAD is widely patent.  There is a significant size mismatch between the graft and the native vessels.  The diagonal and distal LAD have no obvious lesions but are small caliber (< 2.0 mm vessels).  There is some backfilling into the mid LAD.      The SVG to the ramus is widely patent.  There is a significant size mismatch between the graft and the native vessel.  The ramus has no obvious lesion but is small caliber (< 2.0 mm vessel).        The SVG to the RCA is chronically occluded.      LEFT VENTRICULAR FUNCTION      Left ventricular function is mildly reduced with EF of 42% and mild global hypokinesis.  No significant MR.      HEMODYNAMICS      The LVEDP is 6 mmHg.  No significant gradient across the aortic valve.      RA 11, RV 29/10, PA 34/17 (mean 25), PCWP 18      Amarilys CO 5.1, TDCO 4.6      RECOMMENDATIONS & PLAN      Consider alternative etiology for symptoms.      Lasix dose decreased to 40 mg QD given relative hypotension and patient reporting feeling \"dry membranes.\".     Colonoscopy (12/15/2006)            Comments: Diverticulosis. No polyps..     Colonoscopy (06/22/2001)      "       Comments: hyperplastic polyp.     Cholecystectomy (06.2001)           CABG - Coronary artery bypass graft (2000)           History of Back Surgery (2000)           Colonoscopy (02/17/1993)            Comments: 1 adenoma, 1 hyperplastic, diverticulosis..     Left shoulder surgery (1991)           Ablation for atrial fibrillation            Comments: Subsequent pacemaker dependence..     Cholecystectomy           Colonoscopy           Esophagogastroduodenoscopy        Medications    030603 URINARY DRAIN BAG 2000ML MG BEAD, See Instructions    acetaminophen 325 mg oral tablet, 650 mg= 2 tab(s), Oral, q4 hrs    allopurinol 300 mg oral tablet, 300 mg= 1 tab(s), Oral, daily, 3 refills    atorvastatin 10 mg oral tablet, 10 mg= 1 tab(s), Oral, hs    clotrimazole 1% topical cream, 1 nazario, Topical, bid    digoxin 125 mcg (0.125 mg) oral tablet, 125 mcg= 1 tab(s), Oral, daily    famotidine 20 mg oral tablet, 1 tab(s), Oral, bid, 3 refills    Flonase 50 mcg/inh nasal spray, 2 spray(s), Nasal, daily    furosemide 80 mg oral tablet, 40 mg= 0.5 tab(s), Oral, bid    Metoprolol Succinate ER 25 mg oral tablet, extended release, See Instructions, 3 refills    MetroGel 1% topical gel, 1 nazario, Topical, daily, 5 refills    midodrine 2.5 mg oral tablet, 5 mg= 2 tab(s), Oral, tid, 2 refills    mirtazapine 15 mg oral tablet, 1 tab(s), Oral, qhs, 11 refills    nitroglycerin 0.4 mg sublingual tablet, 0.4 mg= 1 tab(s), SL, q5 min, PRN, 3 refills    predniSONE 20 mg oral tablet, See Instructions    Protegra oral tablet, 1 tab(s), Oral, daily    spironolactone 50 mg oral tablet, 25 mg= 0.5 tab(s), Oral, bid, 2 refills    Vitamin B12 1000 mcg/mL injectable solution, 1000 mcg, IM, qmonth  Allergies    No Known Medication Allergies    adhesive tape  Social History    Smoking Status - 08/15/2019     Former smoker     Alcohol      Past, 03/30/2018     Employment/School      Employed, Work/School description: [employer] 11/11/2010     Exercise       Exercise type: Walking., 11/11/2010     Tobacco      Former smoker, quit more than 30 days ago, 08/23/2018  Family History    Aneurysm: Father.    Heart disease: Mother.  Immunizations      Vaccine Date Status      influenza virus vaccine, inactivated 11/27/2018 Given      influenza virus vaccine, inactivated 09/28/2015 Given      pneumococcal (PCV13) 04/21/2015 Given      influenza virus vaccine, inactivated 08/28/2014 Given      influenza virus vaccine, inactivated 10/29/2012 Given      influenza virus vaccine, inactivated 09/23/2011 Given      influenza virus vaccine, inactivated 10/26/2010 Given      influenza 10/23/2008 Recorded      Td 09/01/2005 Recorded      pneumococcal 11/28/2001 Recorded  Lab Results       Lab Results (Last 4 results within 90 days)        Sodium Level: 134 mmol/L Low [135 mmol/L - 146 mmol/L] (07/08/19 08:36:00)       Sodium Level TR: 136 mEq/L (07/18/19 08:29:00)       Sodium Level TR: 134 mEq/L (06/28/19 13:43:00)       Sodium Level TR: 139 mEq/L (06/27/19 13:51:00)       Potassium Level: 4.5 mmol/L [3.5 mmol/L - 5.3 mmol/L] (07/08/19 08:36:00)       Potassium Level TR: 4.2 mEq/L (07/18/19 08:29:00)       Potassium Level TR: 4.2 mEq/L (06/28/19 13:43:00)       Potassium Level TR: 3.4 mEq/L (06/27/19 13:51:00)       Chloride Level: 100 mmol/L [98 mmol/L - 110 mmol/L] (07/08/19 08:36:00)       Chloride TR: 101 mEq/L (07/18/19 08:29:00)       Chloride TR: 99 mEq/L (06/28/19 13:43:00)       Chloride TR: 102 mEq/L (06/27/19 13:51:00)       CO2 Level: 26 mmol/L [20 mmol/L - 32 mmol/L] (07/08/19 08:36:00)       CO2 TR: 27 mEq/L (07/18/19 08:29:00)       CO2 TR: 26 mEq/L (06/28/19 13:43:00)       CO2 TR: 26 mEq/L (06/27/19 13:51:00)       Anion Gap TR: 8 mEq/L (07/18/19 08:29:00)       Anion Gap TR: 9 mEq/L (06/28/19 13:43:00)       Anion Gap TR: 11 mEq/L (06/27/19 13:51:00)       Glucose Level: 97 mg/dL [65 mg/dL - 99 mg/dL] (07/08/19 08:36:00)       Glucose Level TR: 90 mg/dL (07/18/19  08:29:00)       Glucose Level TR: 100 mg/dL (06/28/19 13:43:00)       Glucose Level TR: 117 mg/dL (06/27/19 13:51:00)       BUN: 45 mg/dL High [7 mg/dL - 25 mg/dL] (07/08/19 08:36:00)       BUN TR: 41 mg/dL (07/18/19 08:29:00)       BUN TR: 45 mg/dL (06/28/19 13:43:00)       BUN TR: 16 mg/dL (06/27/19 13:51:00)       Creatinine Level: 1.63 mg/dL High [0.7 mg/dL - 1.11 mg/dL] (07/08/19 08:36:00)       Creatinine TR: 1.58 mg/dL (07/18/19 08:29:00)       Creatinine TR: 1.73 mg/dL (06/28/19 13:43:00)       Creatinine TR: 0.84 mg/dL (06/27/19 13:51:00)       BUN/Creat Ratio: 28 High [6  - 22] (07/08/19 08:36:00)       BUN/Creatinine Ratio TR: 26 (07/18/19 08:29:00)       BUN/Creatinine Ratio TR: 26 (06/28/19 13:43:00)       BUN/Creatinine Ratio TR: 19 (06/27/19 13:51:00)       eGFR: 39 mL/min/1.73m2 Low (07/08/19 08:36:00)       eGFR TR: 42 mL/min (07/18/19 08:29:00)       eGFR TR: 38 mL/min (06/28/19 13:43:00)       eGFR : 45 mL/min/1.73m2 Low (07/08/19 08:36:00)       Calcium Level: 8.6 mg/dL [8.6 mg/dL - 10.3 mg/dL] (07/08/19 08:36:00)       Calcium TR: 9.1 mEq/dL (07/18/19 08:29:00)       Calcium TR: 8.8 mEq/dL (06/28/19 13:43:00)       Calcium TR: 9.7 mEq/dL (06/27/19 13:51:00)       Bilirubin Total TR: 0.5 mg/dL (07/18/19 08:29:00)       Bilirubin Total TR: 0.5 mg/dL (06/28/19 13:43:00)       Alkaline Phosphatase TR: 332 unit/L (07/18/19 08:29:00)       Alkaline Phosphatase TR: 317 unit/L (06/28/19 13:43:00)       AST TR: 27 unit/L (07/18/19 08:29:00)       AST TR: 24 unit/L (06/28/19 13:43:00)       ALT TR: 25 unit/L (07/18/19 08:29:00)       ALT TR: 20 unit/L (06/28/19 13:43:00)       Protein Total TR: 7 gm/dL (07/18/19 08:29:00)       Protein Total TR: 6.5 gm/dL (06/28/19 13:43:00)       Albumin Level TR: 3.1 g/dL (07/18/19 08:29:00)       Albumin Level TR: 2.9 g/dL (06/28/19 13:43:00)       Globulin TR: 3.9 g/dL (07/18/19 08:29:00)       Globulin TR: 3.6 g/dL (06/28/19 13:43:00)       A/G Ratio  TR: 0.8 mg/dL (07/18/19 08:29:00)       A/G Ratio TR: 0.8 mg/dL (06/28/19 13:43:00)       Iron TR: 21 nmol/L (07/18/19 08:29:00)       Ferritin TR: 754.1 ng/mL (07/18/19 08:29:00)       WBC: 5.4 [3.8  - 10.8] (07/08/19 08:36:00)       WBC TR: 5.1 x10^3/uL (07/18/19 08:29:00)       WBC TR: 5.5 x10^3/uL (06/28/19 13:43:00)       WBC TR: 4.7 x10^3/uL (06/27/19 13:51:00)       RBC: 2.74 Low [4.2  - 5.8] (07/08/19 08:36:00)       RBC TR: 2.66 x10^6/uL (07/18/19 08:29:00)       RBC TR: 2.76 x10^6/uL (06/28/19 13:43:00)       RBC TR: 4.14 x10^6/uL (06/27/19 13:51:00)       Hgb: 7.7 gm/dL Low [13.2 gm/dL - 17.1 gm/dL] (07/08/19 08:36:00)       Hgb TR: 7.7 g/dL (07/18/19 08:29:00)       Hgb TR: 7.9 g/dL (06/28/19 13:43:00)       Hgb TR: 12.9 g/dL (06/27/19 13:51:00)       Hct: 25.4 % Low [38.5 % - 50 %] (07/08/19 08:36:00)       Hct TR: 25.4 % (07/18/19 08:29:00)       Hct TR: 25.9 % (06/28/19 13:43:00)       Hct TR: 38.2 % (06/27/19 13:51:00)       MCV: 92.7 fL [80 fL - 100 fL] (07/08/19 08:36:00)       MCV TR: 96 fL (07/18/19 08:29:00)       MCV TR: 94 fL (06/28/19 13:43:00)       MCV TR: 92 fL (06/27/19 13:51:00)       MCH: 28.1 pg [27 pg - 33 pg] (07/08/19 08:36:00)       MCH TR: 28.9 pg (07/18/19 08:29:00)       MCH TR: 28.6 pg (06/28/19 13:43:00)       MCH TR: 31.2 pg (06/27/19 13:51:00)       MCHC: 30.3 gm/dL Low [32 gm/dL - 36 gm/dL] (07/08/19 08:36:00)       MCHC TR: 30.3 gm/dL (07/18/19 08:29:00)       MCHC TR: 30.5 gm/dL (06/28/19 13:43:00)       MCHC TR: 33.8 gm/dL (06/27/19 13:51:00)       RDW: 16.9 % High [11 % - 15 %] (07/08/19 08:36:00)       RDW TR: 19.1 % (07/18/19 08:29:00)       RDW TR: 18.6 % (06/28/19 13:43:00)       RDW TR: 14.9 % (06/27/19 13:51:00)       Platelet: 217 [140  - 400] (07/08/19 08:36:00)       Platelet TR: 240 x10^3/uL (07/18/19 08:29:00)       Platelet TR: 228 x10^3/uL (06/28/19 13:43:00)       Platelet TR: 210 x10^3/uL (06/27/19 13:51:00)       MPV: 9.3 fL [7.5 fL - 12.5 fL] (07/08/19  08:36:00)       MPV (Mean Platelet Volume) TR: 8.2 fL (07/18/19 08:29:00)       MPV (Mean Platelet Volume) TR: 8.6 fL (06/28/19 13:43:00)       MPV (Mean Platelet Volume) TR: 10.2 fL (06/27/19 13:51:00)       Lymphocytes TR: 18.7 % (07/18/19 08:29:00)       Lymphocytes TR: 13.6 % (06/28/19 13:43:00)       Absolute Lymphocytes TR: 1 cells/uL (07/18/19 08:29:00)       Absolute Lymphocytes TR: 0.8 cells/uL (06/28/19 13:43:00)       Neutrophils TR: 65.9 % (07/18/19 08:29:00)       Neutrophils TR: 73 % (06/28/19 13:43:00)       Absolute Neutrophils TR: 3.3 cells/uL (07/18/19 08:29:00)       Absolute Neutrophils TR: 4 cells/uL (06/28/19 13:43:00)       Monocytes TR: 12 % (07/18/19 08:29:00)       Monocytes TR: 10.5 % (06/28/19 13:43:00)       Absolute Monocytes TR: 0.6 cells/uL (07/18/19 08:29:00)       Absolute Monocytes TR: 0.6 cells/uL (06/28/19 13:43:00)       Eosinophils TR: 3 % (07/18/19 08:29:00)       Eosinophils TR: 2.5 % (06/28/19 13:43:00)       Absolute Eosinophils TR: 0.2 cells/uL (07/18/19 08:29:00)       Absolute Eosinophils TR: 0.1 cells/uL (06/28/19 13:43:00)       Basophils TR: 0.4 % (07/18/19 08:29:00)       Basophils TR: 0.4 % (06/28/19 13:43:00)       Absolute Basophils TR: 0 cells/uL (07/18/19 08:29:00)       Absolute Basophils TR: 0 cells/uL (06/28/19 13:43:00)       UA pH: 5.5 [5  - 8] (07/29/19 16:59:00)       UA Specific Gravity: 1.01 [1.001  - 1.035] (07/29/19 16:59:00)       UA Glucose: NEGATIVE [NEGATIVE  - NEGATIVE] (07/29/19 16:59:00)       UA Bilirubin: NEGATIVE [NEGATIVE  - NEGATIVE] (07/29/19 16:59:00)       UA Ketones: NEGATIVE [NEGATIVE  - NEGATIVE] (07/29/19 16:59:00)       Urine Occult Blood: NEGATIVE [NEGATIVE  - NEGATIVE] (07/29/19 16:59:00)       UA Protein: TRACE Abnormal [NEGATIVE  - NEGATIVE] (07/29/19 16:59:00)       UA Nitrite: NEGATIVE [NEGATIVE  - NEGATIVE] (07/29/19 16:59:00)       UA Leukocyte Esterase: NEGATIVE [NEGATIVE  - NEGATIVE] (07/29/19 16:59:00)       UA  Epithelial Cells: Few [None  - Few] (07/29/19 17:13:00)       UA Hyaline Cast: 0-2 [0  - 5] (07/29/19 17:13:00)       UA WBC: 3-5 [None  - 5] (07/29/19 17:13:00)       UA RBC: 3-5 [None  - 2] (07/29/19 17:13:00)       UA Bacteria: Few [None  - Few] (07/29/19 17:13:00)       Culture Urine: See comment (07/29/19 17:00:00)

## 2022-02-16 NOTE — NURSING NOTE
Anticoagulation Therapy Entered On:  5/8/2019 12:28 PM CDT    Performed On:  5/8/2019 12:23 PM CDT by Mackenzie Quintana RN               Warfarin Management   Week 1 Sunday Dose :   3    Week 1 Monday Dose :   1.5    Week 1 Tuesday Dose :   3    Week 1 Wednesday Dose :   1.5    Week 1 Thursday Dose :   3    Week 1 Friday Dose :   1.5    Week 1 Saturday Dose :   3    Week 1 Dose Total :   16.5    Week 2 Sunday Dose :   3    Week 2 Monday Dose :   1.5    Week 2 Tuesday Dose :   3    Week 2 Wednesday Dose :   1.5    Week 2 Thursday Dose :   3    Week 2 Friday Dose :   1.5    Week 2 Saturday Dose :   3    Week 2 Dose Total :   16.5    Planned Duration :   Indefinite   Indication :   Atrial fibrillation   International Normalization Ratio :   1.9    INR Range :   2.0 - 3.0   INR Therapeutic Range :   No   Warfarin Abnormal Findings :   C/O dark stools but is on on iron supplement . Hgb done today and discuss C/O and results with MD MODI   Anticoagulation Recheck :   2 weeks   Warfarin Physician :   Shravan Berrios MD   Warfarin Special Instructions :   INR = 1.9 per [facility] lab. Patient is to take 1.5 mg warfarin on Mon, Wed, and Fri and 3 mg the rest of the days of the week Recheck INR in 2 weeks per [facility].  Patient and wife advised in office.   Mackenzie Quintana RN - 5/8/2019 12:23 PM CDT

## 2022-02-16 NOTE — PROGRESS NOTES
Patient:   JACINTO JIN            MRN: [id number]            FIN: [id number]               Age:   83 years     Sex:  Male     :  1935   Associated Diagnoses:   Iron deficiency anemia; Inguinal hernia, right; Right heart failure; Cirrhosis; Anticoagulated; Stage 3 chronic kidney disease   Author:   Shravan Berrios MD      Visit Information   Visit type:  Scheduled follow-up.    Accompanied by:  No one.    Source of history:  Self, Medical record.    History limitation:  None.       Chief Complaint   3/1/2019 3:22 PM CST     follow up      History of Present Illness    The patient is here for follow up.  He tells me he needs an INR.  He did have CBC today with improved hemoglobin of 10.3 since his small bowel endoscopy and treatment of AV malformations in February.  He was started on spironolactone about two weeks ago and has noticed significant reduction in weight.  His appetite remains relatively poor.  He is sleeping well.  No edema, paroxysmal nocturnal dyspnea or orthopnea.  His hernia continues to bother him but he has been told he has to wait until his ascites is improved to have it repaired.           Review of Systems   Constitutional:  Weakness, Fatigue, Decreased activity, Loss of appetite, No fever, No chills, No night sweats.    Eye:  Negative.    Respiratory:  No shortness of breath, No cough.    Cardiovascular:  No chest pain, No palpitations, No peripheral edema.    Gastrointestinal:  No nausea, No vomiting, No diarrhea, No abdominal pain.    Genitourinary:  Neurogenic bladder.    Hematology/Lymphatics:  Bruising tendency, Bleeding tendency.    Endocrine:  No polyuria.    Musculoskeletal:  No muscle pain.    Neurologic:  Alert and oriented X4, No confusion, No headache.       Health Status   Allergies:    Allergic Reactions (Selected)  Severity Not Documented  Adhesive tape (No reactions were documented)  No Known Medication Allergies   Medications:  (Selected)    Prescriptions  Prescribed  902611 URINARY DRAIN BAG 2000ML MG BEAD: See Instructions, Instructions: USE AS DIRECTED, # 1 unknown unit, Type: Soft Stop, Pharmacy: [facility], USE AS DIRECTED  Metoprolol Succinate ER 25 mg oral tablet, extended release: See Instructions, Instructions: TAKE ONE-HALF TABLET BY MOUTH TWICE DAILY, # 30 unknown unit, 11 Refill(s), Type: Soft Stop, Pharmacy: [facility]  MetroGel 1% topical gel: 1 nazario, Topical, daily, # 1 tubes, 5 Refill(s), Type: Maintenance, Pharmacy: [facility], 1 nazario Topical daily  allopurinol 300 mg oral tablet: See Instructions, Instructions: TAKE ONE TABLET BY MOUTH ONCE DAILY, # 90 unknown unit, 3 Refill(s), Type: Soft Stop, Pharmacy: [facility], TAKE ONE TABLET BY MOUTH ONCE DAILY  atorvastatin 40 mg oral tablet: See Instructions, Instructions: TAKE ONE TABLET BY MOUTH AT BEDTIME, # 90 unknown unit, Type: Soft Stop, Pharmacy: [facility]  clotrimazole 1% topical cream: 1 nazario, TOP, BID, # 30 gm, 0 Refill(s), Type: Maintenance, Pharmacy: [facility], 1 nazario top bid,x7 day(s)  mirtazapine 15 mg oral tablet: 1 tab(s) ( 15 mg ), PO, Once a day (at bedtime), # 30 tab(s), 5 Refill(s), Type: Maintenance, Pharmacy: [facility], 1 tab(s) po hs  nitroglycerin 0.4 mg sublingual tablet: 1 tab(s) ( 0.4 mg ), SL, q5min, Instructions: not to exceed 3 doses/15 min--if pain persists, seek medical attention, PRN: for chest pain, # 25 tab(s), 3 Refill(s), Type: Maintenance, Pharmacy: [facility], 1 tab(s) sl q5 min,PRN:for chest pain,Instr...  omeprazole 20 mg oral delayed release capsule: = 1 cap(s) ( 20 mg ), po, daily, # 90 cap(s), 3 Refill(s), Type: Maintenance, Pharmacy: [facility]  potassium chloride 20 mEq oral tablet, extended release: See Instructions, Instructions: 3 tab BID, # 30 unknown unit, 5 Refill(s), Type: Soft Stop, Pharmacy: [facility]  predniSONE 20 mg oral tablet: See Instructions, Instructions: 2 po daily for 3-5 days prn gout, # 30 EA, 0  Refill(s), Type: Maintenance, Pharmacy: [facility], 2 po daily for 3-5 days prn gout  tamsulosin 0.4 mg oral capsule: 1 cap(s) ( 0.4 mg ), Oral, daily, # 30 cap(s), 0 Refill(s), Type: Maintenance, Pharmacy: [facility], 1 cap(s) Oral daily  warfarin 3 mg oral tablet: 1 tab(s), Oral, daily, # 90 unknown unit, Type: Soft Stop, Pharmacy: "[facility]"  Documented Medications  Documented  Protegra oral tablet: 1 tab(s), po, daily, 0 Refill(s), Type: Maintenance  Senokot 8.6 mg oral tablet: 1-2 tab(s), PO, Once a day (at bedtime), PRN: for constipation, Type: Maintenance  Vitamin B12 1000 mcg/mL injectable solution: ( 1,000 mcg ), im, qmonth, 0 Refill(s), Type: Maintenance  acetaminophen 325 mg oral tablet: 2 tab(s) ( 650 mg ), po, q4 hrs, Instructions: not to exceed 4000 mg/day, 0 Refill(s), Type: Maintenance  digoxin 125 mcg (0.125 mg) oral tablet: 1 tab(s) ( 125 mcg ), po, daily, 0 Refill(s), Type: Maintenance  furosemide 80 mg oral tablet: = 1 tab(s) ( 80 mg ), po, bid, Instructions: 80 mg AM and 40 mg PM, 0 Refill(s), Type: Maintenance  metOLazone 2.5 mg oral tablet: See Instructions, Instructions: 1 tab(s) po every other daily for weight > 224#. Per Dr Quintana 2/7/19 increase metolazone 1-2 times per week to acheive weight of 220 lbs, 0 Refill(s), Type: Maintenance  spironolactone 50 mg oral tablet: = 1 tab(s) ( 50 mg ), Oral, daily, 0 Refill(s), Type: Maintenance   Problem list:    All Problems  Acquired arteriovenous malformation of colon / SNOMED CT 924022173 / Confirmed  Acute kidney injury (nontraumatic) / SNOMED CT 2213030031 / Confirmed  AF (Atrial Fibrillation) / SNOMED CT 4036203125 / Confirmed  Anemia of renal disease / SNOMED CT 718878143 / Confirmed  Anticardiolipin syndrome / SNOMED CT 7393927673 / Confirmed  Anticoagulated / SNOMED CT 15209573 / Confirmed  Ascites / SNOMED CT 1336674760 / Confirmed  B12 deficiency / SNOMED CT 041686477 / Confirmed  BPH with urinary obstruction / SNOMED CT 6735640440  / Confirmed  CAD (coronary artery disease) / SNOMED CT 4781616641 / Confirmed  Cardiorenal syndrome / SNOMED CT 0342402961 / Confirmed  CHB (complete heart block) / SNOMED CT 6024474531 / Confirmed  Chronic diastolic CHF (congestive heart failure), NYHA class 3 / SNOMED CT 7421865049 / Confirmed  Cirrhosis / SNOMED CT 00288887 / Confirmed  Depression / SNOMED CT 60370274 / Confirmed  Diverticulosis / SNOMED CT 9184571026 / Confirmed  Dupuytren's contracture of right hand / SNOMED CT 4167462499 / Confirmed  Gout / SNOMED CT 307381844 / Confirmed  H/O acute pancreatitis / SNOMED CT 5084028730 / Confirmed  High cholesterol / SNOMED CT 25663439 / Confirmed  History of acute bacterial endocarditis / SNOMED CT 1721709869 / Confirmed  History of coronary artery bypass graft / SNOMED CT 3629375256 / Confirmed  History of GI bleed / SNOMED CT 5129809894 / Confirmed  History of tricuspid valve replacement / SNOMED CT 1624092813 / Confirmed  Hx of colonic polyp / SNOMED CT 9652000300 / Confirmed  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 / SNOMED CT 4548157417 / Confirmed  IBS (irritable bowel syndrome) / SNOMED CT 8039864398 / Confirmed  ICD (implantable cardioverter-defibrillator) infection / SNOMED CT 958174813 / Confirmed  Inguinal hernia, right / SNOMED CT 588766126 / Confirmed  Iron deficiency anemia / SNOMED CT 734423825 / Confirmed  MGUS (monoclonal gammopathy of unknown significance) / SNOMED CT 168091501 / Confirmed  Nonischemic dilated cardiomyopathy / SNOMED CT 7075849160 / Confirmed  Obesity / SNOMED CT 4533853057 / Confirmed  KEYONA (obstructive sleep apnea) / SNOMED CT 571117814 / Confirmed  Osteoarthritis of both knees / SNOMED CT 5321774939 / Confirmed  Paralysis, diaphragm / SNOMED CT 788659672 / Confirmed  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 / SNOMED CT 4015526019 / Confirmed  Right heart failure / SNOMED CT 347896872 / Confirmed  Stage 3 chronic kidney  disease / SNOMED CT 5838986515 / Confirmed  Tricuspid valve insufficiency / SNOMED CT [id number] / Confirmed  Vitamin B 12 deficiency / SNOMED CT 8788439905 / Confirmed  Inactive: PUD (peptic ulcer disease) / SNOMED CT 1553781203  Resolved: BE (bacterial endocarditis) / SNOMED CT 612167951  Resolved: Colon polyps / SNOMED CT 244055665  Resolved: History of sepsis / SNOMED CT 0500506030  Resolved: Inpatient stay / SNOMED CT 887209889  Resolved: Inpatient stay / SNOMED CT 559850912  Resolved: Inpatient stay / SNOMED CT 849308926  Resolved: Inpatient stay / SNOMED CT 314366052  Resolved: Inpatient stay / SNOMED CT 045761477  Resolved: Inpatient stay / SNOMED CT 916214177  Resolved: Inpatient stay / SNOMED CT 659617209  Resolved: Inpatient stay / SNOMED CT 770603101  Resolved: Other and Unspecified Noninfectious Gastroenteritis and Colitis / ICD-9-.9  Canceled: 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 / SNOMED CT 5631045444  Canceled: Ascites / SNOMED CT 9083005128  Canceled: 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 / SNOMED CT 39230091  Canceled: Benign hypertensive CKD / SNOMED CT 4978435  Canceled: Benign prostatic hyperplasia (BPH) with urinary urgency / SNOMED CT 728173626  Canceled: Hypercholesteremia / ICD-9-.0  Canceled: Hypertensive HF (heart failure) / SNOMED CT 72072910  Canceled: Type 2 diabetes mellitus / SNOMED CT 226497373      Histories   Past Medical History:    Active  History of GI bleed (8207589542): Onset on 12/5/2018 at 83 years.  H/O acute pancreatitis (6541690932): Onset on 12/6/2016 at 81 years.  History of tricuspid valve replacement (2770995501): Onset in the month of 11/2016 at 80 years  History of acute bacterial endocarditis (0331253070): Onset on 7/23/2016 at 80 years.  Anticardiolipin syndrome (5480356483): Onset on 1/1/2005 at 69 years.  KEYONA (obstructive sleep apnea) (836910022): Onset on 1/1/2003 at 67 years.  Comments:  11/1/2013 CDT  10:58 AM CDT - Agustina MORELOS Shravan  Adequate titration to BIPAP 16/11 on 10/15/2013  AF (Atrial Fibrillation) (2856938438)  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 (3014127174)  IBS (irritable bowel syndrome) (5094154443)  CAD (coronary artery disease) (9547888614)  Gout (359837575)  Chronic diastolic CHF (congestive heart failure), NYHA class 3 (5992042264)  Obesity (1210529464)  Anticoagulated (70379976)  High cholesterol (90689591)  Dupuytren's contracture of right hand (6584602339)  Acute kidney injury (nontraumatic) (2419433932)  CHB (complete heart block) (7145627510)  Nonischemic dilated cardiomyopathy (7994795259)  ICD (implantable cardioverter-defibrillator) infection (058815946)  History of coronary artery bypass graft (4636140242)  Tricuspid valve insufficiency ([id number])  Right heart failure (213540389)  Cardiorenal syndrome (7683370144)  Vitamin B 12 deficiency (9551769887)  Resolved  Inpatient stay (738975304): Onset on 12/5/2018 at 83 years.  Resolved on 12/6/2018 at 83 years.  Comments:  12/10/2018 CST 6:59 AM Floridalma Brody  @[facility], [place] - GI bleeding, suspected upper source  Inpatient stay (408557876): Onset on 5/26/2017 at 81 years.  Resolved on 5/28/2017 at 81 years.  Comments:  5/31/2017 CDT 10:36 PM Floridalma Jarrell  @[facility] - Anemia  Inpatient stay (152747623): Onset on 12/13/2016 at 81 years.  Resolved on 12/19/2016 at 81 years.  Comments:  1/3/2017 CST 9:01 PM Floridalma Brody  @[facility] on chronic right heart failure  Inpatient stay (976129115): Onset on 12/6/2016 at 81 years.  Resolved on 12/13/2016 at 81 years.  Comments:  12/20/2016 CST 6:02 AM Floridalma Brody  @[facility]  - S/P tricuspid valve replacement with worsening right ventricular function  Inpatient stay (176219560): Onset on 11/29/2016 at 81 years.  Resolved on 12/6/2016 at 81 years.  Comments:  12/20/2016 CST 10:22 PM CHYNA Calderon  Floridalma  @[facility] Severe symptomatic tricuspid valvular insufficiency. Chronic right heart failure.  History of sepsis (1357013282): Onset on 7/26/2016 at 80 years.  Resolved.  Inpatient stay (845423186): Onset on 9/28/2015 at 79 years.  Resolved on 10/2/2015 at 79 years.  Comments:  12/20/2016 CST 6:03 AM Floridalma Brody  @[facility] - Diverticulitis  Inpatient stay (207287423): Onset on 5/17/2015 at 79 years.  Resolved on 5/29/2015 at 79 years.  Comments:  12/20/2016 CST 6:03 AM Floridalma Brody  @[facility] Infected pacemaker  Inpatient stay (837835396): Onset on 7/30/2013 at 77 years.  Resolved on 7/30/2013 at 77 years.  Comments:  12/20/2016 CST 6:02 AM Floridalma Brody  [facility] - Jaw pain, possible anginal equivalent  Colon polyps (958671676):  Resolved.  Other and Unspecified Noninfectious Gastroenteritis and Colitis (558.9):  Resolved.  BE (bacterial endocarditis) (392758243):  Resolved.   Family History:    Aneurysm  Father  Heart disease  Mother     Procedure history:    Esophagogastroduodenoscopy (668699245) on 2/12/2019 at 83 Years.  Esophagogastroduodenoscopy and biopsy (7356261186) on 12/7/2018 at 83 Years.  Comments:  12/11/2018 2:43 PM Shravan Willard MD  Indication: GIB  Sedatoin: Fentanyl 50 mcg, Versed 2 mg  findings: Negative with negative duodenal biopsy  Rec: Colonoscopy  Colonoscopy (543977668) on 12/7/2018 at 83 Years.  Comments:  12/11/2018 2:46 PM Shravan Willard MD  Indication: GIB  Sedation: fentanyl 50 mcg, Versed 1 mg  Findings: Diverticulosis, 2 small AVMs with bleeding in ascending treated with APC  Rec: Consider further workup based on tu amin  Abdominal paracentesis (317529336) on 12/6/2018 at 83 Years.  Comments:  12/11/2018 2:40 PM Shravan Willard MD  Negative cytology  Bone marrow biopsy ([id number]) on 7/12/2017 at 81 Years.  Esophagogastroduodenoscopy (065704600) on 5/27/2017 at 81 Years.  Comments:  6/1/2017 1:15 PM JANIE - Agustina MORELOS,  Shravan  gastritis, fundic gland polyps  Colonoscopy ([id number]) on 5/27/2017 at 81 Years.  Comments:  6/1/2017 1:16 PM Shravan Alvarez MD  Cecal tubular adenoma, pandiverticulosis    5/30/2017 3:36 PM KATHERINET - Mag Vargas CMA  w/polypectomy  TVR - Tricuspid valve replacement (6961397354) on 11/29/2016 at 81 Years.  Cardiac angiogram (9703868548) on 11/1/2016 at 80 Years.  Comments:  11/3/2016 12:05 PM Shravan Alvarez MD  Summary/Conclusions  PRESENTATION / INDICATIONS    Pre-surgical evaluation for cardiac surgery    Severe TR by echo  DIAGNOSTIC - CORONARY    Co-dominant coronary artery system    The left main artery was normal in appearance and free of obstructive disease.    Chronic total occlusion of the proximal LAD    The circumflex artery has minimal disease.    The RCA has moderate disease.    No change since 2013  DIAGNOSTIC - GRAFTS    Chronic total occlusion in the proximal anastomosis of the SVG graft to the RCA    The SVG to the ramus intermediate is patent    The sequential graft to the 1st diagonal is patent.    The sequential graft limb from the diagonal to the LAD is patent.    No change since 2013  HEMODYNAMICS    The LVEDP is mildly elevated    Severely elevated right atrial pressures    No evidence of intracardiac shunting  Limited thoracotomy (6123027079) on 8/23/2016 at 80 Years.  Comments:  8/29/2016 2:45 PM Floridalma Jarrell  Left mini thoracotomy and placement of two epicardial screw in leads.  Implant of left pectoral pacer generatoe.  Removal of automatic cardiac defibrillator (437509676) on 8/4/2016 at 80 Years.  Comments:  8/29/2016 2:49 PM Floridalma Jarrell  Generator and leads  Implantation of intravenous single chamber cardiac pacemaker system (8734327547) on 8/4/2016 at 80 Years.  Pacemaker change on 4/8/2015 at 79 Years.  Cardiac angiogram (5667999384) on 8/2/2013 at 77 Years.  Comments:  8/8/2013 2:45 PM Shravan Alvarez MD  Summary/Conclusions  PRESENTATION /  INDICATIONS   Chest pain  DIAGNOSTIC - CORONARY  Right dominant coronary artery system  DIAGNOSTIC SUMMARY  100% stenosis in the Proximal LAD  30% stenosis in the 1st Marginal  30% stenosis in the Proximal RCA  50% stenosis in the Mid RCA  100% stenosis in the Aorta graft to Distal RCA  DIAGNOSTIC - GRAFTS  The sequential graft to the diagonal branch is patent.  The sequential graft from the diagonal to the LAD is patent.  The SVG to the ramus intermediate is patent  The SVG to RCA is chronically occluded  HEMODYNAMICS  The LVEDP is mildly elevated  RECOMMENDATIONS & PLAN  Medical Rx- no significant change from previous angiogram, there is dramatic mismatch in size between the SVG's and the target arteries with slow filling of the LAD        angiogrqam on 2/3/2012 at 76 Years.  Comments:  4/24/2012 3:33 PM CDT - Shravan Berrios MD  Summary/Conclusions  PRESENTATION / INDICATIONS   Dyspnea and fatigue.  DIAGNOSTIC - CORONARY  Right dominant coronary artery system.  LMCA is normal.  LAD is occluded proximally after the 1st septal branch.  LCx has mild luminal irregularities.  OM1 has 20-30% proximal stenosis.  RCA has 30-40% diffuse disease in the mid segment and otherwise has mild diffuse luminal irregularities.  DIAGNOSTIC - GRAFTS  The sequential SVG to the diagonal and then LAD is widely patent.  There is a significant size mismatch between the graft and the native vessels.  The diagonal and distal LAD have no obvious lesions but are small caliber (< 2.0 mm vessels).  There is some backfilling into the mid LAD.  The SVG to the ramus is widely patent.  There is a significant size mismatch between the graft and the native vessel.  The ramus has no obvious lesion but is small caliber (< 2.0 mm vessel).    The SVG to the RCA is chronically occluded.  LEFT VENTRICULAR FUNCTION  Left ventricular function is mildly reduced with EF of 42% and mild global hypokinesis.  No significant MR.  HEMODYNAMICS  The LVEDP is 6 mmHg.  " No significant gradient across the aortic valve.  RA 11, RV 29/10, PA 34/17 (mean 25), PCWP 18  Amarilys CO 5.1, TDCO 4.6  RECOMMENDATIONS & PLAN  Consider alternative etiology for symptoms.  Lasix dose decreased to 40 mg QD given relative hypotension and patient reporting feeling \"dry membranes.\"             Colonoscopy ([id number]) on 12/15/2006 at 71 Years.  Comments:  8/8/2013 4:06 PM Shravan Alvarez MD  Diverticulosis. No polyps.  Colonoscopy on 6/22/2001 at 65 Years.  Comments:  11/15/2010 8:21 AM Shravan Willard MD  hyperplastic polyp  Cholecystectomy (93894927) in the month of 6/2001 at 65 Years.  CABG - Coronary artery bypass graft (171171048) in 2000 at 65 Years.  History of Back Surgery (V45.89) in 2000 at 65 Years.  Colonoscopy (700869785) on 2/17/1993 at 57 Years.  Comments:  8/8/2013 4:06 PM Shravan Alvarez MD  1 adenoma, 1 hyperplastic, diverticulosis.  Left shoulder surgery in 1991 at 56 Years.  Esophagogastroduodenoscopy (835222708).  Cholecystectomy (08319787).  Ablation for atrial fibrillation.  Comments:  5/2/2011 1:56 PM Madeline Murray  Subsequent pacemaker dependence.  Colonoscopy (648803175).   Social History:        Alcohol Assessment            Past      Tobacco Assessment            Former smoker, quit more than 30 days ago                     Comments:                      08/23/2018 - Andrew Bailon CMA                     Pt quit 2/5/1963      Employment and Education Assessment            Employed, Work/School description: Farmer.      Exercise and Physical Activity Assessment            Exercise type: Walking.      Physical Examination   Vital Signs   3/1/2019 3:22 PM CST Peripheral Pulse Rate 96 bpm    Systolic Blood Pressure 102 mmHg    Diastolic Blood Pressure 68 mmHg    Mean Arterial Pressure 79 mmHg    BP Site Right arm      Measurements from flowsheet : Measurements   3/1/2019 3:22 PM CST Height Measured - Standard 75 in    Weight Measured - Standard 216 lb    " BSA 2.27 m2    Body Mass Index 27 kg/m2  HI      General:  Alert and oriented, No acute distress.    Eye:  Normal conjunctiva.    HENT:  Normocephalic, Normal hearing, Oral mucosa is moist.    Neck:  Supple, Non-tender, No carotid bruit, No jugular venous distention, No lymphadenopathy, No thyromegaly.    Respiratory:  Respirations are non-labored, Decreased BS left base unchanged. No wheezes or rales.    Cardiovascular:  Normal rate, Regular rhythm, No gallop, Normal peripheral perfusion, No edema, Device right chest. Scar left chest.         Systolic murmur: Location ( [place] ), Intensity ( Grade I ), Systolic.    Gastrointestinal:  Non-tender.         Abdomen: Distended, Obese.    Genitourinary:  Large right inguinal hernia.    Integumentary:  Ecchymoses.    Neurologic:  No focal deficits.    Cognition and Speech:  Speech clear and coherent, Functional cognition intact.    Psychiatric:  Cooperative, Appropriate mood & affect.       Review / Management   Results review:       Interpretation: Hgb 10.3.       Impression and Plan   Diagnosis     Iron deficiency anemia (JZH88-PZ D50.9).     Inguinal hernia, right (QDA21-MQ K40.90).     Right heart failure (WXC82-YR I50.9).     Cirrhosis (BCD05-RU K74.60).     Anticoagulated (CGR87-EJ Z79.01).     Stage 3 chronic kidney disease (OFB85-KM N18.3).     Course:  Improving.    Orders     Orders (Selected)   Outpatient Orders  Ordered  Return to Clinic (Request): RFV: F/U CHF, Return in 2-3 weeks  Ordered (In Transit)  Basic Metabolic Panel* ([facility]): Specimen Type: Serum, Collection Date: 03/01/19 15:57:00 CST  Prothrombin time-INR* ([facility]): Specimen Type: Blood, Collection Date: 03/01/19 15:57:00 CST.

## 2022-02-16 NOTE — NURSING NOTE
Quick Intake Entered On:  4/15/2019 10:10 AM CDT    Performed On:  4/15/2019 10:10 AM CDT by Mackenzie Quintana RN               Summary   Menstrual Status :   N/A   Systolic Blood Pressure :   100 mmHg   Diastolic Blood Pressure :   58 mmHg (LOW)    Mean Arterial Pressure :   72 mmHg   Mackenzie Quintana RN - 4/15/2019 10:10 AM CDT

## 2022-02-16 NOTE — LETTER
(Inserted Image. Unable to display)   March 06, 2019      JACINTO JIN      [address]  [place], [place] [id number]        Dear JACINTO,    Thank you for selecting [facility] (previously [facility]) for your healthcare needs.  Below you will find the results of your recent tests done at our clinic.    Creatinine increased slightly. Please schedule an appointment in 2-3 weeks.      Result Name Current Result Previous Result Reference Range   Sodium Level (mmol/L)  137 3/1/2019  139 2/22/2019 135 - 146   Potassium Level (mmol/L)  4.4 3/1/2019  4.4 2/22/2019 3.5 - 5.3   Chloride Level (mmol/L)  101 3/1/2019  105 2/22/2019 98 - 110   CO2 Level (mmol/L)  29 3/1/2019  27 2/22/2019 20 - 32   Glucose Level (mg/dL)  95 3/1/2019  85 2/22/2019 65 - 99   BUN (mg/dL) ((H)) 46 3/1/2019 ((H)) 41 2/22/2019 7 - 25   Creatinine Level (mg/dL) ((H)) 1.98 3/1/2019 ((H)) 1.70 2/22/2019 0.70 - 1.11   eGFR (mL/min/1.73m2) ((L)) 31 3/1/2019 ((L)) 37 2/22/2019 > OR = 60 -    Calcium Level (mg/dL)  9.3 3/1/2019  8.6 2/22/2019 8.6 - 10.3   PT ((H)) 13.9 3/1/2019 ((H)) 18.4 2/1/2019 9.0 - 11.5   INR ((H)) 1.4 3/1/2019 ((H)) 1.8 2/1/2019        Please contact me or my assistant at [phone] if you have any questions.     Sincerely,        Shravan Berrios MD

## 2022-02-16 NOTE — PROGRESS NOTES
Chief Complaint    tight cough, started today, felt fine yesterday, somewhat achy  History of Present Illness      Patient has had a cough for 2 weeks.  Currently without fever chills or sputum production.  No myalgias or headache.  His weight is up and he feels perhaps a little more dyspneic.  No chest pain or edema.  Review of Systems      Weight is up 3 pounds from baseline.  No fever chills.  Chronic fatigue.  No nausea or vomiting.  No abdominal pain.  Physical Exam   Vitals & Measurements    T: 97.5(Tympanic)  HR: 85(Peripheral)  BP: 113/71     HT: 75 in  WT: 233 lb  BMI: 29.12       Appears comfortable.  Not tachypneic.  Alert and oriented.  H&T exam unremarkable.  Neck supple no thyromegaly.  Chest reveals somewhat diminished breath sounds right base with a couple of crackles otherwise clear with good air movement.  Cardiac exam regular soft systolic murmur.  Trace edema bilaterally.  Assessment/Plan       Acute respiratory infection         Doubt pneumonia or influenza.  Improving.         Ordered:          [id number] office outpatient visit 25 minutes (Charge), Quantity: 1, Acute respiratory infection  Paralysis, diaphragm  Anticoagulated  Nonischemic dilated cardiomyopathy          XR Chest PA/Lat (Request), Priority: STAT, Acute respiratory infection  Paralysis, diaphragm                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         Has been improving.         Ordered:          Hemoglobin* ([facility]), Specimen Type: Blood, Collection Date: 02/26/18 14:33:00 CST                Anticoagulated         Recent INR was low will recheck.         Ordered:          [id number] office outpatient visit 25 minutes (Charge), Quantity: 1, Acute respiratory infection  Paralysis, diaphragm  Anticoagulated  Nonischemic dilated cardiomyopathy          Prothrombin time-INR* ([facility]), Specimen Type: Blood, Collection Date: 02/26/18 14:33:00 CST                Nonischemic dilated cardiomyopathy         Appears to have a little bit of  failure on the chest x-ray.  Patient will take metolazone 5 mg tonight and continue to take it whenever his weight is above 222 pounds.         Ordered:          [id number] office outpatient visit 25 minutes (Charge), Quantity: 1, Acute respiratory infection  Paralysis, diaphragm  Anticoagulated  Nonischemic dilated cardiomyopathy          B type natriuretic peptide (BNP)* ([facility]), Specimen Type: Plasma, Collection Date: 02/26/18 14:33:00 CST          Basic Metabolic Panel* ([facility]), Specimen Type: Serum, Collection Date: 02/26/18 14:33:00 CST                Paralysis, diaphragm        Unchanged.         Ordered:          [id number] office outpatient visit 25 minutes (Charge), Quantity: 1, Acute respiratory infection  Paralysis, diaphragm  Anticoagulated  Nonischemic dilated cardiomyopathy          XR Chest PA/Lat (Request), Priority: STAT, Acute respiratory infection  Paralysis, diaphragm                Orders:         Return to Clinic (Request), Return in 3-4 weeks  Patient Information     Name:JACINTO JIN      Address:      [address] [place] [id number]     Sex:Male     YOB: 1935     [phone]     Emergency Contact:GERA JIN     MRN:[id number]     FIN:[id number]     Location:[facility]     Date of Service:02/26/2018      Primary Care Physician:       Shravan Berrios MD, [phone]  Problem List/Past Medical History    Ongoing     Acute kidney injury (nontraumatic)     AF (Atrial Fibrillation)     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     Anticardiolipin syndrome     Anticoagulated     Ascites     B12 deficiency     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     CAD (coronary artery disease)     Cardiorenal syndrome     CHB (complete heart block)     CHF (Congestive Heart Failure)     Colon polyps     Diverticulosis     Dupuytren's contracture of right hand     Gout     H/O acute pancreatitis     High cholesterol     History of  acute bacterial endocarditis     History of coronary artery bypass graft     History of tricuspid valve replacement     IBS (irritable bowel syndrome)     ICD (implantable cardioverter-defibrillator) infection     Iron deficiency anemia     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     Nonischemic dilated cardiomyopathy     Obesity     KEYONA (obstructive sleep apnea)      Comments: Adequate titration to BIPAP 16/11 on 10/15/2013     Osteoarthritis of both knees     Paralysis, diaphragm      Comments: Left     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 Chronic right heart failure.     Inpatient stay      Comments: @[facility] - Anemia     Inpatient stay      Comments: @[facility] - Jaw pain, possible anginal equivalent     Inpatient stay      Comments: @[facility] - Infected pacemaker     Inpatient stay      Comments: @[facility] - Acute on chronic right heart failure     Inpatient stay      Comments: @[facility] - S/P tricuspid valve replacement with worsening right ventricular function     Other and Unspecified Noninfectious Gastroenteritis and Colitis  Procedure/Surgical History     Bone marrow biopsy (07/12/2017)     Colonoscopy (05/27/2017)     Esophagogastroduodenoscopy (05/27/2017)     TVR - Tricuspid valve replacement (11/29/2016)     Cardiac angiogram (11/01/2016)     Limited thoracotomy (08/23/2016)     Implantation of intravenous single chamber cardiac pacemaker system (08/04/2016)     Removal of automatic cardiac defibrillator (08/04/2016)     Pacemaker change (04/08/2015)     Cardiac angiogram (08/02/2013)     angiogrqam  (02/03/2012)     Colonoscopy (12/15/2006)     Colonoscopy (06/22/2001)     Cholecystectomy (06/2001)     CABG - Coronary artery bypass graft (2000)     History of Back Surgery (2000)     Colonoscopy (02/17/1993)     Left shoulder surgery (1991)     Ablation for atrial fibrillation     Cholecystectomy     Colonoscopy     Esophagogastroduodenoscopy  Medications        acetaminophen 325 mg oral tablet: 650 mg, 2 tab(s), po, q4 hrs, not to exceed 4000 mg/day, 0 Refill(s).        allopurinol: 300 mg, po, daily, 0 Refill(s).        atorvastatin 40 mg oral tablet: See Instructions, TAKE ONE TABLET BY MOUTH AT BEDTIME, 90 unknown unit.        Vitamin B12 1000 mcg/mL injectable solution: 1,000 mcg, im, qmonth, 0 Refill(s).        digoxin 125 mcg (0.125 mg) oral tablet: 125 mcg, 1 tab(s), po, daily, 0 Refill(s).        Senexon-S: 2 tab(s), po, hs, 0 Refill(s).        furosemide 80 mg oral tablet: 80 mg, 1 tab(s), po, bid, Per Dr. Quintana on 3/7/2017, 0 Refill(s).        metOLazone 2.5 mg oral tablet: See Instructions, 1 tab(s) po daily for weight > 222#, 0 Refill(s).        Metoprolol Succinate ER 25 mg oral tablet, extended release: See Instructions, TAKE ONE-HALF TABLET BY MOUTH TWICE DAILY, 30 unknown unit, 11 Refill(s).        Protegra oral tablet: 1 tab(s), po, daily.        nitroglycerin 0.4 mg sublingual tablet: 0.4 mg, 1 tab(s), SL, q5min, not to exceed 3 doses/15 min--if pain persists, seek medical attention, 25 tab(s), PRN: for chest pain.        omeprazole 20 mg oral delayed release capsule: 40 mg, 2 cap(s), po, bid, 90 cap(s), 3 Refill(s).        potassium chloride 20 mEq oral tablet, extended release: See Instructions, 1 tab TID, 30 unknown unit, 5 Refill(s).        predniSONE 20 mg oral tablet: See Instructions, 2 po daily for 3-5 days prn gout, 30 EA, 0 Refill(s).        Senokot 8.6 mg oral tablet: 1-2 tab(s), PO, Once a day (at bedtime), PRN: for constipation.        warfarin 3 mg oral tablet: See Instructions,  Take 4.5mg, 3mg, 3mg on an alt. qd schedule, 40 unknown unit, 11 Refill(s).                Allergies    No Known Medication Allergies    adhesive tape  Social History    Smoking Status - 02/26/2018     Never smoker     Alcohol - Current, 11/11/2010      Current     Employment and Education - 11/11/2010      Employed, Work/School description: Farmer.     Exercise and Physical Activity - 11/11/2010      Exercise type: Walking.     Tobacco - Denies Tobacco Use, 11/11/2010  Family History    Aneurysm: Father.    Heart disease: Mother.  Immunizations      Vaccine Date Status      influenza virus vaccine, inactivated 09/28/2015 Given      pneumococcal (PCV13) 04/21/2015 Given      influenza virus vaccine, inactivated 08/28/2014 Given      influenza virus vaccine, inactivated 10/29/2012 Given      influenza virus vaccine, inactivated 09/23/2011 Given      influenza virus vaccine, inactivated 10/26/2010 Given      influenza 10/23/2008 Recorded      Td 09/01/2005 Recorded      pneumococcal 11/28/2001 Recorded  Lab Results      Results (Last 90 days)                Laboratory                     Chemistry                          General Chemistry                               BUN                                     H 26 mg/dL (Range 7 - 25)  (12/12/17 05:27 PM CST)                                                                                                                                                H 29 mg/dL (Range 7 - 25)  (12/29/17 10:18 AM CST)                                                                                                                                          BUN/Creat Ratio                                     16   (12/12/17 05:27 PM CST)                                                                                                                                                19   (12/29/17 10:18 AM CST)                                                                                                                                           Basic Metabolic Profile                                        (12/12/17 05:27 PM CST)                                                                                                                                                   (12/29/17 10:18 AM CST)                                                                                                                                          CO2 Level                                     H 32 mmol/L (Range 20 - 31)  (12/12/17 05:27 PM CST)                                                                                                                                                H 34 mmol/L (Range 20 - 31)  (12/29/17 10:18 AM CST)                                                                                                                                          Calcium Level                                     9.6 mg/dL  (12/12/17 05:27 PM CST)                                                                                                                                                9.5 mg/dL  (12/29/17 10:18 AM CST)                                                                                                                                          Chloride Level                                     99 mmol/L  (12/12/17 05:27 PM CST)                                                                                                                                                99 mmol/L  (12/29/17 10:18 AM CST)                                                                                                                                          Creatinine Level                                     H 1.60 mg/dL (Range 0.70 - 1.11)  (12/12/17 05:27 PM CST)                                                                                                                                                H 1.55 mg/dL (Range  0.70 - 1.11)  (12/29/17 10:18 AM CST)                                                                                                                                          Glucose Level                                     83 mg/dL  (12/12/17 05:27 PM CST)                                                                                                                                                95 mg/dL  (12/29/17 10:18 AM CST)                                                                                                                                          Hgb A1c                                        (12/29/17 10:18 AM CST)                                                                                                                                                5.5   (12/29/17 10:18 AM CST)                                                                                                                                          Magnesium Level                                        (12/12/17 05:27 PM CST)                                                                                                                                                1.9 mg/dL  (12/12/17 05:27 PM CST)                                                                                                                                          Potassium Level                                     3.9 mmol/L  (12/12/17 05:27 PM CST)                                                                                                                                                3.6 mmol/L  (12/29/17 10:18 AM CST)                                                                                                                                          Sodium Level                                     140 mmol/L  (12/12/17 05:27 PM CST)                                                                                                                                                 141 mmol/L  (12/29/17 10:18 AM CST)                                                                                                                                          Uric Acid                                        (12/29/17 10:18 AM CST)                                                                                                                                                5.4 mg/dL  (12/29/17 10:18 AM CST)                                                                                                                                          eGFR                                     L 40 mL/min/1.73m2 (Range > OR = 60 - )  (12/12/17 05:27 PM CST)                                                                                                                                                L 42 mL/min/1.73m2 (Range > OR = 60 - )  (12/29/17 10:18 AM CST)                                                                                                                                          eGFR                                      L 46 mL/min/1.73m2 (Range > OR = 60 - )  (12/12/17 05:27 PM CST)                                                                                                                                                L 48 mL/min/1.73m2 (Range > OR = 60 - )  (12/29/17 10:18 AM CST)                                                                                                                                Coagulation                          INR                               INR                                     1.7   (12/04/17 02:36 PM CST)                                                                                                                                                1.7   (12/19/17 03:10 PM CST)                                                                                                                                                 H 2.9   (12/29/17 10:18 AM CST)                                                                                                                                                1.9   (02/05/18 11:39 AM CST)                                                                                                                                                1.7   (02/23/18 08:34 AM CST)                                                                                                                                     PT                               PT                                        (12/29/17 10:18 AM CST)                                                                                                                                                H 28.7  (Range 9.0 - 11.5)  (12/29/17 10:18 AM CST)                                                                                                                                Hematology                          CBC                               Hgb                                        (12/29/17 10:18 AM CST)                                                                                                                                                L 11.5 gm/dL (Range 13.2 - 17.1)  (12/29/17 10:18 AM CST)                                                                                                                    Diagnostic Results   Chest x-ray reveals small bilateral effusions without infiltrate.

## 2022-02-16 NOTE — PROGRESS NOTES
Patient:   JACINTO JIN            MRN: [id number]            FIN: [id number]               Age:   81 years     Sex:  Male     :  1935   Associated Diagnoses:   Anticoagulated; AF (Atrial Fibrillation); Right heart failure; Unintended weight loss; Gouty bursitis; Acute lower UTI   Author:   Shravan Berrios MD      Visit Information   Visit type:  Scheduled follow-up.    Accompanied by:  No one.    Source of history:  Self.    History limitation:  None.       Chief Complaint   2017 1:36 PM CST    gout, urinating alot with burning sensation, weight loss  (Modified)      History of Present Illness   The patient has a couple of complaints.    1.  He is concerned about weight loss.  He has lost about 15 pounds with his illness       which began with endocarditis, it continued through heart valve surgery.  He feels       that, in general, he is starting to get better.  He is sleeping at night.  He cannot be       as physically-active as he would like due to the weather.      2.  He complains of dysuria for about 1 week.  He has burning, frequency, urgency,       and nocturia.  There is no hematuria.  There is no bleeding.      3. The edema and the breathing have improved.      4.  His right elbow has improved.  We have tapped this revealing gouty crystals, no       evidence of infection.  He was treated with a course of prednisone.              Review of Systems   Constitutional:  Fatigue, Decreased activity, No fever, No chills, No weakness, No night sweats.    Respiratory:  Chronic ROSE, No shortness of breath, No cough.    Cardiovascular:  No chest pain, No palpitations, No peripheral edema.    Gastrointestinal:  No nausea, No vomiting, No diarrhea, No abdominal pain.    Genitourinary:  Negative except as documented in history of present illness.    Hematology/Lymphatics:  Bruising tendency, No bleeding tendency.    Endocrine:  No excessive thirst, No polyuria.    Musculoskeletal:  Chronic knee pain due to  OA, No muscle pain.    Neurologic:  Negative.       Health Status   Allergies:    Allergic Reactions (Selected)  Severity Not Documented  Adhesive tape (No reactions were documented)   Medications:  (Selected)   Prescriptions  Prescribed  Metoprolol Succinate ER 25 mg oral tablet, extended release: See Instructions, Instructions: TAKE ONE-HALF TABLET BY MOUTH TWICE DAILY FOR 30 DAYS, # 30 unknown unit, 5 Refill(s), Type: Soft Stop, Pharmacy: [facility]  atorvastatin 40 mg oral tablet: See Instructions, Instructions: TAKE ONE TABLET BY MOUTH AT BEDTIME, # 90 unknown unit, 2 Refill(s), Type: Soft Stop, Pharmacy: [facility], TAKE ONE TABLET BY MOUTH AT BEDTIME  nitroglycerin 0.4 mg sublingual tablet: 1 tab(s) ( 0.4 mg ), SL, q5min, Instructions: not to exceed 3 doses/15 min--if pain persists, seek medical attention, PRN: for chest pain, # 25 tab(s), 3 Refill(s), Type: Maintenance, Pharmacy: [facility], 1 tab(s) sl q5 min,PRN:for chest pain,Instr...  omeprazole 20 mg oral delayed release capsule: 1 cap(s) ( 20 mg ), po, daily, # 90 cap(s), 3 Refill(s), Type: Maintenance, Pharmacy: [facility], 1 cap(s) po daily  potassium chloride 20 mEq oral tablet, extended release: See Instructions, Instructions: 1 tab qd, # 30 unknown unit, 5 Refill(s), Type: Soft Stop, Pharmacy: [facility]  triamcinolone 0.1% topical cream: 1 nazario, top, bid, # 120 gm, 0 Refill(s), Type: Maintenance, Pharmacy: [facility], 1 nazario top bid  warfarin 3 mg oral tablet: 1 tab(s) ( 3 mg ), po, daily, # 120 unknown unit, 2 Refill(s), Type: Soft Stop, Pharmacy: [facility], TAKE 1 & 1/2 TABLETS BY MOUTH ONCE DAILY SUN,TUES,THURS,SAT TAKE ONE TABLET MON,WED,FRI  Documented Medications  Documented  Bumex 2 mg oral tablet: See Instructions, Instructions: Take 1 tab qd but increase to 2.5mg BID for wt of 220lbs or greater, 0 Refill(s), Type: Maintenance  Coenzyme Q10 100 mg oral capsule: 1 cap(s) ( 100 mg ), po, daily, 0 Refill(s), Type:  Maintenance  Lactobacillus Caps: Lactobacillus Caps, Instructions: Take 1 caps po BID-use this while on abx, Supply, 0 Refill(s), Type: Maintenance  MiraLax oral powder for reconstitution: ( 17 gm ), po, daily, 0 Refill(s), Type: Maintenance  Protegra oral tablet: 1 tab(s), po, daily, 0 Refill(s), Type: Maintenance  Senexon-S: 2 tab(s), po, hs, 0 Refill(s), Type: Maintenance  Senokot 8.6 mg oral tablet: 1-2 tab(s), PO, Once a day (at bedtime), PRN: for constipation, Type: Maintenance  Theragran: 1 tab(s), po, daily, 0 Refill(s), Type: Maintenance  acetaminophen 325 mg oral tablet: 2 tab(s) ( 650 mg ), po, q4 hrs, Instructions: not to exceed 4000 mg/day, 0 Refill(s), Type: Maintenance  aspirin 81 mg oral tablet: 1 tab(s) ( 81 mg ), PO, Daily, 0  melatonin 3 mg oral tablet: 1 tab(s) ( 3 mg ), po, hs, 0 Refill(s), Type: Maintenance  metolazone 2.5 mg oral tablet: 1 tab(s) ( 2.5 mg ), PO, Daily, Instructions: One daily weight >247#, # 30 tab(s), 0 Refill(s), Type: Maintenance  Suspended  Bumex 0.5 mg oral tablet: See Instructions, Instructions: Tkae 1 tab w/ 2mg tablet BID for total dose of 2.5mg BID, 0 Refill(s), Type: Maintenance   Problem list:    All Problems  Anticardiolipin Syndrome / ICD-9-.81 / Confirmed  Anticoagulated / ICD-9-CM V58.61 / Confirmed  Ascites / SNOMED CT 1358266548 / Confirmed  CAD (Coronary Artery Disease) / ICD-9-.00 / Confirmed  AF (Atrial Fibrillation) / SNOMED CT 1958240376 / Confirmed  Colon Polyps / ICD-9-.3 / Confirmed  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 / SNOMED CT 5507332955 / Confirmed  Osteoarthritis of both knees / SNOMED CT 1093839284 / Confirmed  ICD (implantable cardioverter-defibrillator) infection / SNOMED CT 536391355 / Confirmed  Diverticulosis / SNOMED CT 3802497503 / Confirmed  Dupuytren's contracture of right hand / ICD-9-.6 / Confirmed  GOUT / ICD-9- / Confirmed  CHF (Congestive Heart Failure) / SNOMED  CT 180031601 / Confirmed  High Cholesterol / ICD-9-.0 / Confirmed  CHB (complete heart block) / SNOMED CT 1128077090 / Confirmed  H/O acute pancreatitis / SNOMED CT 9407463469 / Confirmed  History of acute bacterial endocarditis / SNOMED CT 9546958512 / Confirmed  History of sepsis / SNOMED CT 4087298906 / Confirmed  History of tricuspid valve replacement / SNOMED CT 9439302147 / Confirmed  IBS (Irritable Bowel Syndrome) / ICD-9-.1 / Confirmed  Acute kidney injury (nontraumatic) / SNOMED CT 4570648710 / Confirmed  Nonischemic dilated cardiomyopathy / SNOMED CT 5971290264 / Confirmed  Obesity / ICD-9-.00 / Confirmed  KEYONA (Obstructive Sleep Apnea) / ICD-9-.23 / Confirmed  Paralysis, diaphragm / SNOMED CT 413724756 / Confirmed  History of coronary artery bypass graft / SNOMED CT 5698842336 / Confirmed  Right heart failure / SNOMED CT 824832064 / Confirmed  Tricuspid valve insufficiency / SNOMED CT [id number] / Confirmed  Type 2 diabetes mellitus / SNOMED CT 934451390 / Confirmed  Inactive: PUD (Peptic Ulcer Disease) / ICD-9-.90  Resolved: BE (bacterial endocarditis) / SNOMED CT 646364893  Resolved: Inpatient stay / SNOMED CT 543570347  Resolved: Inpatient stay / SNOMED CT 785860946  Resolved: Inpatient stay / SNOMED CT 872982746  Resolved: Inpatient stay / SNOMED CT 859407203  Resolved: Inpatient stay / SNOMED CT 377081315  Resolved: Inpatient stay / SNOMED CT 563816044  Resolved: Other and Unspecified Noninfectious Gastroenteritis and Colitis / ICD-9-.9  Canceled: Hypercholesteremia / ICD-9-.0      Histories   Past Medical History:    Active  H/O acute pancreatitis (5838039574): Onset on 12/6/2016 at 81 years.  History of tricuspid valve replacement (9779063369): Onset in the month of 11/2016 at 80 years  History of sepsis (0399221752): Onset on 7/26/2016 at 80 years.  History of acute bacterial endocarditis (0500125998): Onset on 7/23/2016 at 80  years.  Anticardiolipin Syndrome (289.81): Onset on 1/1/2005 at 69 years.  KEYONA (Obstructive Sleep Apnea) (327.23): Onset on 1/1/2003 at 67 years.  Comments:  11/1/2013 CDT 10:58 AM CDT - Shravan Berrios MD  Adequate titration to BIPAP 16/11 on 10/15/2013  AF (Atrial Fibrillation) (0181303786)  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 (1843983331)  IBS (Irritable Bowel Syndrome) (564.1)  Colon Polyps (211.3)  CAD (Coronary Artery Disease) (414.00)  GOUT (274)  CHF (Congestive Heart Failure) (426429882)  Obesity (278.00)  Anticoagulated (V58.61)  High Cholesterol (272.0)  Acute kidney injury (nontraumatic) (8899488573)  CHB (complete heart block) (8824824912)  Nonischemic dilated cardiomyopathy (6479514047)  ICD (implantable cardioverter-defibrillator) infection (124809656)  History of coronary artery bypass graft (9474942752)  Tricuspid valve insufficiency ([id number])  Ascites (3727569594)  Right heart failure (976126580)  Type 2 diabetes mellitus (431128817)  Resolved  Inpatient stay (736619396): Onset on 12/13/2016 at 81 years.  Resolved on 12/19/2016 at 81 years.  Comments:  1/3/2017 CST 9:01 PM Floridalma Brody  @[facility] - Acute on chronic right heart failure  Inpatient stay (819668527): Onset on 12/6/2016 at 81 years.  Resolved on 12/13/2016 at 81 years.  Comments:  12/20/2016 CST 6:02 AM Floridalma Brody  @[facility]  - S/P tricuspid valve replacement with worsening right ventricular function  Inpatient stay (155797708): Onset on 11/29/2016 at 81 years.  Resolved on 12/6/2016 at 81 years.  Comments:  12/20/2016 CST 10:22 PM Floridalma Brody  @[facility] - Severe symptomatic tricuspid valvular insufficiency. Chronic right heart failure.  Inpatient stay (129015685): Onset on 9/28/2015 at 79 years.  Resolved on 10/2/2015 at 79 years.  Comments:  12/20/2016 CST 6:03 AM CHYNA - Floridalma Calderon  @[facility] - Diverticulitis  Inpatient stay (849958599): Onset on  5/17/2015 at 79 years.  Resolved on 5/29/2015 at 79 years.  Comments:  12/20/2016 CST 6:03 AM Floridalma Brody  @[facility] Infected pacemaker  Inpatient stay (052620756): Onset on 7/30/2013 at 77 years.  Resolved on 7/30/2013 at 77 years.  Comments:  12/20/2016 CST 6:02 AM Floridalma Brody  @[facility] - Jaw pain, possible anginal equivalent  Other and Unspecified Noninfectious Gastroenteritis and Colitis (558.9):  Resolved.  BE (bacterial endocarditis) (751472830):  Resolved.   Family History:    Aneurysm  Father  Heart disease  Mother     Procedure history:    TVR - Tricuspid valve replacement (4974004981) on 11/29/2016 at 81 Years.  Cardiac angiogram (7089234040) on 11/1/2016 at 80 Years.  Comments:  11/3/2016 12:05 PM - Shravan Berrios MD  Summary/Conclusions  PRESENTATION / INDICATIONS    Pre-surgical evaluation for cardiac surgery    Severe TR by echo  DIAGNOSTIC - CORONARY    Co-dominant coronary artery system    The left main artery was normal in appearance and free of obstructive disease.    Chronic total occlusion of the proximal LAD    The circumflex artery has minimal disease.    The RCA has moderate disease.    No change since 2013  DIAGNOSTIC - GRAFTS    Chronic total occlusion in the proximal anastomosis of the SVG graft to the RCA    The SVG to the ramus intermediate is patent    The sequential graft to the 1st diagonal is patent.    The sequential graft limb from the diagonal to the LAD is patent.    No change since 2013  HEMODYNAMICS    The LVEDP is mildly elevated    Severely elevated right atrial pressures    No evidence of intracardiac shunting  Limited thoracotomy (7075817329) on 8/23/2016 at 80 Years.  Comments:  8/29/2016 2:45 PM - Floridalma Calderon  Left mini thoracotomy and placement of two epicardial screw in leads.  Implant of left pectoral pacer generatoe.  Removal of automatic cardiac defibrillator (902148398) on 8/4/2016 at 80 Years.  Comments:  8/29/2016 2:49 PM - Floridalma Calderon  Generator  and leads  Implantation of intravenous single chamber cardiac pacemaker system (1722586941) on 8/4/2016 at 80 Years.  Pacemaker change on 4/8/2015 at 79 Years.  Cardiac angiogram (3537827512) on 8/2/2013 at 77 Years.  Comments:  8/8/2013 2:45 PM - Shravan Berrios MD  Summary/Conclusions  PRESENTATION / INDICATIONS   Chest pain  DIAGNOSTIC - CORONARY  Right dominant coronary artery system  DIAGNOSTIC SUMMARY  100% stenosis in the Proximal LAD  30% stenosis in the 1st Marginal  30% stenosis in the Proximal RCA  50% stenosis in the Mid RCA  100% stenosis in the Aorta graft to Distal RCA  DIAGNOSTIC - GRAFTS  The sequential graft to the diagonal branch is patent.  The sequential graft from the diagonal to the LAD is patent.  The SVG to the ramus intermediate is patent  The SVG to RCA is chronically occluded  HEMODYNAMICS  The LVEDP is mildly elevated  RECOMMENDATIONS & PLAN  Medical Rx- no significant change from previous angiogram, there is dramatic mismatch in size between the SVG's and the target arteries with slow filling of the LAD        angiogrqam on 2/3/2012 at 76 Years.  Comments:  4/24/2012 3:33 PM - Shravan Berrios MD  Summary/Conclusions  PRESENTATION / INDICATIONS   Dyspnea and fatigue.  DIAGNOSTIC - CORONARY  Right dominant coronary artery system.  LMCA is normal.  LAD is occluded proximally after the 1st septal branch.  LCx has mild luminal irregularities.  OM1 has 20-30% proximal stenosis.  RCA has 30-40% diffuse disease in the mid segment and otherwise has mild diffuse luminal irregularities.  DIAGNOSTIC - GRAFTS  The sequential SVG to the diagonal and then LAD is widely patent.  There is a significant size mismatch between the graft and the native vessels.  The diagonal and distal LAD have no obvious lesions but are small caliber (< 2.0 mm vessels).  There is some backfilling into the mid LAD.  The SVG to the ramus is widely patent.  There is a significant size mismatch between the graft and the native  "vessel.  The ramus has no obvious lesion but is small caliber (< 2.0 mm vessel).    The SVG to the RCA is chronically occluded.  LEFT VENTRICULAR FUNCTION  Left ventricular function is mildly reduced with EF of 42% and mild global hypokinesis.  No significant MR.  HEMODYNAMICS  The LVEDP is 6 mmHg.  No significant gradient across the aortic valve.  RA 11, RV 29/10, PA 34/17 (mean 25), PCWP 18  Amarilys CO 5.1, TDCO 4.6  RECOMMENDATIONS & PLAN  Consider alternative etiology for symptoms.  Lasix dose decreased to 40 mg QD given relative hypotension and patient reporting feeling \"dry membranes.\"             Colonoscopy (451647632) on 12/15/2006 at 71 Years.  Comments:  8/8/2013 4:06 PM - Shravan Berrios MD  Diverticulosis. No polyps.  Colonoscopy on 6/22/2001 at 65 Years.  Comments:  11/15/2010 8:21 AM - Shravan Berriso MD  hyperplastic polyp  Cholecystectomy (39560212) in the month of 6/2001 at 65 Years.  CABG - Coronary artery bypass graft (324559745) in 2000 at 65 Years.  History of Back Surgery (V45.89) in 2000 at 65 Years.  Colonoscopy (351733395) on 2/17/1993 at 57 Years.  Comments:  8/8/2013 4:06 PM - Shravan Berrios MD  1 adenoma, 1 hyperplastic, diverticulosis.  Left shoulder surgery in 1991 at 56 Years.  Esophagogastroduodenoscopy (472610078).  Cholecystectomy (05917912).  Ablation for atrial fibrillation.  Comments:  5/2/2011 1:56 PM - Madeline Gonzalez  Subsequent pacemaker dependence.   Social History:        Alcohol Assessment: Current            Current                     Comments:                      04/15/2010 - Mag Vargas CMA                     occasional      Tobacco Assessment: Denies Tobacco Use      Employment and Education Assessment            Employed, Work/School description: Farmer.      Exercise and Physical Activity Assessment            Exercise type: Walking.        Physical Examination   Vital Signs   1/23/2017 1:36 PM CST Temperature Tympanic 97.6 DegF  LOW    Peripheral Pulse Rate 80 " bpm    Systolic Blood Pressure 114 mmHg    Diastolic Blood Pressure 72 mmHg    Mean Arterial Pressure 86 mmHg    BP Site Right arm      Measurements from flowsheet : Measurements   1/23/2017 1:36 PM CST    Weight Measured - Standard                215 lb     General:  Alert and oriented, No acute distress, gaunt.    Eye:  Normal conjunctiva.    HENT:  Normocephalic, Normal hearing, Oral mucosa is moist, No pharyngeal erythema.    Neck:  Supple, Non-tender, No carotid bruit, No jugular venous distention, No lymphadenopathy, No thyromegaly.    Respiratory:  Respirations are non-labored, Decreased BS left base unchanged. No wheezes or rales.    Cardiovascular:  Normal rate, Regular rhythm, No murmur, No gallop, Normal peripheral perfusion, No edema, Device right chest. Scar left chest.    Gastrointestinal:  Soft, Non-tender, Non-distended, No organomegaly.    Musculoskeletal:  Normal gait, Olecranon bursa effusion decreased and without warmth or erythema.    Integumentary:  pale.    Neurologic:  No focal deficits.    Cognition and Speech:  Speech clear and coherent, Functional cognition intact.    Psychiatric:  Cooperative, Appropriate mood & affect.       Review / Management   Results review:  Lab results   1/23/2017 2:27 PM CST UA Color Yellow    UA Clarity Cloudy    UA pH 6.5    UA Specific Gravity 1.010    UA Glucose Negative mg/dL    UA Bilirubin Negative    UA Ketones Negative mg/dL    Urine Occult Blood Large    UA Protein 30 mg/dL    UA Nitrite Negative    UA Leukocyte Esterase Large    UA Urobilinogen Normal    UA WBC >100    UA RBC 26-50    UA Bacteria Many    UA Epithelial Cells None Seen   1/17/2017 3:21 PM CST INR 2.9   1/9/2017 12:49 PM CST Collection Site BF ELBOW    Collection Site BF ELBOW    Appearance BF CLOUDY    Color BF RED    NRBC  BF 5,200  HI    Lymphocytes BF 35 %    Neutrophils BF 65 %  HI    Monocytes BF 0 %    Eosinophils BF 0 %    Basophils BF 0 %    Synoviocytes BF 0 %    Crystals BF See  comment /HPF    Comment BF Visible clumping present, results of cell count may be compromised.    Culture Aerobic See comment    Culture Anaerobic See comment     .       Impression and Plan   Diagnosis     Anticoagulated (FYE29-PU Z79.01).     AF (Atrial Fibrillation) (QSB17-ZE I48.2).     Right heart failure (BCA97-DO I50.9).     Unintended weight loss (JAH07-QV R63.4).     Course:  Improving.    Patient Instructions:       Counseled: Patient, Verbalized understanding, weight.    Summary:  Weight loss has stabilized and in general doing better after a long illness.    Orders     Orders (Selected)   Outpatient Orders  Ordered  Dietary Consult (Request): Referred to: Leila Dudley, Reason: Weight loss due to illness, Unintended weight loss  Completed  Albumin Level, serum (Request): Unintended weight loss  Basic Metabolic Panel (Request): Right heart failure  CBC w/o Diff (Request): Anticoagulated  PT (Request): Anticoagulated.     Diagnosis     Gouty bursitis (LZJ26-LA M10.9).     Course:  Improving.    Diagnosis     Acute lower UTI (FUL25-VF N39.0).     Course:  Improving: none.    Orders     Orders (Selected)   Outpatient Orders  Ordered  Urine Culture (Request): Acute lower UTI  Prescriptions  Prescribed  Keflex 500 mg oral capsule: 1 cap(s) ( 500 mg ), PO, QID, x 10 day(s), # 40 cap(s), 0 Refill(s), Type: Acute, Pharmacy: [facility], 1 cap(s) po qid,x10 day(s).       Call if not better.

## 2022-02-16 NOTE — PROGRESS NOTES
Chief Complaint    F/U CHF  History of Present Illness      Is here with his wife for follow-up.  Doing relatively well at present.  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  Diagnosis of anemia of chronic renal disease was made he was not iron deficient.  He sleeps in recliner and is sleeping well.  He no longer snores.  He is chronically fatigued.  No edema angina or bleeding.  He had a urinary tract infection enterococcus that resolved with ampicillin.  Had diarrhea which resolved as well.  Review of Systems      No PND, orthopnea.  Weight is stable at 122 121 pounds.  No abdominal pain no nausea or vomiting.  Physical Exam   Vitals & Measurements    T: 97.0(Tympanic)  HR: 96(Peripheral)  BP: 105/70     HT: 75 in  WT: 224 lb  BMI: 28       Patient appears comfortable.  Alert and oriented.  Speaks in complete sentences.  Not tachypneic.  HEENT exam unremarkable.  Neck supple no thyromegaly.  Carotid pulsations normal.  Chest is clear to auscultation percussion.  Cardiac exam is regular at this point regurgitation murmur unchanged 1+ pretibial edema.  Abdomen nontender neurologic exam nonfocal.  Assessment/Plan       Acute lower UTI         Enterococcus.  Resolved with ampicillin.         Ordered:          [id number] office outpatient visit 25 minutes (Charge), Quantity: 1, V tach  MGUS (monoclonal gammopathy of unknown significance)  B12 deficiency  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  Anticoagulated  Acute lower UTI  Antibiotic-associated diarrhea  AF (Atrial Fibrillation)  Presence of combination inte...                AF (Atrial Fibrillation)         Stable.  Anticoagulated with the device.  Generally a paced rhythm.         Ordered:          [id number] office outpatient visit 25 minutes (Charge), Quantity: 1, V tach  MGUS (monoclonal gammopathy of unknown significance)  B12 deficiency  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  Anticoagulated  Acute  lower UTI  Antibiotic-associated diarrhea  AF (Atrial Fibrillation)  Presence of combination inte...                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         Improved with Aranesp and B12.         Ordered:          [id number] office outpatient visit 25 minutes (Charge), Quantity: 1, V tach  MGUS (monoclonal gammopathy of unknown significance)  B12 deficiency  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  Anticoagulated  Acute lower UTI  Antibiotic-associated diarrhea  AF (Atrial Fibrillation)  Presence of combination inte...                Antibiotic-associated diarrhea         Resolved.  C. difficile negative.         Ordered:          [id number] office outpatient visit 25 minutes (Charge), Quantity: 1, V tach  MGUS (monoclonal gammopathy of unknown significance)  B12 deficiency  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  Anticoagulated  Acute lower UTI  Antibiotic-associated diarrhea  AF (Atrial Fibrillation)  Presence of combination inte...                Anticoagulated         Stable.         Ordered:          [id number] office outpatient visit 25 minutes (Charge), Quantity: 1, V tach  MGUS (monoclonal gammopathy of unknown significance)  B12 deficiency  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  Anticoagulated  Acute lower UTI  Antibiotic-associated diarrhea  AF (Atrial Fibrillation)  Presence of combination inte...                B12 deficiency         Now on B12.         Ordered:          [id number] office outpatient visit 25 minutes (Charge), Quantity: 1, V tach  MGUS (monoclonal gammopathy of unknown significance)  B12 deficiency  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  Anticoagulated  Acute lower UTI  Antibiotic-associated diarrhea  AF (Atrial Fibrillation)  Presence of combination inte...                MGUS (monoclonal gammopathy of unknown significance)         Dr. Abrams following.         Ordered:          [id number] office outpatient visit 25 minutes (Charge), Quantity: 1, V tach  MGUS (monoclonal gammopathy of unknown  significance)  B12 deficiency  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  Anticoagulated  Acute lower UTI  Antibiotic-associated diarrhea  AF (Atrial Fibrillation)  Presence of combination inte...                Nonischemic dilated cardiomyopathy         Weight stable.         Ordered:          [id number] office outpatient visit 25 minutes (Charge), Quantity: 1, V tach  MGUS (monoclonal gammopathy of unknown significance)  B12 deficiency  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  Anticoagulated  Acute lower UTI  Antibiotic-associated diarrhea  AF (Atrial Fibrillation)  Presence of combination inte...                Paralysis, diaphragm         Unchanged.  Good air movement today.         Ordered:          [id number] office outpatient visit 25 minutes (Charge), Quantity: 1, V tach  MGUS (monoclonal gammopathy of unknown significance)  B12 deficiency  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  Anticoagulated  Acute lower UTI  Antibiotic-associated diarrhea  AF (Atrial Fibrillation)  Presence of combination inte...                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                Right heart failure         Weight stable.  Decreased ascites and last CT.         Ordered:          [id number] office outpatient visit 25 minutes (Charge), Quantity: 1, V tach  MGUS (monoclonal gammopathy of unknown significance)  B12 deficiency  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  Anticoagulated  Acute lower UTI  Antibiotic-associated diarrhea  AF (Atrial Fibrillation)  Presence of combination inte...                V tach         This was taken care of by cardiology.  No symptoms.          Ordered:          [id number] office outpatient visit 25 minutes (Charge), Quantity: 1, V tach  MGUS (monoclonal gammopathy of unknown significance)  B12 deficiency  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  Anticoagulated  Acute lower UTI  Antibiotic-associated diarrhea  AF (Atrial Fibrillation)  Presence of combination inte...                Orders:         cephalexin, 1 cap(s) ( 250 mg ), PO, tid, # 21 cap(s), 0 Refill(s), Type: Maintenance, Pharmacy: [facility], 1 cap(s) po tid,x7 day(s)         Return to Clinic (Request), Return in 6-8 weeks  Problem List/Past Medical History    Ongoing     Acute kidney injury (nontraumatic)     AF (Atrial Fibrillation)     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     Anticardiolipin syndrome     Anticoagulated     Ascites     B12 deficiency     CAD (coronary artery disease)     Cardiorenal syndrome     CHB (complete heart block)     CHF (Congestive Heart Failure)     Colon polyps     Diverticulosis     Dupuytren's contracture of right hand     Gout     H/O acute pancreatitis     High cholesterol     History of acute bacterial endocarditis     History of coronary artery bypass graft     History of tricuspid valve replacement     IBS (irritable bowel syndrome)     ICD (implantable cardioverter-defibrillator) infection     Iron deficiency anemia     MGUS (monoclonal gammopathy of unknown significance)     Nonischemic dilated cardiomyopathy     Obesity     KEYONA (obstructive sleep apnea)     Osteoarthritis of both knees     Paralysis, diaphragm     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     PUD (peptic ulcer disease)     Right heart failure     Tricuspid valve insufficiency     Type 2 diabetes mellitus     Vitamin B 12 deficiency    Historical     BE (bacterial endocarditis)     History of sepsis     Inpatient stay     Inpatient stay     Inpatient stay     Inpatient stay     Inpatient stay     Inpatient stay     Inpatient stay     Other and Unspecified  Noninfectious Gastroenteritis and Colitis  Medications    acetaminophen 325 mg oral tablet, 650 mg= 2 tab(s), po, q4 hrs    allopurinol, 300 mg, po, daily    ampicillin 500 mg oral capsule, 500 mg= 1 cap(s), po, qid    Aranesp 200 mcg/mL injectable solution, 200 mcg= 1 mL, subcutaneous, q2 wks    atorvastatin 40 mg oral tablet, See Instructions    digoxin 125 mcg (0.125 mg) oral tablet, 125 mcg= 1 tab(s), po, daily    furosemide 80 mg oral tablet, 80 mg= 1 tab(s), po, bid    metOLazone 2.5 mg oral tablet, See Instructions    Metoprolol Succinate ER 25 mg oral tablet, extended release, See Instructions, 5 refills    nitroglycerin 0.4 mg sublingual tablet, 0.4 mg= 1 tab(s), sl, q5 min, PRN, 3 refills    omeprazole 20 mg oral delayed release capsule, 40 mg= 2 cap(s), po, bid, 3 refills    potassium chloride 20 mEq oral tablet, extended release, See Instructions, 5 refills    Protegra oral tablet, 1 tab(s), po, daily    Senexon-S, 2 tab(s), po, hs    Senokot 8.6 mg oral tablet, 1-2 tab(s), po, hs, PRN    triamcinolone 0.1% topical cream, 1 nazario, top, bid    Vitamin B12 1000 mcg/mL injectable solution, 1000 mcg, im, qmonth    warfarin 3 mg oral tablet, See Instructions, 11 refills  Allergies    adhesive tape  Social History    Smoking Status - 08/07/2017     Never smoker     Alcohol - Current, 11/11/2010      Current     Employment and Education - 11/11/2010      Employed, Work/School description: Farmer.     Exercise and Physical Activity - 11/11/2010      Exercise type: Walking.     Tobacco - Denies Tobacco Use, 11/11/2010  Family History    Aneurysm: Father.    Heart disease: Mother.  Immunizations      Vaccine Date Status      influenza virus vaccine, inactivated 09/28/2015 Given      pneumococcal (PCV13) 04/21/2015 Given      influenza virus vaccine, inactivated 08/28/2014 Given      influenza virus vaccine, inactivated 10/29/2012 Given      influenza virus vaccine, inactivated 09/23/2011 Given      influenza virus  vaccine, inactivated 10/26/2010 Given      influenza 10/23/2008 Recorded      Td 09/01/2005 Recorded      pneumococcal 11/28/2001 Recorded  Lab Results      Results (Last 90 days)                Laboratory                     Chemistry                          General Chemistry                               AGAP                                     13   (05/26/17 02:37 PM CDT)                                                                                                                                                13   (06/06/17 11:05 AM CDT)                                                                                                                                                13   (06/21/17 02:48 PM CDT)                                                                                                                                          Anion Gap TR:      12 mEq/L  (05/28/17 03:38 PM CDT)                                                                                                                                          BUN                                     H 29 mg/dL (Range 7 - 25)  (05/22/17 10:59 AM CDT)                                                                                                                                                H 38 mg/dL (Range 8 - 25)  (05/26/17 02:37 PM CDT)                                                                                                                                                H 48 mg/dL (Range 8 - 25)  (06/06/17 11:05 AM CDT)                                                                                                                                                H 32 mg/dL (Range 8 - 25)  (06/21/17 02:48 PM CDT)                                                                                                                                                H 31 mg/dL (Range 7 - 25)  (07/06/17 03:31 PM CDT)                                                                                                                                                 H 30 mg/dL (Range 7 - 25)  (07/31/17 01:08 PM CDT)                                                                                                                                          BUN TR:      34 mg/dL  (05/28/17 03:38 PM CDT)                                                                                                                                          BUN/Creat Ratio                                     16   (05/22/17 10:59 AM CDT)                                                                                                                                                20   (05/26/17 02:37 PM CDT)                                                                                                                                                H 23  (Range 10 - 20)  (06/06/17 11:05 AM CDT)                                                                                                                                                15   (06/21/17 02:48 PM CDT)                                                                                                                                                15   (07/06/17 03:31 PM CDT)                                                                                                                                                18   (07/31/17 01:08 PM CDT)                                                                                                                                          BUN/Creatinine Ratio TR:      20   (05/28/17 03:38 PM CDT)                                                                                                                                          CO2 Level                                     29 mmol/L  (05/22/17 10:59 AM CDT)                                                                                                                                                 27 mmol/L  (05/26/17 02:37 PM CDT)                                                                                                                                                29 mmol/L  (06/06/17 11:05 AM CDT)                                                                                                                                                29 mmol/L  (06/21/17 02:48 PM CDT)                                                                                                                                                28 mmol/L  (07/06/17 03:31 PM CDT)                                                                                                                                                31 mmol/L  (07/31/17 01:08 PM CDT)                                                                                                                                          CO2 TR:      25 mEq/L  (05/28/17 03:38 PM CDT)                                                                                                                                          Calcium Level                                     9.3 mg/dL  (05/26/17 02:37 PM CDT)                                                                                                                                                9.9 mg/dL  (06/06/17 11:05 AM CDT)                                                                                                                                                9.3 mg/dL  (05/22/17 10:59 AM CDT)                                                                                                                                                9.4 mg/dL  (06/21/17 02:48 PM CDT)                                                                                                                                                9.0 mg/dL  (07/06/17 03:31 PM CDT)                                                                                                                                                 9.2 mg/dL  (07/31/17 01:08 PM CDT)                                                                                                                                          Calcium TR:      8.9 mEq/dL  (05/28/17 03:38 PM CDT)                                                                                                                                          Chloride Level                                     98 mmol/L  (05/22/17 10:59 AM CDT)                                                                                                                                                100 mmol/L  (05/26/17 02:37 PM CDT)                                                                                                                                                100 mmol/L  (06/06/17 11:05 AM CDT)                                                                                                                                                L 97 mmol/L (Range 98 - 110)  (06/21/17 02:48 PM CDT)                                                                                                                                                102 mmol/L  (07/06/17 03:31 PM CDT)                                                                                                                                                99 mmol/L  (07/31/17 01:08 PM CDT)                                                                                                                                          Chloride TR:      102 mEq/L  (05/28/17 03:38 PM CDT)                                                                                                                                          Creatinine Level                                     H 1.88 mg/dL (Range 0.72 - 1.25)  (05/26/17 02:37 PM CDT)                                                                                                                                                 H 2.11 mg/dL (Range 0.72 - 1.25)  (06/06/17 11:05 AM CDT)                                                                                                                                                H 1.80 mg/dL (Range 0.70 - 1.18)  (05/22/17 10:59 AM CDT)                                                                                                                                                H 2.19 mg/dL (Range 0.72 - 1.25)  (06/21/17 02:48 PM CDT)                                                                                                                                                H 2.05 mg/dL (Range 0.70 - 1.18)  (07/06/17 03:31 PM CDT)                                                                                                                                                H 1.64 mg/dL (Range 0.70 - 1.18)  (07/31/17 01:08 PM CDT)                                                                                                                                          Creatinine TR:      1.68 mg/dL  (05/28/17 03:38 PM CDT)                                                                                                                                          Glucose Level                                     99 mg/dL  (05/22/17 10:59 AM CDT)                                                                                                                                                94 mg/dL  (05/26/17 02:37 PM CDT)                                                                                                                                                H 111 mg/dL (Range 65 - 100)  (06/06/17 11:05 AM CDT)                                                                                                                                                92 mg/dL  (06/21/17 02:48 PM CDT)                                                                                                                                                 H 113 mg/dL (Range 65 - 99)  (07/06/17 03:31 PM CDT)                                                                                                                                                H 111 mg/dL (Range 65 - 99)  (07/31/17 01:08 PM CDT)                                                                                                                                          Glucose Level TR:      102 mg/dL  (05/28/17 03:38 PM CDT)                                                                                                                                          Magnesium Level                                        (07/31/17 01:08 PM CDT)                                                                                                                                                1.7 mg/dL  (07/31/17 01:08 PM CDT)                                                                                                                                          Potassium Level                                     3.9 mmol/L  (05/26/17 02:37 PM CDT)                                                                                                                                                3.9 mmol/L  (05/22/17 10:59 AM CDT)                                                                                                                                                3.8 mmol/L  (06/06/17 11:05 AM CDT)                                                                                                                                                L 3.3 mmol/L (Range 3.5 - 5.0)  (06/21/17 02:48 PM CDT)                                                                                                                                                3.8 mmol/L  (07/06/17 03:31 PM CDT)                                                                                                                                                 3.9 mmol/L  (07/31/17 01:08 PM CDT)                                                                                                                                          Potassium Level TR:      3.7 mEq/L  (05/28/17 03:38 PM CDT)                                                                                                                                          Sodium Level                                     140 mmol/L  (05/26/17 02:37 PM CDT)                                                                                                                                                136 mmol/L  (05/22/17 10:59 AM CDT)                                                                                                                                                142 mmol/L  (06/06/17 11:05 AM CDT)                                                                                                                                                139 mmol/L  (06/21/17 02:48 PM CDT)                                                                                                                                                139 mmol/L  (07/06/17 03:31 PM CDT)                                                                                                                                                138 mmol/L  (07/31/17 01:08 PM CDT)                                                                                                                                          Sodium Level TR:      139 mEq/L  (05/28/17 03:38 PM CDT)                                                                                                                                          eGFR                                     L 35 mL/min/1.73m2 (Range > OR = 60 - )  (05/22/17 10:59 AM CDT)                                                                                                                                                 L 30 mL/min/1.73m2 (Range > OR = 60 - )  (07/06/17 03:31 PM CDT)                                                                                                                                                L 39 mL/min/1.73m2 (Range > OR = 60 - )  (07/31/17 01:08 PM CDT)                                                                                                                                          eGFR                                      L 41 mL/min/1.73m2 (Range > OR = 60 - )  (05/22/17 10:59 AM CDT)                                                                                                                                                L 42  (Range >60 - )  (05/26/17 02:37 PM CDT)                                                                                                                                                L 37  (Range >60 - )  (06/06/17 11:05 AM CDT)                                                                                                                                                L 35  (Range >60 - )  (06/21/17 02:48 PM CDT)                                                                                                                                                L 35 mL/min/1.73m2 (Range > OR = 60 - )  (07/06/17 03:31 PM CDT)                                                                                                                                                L 45 mL/min/1.73m2 (Range > OR = 60 - )  (07/31/17 01:08 PM CDT)                                                                                                                                          eGFR Non-                                     L 35  (Range >60 - )  (05/26/17 02:37 PM CDT)                                                                                                                                                 L 30  (Range >60 - )  (06/06/17 11:05 AM CDT)                                                                                                                                                L 29  (Range >60 - )  (06/21/17 02:48 PM CDT)                                                                                                                                          eGFR TR:      40 mL/min  (05/28/17 03:38 PM CDT)                                                                                                                                     Iron Studies                               Ferritin                                        (06/21/17 02:42 PM CDT)                                                                                                                                                50 ng/mL  (06/21/17 02:42 PM CDT)                                                                                                                                Coagulation                          INR                               INR                                     3.2   (05/12/17 02:19 PM CDT)                                                                                                                                                3.4   (05/23/17 10:55 AM CDT)                                                                                                                                                1.9   (06/01/17 11:45 AM CDT)                                                                                                                                                2.2   (06/06/17 11:40 AM CDT)                                                                                                                                                H 3.4  (Range  - <1.3)  (05/26/17 02:37 PM CDT)                                                                                                                                                 H 3.4   (05/23/17 04:04 PM CDT)                                                                                                                                                   (07/06/17 02:57 PM CDT)                                                                                                                                                H 2.7   (07/06/17 02:57 PM CDT)                                                                                                                                                2.6   (07/26/17 11:28 AM CDT)                                                                                                                                     INR TR                               INR TR:      1.6   (05/28/17 03:38 PM CDT)                                                                                                                                     PT                               PT                                        (05/23/17 04:04 PM CDT)                                                                                                                                                   (05/26/17 02:37 PM CDT)                                                                                                                                                H 33.6  (Range 9.0 - 11.5)  (05/23/17 04:04 PM CDT)                                                                                                                                     PT TR                               PT TR:      19.2   (05/28/17 03:38 PM CDT)                                                                                                                                     PTT                               PTT:      H 44  (Range 22 - 34)  (05/22/17 10:59 AM CDT)                                                                                                                                      Prothrombin Time                               Prothrombin Time:      H 32.1  (Range 10.5 - 13.1)  (07/06/17 02:57 PM CDT)                                                                                                                                     Protime                               Protime:      H 33.4  (Range 11.7 - 14.1)  (05/26/17 02:37 PM CDT)                                                                                                                                Hematology                          CBC                               CBC Path Review:         (07/06/17 03:33 PM CDT)                                                                                                                                          Hct                                     L 26.6 % (Range 37.0 - 53.0)  (05/26/17 02:37 PM CDT)                                                                                                                                                L 28.5 % (Range 37.0 - 53.0)  (06/06/17 11:05 AM CDT)                                                                                                                                                L 27.3 % (Range 38.5 - 50.0)  (07/06/17 03:33 PM CDT)                                                                                                                                          Hgb                                        (05/22/17 10:59 AM CDT)                                                                                                                                                L 8.5 gm/dL (Range 13.2 - 17.1)  (05/22/17 10:59 AM CDT)                                                                                                                                                L 8.1 g/dL (Range 13.5 - 17.5)  (05/26/17 02:37 PM CDT)                                                                                                                                                 L 8.6 g/dL (Range 13.5 - 17.5)  (06/06/17 11:05 AM CDT)                                                                                                                                                   (06/21/17 02:48 PM CDT)                                                                                                                                                L 8.0 g/dL (Range 13.5 - 17.5)  (06/21/17 02:48 PM CDT)                                                                                                                                                L 8.4 gm/dL (Range 13.2 - 17.1)  (07/06/17 03:33 PM CDT)                                                                                                                                          Hgb TR:      7.9 g/dL  (05/28/17 03:38 PM CDT)                                                                                                                                          MCH                                     26.8 pg  (06/06/17 11:05 AM CDT)                                                                                                                                                26.8 pg  (05/26/17 02:37 PM CDT)                                                                                                                                                L 26.5 pg (Range 27.0 - 33.0)  (07/06/17 03:33 PM CDT)                                                                                                                                          MCHC                                     L 30.5 g/dL (Range 32.0 - 36.0)  (05/26/17 02:37 PM CDT)                                                                                                                                                L 30.2 g/dL (Range 32.0 - 36.0)  (06/06/17 11:05 AM CDT)                                                                                                                                                 L 30.8 gm/dL (Range 32.0 - 36.0)  (07/06/17 03:33 PM CDT)                                                                                                                                          MCV                                     88 fL  (05/26/17 02:37 PM CDT)                                                                                                                                                89 fL  (06/06/17 11:05 AM CDT)                                                                                                                                                87 fL  (06/21/17 02:48 PM CDT)                                                                                                                                                86.1 fL  (07/06/17 03:33 PM CDT)                                                                                                                                          MPV                                     9.6 fL  (05/26/17 02:37 PM CDT)                                                                                                                                                9.7 fL  (06/06/17 11:05 AM CDT)                                                                                                                                                10.9 fL  (07/06/17 03:33 PM CDT)                                                                                                                                          Platelet                                     178   (05/26/17 02:37 PM CDT)                                                                                                                                                174   (06/06/17 11:05 AM CDT)                                                                                                                                                 140   (07/06/17 03:33 PM CDT)                                                                                                                                          RBC                                     L 3.02  (Range 4.30 - 5.90)  (05/26/17 02:37 PM CDT)                                                                                                                                                L 3.21  (Range 4.30 - 5.90)  (06/06/17 11:05 AM CDT)                                                                                                                                                L 3.17  (Range 4.20 - 5.80)  (07/06/17 03:33 PM CDT)                                                                                                                                          RDW                                     H 20.7 % (Range 11.5 - 15.5)  (06/06/17 11:05 AM CDT)                                                                                                                                                H 21.4 % (Range 11.5 - 15.5)  (05/26/17 02:37 PM CDT)                                                                                                                                                H 19.5 % (Range 11.0 - 15.0)  (07/06/17 03:33 PM CDT)                                                                                                                                          WBC                                     4.7   (06/06/17 11:05 AM CDT)                                                                                                                                                6.1   (05/26/17 02:37 PM CDT)                                                                                                                                                5.5   (07/06/17 03:33 PM CDT)                                                                                                                                      Other Hematology                               Retic Abs#                                     [id number]   (05/22/17 10:59 AM CDT)                                                                                                                                                [id number]   (06/21/17 02:42 PM CDT)                                                                                                                                          Reticulocyte                                     1.8 %  (05/22/17 10:59 AM CDT)                                                                                                                                                1.1 %  (06/21/17 02:42 PM CDT)                                                                                                                                Microbiology                          Bacteriology                               Culture Urine                                        (07/20/17 06:53 PM CDT)                                                                                                                                                See comment   (07/20/17 06:53 PM CDT)                                                                                                                                Urinalysis                          UA Dipstick                               UA Bilirubin:      Negative   (07/20/17 06:11 PM CDT)                                                                                                                                          UA Clarity:      Slightly Cloudy   (07/20/17 06:11 PM CDT)                                                                                                                                          UA Color:      Yellow   (07/20/17 06:11 PM CDT)                                                                                                                                           UA Glucose:      Negative mg/dL  (07/20/17 06:11 PM CDT)                                                                                                                                          UA Ketones:      Negative mg/dL  (07/20/17 06:11 PM CDT)                                                                                                                                          UA Leukocyte Esterase:      Large   (07/20/17 06:11 PM CDT)                                                                                                                                          UA Nitrite:      Negative   (07/20/17 06:11 PM CDT)                                                                                                                                          UA Protein:      30 mg/dL  (07/20/17 06:11 PM CDT)                                                                                                                                          UA Specific Gravity:      1.015   (07/20/17 06:11 PM CDT)                                                                                                                                          UA Urobilinogen:      Normal   (07/20/17 06:11 PM CDT)                                                                                                                                          UA pH:      6.0   (07/20/17 06:11 PM CDT)                                                                                                                                          Urine Occult Blood:      Large   (07/20/17 06:11 PM CDT)                                                                                                                                     UA Microscopic                               UA Bacteria:      Many   (07/20/17 06:11 PM CDT)                                                                                                                                           UA Epithelial Cells:      None Seen   (07/20/17 06:11 PM CDT)                                                                                                                                          UA RBC:      11-25   (07/20/17 06:11 PM CDT)                                                                                                                                          UA WBC:      >100   (07/20/17 06:11 PM CDT)                                                                                                                                          UA WBC Clumps:      Present   (07/20/17 06:11 PM CDT)                                                                                                                    Diagnostic Results   From July 31, 2017 electrolytes normal glucose 111 BUN 30 creatinine 1.64 calcium and magnesium normal.  From July 26 potassium was 3.3 creatinine 1.82 basic medical profile otherwise normal hemoglobin 9.5 white count 5.3 platelets 120,000 INR 2.6 urinalysis normal there were a few fecal leukocytes C. difficile was negative.  From August 4 hemoglobin 10.8.

## 2022-02-16 NOTE — NURSING NOTE
Comprehensive Intake Entered On:  3/1/2019 3:24 PM CST    Performed On:  3/1/2019 3:22 PM CST by Ellen Parker MA               Summary   Chief Complaint :   follow up   Menstrual Status :   N/A   Weight Measured :   216 lb(Converted to: 216 lb 0 oz, 97.98 kg)    Height Measured :   75 in(Converted to: 6 ft 3 in, 190.50 cm)    Body Mass Index :   27 kg/m2 (HI)    Body Surface Area :   2.27 m2   Systolic Blood Pressure :   102 mmHg   Diastolic Blood Pressure :   68 mmHg   Mean Arterial Pressure :   79 mmHg   Peripheral Pulse Rate :   96 bpm   BP Site :   Right arm   Ellen Parker MA - 3/1/2019 3:22 PM CST   Health Status   Allergies Verified? :   Yes   Medication History Verified? :   Yes   Ellen Parker MA - 3/1/2019 3:22 PM CST   Meds / Allergies   (As Of: 3/1/2019 3:24:39 PM CST)   Allergies (Active)   adhesive tape  Estimated Onset Date:   Unspecified ; Created By:   Floridalma Calderon; Reaction Status:   Active ; Category:   Other ; Substance:   adhesive tape ; Type:   Allergy ; Updated By:   Floridalma Calderon; Reviewed Date:   2/7/2019 10:51 AM CST      No Known Medication Allergies  Estimated Onset Date:   Unspecified ; Created By:   Ellen Parker MA; Reaction Status:   Active ; Category:   Drug ; Substance:   No Known Medication Allergies ; Type:   Allergy ; Updated By:   Ellen Parker MA; Reviewed Date:   2/7/2019 10:51 AM CST        Medication List   (As Of: 3/1/2019 3:24:39 PM CST)   Prescription/Discharge Order    allopurinol 300 mg oral tablet  :   allopurinol 300 mg oral tablet ; Status:   Prescribed ; Ordered As Mnemonic:   allopurinol 300 mg oral tablet ; Simple Display Line:   See Instructions, TAKE ONE TABLET BY MOUTH ONCE DAILY, 90 unknown unit, 3 Refill(s) ; Ordering Provider:   Shravan Berrios MD; Catalog Code:   allopurinol ; Order Dt/Tm:   5/10/2018 11:15:36 AM          atorvastatin 40 mg oral tablet  :   atorvastatin 40 mg oral tablet ; Status:   Prescribed ; Ordered As Mnemonic:   atorvastatin 40 mg oral  tablet ; Simple Display Line:   See Instructions, TAKE ONE TABLET BY MOUTH AT BEDTIME, 90 unknown unit ; Ordering Provider:   Shravan Berrios MD; Catalog Code:   atorvastatin ; Order Dt/Tm:   11/13/2017 11:13:28 AM          clotrimazole topical  :   clotrimazole topical ; Status:   Prescribed ; Ordered As Mnemonic:   clotrimazole 1% topical cream ; Simple Display Line:   1 nazario, TOP, BID, for 7 day(s), 30 gm, 0 Refill(s) ; Ordering Provider:   Rissa Arauz MD; Catalog Code:   clotrimazole topical ; Order Dt/Tm:   6/22/2018 3:35:41 PM          Metoprolol Succinate ER 25 mg oral tablet, extended release  :   Metoprolol Succinate ER 25 mg oral tablet, extended release ; Status:   Prescribed ; Ordered As Mnemonic:   Metoprolol Succinate ER 25 mg oral tablet, extended release ; Simple Display Line:   See Instructions, TAKE ONE-HALF TABLET BY MOUTH TWICE DAILY, 30 unknown unit, 11 Refill(s) ; Ordering Provider:   Shravan Berrios MD; Catalog Code:   metoprolol ; Order Dt/Tm:   9/25/2017 1:33:08 PM          metroNIDAZOLE topical  :   metroNIDAZOLE topical ; Status:   Prescribed ; Ordered As Mnemonic:   MetroGel 1% topical gel ; Simple Display Line:   1 nazario, Topical, daily, 1 tubes, 5 Refill(s) ; Ordering Provider:   Rissa Arauz MD; Catalog Code:   metroNIDAZOLE topical ; Order Dt/Tm:   6/29/2018 3:46:29 PM          mirtazapine  :   mirtazapine ; Status:   Prescribed ; Ordered As Mnemonic:   mirtazapine 15 mg oral tablet ; Simple Display Line:   15 mg, 1 tab(s), PO, Once a day (at bedtime), 30 tab(s), 5 Refill(s) ; Ordering Provider:   Shravan Berrios MD; Catalog Code:   mirtazapine ; Order Dt/Tm:   3/27/2018 11:38:20 AM          Misc Prescription  :   Misc Prescription ; Status:   Prescribed ; Ordered As Mnemonic:   [id number] URINARY DRAIN BAG 2000ML MG BEAD ; Simple Display Line:   See Instructions, USE AS DIRECTED, 1 unknown unit ; Ordering Provider:   Shravan Berrios MD; Catalog Code:   Miscellaneous Prescription  ; Order Dt/Tm:   6/4/2018 7:34:17 AM          nitroglycerin  :   nitroglycerin ; Status:   Prescribed ; Ordered As Mnemonic:   nitroglycerin 0.4 mg sublingual tablet ; Simple Display Line:   0.4 mg, 1 tab(s), SL, q5min, not to exceed 3 doses/15 min--if pain persists, seek medical attention, 25 tab(s), PRN: for chest pain ; Ordering Provider:   Shravan Berrios MD; Catalog Code:   nitroglycerin ; Order Dt/Tm:   8/28/2014 11:35:30 AM          omeprazole  :   omeprazole ; Status:   Prescribed ; Ordered As Mnemonic:   omeprazole 20 mg oral delayed release capsule ; Simple Display Line:   20 mg, 1 cap(s), po, daily, 90 cap(s), 3 Refill(s) ; Ordering Provider:   Shravan Berrios MD; Catalog Code:   omeprazole ; Order Dt/Tm:   1/9/2017 12:26:06 PM          potassium chloride 20 mEq oral tablet, extended release  :   potassium chloride 20 mEq oral tablet, extended release ; Status:   Prescribed ; Ordered As Mnemonic:   potassium chloride 20 mEq oral tablet, extended release ; Simple Display Line:   See Instructions, 3 tab BID, 30 unknown unit, 5 Refill(s) ; Ordering Provider:   Shravan Berrios MD; Catalog Code:   potassium chloride ; Order Dt/Tm:   4/13/2016 11:25:17 AM          predniSONE  :   predniSONE ; Status:   Prescribed ; Ordered As Mnemonic:   predniSONE 20 mg oral tablet ; Simple Display Line:   See Instructions, 2 po daily for 3-5 days prn gout, 30 EA, 0 Refill(s) ; Ordering Provider:   Shravan Berrios MD; Catalog Code:   predniSONE ; Order Dt/Tm:   12/29/2017 10:06:41 AM          tamsulosin  :   tamsulosin ; Status:   Prescribed ; Ordered As Mnemonic:   tamsulosin 0.4 mg oral capsule ; Simple Display Line:   0.4 mg, 1 cap(s), Oral, daily, 30 cap(s), 0 Refill(s) ; Ordering Provider:   Shravan Berrios MD; Catalog Code:   tamsulosin ; Order Dt/Tm:   7/20/2018 2:46:14 PM          warfarin 3 mg oral tablet  :   warfarin 3 mg oral tablet ; Status:   Prescribed ; Ordered As Mnemonic:   warfarin 3 mg oral tablet ; Simple  Display Line:   1 tab(s), Oral, daily, 90 unknown unit ; Ordering Provider:   Shravan Berrios MD; Catalog Code:   warfarin ; Order Dt/Tm:   1/8/2019 3:42:05 PM            Home Meds    acetaminophen  :   acetaminophen ; Status:   Documented ; Ordered As Mnemonic:   acetaminophen 325 mg oral tablet ; Simple Display Line:   650 mg, 2 tab(s), po, q4 hrs, not to exceed 4000 mg/day, 0 Refill(s) ; Catalog Code:   acetaminophen ; Order Dt/Tm:   12/20/2016 1:58:24 PM          cyanocobalamin  :   cyanocobalamin ; Status:   Documented ; Ordered As Mnemonic:   Vitamin B12 1000 mcg/mL injectable solution ; Simple Display Line:   1,000 mcg, im, qmonth, 0 Refill(s) ; Catalog Code:   cyanocobalamin ; Order Dt/Tm:   7/24/2017 11:12:05 AM          digoxin  :   digoxin ; Status:   Documented ; Ordered As Mnemonic:   digoxin 125 mcg (0.125 mg) oral tablet ; Simple Display Line:   125 mcg, 1 tab(s), po, daily, 0 Refill(s) ; Catalog Code:   digoxin ; Order Dt/Tm:   4/19/2017 10:47:22 AM          furosemide  :   furosemide ; Status:   Documented ; Ordered As Mnemonic:   furosemide 80 mg oral tablet ; Simple Display Line:   80 mg, 1 tab(s), po, bid, take at 8am and 2pm, 0 Refill(s) ; Catalog Code:   furosemide ; Order Dt/Tm:   3/15/2017 3:19:58 PM          metOLazone  :   metOLazone ; Status:   Documented ; Ordered As Mnemonic:   metOLazone 2.5 mg oral tablet ; Simple Display Line:   See Instructions, 1 tab(s) po every other daily for weight > 224#. Per Dr Quintana 2/7/19 increase metolazone 1-2 times per week to acheive weight of 220 lbs, 0 Refill(s) ; Catalog Code:   metOLazone ; Order Dt/Tm:   4/19/2017 10:48:13 AM          multivitamin  :   multivitamin ; Status:   Documented ; Ordered As Mnemonic:   Protegra oral tablet ; Simple Display Line:   1 tab(s), po, daily ; Catalog Code:   multivitamin ; Order Dt/Tm:   6/1/2015 8:38:03 AM          leonelna  :   senna ; Status:   Documented ; Ordered As Mnemonic:   Senokot 8.6 mg oral tablet ;  Simple Display Line:   1-2 tab(s), PO, Once a day (at bedtime), PRN: for constipation ; Catalog Code:   senna ; Order Dt/Tm:   12/20/2016 2:00:45 PM

## 2022-02-16 NOTE — PROGRESS NOTES
Chief Complaint    follow up - medication questions  History of Present Illness      Born has been well.  He wanted clarification about how often he was told to the infusion center.  [facility].  No bleeding, edema, chest pain, PND, orthopnea.  Feels like his mood is doing well and appetite is improved a bit.  Weight is stable at home at 214 pounds.  Review of Systems      No headache, nausea, vomiting, diarrhea.  Generally sleeping well.  He feels like he is getting his bladder emptied well though given his history of minor neurogenic bladder I recommended he follow-up with urology.  Physical Exam   Vitals & Measurements    HR: 89(Peripheral)  BP: 112/75     HT: 75 in  WT: 218 lb  BMI: 27.25       Patient appears comfortable.  Alert and oriented.  Chest clear.  Cardiac exam is regular.  Pacer.  Abdomen nontender.  No edema.  Assessment/Plan       Anticoagulated (Z79.01)         INR next week.         Ordered:          85957 office outpatient visit 25 minutes (Charge), Quantity: 1, Anticoagulated  Stage 3 chronic kidney disease  Right heart failure  Iron deficiency anemia  Inguinal hernia, right  History of tricuspid valve replacement  Ascites                Ascites (R18.8)         Is diuresed significantly and now stabilized.  BMP next week.         Ordered:          21456 office outpatient visit 25 minutes (Charge), Quantity: 1, Anticoagulated  Stage 3 chronic kidney disease  Right heart failure  Iron deficiency anemia  Inguinal hernia, right  History of tricuspid valve replacement  Ascites          Return to Clinic (Request), RFV: CMP per Dr. Mario Cartagena fax to , Return in one month                BPH with urinary obstruction (N40.1)         Suggested follow-up with urology.         Ordered:          Urology Consult (Request), Referred to: Tiffany, Reason: F/U urinary retention, BPH with urinary obstruction                History  of tricuspid valve replacement (Z95.2)         Ordered:          81501 office outpatient visit 25 minutes (Charge), Quantity: 1, Anticoagulated  Stage 3 chronic kidney disease  Right heart failure  Iron deficiency anemia  Inguinal hernia, right  History of tricuspid valve replacement  Ascites                Inguinal hernia, right (K40.90)         Repair been stabilized.  High risk.         Ordered:          95055 office outpatient visit 25 minutes (Charge), Quantity: 1, Anticoagulated  Stage 3 chronic kidney disease  Right heart failure  Iron deficiency anemia  Inguinal hernia, right  History of tricuspid valve replacement  Ascites                Iron deficiency anemia (D50.9)         Seems to be improving after small bowel study with treatment of AV malformations.         Ordered:          28048 office outpatient visit 25 minutes (Charge), Quantity: 1, Anticoagulated  Stage 3 chronic kidney disease  Right heart failure  Iron deficiency anemia  Inguinal hernia, right  History of tricuspid valve replacement  Ascites                Right heart failure (I50.9)         Improving         Ordered:          87548 office outpatient visit 25 minutes (Charge), Quantity: 1, Anticoagulated  Stage 3 chronic kidney disease  Right heart failure  Iron deficiency anemia  Inguinal hernia, right  History of tricuspid valve replacement  Ascites                Stage 3 chronic kidney disease (N18.3)         Ordered:          02954 office outpatient visit 25 minutes (Charge), Quantity: 1, Anticoagulated  Stage 3 chronic kidney disease  Right heart failure  Iron deficiency anemia  Inguinal hernia, right  History of tricuspid valve replacement  Ascites                Orders:         Return to Clinic (Request), RFV: F/U CHF, Return in 6-8 weeks  Patient Information     Name:ALEX JINN E      Address:      [address] [place] [id number]     Sex:Male     YOB: 1935     [phone]  [phone]     Emergency Contact:GERA JIN     MRN:[id number]     FIN:[id number]     Location:[facility]     Date of Service:03/28/2019      Primary Care Physician:       Shravan Berrios MD, [phone]      Attending Physician:       Shravan Berrios MD, [phone]  Problem List/Past Medical History    Ongoing     Acquired arteriovenous malformation of colon       Comments: Treated.     Acute kidney injury (nontraumatic)     AF (Atrial Fibrillation)     Anemia of renal disease     Anticardiolipin syndrome     Anticoagulated     Ascites       Comments: Negative cytology 12/6/18     B12 deficiency     BPH with urinary obstruction     CAD (coronary artery disease)     Cardiorenal syndrome     CHB (complete heart block)     Chronic diastolic CHF (congestive heart failure), NYHA class 3     Cirrhosis     Depression     Diverticulosis     Dupuytren's contracture of right hand     Gout     H/O acute pancreatitis     High cholesterol     History of acute bacterial endocarditis     History of coronary artery bypass graft     History of GI bleed     History of tricuspid valve replacement     Hx of colonic polyp     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     IBS (irritable bowel syndrome)     ICD (implantable cardioverter-defibrillator) infection     Inguinal hernia, right     Iron deficiency anemia     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     Nonischemic dilated cardiomyopathy     Obesity     KEYONA (obstructive sleep apnea)       Comments: Adequate titration to BIPAP 16/11 on 10/15/2013     Osteoarthritis of both knees     Paralysis, diaphragm       Comments: Left     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     PUD (peptic ulcer disease)     Right heart failure     Stage 3 chronic kidney disease     Tricuspid valve insufficiency     Vitamin B 12 deficiency    Historical     BE (bacterial endocarditis)     Colon  polyps     History of sepsis     Inpatient stay       Comments: @[facility] - Jaw pain, possible anginal equivalent     Inpatient stay       Comments: @[facility] - Infected pacemaker     Inpatient stay       Comments: @[facility] - Diverticulitis     Inpatient stay       Comments: @[facility] - S/P tricuspid valve replacement with worsening right ventricular function     Inpatient stay       Comments: @[facility] - Severe symptomatic tricuspid valvular insufficiency. Chronic right heart failure.     Inpatient stay       Comments: @[facility] - Acute on chronic right heart failure     Inpatient stay       Comments: @[facility] - Anemia     Inpatient stay       Comments: @[facility] [place] - GI bleeding, suspected upper source     Other and Unspecified Noninfectious Gastroenteritis and Colitis  Procedure/Surgical History     Esophagogastroduodenoscopy (02/12/2019)     Colonoscopy (12/07/2018)      Comments: Indication: GIB      Sedation: fentanyl 50 mcg, Versed 1 mg      Findings: Diverticulosis, 2 small AVMs with bleeding in ascending treated with APC      Rec: Consider further workup based on resgabriele to treatemetn.     Esophagogastroduodenoscopy and biopsy (12/07/2018)      Comments: Indication: GIB      Sedatoin: Fentanyl 50 mcg, Versed 2 mg      findings: Negative with negative duodenal biopsy      Rec: Colonoscopy.     Abdominal paracentesis (12/06/2018)      Comments: Negative cytology.     Bone marrow biopsy (07/12/2017)     Colonoscopy (05/27/2017)      Comments: Cecal tubular adenoma, pandiverticulosis. w/polypectomy.     Esophagogastroduodenoscopy (05/27/2017)      Comments: gastritis, fundic gland polyps.     TVR - Tricuspid valve replacement (11/29/2016)     Cardiac angiogram (11/01/2016)      Comments: Summary/Conclusions      PRESENTATION / INDICATIONS        Pre-surgical evaluation for cardiac surgery        Severe TR by echo      DIAGNOSTIC - CORONARY        Co-dominant coronary artery system        The left main artery  was normal in appearance and free of obstructive disease.        Chronic total occlusion of the proximal LAD        The circumflex artery has minimal disease.        The RCA has moderate disease.        No change since 2013      DIAGNOSTIC - GRAFTS        Chronic total occlusion in the proximal anastomosis of the SVG graft to the RCA        The SVG to the ramus intermediate is patent        The sequential graft to the 1st diagonal is patent.        The sequential graft limb from the diagonal to the LAD is patent.        No change since 2013      HEMODYNAMICS        The LVEDP is mildly elevated        Severely elevated right atrial pressures        No evidence of intracardiac shunting.     Limited thoracotomy (08/23/2016)      Comments: Left mini thoracotomy and placement of two epicardial screw in leads.  Implant of left pectoral pacer generatoe..     Implantation of intravenous single chamber cardiac pacemaker system (08/04/2016)     Removal of automatic cardiac defibrillator (08/04/2016)      Comments: Generator and leads.     Pacemaker change (04/08/2015)     Cardiac angiogram (08/02/2013)      Comments: Summary/Conclusions      PRESENTATION / INDICATIONS      Chest pain      DIAGNOSTIC - CORONARY      Right dominant coronary artery system      DIAGNOSTIC SUMMARY      100% stenosis in the Proximal LAD      30% stenosis in the 1st Marginal      30% stenosis in the Proximal RCA      50% stenosis in the Mid RCA      100% stenosis in the Aorta graft to Distal RCA      DIAGNOSTIC - GRAFTS      The sequential graft to the diagonal branch is patent.      The sequential graft from the diagonal to the LAD is patent.      The SVG to the ramus intermediate is patent      The SVG to RCA is chronically occluded      HEMODYNAMICS      The LVEDP is mildly elevated      RECOMMENDATIONS & PLAN      Medical Rx- no significant change from previous angiogram, there is dramatic mismatch in size between the SVG's and the target  "arteries with slow filling of the LAD.     angiogrqam (02/03/2012)      Comments: Summary/Conclusions      PRESENTATION / INDICATIONS      Dyspnea and fatigue.      DIAGNOSTIC - CORONARY      Right dominant coronary artery system.      LMCA is normal.      LAD is occluded proximally after the 1st septal branch.      LCx has mild luminal irregularities.  OM1 has 20-30% proximal stenosis.      RCA has 30-40% diffuse disease in the mid segment and otherwise has mild diffuse luminal irregularities.      DIAGNOSTIC - GRAFTS      The sequential SVG to the diagonal and then LAD is widely patent.  There is a significant size mismatch between the graft and the native vessels.  The diagonal and distal LAD have no obvious lesions but are small caliber (< 2.0 mm vessels).  There is some backfilling into the mid LAD.      The SVG to the ramus is widely patent.  There is a significant size mismatch between the graft and the native vessel.  The ramus has no obvious lesion but is small caliber (< 2.0 mm vessel).        The SVG to the RCA is chronically occluded.      LEFT VENTRICULAR FUNCTION      Left ventricular function is mildly reduced with EF of 42% and mild global hypokinesis.  No significant MR.      HEMODYNAMICS      The LVEDP is 6 mmHg.  No significant gradient across the aortic valve.      RA 11, RV 29/10, PA 34/17 (mean 25), PCWP 18      Amarilys CO 5.1, TDCO 4.6      RECOMMENDATIONS & PLAN      Consider alternative etiology for symptoms.      Lasix dose decreased to 40 mg QD given relative hypotension and patient reporting feeling \"dry membranes.\".     Colonoscopy (12/15/2006)      Comments: Diverticulosis. No polyps..     Colonoscopy (06/22/2001)      Comments: hyperplastic polyp.     Cholecystectomy (06.2001)     CABG - Coronary artery bypass graft (2000)     History of Back Surgery (2000)     Colonoscopy (02/17/1993)      Comments: 1 adenoma, 1 hyperplastic, diverticulosis..     Left shoulder surgery (1991)     Ablation " for atrial fibrillation      Comments: Subsequent pacemaker dependence..     Cholecystectomy     Colonoscopy     Esophagogastroduodenoscopy  Medications        nitroglycerin 0.4 mg sublingual tablet: 0.4 mg, 1 tab(s), SL, q5min, not to exceed 3 doses/15 min--if pain persists, seek medical attention, 25 tab(s), PRN: for chest pain.        Protegra oral tablet: 1 tab(s), po, daily.        potassium chloride 20 mEq oral tablet, extended release: See Instructions, 3 tab BID, 30 unknown unit, 5 Refill(s).        acetaminophen 325 mg oral tablet: 650 mg, 2 tab(s), po, q4 hrs, not to exceed 4000 mg/day, 0 Refill(s).        Senokot 8.6 mg oral tablet: 1-2 tab(s), PO, Once a day (at bedtime), PRN: for constipation.        omeprazole 20 mg oral delayed release capsule: 20 mg, 1 cap(s), po, daily, 90 cap(s), 3 Refill(s).        furosemide 80 mg oral tablet: 80 mg, 1 tab(s), po, bid, 80 mg AM and 40 mg PM, 0 Refill(s).        digoxin 125 mcg (0.125 mg) oral tablet: 125 mcg, 1 tab(s), po, daily, 0 Refill(s).        Vitamin B12 1000 mcg/mL injectable solution: 1,000 mcg, im, qmonth, 0 Refill(s).        Metoprolol Succinate ER 25 mg oral tablet, extended release: See Instructions, TAKE ONE-HALF TABLET BY MOUTH TWICE DAILY, 30 unknown unit, 11 Refill(s).        atorvastatin 40 mg oral tablet: See Instructions, TAKE ONE TABLET BY MOUTH AT BEDTIME, 90 unknown unit.        predniSONE 20 mg oral tablet: See Instructions, 2 po daily for 3-5 days prn gout, 30 EA, 0 Refill(s).        mirtazapine 15 mg oral tablet: 15 mg, 1 tab(s), PO, Once a day (at bedtime), 30 tab(s), 5 Refill(s).        allopurinol 300 mg oral tablet: See Instructions, TAKE ONE TABLET BY MOUTH ONCE DAILY, 90 unknown unit, 3 Refill(s).        [id number] URINARY DRAIN BAG 2000ML MG BEAD: See Instructions, USE AS DIRECTED, 1 unknown unit.        clotrimazole 1% topical cream: 1 nazario, TOP, BID, for 7 day(s), 30 gm, 0 Refill(s).        MetroGel 1% topical gel: 1 nazario, Topical,  daily, 1 tubes, 5 Refill(s).        tamsulosin 0.4 mg oral capsule: 0.4 mg, 1 cap(s), Oral, daily, 30 cap(s), 0 Refill(s).        warfarin 3 mg oral tablet: 1 tab(s), Oral, daily, 90 unknown unit.        spironolactone 50 mg oral tablet: 25 mg, 0.5 tab(s), Oral, daily, 0 Refill(s).         Allergies    No Known Medication Allergies    adhesive tape  Social History    Smoking Status - 03/28/2019     Never smoker     Alcohol      Past, 03/30/2018     Employment and Education      Employed, Work/School description: [employer] 11/11/2010     Exercise and Physical Activity      Exercise type: Walking., 11/11/2010     Tobacco      Former smoker, quit more than 30 days ago, 08/23/2018  Family History    Aneurysm: Father.    Heart disease: Mother.  Immunizations      Vaccine Date Status      influenza virus vaccine, inactivated 11/27/2018 Given      influenza virus vaccine, inactivated 09/28/2015 Given      pneumococcal (PCV13) 04/21/2015 Given      influenza virus vaccine, inactivated 08/28/2014 Given      influenza virus vaccine, inactivated 10/29/2012 Given      influenza virus vaccine, inactivated 09/23/2011 Given      influenza virus vaccine, inactivated 10/26/2010 Given      influenza 10/23/2008 Recorded      Td 09/01/2005 Recorded      pneumococcal 11/28/2001 Recorded  Lab Results          Lab Results (Last 4 results within 90 days)           Sodium Level: 137 mmol/L [135 mmol/L - 146 mmol/L] (03/14/19 08:54:00)          Sodium Level: 137 mmol/L [135 mmol/L - 146 mmol/L] (03/01/19 16:05:00)          Sodium Level: 139 mmol/L [135 mmol/L - 146 mmol/L] (02/22/19 13:26:00)          Sodium Level: 138 mmol/L [135 mmol/L - 146 mmol/L] (02/01/19 16:00:00)          Sodium Level TR: 138 mEq/L (02/27/19 10:02:00)          Sodium Level TR: 138 mEq/L (02/14/19 11:22:00)          Potassium Level: 4.2 mmol/L [3.5 mmol/L - 5.3 mmol/L] (03/14/19 08:54:00)          Potassium Level: 4.4 mmol/L [3.5 mmol/L - 5.3 mmol/L] (03/01/19  16:05:00)          Potassium Level: 4.4 mmol/L [3.5 mmol/L - 5.3 mmol/L] (02/22/19 13:26:00)          Potassium Level: 3.7 mmol/L [3.5 mmol/L - 5.3 mmol/L] (02/01/19 16:00:00)          Potassium Level TR: 3.9 mEq/L (02/27/19 10:02:00)          Potassium Level TR: 3.9 mEq/L (02/14/19 11:22:00)          Chloride Level: 103 mmol/L [98 mmol/L - 110 mmol/L] (03/14/19 08:54:00)          Chloride Level: 101 mmol/L [98 mmol/L - 110 mmol/L] (03/01/19 16:05:00)          Chloride Level: 105 mmol/L [98 mmol/L - 110 mmol/L] (02/22/19 13:26:00)          Chloride Level: 98 mmol/L [98 mmol/L - 110 mmol/L] (02/01/19 16:00:00)          Chloride TR: 97 mEq/L (02/27/19 10:02:00)          Chloride TR: 97 mEq/L (02/14/19 11:22:00)          CO2 Level: 27 mmol/L [20 mmol/L - 32 mmol/L] (03/14/19 08:54:00)          CO2 Level: 29 mmol/L [20 mmol/L - 32 mmol/L] (03/01/19 16:05:00)          CO2 Level: 27 mmol/L [20 mmol/L - 32 mmol/L] (02/22/19 13:26:00)          CO2 Level: 32 mmol/L [20 mmol/L - 32 mmol/L] (02/01/19 16:00:00)          CO2 TR: 32 mEq/L (02/27/19 10:02:00)          CO2 TR: 32 mEq/L (02/14/19 11:22:00)          Anion Gap TR: 12.6 mEq/L (02/27/19 10:02:00)          Glucose Level: 91 mg/dL [65 mg/dL - 99 mg/dL] (03/14/19 08:54:00)          Glucose Level: 95 mg/dL [65 mg/dL - 99 mg/dL] (03/01/19 16:05:00)          Glucose Level: 85 mg/dL [65 mg/dL - 99 mg/dL] (02/22/19 13:26:00)          Glucose Level: 94 mg/dL [65 mg/dL - 99 mg/dL] (02/01/19 16:00:00)          Glucose Level TR: 92 mg/dL (02/27/19 10:02:00)          Glucose Level TR: 92 mg/dL (02/14/19 11:22:00)          BUN: 48 mg/dL High [7 mg/dL - 25 mg/dL] (03/14/19 08:54:00)          BUN: 46 mg/dL High [7 mg/dL - 25 mg/dL] (03/01/19 16:05:00)          BUN: 41 mg/dL High [7 mg/dL - 25 mg/dL] (02/22/19 13:26:00)          BUN: 40 mg/dL High [7 mg/dL - 25 mg/dL] (02/01/19 16:00:00)          BUN TR: 45 mg/dL (02/27/19 10:02:00)          BUN TR: 45 mg/dL (02/14/19 11:22:00)           Creatinine Level: 1.94 mg/dL High [0.7 mg/dL - 1.11 mg/dL] (03/14/19 08:54:00)          Creatinine Level: 1.98 mg/dL High [0.7 mg/dL - 1.11 mg/dL] (03/01/19 16:05:00)          Creatinine Level: 1.7 mg/dL High [0.7 mg/dL - 1.11 mg/dL] (02/22/19 13:26:00)          Creatinine Level: 1.78 mg/dL High [0.7 mg/dL - 1.11 mg/dL] (02/01/19 16:00:00)          Creatinine TR: 2.22 mg/dL (02/27/19 10:02:00)          Creatinine TR: 2.22 mg/dL (02/14/19 11:22:00)          BUN/Creat Ratio: 25 High [6  - 22] (03/14/19 08:54:00)          BUN/Creat Ratio: 23 High [6  - 22] (03/01/19 16:05:00)          BUN/Creat Ratio: 24 High [6  - 22] (02/22/19 13:26:00)          BUN/Creat Ratio: 22 [6  - 22] (02/01/19 16:00:00)          BUN/Creatinine Ratio TR: 20.27 (02/27/19 10:02:00)          BUN/Creatinine Ratio TR: 20.27 (02/14/19 11:22:00)          eGFR: 32 mL/min/1.73m2 Low (03/14/19 08:54:00)          eGFR: 31 mL/min/1.73m2 Low (03/01/19 16:05:00)          eGFR: 37 mL/min/1.73m2 Low (02/22/19 13:26:00)          eGFR: 35 mL/min/1.73m2 Low (02/01/19 16:00:00)          eGFR TR: 30 mL/min (02/27/19 10:02:00)          eGFR TR: 30 mL/min (02/14/19 11:22:00)          eGFR African American: 37 mL/min/1.73m2 Low (03/14/19 08:54:00)          eGFR African American: 36 mL/min/1.73m2 Low (03/01/19 16:05:00)          eGFR African American: 43 mL/min/1.73m2 Low (02/22/19 13:26:00)          eGFR : 41 mL/min/1.73m2 Low (02/01/19 16:00:00)          Calcium Level: 9.2 mg/dL [8.6 mg/dL - 10.3 mg/dL] (03/14/19 08:54:00)          Calcium Level: 9.3 mg/dL [8.6 mg/dL - 10.3 mg/dL] (03/01/19 16:05:00)          Calcium Level: 8.6 mg/dL [8.6 mg/dL - 10.3 mg/dL] (02/22/19 13:26:00)          Calcium Level: 9 mg/dL [8.6 mg/dL - 10.3 mg/dL] (02/01/19 16:00:00)          Calcium TR: 8.7 mEq/dL (02/27/19 10:02:00)          Calcium TR: 8.7 mEq/dL (02/14/19 11:22:00)          Bilirubin Total: 0.8 mg/dL [0.2 mg/dL - 1.2 mg/dL] (03/14/19 08:54:00)           Bilirubin Total TR: 1.8 mg/dL (02/27/19 10:02:00)          Bilirubin Total TR: 1.8 mg/dL (02/14/19 11:22:00)          Alkaline Phosphatase: 247 unit/L High [40 unit/L - 115 unit/L] (03/14/19 08:54:00)          Alkaline Phosphatase TR: 215 unit/L (02/27/19 10:02:00)          Alkaline Phosphatase TR: 215 unit/L (02/14/19 11:22:00)          AST/SGOT: 20 unit/L [10 unit/L - 35 unit/L] (03/14/19 08:54:00)          AST TR: 21 unit/L (02/27/19 10:02:00)          AST TR: 21 unit/L (02/14/19 11:22:00)          ALT/SGPT: 17 unit/L [9 unit/L - 46 unit/L] (03/14/19 08:54:00)          ALT TR: 20 unit/L (02/27/19 10:02:00)          ALT TR: 20 unit/L (02/14/19 11:22:00)          Protein Total: 6.9 gm/dL [6.1 gm/dL - 8.1 gm/dL] (03/14/19 08:54:00)          Protein Total TR: 7.1 gm/dL (02/27/19 10:02:00)          Protein Total TR: 7.1 gm/dL (02/14/19 11:22:00)          Albumin Level: 4 gm/dL [3.6 gm/dL - 5.1 gm/dL] (03/14/19 08:54:00)          Albumin Level TR: 3.7 g/dL (02/27/19 10:02:00)          Albumin Level TR: 3.7 g/dL (02/14/19 11:22:00)          Globulin: 2.9 [1.9  - 3.7] (03/14/19 08:54:00)          A/G Ratio: 1.4 [1  - 2.5] (03/14/19 08:54:00)          A/G Ratio TR: 1.09 (02/27/19 10:02:00)          AFP TR: Low (02/27/19 10:02:00)          AFP TR: Low (02/14/19 11:22:00)          WBC TR: 5.9 x10^3/uL (03/01/19 16:28:00)          WBC TR: 4.9 x10^3/uL (02/01/19 14:10:00)          RBC TR: 3.56 x10^6/uL (03/01/19 16:28:00)          RBC TR: 3.09 x10^6/uL (02/01/19 14:10:00)          Hgb TR: 10.3 g/dL (03/01/19 16:28:00)          Hgb TR: 8.6 g/dL (02/01/19 14:10:00)          Hct TR: 33.1 % (03/01/19 16:28:00)          Hct TR: 28.2 % (02/01/19 14:10:00)          MCV TR: 93 fL (03/01/19 16:28:00)          MCV TR: 91 fL (02/01/19 14:10:00)          MCH TR: 28.9 pg (03/01/19 16:28:00)          MCH TR: 27.8 pg (02/01/19 14:10:00)          MCHC TR: 31.1 gm/dL (03/01/19 16:28:00)          MCHC TR: 30.5 gm/dL (02/01/19 14:10:00)           RDW TR: 22.4 % (03/01/19 16:28:00)          RDW TR: 20 % (02/01/19 14:10:00)          Platelet TR: 155 x10^3/uL (03/01/19 16:28:00)          Platelet TR: 129 x10^3/uL (02/01/19 14:10:00)          MPV (Mean Platelet Volume) TR: 10.9 fL (03/01/19 16:28:00)          MPV (Mean Platelet Volume) TR: 9.7 fL (02/01/19 14:10:00)          Lymphocytes TR: 16.5 % (03/01/19 16:28:00)          Lymphocytes TR: 16.8 % (02/01/19 14:10:00)          Absolute Lymphocytes TR: 1 cells/uL (03/01/19 16:28:00)          Absolute Lymphocytes TR: 0.8 cells/uL (02/01/19 14:10:00)          Neutrophils TR: 68.4 % (03/01/19 16:28:00)          Neutrophils TR: 66.3 % (02/01/19 14:10:00)          Absolute Neutrophils TR: 4 cells/uL (03/01/19 16:28:00)          Absolute Neutrophils TR: 3.3 cells/uL (02/01/19 14:10:00)          Monocytes TR: 10.4 % (03/01/19 16:28:00)          Monocytes TR: 11 % (02/01/19 14:10:00)          Absolute Monocytes TR: 0.6 cells/uL (03/01/19 16:28:00)          Absolute Monocytes TR: 0.5 cells/uL (02/01/19 14:10:00)          Eosinophils TR: 4.4 % (03/01/19 16:28:00)          Eosinophils TR: 5.5 % (02/01/19 14:10:00)          Absolute Eosinophils TR: 0.3 cells/uL (03/01/19 16:28:00)          Absolute Eosinophils TR: 0.3 cells/uL (02/01/19 14:10:00)          Basophils TR: 0.3 % (03/01/19 16:28:00)          Basophils TR: 0.4 % (02/01/19 14:10:00)          Absolute Basophils TR: 0 cells/uL (03/01/19 16:28:00)          Absolute Basophils TR: 0 cells/uL (02/01/19 14:10:00)          PT: 13.9 High [9  - 11.5] (03/01/19 16:05:00)          PT: 18.4 High [9  - 11.5] (02/01/19 16:00:00)          INR: 1.8 (03/18/19 10:15:00)          INR: 1.4 High (03/01/19 16:05:00)          INR: 1.8 High (02/01/19 16:00:00)          INR: 2.3 (01/25/19 15:29:00)          Prothrombin Time: 37.3 High [10.5  - 13.1] (12/28/18 13:12:00)          Hep A Ab TR: Reactive (02/27/19 10:02:00)          Hep A Ab TR: Reactive (02/14/19 11:22:00)          Hep B Core  Ab TR: Negative (02/27/19 10:02:00)          Hep B Core Ab TR: Negative (02/14/19 11:22:00)          Hep Be Ab TR: Negative (02/27/19 10:02:00)          Hep Bs Ag TR: Non-Reactive (02/14/19 11:22:00)          Hep Bs Ab TR: Non-Reactive (02/14/19 11:22:00)          Hep C Ab TR: Non-Reactive (02/14/19 11:22:00)

## 2022-02-16 NOTE — TELEPHONE ENCOUNTER
---------------------  From: Radha Brooks CMA   To: Unit 2 Pool ([id number]_[facility]) ;     Sent: 3/13/2019 10:33:35 AM CDT  Subject: Lab appt/order     Patient is scheduled for lab tomorrow 3/14/19 per outside order. Contacted [facility] as last scanned order is  and BMP was completed per PCP. Nurse states patient is to have a CMP and she will fax order to HIM. Will await order.Order received, submitted and printed. Order sent for scanning

## 2022-02-16 NOTE — PROGRESS NOTES
Chief Complaint    F/u  History of Present Illness      Patient is here for one-week follow-up on his rash.  Since our last visit he has been using some for tramadol twice daily.  He reports that some of the scaling near his no wheeze has improved however the rest of his rash has not really changed.  It does not hurt, there is no burning or itching.  Also see images captured today.      tried some over the counter creams without relief      no recent trips, no changes in laundry products or cleaning products,   no  new pets,   no contacts with people with similar condition,        no fevers, chills, sore throat, runny nose, nausea, vomiting, constipation, no new skin lesions, chest pain, palpitations, slurred speech, new paresthesia, shortness of breath or wheeze.      Review of systems is negative except as per HPI      Exam:      General: alert and oriented ×3 no acute distress.      HEENT: pupils are equal round and reactive to light extraocular motion is intact. Normocephalic and atraumatic.       Hearing is grossly normal and there is no otorrhea.       Nares are patent there is no rhinorrhea.       Mucous membranes are moist and pink.      Chest: has bilateral rise with no increased work of breathing.      Cardiovascular: normal perfusion and brisk capillary refill.      Musculoskeletal: no gross focal abnormalities and normal gait.      Neuro: no gross focal abnormalities and memory seems intact.      Psychiatric: speech is clear and coherent and fluent. Patient dressed appropriately for the weather. Mood is appropriate and affect is full.      Skin:      Patches of reddened skin in the periorbital area.  See images captured today for further details.      Education:  discussed with patient diagnosis.    also see assessment and plan below.   Patient should return to clinic if symptoms worsen or do not improve, and as needed for next annual exam.   Physical Exam   Vitals & Measurements    T: 98.1   F  (Tympanic)  HR: 84(Peripheral)  BP: 110/68     HT: 75 in  WT: 235 lb  BMI: 29.37   Assessment/Plan       Periorbital dermatitis (L30.9)        Suspect this might possibly be rosacea.  We will have patient give metronidazole a try for 2 weeks and return to clinic if his symptoms have not improved.         Orders:          metroNIDAZOLE topical, 1 nazario, Topical, daily, # 1 tubes, 5 Refill(s), Type: Maintenance, Pharmacy: [facility], 1 nazario Topical daily, (Ordered)          Return to Clinic (Request), RFV: follow up dermatitis, Return in 2 weeks           Patient Information     Name:JACINTO JIN      Address:      [address] [place] [id number]     Sex:Male     YOB: 1935     [phone]     Emergency Contact:GERA JIN     MRN:[id number]     FIN:[id number]     Location:[facility]     Date of Service:06/29/2018      Primary Care Physician:       Shravan Berrios MD, [phone]      Attending Physician:       Rissa Arauz MD, [phone]  Problem List/Past Medical History    Ongoing     Acute kidney injury (nontraumatic)     AF (Atrial Fibrillation)     Anemia of renal disease     Anticardiolipin syndrome     Anticoagulated     Ascites     B12 deficiency     Benign hypertensive CKD     Benign prostatic hyperplasia (BPH) with urinary urgency     CAD (coronary artery disease)     Cardiorenal syndrome     CHB (complete heart block)     CHF (Congestive Heart Failure)     Depression     Diverticulosis     Dupuytren's contracture of right hand     Gout     H/O acute pancreatitis     High cholesterol     History of acute bacterial endocarditis     History of coronary artery bypass graft     History of tricuspid valve replacement     Hx of colonic polyp     Hypertensive HF (heart failure)     IBS (irritable bowel syndrome)     ICD (implantable cardioverter-defibrillator) infection     Iron deficiency anemia     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     Nonischemic dilated cardiomyopathy     Obesity     KEYONA (obstructive sleep apnea)       Comments: Adequate titration to BIPAP 16/11 on 10/15/2013     Osteoarthritis of both knees     Paralysis, diaphragm       Comments: Left     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     PUD (peptic ulcer disease)     Right heart failure     Stage 3 chronic kidney disease     Tricuspid valve insufficiency     Vitamin B 12 deficiency    Historical     BE (bacterial endocarditis)     Colon polyps     History of sepsis     Inpatient stay       Comments: @[facility] - Diverticulitis     Inpatient stay       Comments: @[facility] - Severe symptomatic tricuspid valvular insufficiency. Chronic right heart failure.     Inpatient stay       Comments: @[facility] - Anemia     Inpatient stay       Comments: @[facility] - Jaw pain, possible anginal equivalent     Inpatient stay       Comments: @[facility] - Infected pacemaker     Inpatient stay       Comments: @[facility] - Acute on chronic right heart failure     Inpatient stay       Comments: @[facility] - S/P tricuspid valve replacement with worsening right ventricular function     Other and Unspecified Noninfectious Gastroenteritis and Colitis  Procedure/Surgical History     Bone marrow biopsy (07/12/2017)           Colonoscopy (05/27/2017)            Comments:      Cecal tubular adenoma, pandiverticulosis      w/polypectomy     Esophagogastroduodenoscopy (05/27/2017)            Comments:      gastritis, fundic gland polyps     TVR - Tricuspid valve replacement (11/29/2016)           Cardiac angiogram (11/01/2016)            Comments:      Summary/Conclusions      PRESENTATION / INDICATIONS        Pre-surgical evaluation for cardiac surgery        Severe TR by echo      DIAGNOSTIC - CORONARY        Co-dominant coronary artery system        The left main artery was normal in appearance and free of obstructive disease.        Chronic total occlusion  of the proximal LAD        The circumflex artery has minimal disease.        The RCA has moderate disease.        No change since 2013      DIAGNOSTIC - GRAFTS        Chronic total occlusion in the proximal anastomosis of the SVG graft to the RCA        The SVG to the ramus intermediate is patent        The sequential graft to the 1st diagonal is patent.        The sequential graft limb from the diagonal to the LAD is patent.        No change since 2013      HEMODYNAMICS        The LVEDP is mildly elevated        Severely elevated right atrial pressures        No evidence of intracardiac shunting     Limited thoracotomy (08/23/2016)            Comments:      Left mini thoracotomy and placement of two epicardial screw in leads.  Implant of left pectoral pacer generatoe.     Implantation of intravenous single chamber cardiac pacemaker system (08/04/2016)           Removal of automatic cardiac defibrillator (08/04/2016)            Comments:      Generator and leads     Pacemaker change (04/08/2015)           Cardiac angiogram (08/02/2013)            Comments:      Summary/Conclusions      PRESENTATION / INDICATIONS      Chest pain      DIAGNOSTIC - CORONARY      Right dominant coronary artery system      DIAGNOSTIC SUMMARY      100% stenosis in the Proximal LAD      30% stenosis in the 1st Marginal      30% stenosis in the Proximal RCA      50% stenosis in the Mid RCA      100% stenosis in the Aorta graft to Distal RCA      DIAGNOSTIC - GRAFTS      The sequential graft to the diagonal branch is patent.      The sequential graft from the diagonal to the LAD is patent.      The SVG to the ramus intermediate is patent      The SVG to RCA is chronically occluded      HEMODYNAMICS      The LVEDP is mildly elevated      RECOMMENDATIONS & PLAN      Medical Rx- no significant change from previous angiogram, there is dramatic mismatch in size between the SVG's and the target arteries with slow filling of the LAD     angiogrqam  "(02/03/2012)            Comments:      Summary/Conclusions      PRESENTATION / INDICATIONS      Dyspnea and fatigue.      DIAGNOSTIC - CORONARY      Right dominant coronary artery system.      LMCA is normal.      LAD is occluded proximally after the 1st septal branch.      LCx has mild luminal irregularities.  OM1 has 20-30% proximal stenosis.      RCA has 30-40% diffuse disease in the mid segment and otherwise has mild diffuse luminal irregularities.      DIAGNOSTIC - GRAFTS      The sequential SVG to the diagonal and then LAD is widely patent.  There is a significant size mismatch between the graft and the native vessels.  The diagonal and distal LAD have no obvious lesions but are small caliber (< 2.0 mm vessels).  There is some backfilling into the mid LAD.      The SVG to the ramus is widely patent.  There is a significant size mismatch between the graft and the native vessel.  The ramus has no obvious lesion but is small caliber (< 2.0 mm vessel).        The SVG to the RCA is chronically occluded.      LEFT VENTRICULAR FUNCTION      Left ventricular function is mildly reduced with EF of 42% and mild global hypokinesis.  No significant MR.      HEMODYNAMICS      The LVEDP is 6 mmHg.  No significant gradient across the aortic valve.      RA 11, RV 29/10, PA 34/17 (mean 25), PCWP 18      Amarilys CO 5.1, TDCO 4.6      RECOMMENDATIONS & PLAN      Consider alternative etiology for symptoms.      Lasix dose decreased to 40 mg QD given relative hypotension and patient reporting feeling \"dry membranes.\"     Colonoscopy (12/15/2006)            Comments:      Diverticulosis. No polyps.     Colonoscopy (06/22/2001)            Comments:      hyperplastic polyp     Cholecystectomy (06/2001)           CABG - Coronary artery bypass graft (2000)           History of Back Surgery (2000)           Colonoscopy (02/17/1993)            Comments:      1 adenoma, 1 hyperplastic, diverticulosis.     Left shoulder surgery (1991)          "  Ablation for atrial fibrillation            Comments:      Subsequent pacemaker dependence.     Cholecystectomy           Colonoscopy           Esophagogastroduodenoscopy        Medications     nitroglycerin 0.4 mg sublingual tablet: 0.4 mg, 1 tab(s), SL, q5min, not to exceed 3 doses/15 min--if pain persists, seek medical attention, 25 tab(s), PRN: for chest pain.     Protegra oral tablet: 1 tab(s), po, daily.     potassium chloride 20 mEq oral tablet, extended release: See Instructions, 1 tab TID, 30 unknown unit, 5 Refill(s).     Senexon-S: 2 tab(s), po, hs, 0 Refill(s).     acetaminophen 325 mg oral tablet: 650 mg, 2 tab(s), po, q4 hrs, not to exceed 4000 mg/day, 0 Refill(s).     Senokot 8.6 mg oral tablet: 1-2 tab(s), PO, Once a day (at bedtime), PRN: for constipation.     omeprazole 20 mg oral delayed release capsule: 40 mg, 2 cap(s), po, bid, 90 cap(s), 3 Refill(s).     furosemide 80 mg oral tablet: 80 mg, 1 tab(s), po, bid, Per Dr. Quintana on 3/7/2017, 0 Refill(s).     digoxin 125 mcg (0.125 mg) oral tablet: 125 mcg, 1 tab(s), po, daily, 0 Refill(s).     metOLazone 2.5 mg oral tablet: See Instructions, 1 tab(s) po daily for weight > 222#, 0 Refill(s).     Vitamin B12 1000 mcg/mL injectable solution: 1,000 mcg, im, qmonth, 0 Refill(s).     Metoprolol Succinate ER 25 mg oral tablet, extended release: See Instructions, TAKE ONE-HALF TABLET BY MOUTH TWICE DAILY, 30 unknown unit, 11 Refill(s).     atorvastatin 40 mg oral tablet: See Instructions, TAKE ONE TABLET BY MOUTH AT BEDTIME, 90 unknown unit.     predniSONE 20 mg oral tablet: See Instructions, 2 po daily for 3-5 days prn gout, 30 EA, 0 Refill(s).     mirtazapine 15 mg oral tablet: 15 mg, 1 tab(s), PO, Once a day (at bedtime), 30 tab(s), 5 Refill(s).     allopurinol 300 mg oral tablet: See Instructions, TAKE ONE TABLET BY MOUTH ONCE DAILY, 90 unknown unit, 3 Refill(s).     740217 URINARY DRAIN BAG 2000ML MG BEAD: See Instructions, USE AS DIRECTED, 1 unknown  unit.     clotrimazole 1% topical cream: 1 nazario, TOP, BID, for 7 day(s), 30 gm, 0 Refill(s).     warfarin 3 mg oral tablet: See Instructions, TAKE 1 & 1/2 TABLETS BY MOUTH ONCE DAILY SUN,TUES,THURS,SAT TAKE ONE TABLET MON,WED,FRI, 40 unknown unit.     MetroGel 1% topical gel: 1 nazario, Topical, daily, 1 tubes, 5 Refill(s).          Allergies    No Known Medication Allergies    adhesive tape  Social History    Smoking Status - 06/29/2018     Never smoker     Alcohol      Past, 03/30/2018     Employment and Education      Employed, Work/School description: [employer] 11/11/2010     Exercise and Physical Activity      Exercise type: Walking., 11/11/2010  Family History    Aneurysm: Father.    Heart disease: Mother.  Immunizations      Vaccine Date Status      influenza virus vaccine, inactivated 09/28/2015 Given      pneumococcal (PCV13) 04/21/2015 Given      influenza virus vaccine, inactivated 08/28/2014 Given      influenza virus vaccine, inactivated 10/29/2012 Given      influenza virus vaccine, inactivated 09/23/2011 Given      influenza virus vaccine, inactivated 10/26/2010 Given      influenza 10/23/2008 Recorded      Td 09/01/2005 Recorded      pneumococcal 11/28/2001 Recorded  Lab Results       Lab Results (Last 4 results within 90 days)        Sodium Level: 138 mmol/L [135 mmol/L - 146 mmol/L] (06/18/18 10:59:00 CDT)       Sodium Level: 135 mmol/L [135 mmol/L - 146 mmol/L] (05/31/18 14:13:00 CDT)       Sodium Level TR: 139 mEq/L [11.7  - 14.1] (06/04/18 14:18:00 CDT)       Sodium Level TR: 141 mEq/L [11.7  - 14.1] (04/20/18 11:18:00 CDT)       Potassium Level: 3.5 mmol/L [3.5 mmol/L - 5.3 mmol/L] (06/18/18 10:59:00 CDT)       Potassium Level: 3.5 mmol/L [3.5 mmol/L - 5.3 mmol/L] (05/31/18 14:13:00 CDT)       Potassium Level TR: 3.8 mEq/L [11.7  - 14.1] (06/04/18 14:18:00 CDT)       Potassium Level TR: 3.5 mEq/L [11.7  - 14.1] (04/20/18 11:18:00 CDT)       Chloride Level: 96 mmol/L Low [98 mmol/L - 110 mmol/L]  (06/18/18 10:59:00 CDT)       Chloride Level: 93 mmol/L Low [98 mmol/L - 110 mmol/L] (05/31/18 14:13:00 CDT)       Chloride TR: 95 mEq/L [11.7  - 14.1] (06/04/18 14:18:00 CDT)       Chloride TR: 100 mEq/L [11.7  - 14.1] (04/20/18 11:18:00 CDT)       CO2 Level: 32 mmol/L High [20 mmol/L - 31 mmol/L] (06/18/18 10:59:00 CDT)       CO2 Level: 34 mmol/L High [20 mmol/L - 31 mmol/L] (05/31/18 14:13:00 CDT)       CO2 TR: 33 mEq/L [11.7  - 14.1] (06/04/18 14:18:00 CDT)       CO2 TR: 28 mEq/L [11.7  - 14.1] (04/20/18 11:18:00 CDT)       Anion Gap TR: 11 mEq/L [20 mmol/L - 31 mmol/L] (06/04/18 14:18:00 CDT)       Anion Gap TR: 13 mEq/L [20 mmol/L - 31 mmol/L] (04/20/18 11:18:00 CDT)       Glucose Level: 114 mg/dL High [65 mg/dL - 99 mg/dL] (06/18/18 10:59:00 CDT)       Glucose Level: 92 mg/dL [65 mg/dL - 99 mg/dL] (05/31/18 14:13:00 CDT)       Glucose Level TR: 98 mg/dL [11.7  - 14.1] (06/04/18 14:18:00 CDT)       Glucose Level TR: 118 mg/dL [11.7  - 14.1] (04/20/18 11:18:00 CDT)       BUN: 32 mg/dL High [7 mg/dL - 25 mg/dL] (06/18/18 10:59:00 CDT)       BUN: 35 mg/dL High [7 mg/dL - 25 mg/dL] (05/31/18 14:13:00 CDT)       BUN TR: 43 mg/dL [11.7  - 14.1] (06/04/18 14:18:00 CDT)       BUN TR: 40 mg/dL [11.7  - 14.1] (04/20/18 11:18:00 CDT)       Creatinine Level: 1.83 mg/dL High [0.7 mg/dL - 1.11 mg/dL] (06/18/18 10:59:00 CDT)       Creatinine Level: 2.03 mg/dL High [0.7 mg/dL - 1.11 mg/dL] (05/31/18 14:13:00 CDT)       Creatinine TR: 2.02 mg/dL [11.7  - 14.1] (06/04/18 14:18:00 CDT)       Creatinine TR: 1.83 mg/dL [11.7  - 14.1] (04/20/18 11:18:00 CDT)       BUN/Creat Ratio: 17 [6  - 22] (06/18/18 10:59:00 CDT)       BUN/Creat Ratio: 17 [6  - 22] (05/31/18 14:13:00 CDT)       BUN/Creatinine Ratio TR: 21 [20 mmol/L - 31 mmol/L] (06/04/18 14:18:00 CDT)       BUN/Creatinine Ratio TR: 22 [20 mmol/L - 31 mmol/L] (04/20/18 11:18:00 CDT)       eGFR: 34 mL/min/1.73m2 Low [8.6 mg/dL - 10.3 mg/dL] (06/18/18 10:59:00 CDT)       eGFR: 30  mL/min/1.73m2 Low [8.6 mg/dL - 10.3 mg/dL] (05/31/18 14:13:00 CDT)       eGFR TR: 39 mL/min [11.7  - 14.1] (06/04/18 14:18:00 CDT)       GFR Calculated TR: 43 mL/min [11.7  - 14.1] (04/20/18 11:18:00 CDT)       eGFR African American: 40 mL/min/1.73m2 Low [None  - Few] (06/18/18 10:59:00 CDT)       eGFR African American: 35 mL/min/1.73m2 Low [None  - Few] (05/31/18 14:13:00 CDT)       Calcium Level: 9.5 mg/dL [8.6 mg/dL - 10.3 mg/dL] (06/18/18 10:59:00 CDT)       Calcium Level: 9.7 mg/dL [8.6 mg/dL - 10.3 mg/dL] (05/31/18 14:13:00 CDT)       Calcium TR: 10.2 mEq/dL [11.7  - 14.1] (06/04/18 14:18:00 CDT)       Calcium TR: 9.8 mEq/dL [11.7  - 14.1] (04/20/18 11:18:00 CDT)       Magnesium Level TR: 2 mg/dL [11.7  - 14.1] (06/04/18 14:18:00 CDT)       Bilirubin Total TR: 1.5 mg/dL [11.7  - 14.1] (04/20/18 11:18:00 CDT)       Alkaline Phosphatase TR: 358 unit/L [11.7  - 14.1] (04/20/18 11:18:00 CDT)       AST TR: 27 unit/L [11.7  - 14.1] (04/20/18 11:18:00 CDT)       ALT TR: 28 unit/L [11.7  - 14.1] (04/20/18 11:18:00 CDT)       Protein Total TR: 7.7 gm/dL [11.7  - 14.1] (04/20/18 11:18:00 CDT)       Albumin Level TR: 4.3 g/dL [11.7  - 14.1] (04/20/18 11:18:00 CDT)       Globulin TR: 3.4 g/dL [11.7  - 14.1] (04/20/18 11:18:00 CDT)       A/G Ratio TR: 1.3 [11.7  - 14.1] (04/20/18 11:18:00 CDT)       Uric Acid (umol/L) TR: 7.1 umol/L [11.7  - 14.1] (04/20/18 11:18:00 CDT)       Iron TR: 55 nmol/L [11.7  - 14.1] (04/20/18 11:18:00 CDT)       TIBC TR: 297 mg/dL [11.7  - 14.1] (04/20/18 11:18:00 CDT)       Ferritin TR: 263 ng/mL [11.7  - 14.1] (04/20/18 11:18:00 CDT)       B-Natriuretic Peptide (BNP) TR: 136 pg/mL [11.7  - 14.1] (06/04/18 14:18:00 CDT)       PSA: 0.5 ng/mL [3.5 mmol/L - 5.3 mmol/L] (05/01/18 10:42:00 CDT)       WBC: 5.6 [3.8  - 10.8] (04/24/18 10:56:00 CDT)       WBC TR: 5.2 x10^3/uL [11.7  - 14.1] (04/20/18 11:18:00 CDT)       RBC: 3.15 Low [4.2  - 5.8] (04/24/18 10:56:00 CDT)       RBC TR: 3.13 x10^6/uL  [11.7  - 14.1] (04/20/18 11:18:00 CDT)       Hgb: 10.1 gm/dL Low [13.2 gm/dL - 17.1 gm/dL] (04/24/18 10:56:00 CDT)       Hgb TR: 10.1 g/dL [11.7  - 14.1] (04/20/18 11:18:00 CDT)       Hct: 30.7 % Low [38.5 % - 50 %] (04/24/18 10:56:00 CDT)       Hct TR: 31.7 % [11.7  - 14.1] (04/20/18 11:18:00 CDT)       MCV: 97.5 fL [80 fL - 100 fL] (04/24/18 10:56:00 CDT)       MCV TR: 101 fL [11.7  - 14.1] (04/20/18 11:18:00 CDT)       MCH: 32.1 pg [27 pg - 33 pg] (04/24/18 10:56:00 CDT)       MCH TR: 32.3 pg [11.7  - 14.1] (04/20/18 11:18:00 CDT)       MCHC: 32.9 gm/dL [32 gm/dL - 36 gm/dL] (04/24/18 10:56:00 CDT)       MCHC TR: 31.9 gm/dL [11.7  - 14.1] (04/20/18 11:18:00 CDT)       RDW: 14.8 % [11 % - 15 %] (04/24/18 10:56:00 CDT)       RDW TR: 16.2 % [11.7  - 14.1] (04/20/18 11:18:00 CDT)       Platelet: 112 Low [140  - 400] (04/24/18 10:56:00 CDT)       Platelet TR: 117 x10^3/uL [11.7  - 14.1] (04/20/18 11:18:00 CDT)       MPV: 11.6 fL [7.5 fL - 12.5 fL] (04/24/18 10:56:00 CDT)       MPV (Mean Platelet Volume) TR: 10.4 fL [20 mmol/L - 31 mmol/L] (04/20/18 11:18:00 CDT)       Lymphocytes TR: 17 % [20 mmol/L - 31 mmol/L] (04/20/18 11:18:00 CDT)       Absolute Lymphocytes TR: 0.9 cells/uL [20 mmol/L - 31 mmol/L] (04/20/18 11:18:00 CDT)       Neutrophils TR: 64.8 % [20 mmol/L - 31 mmol/L] (04/20/18 11:18:00 CDT)       Absolute Neutrophils TR: 3.4 cells/uL [20 mmol/L - 31 mmol/L] (04/20/18 11:18:00 CDT)       Monocytes TR: 13.2 % [20 mmol/L - 31 mmol/L] (04/20/18 11:18:00 CDT)       Absolute Monocytes TR: 0.7 cells/uL [20 mmol/L - 31 mmol/L] (04/20/18 11:18:00 CDT)       Eosinophils TR: 4.4 % [20 mmol/L - 31 mmol/L] (04/20/18 11:18:00 CDT)       Absolute Eosinophils TR: 0.2 cells/uL [20 mmol/L - 31 mmol/L] (04/20/18 11:18:00 CDT)       Basophils TR: 0.6 % [20 mmol/L - 31 mmol/L] (04/20/18 11:18:00 CDT)       Absolute Basophils TR: 0 cells/uL [20 mmol/L - 31 mmol/L] (04/20/18 11:18:00 CDT)       Protime: 22.6 High [11.7  - 14.1]  (04/24/18 11:05:00 CDT)       PT: 24.3 High [9  - 11.5] (06/18/18 11:02:00 CDT)       PT: 23.9 High [9  - 11.5] (05/31/18 14:13:00 CDT)       INR: 2.4 High [None  - 5] (06/18/18 11:02:00 CDT)       INR: 2.4 High [None  - 5] (05/31/18 14:13:00 CDT)       INR: 2.0 High [None  - 5] (04/24/18 11:05:00 CDT)       UA pH: 7 [5  - 8] (05/04/18 14:32:00 CDT)       UA pH: 6.5 [5  - 8] (05/01/18 10:44:00 CDT)       UA Specific Gravity: 1.015 [1.001  - 1.035] (05/04/18 14:32:00 CDT)       UA Specific Gravity: 1.015 [1.001  - 1.035] (05/01/18 10:44:00 CDT)       UA Glucose: NEGATIVE [None  - Few] (05/04/18 14:32:00 CDT)       UA Glucose: NEGATIVE [None  - Few] (05/01/18 10:44:00 CDT)       UA Bilirubin: NEGATIVE [None  - Few] (05/04/18 14:32:00 CDT)       UA Bilirubin: NEGATIVE [None  - Few] (05/01/18 10:44:00 CDT)       UA Ketones: NEGATIVE [None  - Few] (05/04/18 14:32:00 CDT)       UA Ketones: NEGATIVE [None  - Few] (05/01/18 10:44:00 CDT)       Urine Occult Blood: 1+ Abnormal [None  - Few] (05/04/18 14:32:00 CDT)       Urine Occult Blood: TRACE Abnormal [None  - Few] (05/01/18 10:44:00 CDT)       UA Protein: TRACE Abnormal [None  - Few] (05/04/18 14:32:00 CDT)       UA Protein: NEGATIVE [None  - Few] (05/01/18 10:44:00 CDT)       UA Nitrite: NEGATIVE [None  - Few] (05/04/18 14:32:00 CDT)       UA Nitrite: NEGATIVE [None  - Few] (05/01/18 10:44:00 CDT)       UA Leukocyte Esterase: 2+ Abnormal [None  - Few] (05/04/18 14:32:00 CDT)       UA Leukocyte Esterase: 3+ Abnormal [None  - Few] (05/01/18 10:44:00 CDT)       UA WBC Clumps: Present [None  - Few] (05/04/18 14:51:00 CDT)       UA Epithelial Cells: None Seen [None  - Few] (05/04/18 14:51:00 CDT)       UA Epithelial Cells: Few [None  - Few] (05/01/18 11:06:00 CDT)       UA WBC: >100 [None  - 5] (05/04/18 14:51:00 CDT)       UA WBC: >100 [None  - 5] (05/01/18 11:06:00 CDT)       UA RBC: 6-10 [None  - 2] (05/04/18 14:51:00 CDT)       UA RBC: 11-25 [None  - 2] (05/01/18 11:06:00  CDT)       UA Bacteria: Many [None  - Few] (05/04/18 14:51:00 CDT)       UA Bacteria: Moderate [None  - Few] (05/01/18 11:06:00 CDT)       Culture Urine: See comment Abnormal [6 - 22] (05/04/18 14:37:00 CDT)       Culture Urine: See comment Abnormal [6 - 22] (05/01/18 10:46:00 CDT)

## 2022-02-16 NOTE — NURSING NOTE
Quick Intake Entered On:  3/18/2019 10:16 AM CDT    Performed On:  3/18/2019 10:15 AM CDT by Mackenzie Quintana RN               Summary   Menstrual Status :   N/A   Systolic Blood Pressure :   96 mmHg   Diastolic Blood Pressure :   60 mmHg   Mean Arterial Pressure :   72 mmHg   Mackenzie Quintana RN - 3/18/2019 10:15 AM CDT

## 2022-02-16 NOTE — NURSING NOTE
Comprehensive Intake Entered On:  7/26/2019 4:23 PM CDT    Performed On:  7/26/2019 4:18 PM CDT by Myrna Dsouza               Summary   Chief Complaint :   Patient here to discuss hospic care    Advance Directive :   Yes   Menstrual Status :   N/A   Weight Measured :   182 lb(Converted to: 182 lb 0 oz, 82.55 kg)    Height Measured :   75 in(Converted to: 6 ft 3 in, 190.50 cm)    Body Mass Index :   22.75 kg/m2   Body Surface Area :   2.09 m2   Systolic Blood Pressure :   90 mmHg   Diastolic Blood Pressure :   50 mmHg (LOW)    Mean Arterial Pressure :   63 mmHg   Peripheral Pulse Rate :   76 bpm   BP Site :   Right arm   Pulse Site :   Radial artery   BP Method :   Manual   HR Method :   Manual   Temperature Tympanic :   97.4 DegF(Converted to: 36.3 DegC)  (LOW)    Myrna Dsouza - 7/26/2019 4:18 PM CDT   Health Status   Allergies Verified? :   Yes   Medication History Verified? :   Yes   Medical History Verified? :   Yes   Pre-Visit Planning Status :   Completed   Tobacco Use? :   Former smoker   Myrna Dsouza - 7/26/2019 4:18 PM CDT   Consents   Consent for Immunization Exchange :   Consent Granted   Consent for Immunizations to Providers :   Consent Granted   Myrna Dsouza - 7/26/2019 4:18 PM CDT   Meds / Allergies   (As Of: 7/26/2019 4:23:55 PM CDT)   Allergies (Active)   adhesive tape  Estimated Onset Date:   Unspecified ; Created By:   Floridalma Calderon; Reaction Status:   Active ; Category:   Other ; Substance:   adhesive tape ; Type:   Allergy ; Updated By:   Floridalma Calderon; Reviewed Date:   7/26/2019 4:21 PM CDT      No Known Medication Allergies  Estimated Onset Date:   Unspecified ; Created By:   Ellen Parker MA; Reaction Status:   Active ; Category:   Drug ; Substance:   No Known Medication Allergies ; Type:   Allergy ; Updated By:   Ellen Parker MA; Reviewed Date:   7/26/2019 4:21 PM CDT        Medication List   (As Of: 7/26/2019 4:23:55 PM CDT)   Prescription/Discharge Order    famotidine   :   famotidine ; Status:   Prescribed ; Ordered As Mnemonic:   famotidine 20 mg oral tablet ; Simple Display Line:   1 tab(s), Oral, bid, 180 EA, 3 Refill(s) ; Ordering Provider:   Shravan Berrios MD; Catalog Code:   famotidine ; Order Dt/Tm:   7/11/2019 3:48:29 PM          spironolactone  :   spironolactone ; Status:   Prescribed ; Ordered As Mnemonic:   spironolactone 50 mg oral tablet ; Simple Display Line:   25 mg, 0.5 tab(s), Oral, bid, 30 tab(s), 2 Refill(s) ; Ordering Provider:   Shravan Berrios MD; Catalog Code:   spironolactone ; Order Dt/Tm:   7/11/2019 11:58:45 AM          fluticasone nasal  :   fluticasone nasal ; Status:   Prescribed ; Ordered As Mnemonic:   Flonase 50 mcg/inh nasal spray ; Simple Display Line:   2 spray(s), Nasal, daily, for 30 day(s), 1 EA, 0 Refill(s) ; Ordering Provider:   Shravan Berrios MD; Catalog Code:   fluticasone nasal ; Order Dt/Tm:   7/8/2019 8:23:11 AM          midodrine  :   midodrine ; Status:   Prescribed ; Ordered As Mnemonic:   midodrine 2.5 mg oral tablet ; Simple Display Line:   5 mg, 2 tab(s), Oral, tid, for 30 day(s), 180 tab(s), 2 Refill(s) ; Ordering Provider:   Shravan Berrios MD; Catalog Code:   midodrine ; Order Dt/Tm:   6/28/2019 2:06:56 PM          allopurinol  :   allopurinol ; Status:   Prescribed ; Ordered As Mnemonic:   allopurinol 300 mg oral tablet ; Simple Display Line:   300 mg, 1 tab(s), Oral, daily, for 90 day(s), 90 tab(s), 3 Refill(s) ; Ordering Provider:   Shravan Berrios MD; Catalog Code:   allopurinol ; Order Dt/Tm:   5/3/2019 4:58:18 PM          metoprolol  :   metoprolol ; Status:   Prescribed ; Ordered As Mnemonic:   Metoprolol Succinate ER 25 mg oral tablet, extended release ; Simple Display Line:   See Instructions, TAKE ONE-HALF TABLET BY MOUTH TWICE DAILY, 180 EA, 3 Refill(s) ; Ordering Provider:   Shravan Berrios MD; Catalog Code:   metoprolol ; Order Dt/Tm:   5/3/2019 4:58:09 PM          atorvastatin  :   atorvastatin ; Status:    Prescribed ; Ordered As Mnemonic:   atorvastatin 10 mg oral tablet ; Simple Display Line:   10 mg, 1 tab(s), Oral, hs, 30 tab(s), 0 Refill(s) ; Ordering Provider:   Shravan Berrios MD; Catalog Code:   atorvastatin ; Order Dt/Tm:   5/3/2019 11:54:45 AM          mirtazapine 15 mg oral tablet  :   mirtazapine 15 mg oral tablet ; Status:   Prescribed ; Ordered As Mnemonic:   mirtazapine 15 mg oral tablet ; Simple Display Line:   1 tab(s), Oral, qhs, 30 unknown unit, 11 Refill(s) ; Ordering Provider:   Shravan Berrios MD; Catalog Code:   mirtazapine ; Order Dt/Tm:   4/18/2019 8:36:08 AM          metroNIDAZOLE topical  :   metroNIDAZOLE topical ; Status:   Prescribed ; Ordered As Mnemonic:   MetroGel 1% topical gel ; Simple Display Line:   1 nazario, Topical, daily, 1 tubes, 5 Refill(s) ; Ordering Provider:   Rissa Arauz MD; Catalog Code:   metroNIDAZOLE topical ; Order Dt/Tm:   6/29/2018 3:46:29 PM          clotrimazole topical  :   clotrimazole topical ; Status:   Prescribed ; Ordered As Mnemonic:   clotrimazole 1% topical cream ; Simple Display Line:   1 nazario, TOP, BID, for 7 day(s), 30 gm, 0 Refill(s) ; Ordering Provider:   Rissa Arauz MD; Catalog Code:   clotrimazole topical ; Order Dt/Tm:   6/22/2018 3:35:41 PM          Misc Prescription  :   Misc Prescription ; Status:   Prescribed ; Ordered As Mnemonic:   [id number] URINARY DRAIN BAG 2000ML MG BEAD ; Simple Display Line:   See Instructions, USE AS DIRECTED, 1 unknown unit ; Ordering Provider:   Shravan Berrios MD; Catalog Code:   Miscellaneous Prescription ; Order Dt/Tm:   6/4/2018 7:34:17 AM          predniSONE  :   predniSONE ; Status:   Prescribed ; Ordered As Mnemonic:   predniSONE 20 mg oral tablet ; Simple Display Line:   See Instructions, 2 po daily for 3-5 days prn gout, 30 EA, 0 Refill(s) ; Ordering Provider:   Shravan Berrios MD; Catalog Code:   predniSONE ; Order Dt/Tm:   12/29/2017 10:06:41 AM          nitroglycerin  :   nitroglycerin ; Status:    Prescribed ; Ordered As Mnemonic:   nitroglycerin 0.4 mg sublingual tablet ; Simple Display Line:   0.4 mg, 1 tab(s), SL, q5min, not to exceed 3 doses/15 min--if pain persists, seek medical attention, 25 tab(s), PRN: for chest pain ; Ordering Provider:   Shravan Berrios MD; Catalog Code:   nitroglycerin ; Order Dt/Tm:   8/28/2014 11:35:30 AM            Home Meds    cyanocobalamin  :   cyanocobalamin ; Status:   Documented ; Ordered As Mnemonic:   Vitamin B12 1000 mcg/mL injectable solution ; Simple Display Line:   1,000 mcg, im, qmonth, 0 Refill(s) ; Catalog Code:   cyanocobalamin ; Order Dt/Tm:   7/24/2017 11:12:05 AM          digoxin  :   digoxin ; Status:   Documented ; Ordered As Mnemonic:   digoxin 125 mcg (0.125 mg) oral tablet ; Simple Display Line:   125 mcg, 1 tab(s), po, daily, 0 Refill(s) ; Catalog Code:   digoxin ; Order Dt/Tm:   4/19/2017 10:47:22 AM          furosemide  :   furosemide ; Status:   Documented ; Ordered As Mnemonic:   furosemide 80 mg oral tablet ; Simple Display Line:   40 mg, 0.5 tab(s), po, bid, 0 Refill(s) ; Catalog Code:   furosemide ; Order Dt/Tm:   3/15/2017 3:19:58 PM          acetaminophen  :   acetaminophen ; Status:   Documented ; Ordered As Mnemonic:   acetaminophen 325 mg oral tablet ; Simple Display Line:   650 mg, 2 tab(s), po, q4 hrs, not to exceed 4000 mg/day, 0 Refill(s) ; Catalog Code:   acetaminophen ; Order Dt/Tm:   12/20/2016 1:58:24 PM          multivitamin  :   multivitamin ; Status:   Documented ; Ordered As Mnemonic:   Protegra oral tablet ; Simple Display Line:   1 tab(s), po, daily ; Catalog Code:   multivitamin ; Order Dt/Tm:   6/1/2015 8:38:03 AM

## 2022-02-16 NOTE — TELEPHONE ENCOUNTER
---------------------  From: Titus Diaz (Phone Messages Pool ([id number]_[facility]))   To: [facility] Message Pool ([id number] [facility] [place]);     Sent: 9/20/2019 4:27:33 PM CDT  Subject: Orders for port     Phone Message    PCP:   RIAN                           Time of Call:  4:24p                                        Person Calling:  Wife Codie  Phone number: [phone]     Note:   Wife called wanting [facility] to place an order for the pt to get a port so the hospice nurse can drain the fluid at home.   Please advise.     Last office visit and reason:  09/18/2019; Cirrhosis.---------------------  From: Osman/Myrna LONDON ([facility] Message Pool ([id number] [facility] [place]))   To: Shravan Berrios MD;     Sent: 9/20/2019 4:28:02 PM CDT  Subject: FW: Orders for portWife LM for CC this AM at 7:50 and cc was paged overhead to talk to Codie. I called Codie back at 12 pm today-she said Mamta is no longer able to eat/ not able to swallow pills, has fallen several times over the weekend, not speaking (just groaning). Codie wondering if it is really worth getting the port placed. We discussed that at this point it would probably be best to just keep Mamta at home. I told Codie I would call and speak with hospice nurse to see if they can get out today to help them. Codie was very overwhelmed but seemed to be content after our conversation today. I called and spoke w/ Margarita from [facility] and she will reach out to Codie today.

## 2022-02-16 NOTE — PROGRESS NOTES
Chief Complaint    gout sx for quit a while  History of Present Illness      Patient with history of gout complains of gout in the left ankle for couple of weeks.  He has a chronic olecranon bursitis that has not been any more inflamed than usual.  Heart failure symptoms are doing well.  No PND, orthopnea, edema.  Weight is been stable.  He is sleeping well.  Check continues a diagnosis of type 2 diabetes but accurate no bleeding.  Review of Systems      Weight stable.  Chronic fatigue is improved.  Appetite is good.  Sleeps well.  No PND orthopnea.  No chest pain.  No bleeding.  No headache.  Physical Exam   Vitals & Measurements    HR: 78(Peripheral)  BP: 110/70     HT: 75 in  WT: 223 lb  BMI: 27.87       Patient appears comfortable.  Alert and oriented.  In no distress.  Chest clear.  Cardiac exam is regular.  No edema.  Right olecranon bursa is enlarged but not tender warm or inflamed.  Left ankle is erythematous tender and warm consistent with gout.  Assessment/Plan       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        Will recheck hemoglobin.         Ordered:          [id number] office outpatient visit 25 minutes (Charge), Quantity: 1, Gout  Anticoagulated  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  Right heart failure  Type 2 diabetes mellitus  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          Hemoglobin* ([facility]), Specimen Type: Blood, Collection Date: 12/29/17 10:05:00 CST                Anticoagulated         Check INR was low.         Ordered:          [id number] office outpatient visit 25 minutes (Charge), Quantity: 1, Gout  Anticoagulated  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  Right heart failure  Type 2 diabetes mellitus  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          Prothrombin time-INR* ([facility]), Specimen Type: Blood, Collection Date: 12/29/17 10:05:00 CST                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         Undoubtably involved in his gout.          Ordered:          [id number] office outpatient visit 25 minutes (Charge), Quantity: 1, Gout  Anticoagulated  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  Right heart failure  Type 2 diabetes mellitus  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          Basic Metabolic Panel* ([facility]), Specimen Type: Serum, Collection Date: 12/29/17 10:05:00 CST                Gout         Will treat acute gout with a course of prednisone to be used as needed.  Will check his uric acid and titrate allopurinol if needed.  Return if not promptly better.         Ordered:          [id number] office outpatient visit 25 minutes (Charge), Quantity: 1, Gout  Anticoagulated  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  Right heart failure  Type 2 diabetes mellitus  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          Uric Acid* ([facility]), Specimen Type: Serum, Collection Date: 12/29/17 10:05:00 CST                Right heart failure        Stable.         Ordered:          [id number] office outpatient visit 25 minutes (Charge), Quantity: 1, Gout  Anticoagulated  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  Right heart failure  Type 2 diabetes mellitus  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                Type 2 diabetes mellitus         We will check a hemoglobin A1c and not sure that he has this diagnosis.         Ordered:          [id number] office outpatient visit 25 minutes (Charge), Quantity: 1, Gout  Anticoagulated  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  Right heart failure  Type 2 diabetes mellitus  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          Hemoglobin A1c* ([facility]), Specimen Type: Blood, Collection Date: 12/29/17 10:05:00 CST                Orders:         predniSONE, See Instructions, Instructions: 2 po daily for 3-5 days prn gout, # 30 EA, 0 Refill(s), Type: Maintenance, Pharmacy: [facility], 2 po daily for 3-5 days prn gout  Patient Information     Name:JACINTO JIN       Address:      [address] [place] [id number]     Sex:Male     YOB: 1935     [phone]     Emergency Contact:GERA JIN     MRN:[id number]     FIN:[id number]     Location:[facility]     Date of Service:12/29/2017      Primary Care Physician:       Shravan Berrios MD, [phone]  Problem List/Past Medical History    Ongoing     Acute kidney injury (nontraumatic)     AF (Atrial Fibrillation)     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     Anticardiolipin syndrome     Anticoagulated     Ascites     B12 deficiency     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     CAD (coronary artery disease)     Cardiorenal syndrome     CHB (complete heart block)     CHF (Congestive Heart Failure)     Colon polyps     Diverticulosis     Dupuytren's contracture of right hand     Gout     H/O acute pancreatitis     High cholesterol     History of acute bacterial endocarditis     History of coronary artery bypass graft     History of tricuspid valve replacement     IBS (irritable bowel syndrome)     ICD (implantable cardioverter-defibrillator) infection     Iron deficiency anemia     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     Nonischemic dilated cardiomyopathy     Obesity     KEYONA (obstructive sleep apnea)      Comments: Adequate titration to BIPAP 16/11 on 10/15/2013     Osteoarthritis of both knees     Paralysis, diaphragm      Comments: Left     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 Chronic right heart failure.      Inpatient stay      Comments: @[facility] - Anemia     Inpatient stay      Comments: @[facility] - Jaw pain, possible anginal equivalent     Inpatient stay      Comments: @[facility] - Infected pacemaker     Inpatient stay      Comments: @[facility] - Acute on chronic right heart failure     Inpatient stay      Comments: [facility] - S/P tricuspid valve replacement with worsening right ventricular function     Other and Unspecified Noninfectious Gastroenteritis and Colitis  Procedure/Surgical History     Bone marrow biopsy (07/12/2017)     Colonoscopy (05/27/2017)     Esophagogastroduodenoscopy (05/27/2017)     TVR - Tricuspid valve replacement (11/29/2016)     Cardiac angiogram (11/01/2016)     Limited thoracotomy (08/23/2016)     Implantation of intravenous single chamber cardiac pacemaker system (08/04/2016)     Removal of automatic cardiac defibrillator (08/04/2016)     Pacemaker change (04/08/2015)     Cardiac angiogram (08/02/2013)     angiogrqam (02/03/2012)     Colonoscopy (12/15/2006)     Colonoscopy (06/22/2001)     Cholecystectomy (06/2001)     CABG - Coronary artery bypass graft (2000)     History of Back Surgery (2000)     Colonoscopy (02/17/1993)     Left shoulder surgery (1991)     Ablation for atrial fibrillation     Cholecystectomy     Colonoscopy     Esophagogastroduodenoscopy  Medications        acetaminophen 325 mg oral tablet: 650 mg, 2 tab(s), po, q4 hrs, not to exceed 4000 mg/day, 0 Refill(s).        allopurinol: 300 mg, po, daily, 0 Refill(s).        atorvastatin 40 mg oral tablet: See Instructions, TAKE ONE TABLET BY MOUTH AT BEDTIME, 90 unknown unit.        Vitamin B12 1000 mcg/mL injectable solution: 1,000 mcg, im, qmonth, 0 Refill(s).        digoxin 125 mcg (0.125 mg) oral tablet: 125 mcg, 1 tab(s), po, daily, 0 Refill(s).        Senexon-S: 2 tab(s), po, hs, 0 Refill(s).        furosemide 80 mg oral tablet: 80 mg, 1 tab(s), po, bid, Per Dr. Quintana on 3/7/2017, 0 Refill(s).        metOLazone 2.5 mg oral tablet:  See Instructions, 1 tab(s) po daily for weight > 222#, 0 Refill(s).        Metoprolol Succinate ER 25 mg oral tablet, extended release: See Instructions, TAKE ONE-HALF TABLET BY MOUTH TWICE DAILY, 30 unknown unit, 11 Refill(s).        Protegra oral tablet: 1 tab(s), po, daily.        nitroglycerin 0.4 mg sublingual tablet: 0.4 mg, 1 tab(s), SL, q5min, not to exceed 3 doses/15 min--if pain persists, seek medical attention, 25 tab(s), PRN: for chest pain.        omeprazole 20 mg oral delayed release capsule: 40 mg, 2 cap(s), po, bid, 90 cap(s), 3 Refill(s).        potassium chloride 20 mEq oral tablet, extended release: See Instructions, 1 tab TID, 30 unknown unit, 5 Refill(s).        predniSONE 20 mg oral tablet: See Instructions, 2 po daily for 3-5 days prn gout, 30 EA, 0 Refill(s).        Senokot 8.6 mg oral tablet: 1-2 tab(s), PO, Once a day (at bedtime), PRN: for constipation.        warfarin 3 mg oral tablet: See Instructions, Take 4.5mg, 3mg, 3mg on an alt. qd schedule, 40 unknown unit, 11 Refill(s).                Allergies    No Known Medication Allergies    adhesive tape  Social History    Smoking Status - 12/29/2017     Never smoker     Alcohol - Current, 11/11/2010      Current     Employment and Education - 11/11/2010      Employed, Work/School description: Farmer.     Exercise and Physical Activity - 11/11/2010      Exercise type: Walking.     Tobacco - Denies Tobacco Use, 11/11/2010  Family History    Aneurysm: Father.    Heart disease: Mother.  Immunizations      Vaccine Date Status      influenza virus vaccine, inactivated 09/28/2015 Given      pneumococcal (PCV13) 04/21/2015 Given      influenza virus vaccine, inactivated 08/28/2014 Given      influenza virus vaccine, inactivated 10/29/2012 Given      influenza virus vaccine, inactivated 09/23/2011 Given      influenza virus vaccine, inactivated 10/26/2010 Given      influenza 10/23/2008 Recorded      Td 09/01/2005 Recorded      pneumococcal  11/28/2001 Recorded  Lab Results      Results (Last 90 days)                Laboratory                     Chemistry                          General Chemistry                               BUN:      H 26 mg/dL (Range 7 - 25)  (12/12/17 05:27 PM CST)                                                                                                                                          BUN/Creat Ratio:      16   (12/12/17 05:27 PM CST)                                                                                                                                          Basic Metabolic Profile:         (12/12/17 05:27 PM CST)                                                                                                                                          CO2 Level:      H 32 mmol/L (Range 20 - 31)  (12/12/17 05:27 PM CST)                                                                                                                                          Calcium Level:      9.6 mg/dL  (12/12/17 05:27 PM CST)                                                                                                                                          Chloride Level:      99 mmol/L  (12/12/17 05:27 PM CST)                                                                                                                                          Creatinine Level:      H 1.60 mg/dL (Range 0.70 - 1.11)  (12/12/17 05:27 PM CST)                                                                                                                                          Glucose Level:      83 mg/dL  (12/12/17 05:27 PM CST)                                                                                                                                          Magnesium Level                                        (12/12/17 05:27 PM CST)                                                                                                                                                 1.9 mg/dL  (12/12/17 05:27 PM CST)                                                                                                                                          Potassium Level:      3.9 mmol/L  (12/12/17 05:27 PM CST)                                                                                                                                          Sodium Level:      140 mmol/L  (12/12/17 05:27 PM CST)                                                                                                                                          eGFR:      L 40 mL/min/1.73m2 (Range > OR = 60 - )  (12/12/17 05:27 PM CST)                                                                                                                                          eGFR :      L 46 mL/min/1.73m2 (Range > OR = 60 - )  (12/12/17 05:27 PM CST)                                                                                                                                Coagulation                          INR                               INR                                     2.0   (10/27/17 11:42 AM CDT)                                                                                                                                                1.7   (12/04/17 02:36 PM CST)                                                                                                                                                1.7   (12/19/17 03:10 PM CST)

## 2022-02-16 NOTE — CARE COORDINATION
ACTION PLAN   Goal(s): Cardiac management    CC to follow-up with Mamta PRN. Do monthly chart review. [place]/wife very good about calling if they need something.     Today s Date: 12/16/15                                                                     Goal(s) completion date: ongoing     Steps to be taken to manage CHF (cardio has been managing)    Baseline weight: 213-214lbs When will I accomplish these steps Barriers Status   (4/19/17) -Bumex d/c    -Lasix 80mg BID    -Digoxin 0.125mcg    -Metolazone prn to get weights down    F/u in 4-6 weeks w/ cardio   (4/26/27) ED visit- was advised to take Metolazone qd until appt w/ [facility] on 4/28/17. Has lost 1lbs so far.    Cardio appt scheduled w/ Lilian on 6/1.    (5/2/17) Appt w/ [facility] completed. Metolazone now on hold again. Wt down to 214 lbs which is his dry weight. Recheck w/ [facility] in 2 weeks. Labs at Infusion    (5/4/17) Weight up to 220. Per [facility] no med change/no Metolazone. Needs to be on TID dosing of potassium d/t hypokalemia. Recheck BMP on Sun (5/8). Discussed sodium intake-sticking to 1500mg and will monitor this closely. CC to check in next wk.        Steps to be taken to manage iron deficiency anemia When will I accomplish these steps Barriers Status   Venofer 200mg IV times 5 doses over 2 weeks. BMP and Hgb in 1 week (5/2/17). Orders faxed to infusion center today.            Steps to be taken to manage positve occult blood When will I accomplish these steps Barriers Status   (5/2/17) EGD and Colonscopy when stable per [facility]

## 2022-02-16 NOTE — TELEPHONE ENCOUNTER
Entered by Mag Vargas CMA on September 26, 2019 11:32:43 AM CDT  Mamta saw you on 7/26 to discuss hospice and at that time he wasnt ready yet but I spoke to Codie on Monday and he really isnt doing well. Hospice already did an eval on him.  He has fallen several times, cant eat, cant take his meds, cant drink...Codie said she can barely get him to the bathroom because he is so weak. I dont think he can physically come in for another appointment for this.     ---------------------  From: Myrna Dsouza (Phone Messages Pool ([id number]))   To: Mag Vargas CMA;     Sent: 9/26/2019 11:19:28 AM CDT  Subject: FW: Hospic Fax           ---------------------  From: Shravan Berrios MD   To: Phone Messages Pool ([id number]);     Sent: 9/26/2019 11:01:59 AM CDT  Subject: RE: Hospic Fax     I have not talked to the patient about hospice and would need to see him in person to complete it.       ---------------------  From: Myrna Dsouza ([id number] Pool ([id number]))   To: Shravan Berrios MD;     Sent: 9/26/2019 10:47:20 AM CDT  Subject: Hospic Fax     Received fax on Mamta Orozco. They are wanting this fax signed and sent back within 24 hours. It is a hospice inquiry Please Advise. Fax is in your box.---------------------  From: Mag Vargas CMA   To: Shravan Berrios MD;     Sent: 9/26/2019 11:32:52 AM CDT  Subject: RE: Hospic Fax---------------------  From: Shravan Berrios MD   To: Mag Vargas CMA;     Sent: 9/26/2019 12:55:20 PM CDT  Subject: RE: Cesar Fax     I no longer have the paperwork in my box. I can do it later today if it is located.thanks-its in your box on top.---------------------  From: Mag Vargas CMA   To: Shravan Berrios MD;     Sent: 9/26/2019 1:00:05 PM CDT  Subject: RE: [facility]esar Fax---------------------  From: Shravan Berrios MD   To: Mag Vargas CMA;     Sent: 9/26/2019 3:22:55 PM CDT  Subject: RE: Cesar Fax     done---------------------  From:  Mag Vargas CMA   To: Osman/Myrna LONDON;     Sent: 9/26/2019 3:28:29 PM CDT  Subject: FW: Hospic Fax

## 2022-02-16 NOTE — RESULTS
Anticoagulation Therapy Entered On:  1/4/2019 3:37 PM CST    Performed On:  1/4/2019 3:36 PM CST by Beatrice Mims RN               Warfarin Management   Week 1 Sunday Dose :   3    Week 1 Monday Dose :   3    Week 1 Tuesday Dose :   3    Week 1 Wednesday Dose :   3    Week 1 Thursday Dose :   3    Week 1 Friday Dose :   3    Week 1 Saturday Dose :   3    Week 1 Dose Total :   21    Week 2 Sunday Dose :   3    Week 2 Monday Dose :   3    Week 2 Tuesday Dose :   3    Week 2 Wednesday Dose :   3    Week 2 Thursday Dose :   3    Week 2 Friday Dose :   3    Week 2 Saturday Dose :   3    Week 2 Dose Total :   21    Anticoagulation Goal :    Lab Test performed by:  [facility]  [address] [place] [id number]   Phone # [phone]  Fax # [phone]  Lab Draw   Indication :   Atrial fibrillation   Warfarin Management Comments :   Lab Test Performed by:   [facility]  [facility] [place] [address]  Phone # [phone]  Fax # [phone]  Capillary draw   International Normalization Ratio :   4.1    INR Range :   2.0 - 3.0   INR Therapeutic Range :   No   Beatrice Mims RN - 1/4/2019 3:36 PM CST   Warfarin Management and Results Grid   Signs of Thrombolic :   No   Signs of Warfarin Intolerance :   No   Changes in Diet or Alcohol Intake :   No   Changes in Medication or Antibiotics :   No   Unusual Bleeding or Bruising :   No   Rectal Bleeding or Black Stools :   No   Beatrice Mims RN - 1/4/2019 3:36 PM CST   Anticoagulation Recheck :   1 week   Warfarin Special Instructions :   INR 4.1, per JDL, take coumadin 1.5mg on M, W, F and 3mg the rest of the days of the week.  Recheck INR in 1 week.  Patient informed in clinic and sent home with written directions.   Beatrice Mims RN - 1/4/2019 3:36 PM CST

## 2022-02-16 NOTE — NURSING NOTE
Comprehensive Intake Entered On:  8/6/2019 11:58 AM CDT    Performed On:  8/6/2019 11:45 AM CDT by Myrna Dsouza               Summary   Chief Complaint :   Patient here for low bp, patient took wife's bp medication   Advance Directive :   Yes   Menstrual Status :   N/A   Weight Measured :   195 lb(Converted to: 195 lb 0 oz, 88.45 kg)    Height Measured :   75 in(Converted to: 6 ft 3 in, 190.50 cm)    Body Mass Index :   24.37 kg/m2   Body Surface Area :   2.16 m2   Systolic Blood Pressure :   80 mmHg (LOW)    Diastolic Blood Pressure :   60 mmHg   Mean Arterial Pressure :   67 mmHg   Peripheral Pulse Rate :   70 bpm   BP Site :   Right arm   Pulse Site :   Radial artery   BP Method :   Manual   HR Method :   Manual   Temperature Tympanic :   96.8 DegF(Converted to: 36.0 DegC)  (LOW)    Myrna Dsouza - 8/6/2019 11:45 AM CDT   Health Status   Allergies Verified? :   Yes   Medication History Verified? :   Yes   Medical History Verified? :   Yes   Pre-Visit Planning Status :   N/A   Tobacco Use? :   Former smoker   Myrna Dsouza - 8/6/2019 11:45 AM CDT   Consents   Consent for Immunization Exchange :   Consent Granted   Consent for Immunizations to Providers :   Consent Granted   Myrna Dsouza - 8/6/2019 11:45 AM CDT   Problems   (As Of: 8/6/2019 11:58:18 AM CDT)   Problems(Active)    Acquired arteriovenous malformation of colon (SNOMED CT  :816932361 )  Name of Problem:   Acquired arteriovenous malformation of colon ; Onset Date:   12/7/2018 ; Recorder:   Shravan Berrios MD; Confirmation:   Confirmed ; Classification:   Medical ; Code:   [id number] ; Contributor System:   PowerChart ; Last Updated:   12/11/2018 2:33 PM CST ; Life Cycle Date:   12/11/2018 ; Life Cycle Status:   Active ; Responsible Provider:   Shravan Berrios MD; Vocabulary:   SNOMED CT   ; Comments:        12/11/2018 2:33 PM - Shravan Berrios MD  Treated.      Acute kidney injury (nontraumatic) (SNOMED CT  :9046630705 )  Name of  Problem:   Acute kidney injury (nontraumatic) ; Recorder:   Floridalma Calderon; Confirmation:   Confirmed ; Classification:   Medical ; Code:   [id number] ; Contributor System:   PowerChart ; Last Updated:   8/29/2016 2:41 PM CDT ; Life Cycle Date:   8/29/2016 ; Life Cycle Status:   Active ; Vocabulary:   SNOMED CT        AF (Atrial Fibrillation) (SNOMED CT  :9161827417 )  Name of Problem:   AF (Atrial Fibrillation) ; Recorder:   Cindy Almazan CMA; Confirmation:   Confirmed ; Classification:   Medical ; Code:   1242567965 ; Contributor System:   PowerChart ; Last Updated:   10/9/2015 10:50 AM CDT ; Life Cycle Date:   1/14/2010 ; Life Cycle Status:   Active ; Vocabulary:   SNOMED CT        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, chronic (SNOMED CT  :1917023652 )  Name of Problem:   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, chronic ; Recorder:   Shravan Berrios MD; Confirmation:   Confirmed ; Classification:   Medical ; Code:   [id number] ; Contributor System:   PowerChart ; Last Updated:   5/3/2019 5:07 PM CDT ; Life Cycle Date:   5/3/2019 ; Life Cycle Status:   Active ; Responsible Provider:   Shravan Berrios MD; Vocabulary:   SNOMED CT        Anemia of renal disease (SNOMED CT  :262641232 )  Name of Problem:   Anemia of renal disease ; Recorder:   Myrtle Orozco; Confirmation:   Confirmed ; Classification:   Medical ; Code:   681824978 ; Contributor System:   PowerChart ; Last Updated:   5/1/2018 1:26 PM CDT ; Life Cycle Date:   5/1/2018 ; Life Cycle Status:   Active ; Vocabulary:   SNOMED CT        Anticardiolipin syndrome (SNOMED CT  :8318017416 )  Name of Problem:   Anticardiolipin syndrome ; Onset Date:   1/1/2005 ; Recorder:   Cindy Almazan CMA; Confirmation:   Confirmed ; Classification:   Medical ; Code:   7125336839 ; Contributor System:   PowerChart ; Last Updated:   5/31/2017 10:32 PM CDT ; Life Cycle Date:   1/14/2010 ; Life Cycle Status:   Active ; Vocabulary:   SNOMED CT        Anticoagulated (SNOMED CT  :46268447 )   Name of Problem:   Anticoagulated ; Recorder:   Chata Chow CMA; Confirmation:   Confirmed ; Classification:   Medical ; Code:   97345618 ; Contributor System:   PowerChart ; Last Updated:   5/31/2017 10:32 PM CDT ; Life Cycle Date:   4/5/2011 ; Life Cycle Status:   Active ; Vocabulary:   SNOMED CT        Ascites (SNOMED CT  :4539355825 )  Name of Problem:   Ascites ; Recorder:   Shravan Berrios MD; Confirmation:   Confirmed ; Classification:   Medical ; Code:   7268396164 ; Contributor System:   PowerChart ; Last Updated:   12/11/2018 2:37 PM CST ; Life Cycle Date:   12/11/2018 ; Life Cycle Status:   Active ; Responsible Provider:   Shravan Berrios MD; Vocabulary:   SNOMED CT   ; Comments:        12/11/2018 2:37 PM - Shravan Berrios MD  Negative cytology 12/6/18      B12 deficiency (SNOMED CT  :334137463 )  Name of Problem:   B12 deficiency ; Recorder:   Shravan Berrios MD; Confirmation:   Confirmed ; Classification:   Medical ; Code:   290118813 ; Contributor System:   PowerChart ; Last Updated:   8/7/2017 11:50 AM CDT ; Life Cycle Date:   8/7/2017 ; Life Cycle Status:   Active ; Responsible Provider:   Shravan Berrios MD; Vocabulary:   SNOMED CT        BPH with urinary obstruction (SNOMED CT  :2531480792 )  Name of Problem:   BPH with urinary obstruction ; Recorder:   Shravan Berrios MD; Confirmation:   Confirmed ; Classification:   Medical ; Code:   7007440727 ; Contributor System:   PowerChart ; Last Updated:   7/20/2018 2:43 PM CDT ; Life Cycle Date:   7/20/2018 ; Life Cycle Status:   Active ; Responsible Provider:   Shravan Berrios MD; Vocabulary:   SNOMED CT        CAD (coronary artery disease) (SNOMED CT  :2397263814 )  Name of Problem:   CAD (coronary artery disease) ; Recorder:   Cindy Almazan CMA; Confirmation:   Confirmed ; Classification:   Medical ; Code:   9507136037 ; Contributor System:   PowerChart ; Last Updated:   5/31/2017 10:33 PM CDT ; Life Cycle Date:   1/14/2010 ; Life Cycle  Status:   Active ; Vocabulary:   SNOMED CT        Cardiac cirrhosis (SNOMED CT  :903402419 )  Name of Problem:   Cardiac cirrhosis ; Recorder:   Shravan Berrios MD; Confirmation:   Confirmed ; Classification:   Medical ; Code:   900417376 ; Contributor System:   PowerChart ; Last Updated:   7/26/2019 4:17 PM CDT ; Life Cycle Status:   Active ; Responsible Provider:   Shravan Berrios MD; Vocabulary:   SNOMED CT        Cardiorenal syndrome (SNOMED CT  :4646268245 )  Name of Problem:   Cardiorenal syndrome ; Recorder:   Floridalma Calderon; Confirmation:   Confirmed ; Classification:   Medical ; Code:   0838152958 ; Contributor System:   PowerChart ; Last Updated:   5/31/2017 10:37 PM CDT ; Life Cycle Date:   5/31/2017 ; Life Cycle Status:   Active ; Vocabulary:   SNOMED CT        CHB (complete heart block) (SNOMED CT  :1599781176 )  Name of Problem:   CHB (complete heart block) ; Recorder:   Floridalma Calderon; Confirmation:   Confirmed ; Classification:   Medical ; Code:   4046948276 ; Contributor System:   PowerChart ; Last Updated:   8/29/2016 2:53 PM CDT ; Life Cycle Date:   8/29/2016 ; Life Cycle Status:   Active ; Vocabulary:   SNOMED CT        Chronic diastolic CHF (congestive heart failure), NYHA class 3 (SNOMED CT  :2927076314 )  Name of Problem:   Chronic diastolic CHF (congestive heart failure), NYHA class 3 ; Recorder:   Cindy Almazan CMA; Confirmation:   Confirmed ; Classification:   Medical ; Code:   6101519257 ; Contributor System:   PowerChart ; Last Updated:   12/10/2018 7:02 AM CST ; Life Cycle Status:   Active ; Vocabulary:   SNOMED CT        Depression (SNOMED CT  :19855818 )  Name of Problem:   Depression ; Recorder:   Shravan Berrios MD; Confirmation:   Confirmed ; Classification:   Medical ; Code:   75296993 ; Contributor System:   PowerChart ; Last Updated:   3/27/2018 11:37 AM CDT ; Life Cycle Date:   3/27/2018 ; Life Cycle Status:   Active ; Responsible Provider:   Shravan Berrios MD; Vocabulary:    SNOMED CT        Diverticulosis (SNOMED CT  :8564290969 )  Name of Problem:   Diverticulosis ; Recorder:   Shravan Berrios MD; Confirmation:   Confirmed ; Classification:   Medical ; Code:   9785067756 ; Contributor System:   PowerChart ; Last Updated:   9/28/2015 4:55 PM CDT ; Life Cycle Date:   9/28/2015 ; Life Cycle Status:   Active ; Responsible Provider:   Shravan Berrios MD; Vocabulary:   SNOMED CT        Dupuytren's contracture of right hand (SNOMED CT  :8204918055 )  Name of Problem:   Dupuytren's contracture of right hand ; Recorder:   Shravan Berrios MD; Confirmation:   Confirmed ; Classification:   Medical ; Code:   4492705597 ; Contributor System:   PowerChart ; Last Updated:   5/31/2017 10:34 PM CDT ; Life Cycle Date:   5/30/2013 ; Life Cycle Status:   Active ; Responsible Provider:   Shravan Berrios MD; Vocabulary:   SNOMED CT        Gout (SNOMED CT  :133983388 )  Name of Problem:   Gout ; Recorder:   Cindy Almazan CMA; Confirmation:   Confirmed ; Classification:   Medical ; Code:   317647283 ; Contributor System:   PowerChart ; Last Updated:   5/31/2017 10:34 PM CDT ; Life Cycle Date:   1/14/2010 ; Life Cycle Status:   Active ; Vocabulary:   SNOMED CT        H/O acute pancreatitis (SNOMED CT  :1477388401 )  Name of Problem:   H/O acute pancreatitis ; Onset Date:   12/6/2016 ; Recorder:   Floridalma Calderon; Confirmation:   Confirmed ; Classification:   Medical ; Code:   0404376406 ; Contributor System:   PowerChart ; Last Updated:   12/20/2016 6:07 AM CST ; Life Cycle Date:   12/20/2016 ; Life Cycle Status:   Active ; Vocabulary:   SNOMED CT        High cholesterol (SNOMED CT  :88066626 )  Name of Problem:   High cholesterol ; Recorder:   Madeline Gonzalez; Confirmation:   Confirmed ; Classification:   Medical ; Code:   45704163 ; Contributor System:   PowerChart ; Last Updated:   5/31/2017 10:35 PM CDT ; Life Cycle Date:   5/2/2011 ; Life Cycle Status:   Active ; Vocabulary:   SNOMED CT         History of acute bacterial endocarditis (SNOMED CT  :3473763573 )  Name of Problem:   History of acute bacterial endocarditis ; Onset Date:   7/23/2016 ; Recorder:   Floridalma Calderon; Confirmation:   Confirmed ; Classification:   Medical ; Code:   1613225124 ; Contributor System:   PowerChart ; Last Updated:   8/29/2016 2:38 PM CDT ; Life Cycle Date:   8/29/2016 ; Life Cycle Status:   Active ; Vocabulary:   SNOMED CT        History of coronary artery bypass graft (SNOMED CT  :9066718848 )  Name of Problem:   History of coronary artery bypass graft ; Recorder:   Floridalma Calderon; Confirmation:   Confirmed ; Classification:   Medical ; Code:   1985854003 ; Contributor System:   PowerChart ; Last Updated:   8/29/2016 3:00 PM CDT ; Life Cycle Date:   8/29/2016 ; Life Cycle Status:   Active ; Vocabulary:   SNOMED CT        History of GI bleed (SNOMED CT  :9012961268 )  Name of Problem:   History of GI bleed ; Onset Date:   12/5/2018 ; Recorder:   Floridalma Calderon; Confirmation:   Confirmed ; Classification:   Medical ; Code:   1142761921 ; Contributor System:   PowerChart ; Last Updated:   5/6/2019 6:41 AM CDT ; Life Cycle Status:   Active ; Vocabulary:   SNOMED CT   ; Comments:        5/6/2019 6:41 AM - Floridalma Calderon  Again 04/30/2019      History of tricuspid valve replacement (SNOMED CT  :0279006162 )  Name of Problem:   History of tricuspid valve replacement ; Onset Date:   11/2016 ; Recorder:   Floridalma Calderon; Confirmation:   Confirmed ; Classification:   Medical ; Code:   4276406442 ; Contributor System:   PowerChart ; Last Updated:   12/20/2016 6:06 AM CST ; Life Cycle Date:   12/20/2016 ; Life Cycle Status:   Active ; Vocabulary:   SNOMED CT        Hx of colonic polyp (SNOMED CT  :5880050304 )  Name of Problem:   Hx of colonic polyp ; Recorder:   Myrtle Orozco; Confirmation:   Confirmed ; Classification:   Medical ; Code:   3685326385 ; Contributor System:   PowerChart ; Last Updated:   5/1/2018 1:26 PM CDT ; Life  Cycle Date:   5/1/2018 ; Life Cycle Status:   Active ; Vocabulary:   SNOMED CT        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 (SNOMED CT  :2041144128 )  Name of Problem:   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 ; Recorder:   Madeline Chance; Confirmation:   Confirmed ; Classification:   Medical ; Code:   9404997249 ; Contributor System:   PowerChart ; Last Updated:   12/17/2018 4:30 PM CST ; Life Cycle Date:   12/17/2018 ; Life Cycle Status:   Active ; Vocabulary:   SNOMED CT        IBS (irritable bowel syndrome) (SNOMED CT  :3909859080 )  Name of Problem:   IBS (irritable bowel syndrome) ; Recorder:   Cindy Almazan CMA; Confirmation:   Confirmed ; Classification:   Medical ; Code:   7056936520 ; Contributor System:   PowerChart ; Last Updated:   5/31/2017 10:34 PM CDT ; Life Cycle Date:   1/14/2010 ; Life Cycle Status:   Active ; Vocabulary:   SNOMED CT        ICD (implantable cardioverter-defibrillator) infection (SNOMED CT  :638452357 )  Name of Problem:   ICD (implantable cardioverter-defibrillator) infection ; Recorder:   Floridalma Calderon; Confirmation:   Confirmed ; Classification:   Medical ; Code:   125890594 ; Contributor System:   PowerChart ; Last Updated:   8/29/2016 2:54 PM CDT ; Life Cycle Date:   8/29/2016 ; Life Cycle Status:   Active ; Vocabulary:   SNOMED CT        Inguinal hernia, right (SNOMED CT  :099052657 )  Name of Problem:   Inguinal hernia, right ; Recorder:   Shravan Berrios MD; Confirmation:   Confirmed ; Classification:   Medical ; Code:   596743000 ; Contributor System:   PowerChart ; Last Updated:   7/20/2018 2:42 PM CDT ; Life Cycle Date:   7/20/2018 ; Life Cycle Status:   Active ; Responsible Provider:   Shravan Berrios MD; Vocabulary:   SNOMED CT        Iron deficiency anemia (SNOMED CT  :422063506 )  Name of Problem:   Iron deficiency anemia ; Recorder:   Shravan Berrios MD; Confirmation:   Confirmed ; Classification:   Medical ; Code:   815196652  ; Contributor System:   PowerChart ; Last Updated:   4/28/2017 12:14 PM CDT ; Life Cycle Date:   4/28/2017 ; Life Cycle Status:   Active ; Responsible Provider:   Shravan Berrios MD; Vocabulary:   SNOMED CT        MGUS (monoclonal gammopathy of unknown significance) (SNOMED CT  :050793414 )  Name of Problem:   MGUS (monoclonal gammopathy of unknown significance) ; Recorder:   Shravan Berrios MD; Confirmation:   Confirmed ; Classification:   Medical ; Code:   999867012 ; Contributor System:   PowerChart ; Last Updated:   8/7/2017 11:49 AM CDT ; Life Cycle Date:   8/7/2017 ; Life Cycle Status:   Active ; Responsible Provider:   Shravan Berrios MD; Vocabulary:   SNOMED CT   ; Comments:        8/7/2017 11:50 AM - Shravan Berrios MD  IgG Kappa      Nonischemic dilated cardiomyopathy (SNOMED CT  :8254370865 )  Name of Problem:   Nonischemic dilated cardiomyopathy ; Recorder:   Floridalma Calderon; Confirmation:   Confirmed ; Classification:   Medical ; Code:   0017075834 ; Contributor System:   PowerChart ; Last Updated:   8/29/2016 2:54 PM CDT ; Life Cycle Date:   8/29/2016 ; Life Cycle Status:   Active ; Vocabulary:   SNOMED CT        Obesity (SNOMED CT  :7778096483 )  Name of Problem:   Obesity ; Recorder:   Shravan Berrios MD; Confirmation:   Confirmed ; Classification:   Medical ; Code:   6645690692 ; Contributor System:   PowerChart ; Last Updated:   8/8/2017 9:13 AM CDT ; Life Cycle Date:   12/9/2010 ; Life Cycle Status:   Active ; Responsible Provider:   Shravan Berrios MD; Vocabulary:   SNOMED CT        KEYONA (obstructive sleep apnea) (SNOMED CT  :486940754 )  Name of Problem:   KEYONA (obstructive sleep apnea) ; Onset Date:   1/1/2003 ; Recorder:   Cindy Almazan CMA; Confirmation:   Confirmed ; Classification:   Medical ; Code:   173765100 ; Contributor System:   PowerChart ; Last Updated:   5/31/2017 10:33 PM CDT ; Life Cycle Date:   1/14/2010 ; Life Cycle Status:   Active ; Vocabulary:   SNOMED CT   ; Comments:         11/1/2013 10:58 AM - Shravan Berrios MD  Adequate titration to BIPAP 16/11 on 10/15/2013      Osteoarthritis of both knees (SNOMED CT  :4406948716 )  Name of Problem:   Osteoarthritis of both knees ; Recorder:   Shravan Berrios MD; Confirmation:   Confirmed ; Classification:   Medical ; Code:   5468495134 ; Contributor System:   PowerChart ; Last Updated:   7/14/2016 11:51 AM CDT ; Life Cycle Date:   7/14/2016 ; Life Cycle Status:   Active ; Responsible Provider:   Shravan Berrios MD; Vocabulary:   SNOMED CT        Paralysis, diaphragm (SNOMED CT  :624254376 )  Name of Problem:   Paralysis, diaphragm ; Recorder:   Shravan Berrios MD; Confirmation:   Confirmed ; Classification:   Medical ; Code:   236298679 ; Contributor System:   PowerChart ; Last Updated:   9/28/2015 4:09 PM CDT ; Life Cycle Date:   9/28/2015 ; Life Cycle Status:   Active ; Responsible Provider:   Shravan Berrios MD; Vocabulary:   SNOMED CT   ; Comments:        9/28/2015 4:09 PM - Shravan Berrios MD  Left      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 (SNOMED CT  :6704848323 )  Name of Problem:   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 ; Recorder:   Cindy Almazan CMA; Confirmation:   Confirmed ; Classification:   Medical ; Code:   7851170015 ; Contributor System:   PowerChart ; Last Updated:   9/29/2015 3:05 PM CDT ; Life Cycle Date:   1/14/2010 ; Life Cycle Status:   Active ; Vocabulary:   SNOMED CT        Right heart failure (SNOMED CT  :403907252 )  Name of Problem:   Right heart failure ; Recorder:   Floridalma Calderon; Confirmation:   Confirmed ; Classification:   Medical ; Code:   617320079 ; Contributor System:   PowerChart ; Last Updated:   12/20/2016 10:26 PM CST ; Life Cycle Date:   12/20/2016 ; Life Cycle Status:   Active ; Vocabulary:   SNOMED CT        Stage 3 chronic kidney disease (SNOMED CT  :9962730365 )  Name of Problem:   Stage 3 chronic kidney disease ;  Recorder:   Myrtle Orozco; Confirmation:   Confirmed ; Classification:   Medical ; Code:   8975131475 ; Contributor System:   [employer] ; Last Updated:   5/1/2018 1:31 PM CDT ; Life Cycle Date:   5/1/2018 ; Life Cycle Status:   Active ; Vocabulary:   SNOMED CT        Tricuspid valve insufficiency (SNOMED CT  :[id number] )  Name of Problem:   Tricuspid valve insufficiency ; Recorder:   Floridalma Calderon; Confirmation:   Confirmed ; Classification:   Medical ; Code:   [id number] ; Contributor System:   [employer] ; Last Updated:   8/29/2016 3:02 PM CDT ; Life Cycle Date:   8/29/2016 ; Life Cycle Status:   Active ; Vocabulary:   SNOMED CT        Vitamin B 12 deficiency (SNOMED CT  :6769606795 )  Name of Problem:   Vitamin B 12 deficiency ; Recorder:   Mag Vargas CMA; Confirmation:   Confirmed ; Classification:   Medical ; Code:   2540331786 ; Contributor System:   [employer] ; Last Updated:   7/12/2017 9:28 AM CDT ; Life Cycle Date:   7/12/2017 ; Life Cycle Status:   Active ; Vocabulary:   SNOMED CT          Meds / Allergies   (As Of: 8/6/2019 11:58:18 AM CDT)   Allergies (Active)   adhesive tape  Estimated Onset Date:   Unspecified ; Created By:   Floridalma Calderon; Reaction Status:   Active ; Category:   Other ; Substance:   adhesive tape ; Type:   Allergy ; Updated By:   Floridalma Calderon; Reviewed Date:   8/6/2019 11:54 AM CDT      No Known Medication Allergies  Estimated Onset Date:   Unspecified ; Created By:   Ellen Parker MA; Reaction Status:   Active ; Category:   Drug ; Substance:   No Known Medication Allergies ; Type:   Allergy ; Updated By:   Ellen Parker MA; Reviewed Date:   8/6/2019 11:54 AM CDT        Medication List   (As Of: 8/6/2019 11:58:18 AM CDT)   Prescription/Discharge Order    famotidine  :   famotidine ; Status:   Prescribed ; Ordered As Mnemonic:   famotidine 20 mg oral tablet ; Simple Display Line:   1 tab(s), Oral, bid, 180 EA, 3 Refill(s) ;  Ordering Provider:   Shravan Berrios MD; Catalog Code:   famotidine ; Order Dt/Tm:   7/11/2019 3:48:29 PM          spironolactone  :   spironolactone ; Status:   Prescribed ; Ordered As Mnemonic:   spironolactone 50 mg oral tablet ; Simple Display Line:   25 mg, 0.5 tab(s), Oral, bid, 30 tab(s), 2 Refill(s) ; Ordering Provider:   Shravan Berrios MD; Catalog Code:   spironolactone ; Order Dt/Tm:   7/11/2019 11:58:45 AM          fluticasone nasal  :   fluticasone nasal ; Status:   Prescribed ; Ordered As Mnemonic:   Flonase 50 mcg/inh nasal spray ; Simple Display Line:   2 spray(s), Nasal, daily, for 30 day(s), 1 EA, 0 Refill(s) ; Ordering Provider:   Shravan Berrios MD; Catalog Code:   fluticasone nasal ; Order Dt/Tm:   7/8/2019 8:23:11 AM          midodrine  :   midodrine ; Status:   Prescribed ; Ordered As Mnemonic:   midodrine 2.5 mg oral tablet ; Simple Display Line:   5 mg, 2 tab(s), Oral, tid, for 30 day(s), 180 tab(s), 2 Refill(s) ; Ordering Provider:   Shravan Berrios MD; Catalog Code:   midodrine ; Order Dt/Tm:   6/28/2019 2:06:56 PM          allopurinol  :   allopurinol ; Status:   Prescribed ; Ordered As Mnemonic:   allopurinol 300 mg oral tablet ; Simple Display Line:   300 mg, 1 tab(s), Oral, daily, for 90 day(s), 90 tab(s), 3 Refill(s) ; Ordering Provider:   Shravan Berrios MD; Catalog Code:   allopurinol ; Order Dt/Tm:   5/3/2019 4:58:18 PM          metoprolol  :   metoprolol ; Status:   Prescribed ; Ordered As Mnemonic:   Metoprolol Succinate ER 25 mg oral tablet, extended release ; Simple Display Line:   See Instructions, TAKE ONE-HALF TABLET BY MOUTH TWICE DAILY, 180 EA, 3 Refill(s) ; Ordering Provider:   Shravan Berrios MD; Catalog Code:   metoprolol ; Order Dt/Tm:   5/3/2019 4:58:09 PM          atorvastatin  :   atorvastatin ; Status:   Prescribed ; Ordered As Mnemonic:   atorvastatin 10 mg oral tablet ; Simple Display Line:   10 mg, 1 tab(s), Oral, hs, 30 tab(s), 0 Refill(s) ; Ordering Provider:    Shravan Berrios MD; Catalog Code:   atorvastatin ; Order Dt/Tm:   5/3/2019 11:54:45 AM          mirtazapine 15 mg oral tablet  :   mirtazapine 15 mg oral tablet ; Status:   Prescribed ; Ordered As Mnemonic:   mirtazapine 15 mg oral tablet ; Simple Display Line:   1 tab(s), Oral, qhs, 30 unknown unit, 11 Refill(s) ; Ordering Provider:   Shravan Berrios MD; Catalog Code:   mirtazapine ; Order Dt/Tm:   4/18/2019 8:36:08 AM          metroNIDAZOLE topical  :   metroNIDAZOLE topical ; Status:   Prescribed ; Ordered As Mnemonic:   MetroGel 1% topical gel ; Simple Display Line:   1 nazario, Topical, daily, 1 tubes, 5 Refill(s) ; Ordering Provider:   Rissa Arauz MD; Catalog Code:   metroNIDAZOLE topical ; Order Dt/Tm:   6/29/2018 3:46:29 PM          clotrimazole topical  :   clotrimazole topical ; Status:   Prescribed ; Ordered As Mnemonic:   clotrimazole 1% topical cream ; Simple Display Line:   1 nazario, TOP, BID, for 7 day(s), 30 gm, 0 Refill(s) ; Ordering Provider:   Rissa Arauz MD; Catalog Code:   clotrimazole topical ; Order Dt/Tm:   6/22/2018 3:35:41 PM          Misc Prescription  :   Misc Prescription ; Status:   Prescribed ; Ordered As Mnemonic:   [id number] URINARY DRAIN BAG 2000ML MG BEAD ; Simple Display Line:   See Instructions, USE AS DIRECTED, 1 unknown unit ; Ordering Provider:   Shravan Berrios MD; Catalog Code:   Miscellaneous Prescription ; Order Dt/Tm:   6/4/2018 7:34:17 AM          predniSONE  :   predniSONE ; Status:   Prescribed ; Ordered As Mnemonic:   predniSONE 20 mg oral tablet ; Simple Display Line:   See Instructions, 2 po daily for 3-5 days prn gout, 30 EA, 0 Refill(s) ; Ordering Provider:   Shravan Berrios MD; Catalog Code:   predniSONE ; Order Dt/Tm:   12/29/2017 10:06:41 AM          nitroglycerin  :   nitroglycerin ; Status:   Prescribed ; Ordered As Mnemonic:   nitroglycerin 0.4 mg sublingual tablet ; Simple Display Line:   0.4 mg, 1 tab(s), SL, q5min, not to exceed 3 doses/15 min--if pain  persists, seek medical attention, 25 tab(s), PRN: for chest pain ; Ordering Provider:   Shravan Berrios MD; Catalog Code:   nitroglycerin ; Order Dt/Tm:   8/28/2014 11:35:30 AM            Home Meds    cyanocobalamin  :   cyanocobalamin ; Status:   Documented ; Ordered As Mnemonic:   Vitamin B12 1000 mcg/mL injectable solution ; Simple Display Line:   1,000 mcg, im, qmonth, 0 Refill(s) ; Catalog Code:   cyanocobalamin ; Order Dt/Tm:   7/24/2017 11:12:05 AM          digoxin  :   digoxin ; Status:   Documented ; Ordered As Mnemonic:   digoxin 125 mcg (0.125 mg) oral tablet ; Simple Display Line:   125 mcg, 1 tab(s), po, daily, 0 Refill(s) ; Catalog Code:   digoxin ; Order Dt/Tm:   4/19/2017 10:47:22 AM          furosemide  :   furosemide ; Status:   Documented ; Ordered As Mnemonic:   furosemide 80 mg oral tablet ; Simple Display Line:   40 mg, 0.5 tab(s), po, bid, 0 Refill(s) ; Catalog Code:   furosemide ; Order Dt/Tm:   3/15/2017 3:19:58 PM          acetaminophen  :   acetaminophen ; Status:   Documented ; Ordered As Mnemonic:   acetaminophen 325 mg oral tablet ; Simple Display Line:   650 mg, 2 tab(s), po, q4 hrs, not to exceed 4000 mg/day, 0 Refill(s) ; Catalog Code:   acetaminophen ; Order Dt/Tm:   12/20/2016 1:58:24 PM          multivitamin  :   multivitamin ; Status:   Documented ; Ordered As Mnemonic:   Protegra oral tablet ; Simple Display Line:   1 tab(s), po, daily ; Catalog Code:   multivitamin ; Order Dt/Tm:   6/1/2015 8:38:03 AM            More Vitals   Systolic Blood Pressure Repeat :   78 mmHg   Diastolic Blood Pressure Repeat :   50 mmHg   BP Site Repeat :   Right arm   Osman/Myrna LONDON - 8/6/2019 12:35 PM CDT

## 2022-02-16 NOTE — CARE COORDINATION
ACTION PLAN   Goal(s): Cardiac management    CC to follow-up with Mamta REDDY. Do monthly chart review. Mamta/wife very good about calling if they need something.     Today s Date: 12/16/15                                                                     Goal(s) completion date: ongoing     Steps to be taken to manage CHF (cardio has been managing)    Baseline weight: 213-214lbs When will I accomplish these steps Barriers Status   (4/19/17) -Bumex d/c    -Lasix 80mg BID    -Digoxin 0.125mcg    -Metolazone prn to get weights down    Cardio f/u in 3 mo (9/2017)   (4/26/27) ED visit- was advised to take Metolazone qd until appt w/ [facility] on 4/28/17. Has lost 1lbs so far.    Cardio appt scheduled w/ Lilian on 6/1.    (5/2/17) Appt w/ [facility] completed. Metolazone now on hold again. Wt down to 214 lbs which is his dry weight. Recheck w/ [facility] in 2 weeks. Labs at [facility]    (5/4/17) Weight up to 220. Per [facility] no med change/no Metolazone. Needs to be on TID dosing of potassium d/t hypokalemia. Recheck BMP on Sun (5/8). Discussed sodium intake-sticking to 1500mg and will monitor this closely. CC to check in next wk.     (5/9/17) Wt up to 223lbs. Per [facility] OK to take qd metolazone if weight >222lbs. F/u appt w/ [facility] on Friday 5/12, repeat BMP and INR in 1 week.     (6/27/17) Per cardio-cont. w/ current diuretics. Need to see hematology ([facility]). Cardio f/u in 3 mo and BMP in 2 weeks.     (7/12/17) No change in labs-keep cardio f/u as scheduled (due in Sept 2017)       Steps to be taken to manage uncontrolled iron deficiency anemia When will I accomplish these steps Barriers Status   Saw [facility] on 6/23.   1. Scheduled bone marrow biopsy to r/o lymphoma.    2. Get Injectafer 750mg IV weekly x4    3. U/s of Liver    4. Have Methylmalonic acid level checked    5. F/u with [facility] in 1mo w/ CBC and iron studies.    6. Also noted to have Vitamin B12 deficiency so will be getting wkly infusions x 3 and then switch to  monthly injections.   (7/12/17)    1. Bone marrow biopsy scheduled on 7/12/17    2. Completing infusions    3. U/s done    4. Labs done    5. F/u w/ [facility] scheduled on 7/21 and labs to be completed on 7/12    6. Completing infusions. Chart updated w/ this info.         Steps to be taken to manage When will I accomplish these steps Barriers Status

## 2022-02-16 NOTE — PROGRESS NOTES
Chief Complaint    Patient is here for rash around mouth and on stomach. Started a couple months ago and started getting wrose today.  Physical Exam   Vitals & Measurements    T: 97.9   F (Tympanic)  HR: 68(Peripheral)  BP: 106/62     WT: 232.0 lb       Review of systems is negative except as per HPI      Exam:      General: alert and oriented ×3 no acute distress.      HEENT: pupils are equal round and reactive to light extraocular motion is intact. Normocephalic and atraumatic.       Hearing is grossly slightly impaired and there is no otorrhea.       Nares are patent there is no rhinorrhea.       Mucous membranes are moist and pink.      Chest: has bilateral rise with no increased work of breathing.      Cardiovascular: normal perfusion and brisk capillary refill.      Musculoskeletal: no gross focal abnormalities and normal gait.      Neuro: no gross focal abnormalities and memory seems intact.      Psychiatric: speech is clear and coherent and fluent. Patient dressed appropriately for the weather. Mood is appropriate and affect is full.       skin:irregular flat red rash on face and a patch on abdomen, minimal   small skin eschars, scaling near nasal labial fold  Assessment/Plan       Rash (R21)          possibly seborrheic dermatitis         Orders:          clotrimazole topical, 1 nazario, TOP, BID, # 30 gm, 0 Refill(s), Type: Maintenance, Pharmacy: [facility], 1 nazario top bid,x7 day(s), (Ordered)          [id number] office outpatient visit 15 minutes (Charge), Quantity: 1, Rash          Return to Clinic (Request), Return in 1 weeks with PCP if available follow up rash           Patient Information     Name:ALEX JINN EDILBERTO      Address:      [address] [place] [id number]     Sex:Male     YOB: 1935     [phone]     Emergency Contact:GERA JIN     MRN:[id number]     FIN:[id number]     Location:[facility]     Date of Service:06/22/2018      Primary Care  Physician:       Shravan Berrios MD, [phone]      Attending Physician:       Rissa rAauz MD, [phone]  Problem List/Past Medical History    Ongoing     Acute kidney injury (nontraumatic)     AF (Atrial Fibrillation)     Anemia of renal disease     Anticardiolipin syndrome     Anticoagulated     Ascites     B12 deficiency     Benign hypertensive CKD     Benign prostatic hyperplasia (BPH) with urinary urgency     CAD (coronary artery disease)     Cardiorenal syndrome     CHB (complete heart block)     CHF (Congestive Heart Failure)     Depression     Diverticulosis     Dupuytren's contracture of right hand     Gout     H/O acute pancreatitis     High cholesterol     History of acute bacterial endocarditis     History of coronary artery bypass graft     History of tricuspid valve replacement     Hx of colonic polyp     Hypertensive HF (heart failure)     IBS (irritable bowel syndrome)     ICD (implantable cardioverter-defibrillator) infection     Iron deficiency anemia     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     Nonischemic dilated cardiomyopathy     Obesity     KEYONA (obstructive sleep apnea)       Comments: Adequate titration to BIPAP 16/11 on 10/15/2013     Osteoarthritis of both knees     Paralysis, diaphragm       Comments: Left     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     PUD (peptic ulcer disease)     Right heart failure     Stage 3 chronic kidney disease     Tricuspid valve insufficiency     Vitamin B 12 deficiency    Historical     BE (bacterial endocarditis)     Colon polyps     History of sepsis     Inpatient stay       Comments: @[facility] - Diverticulitis     Inpatient stay       Comments: @[facility] - Severe symptomatic tricuspid valvular insufficiency. Chronic right heart failure.     Inpatient stay       Comments: @[facility] - Anemia     Inpatient stay       Comments: @[facility] - Jaw pain, possible anginal equivalent      Inpatient stay       Comments: @[facility] - Infected pacemaker     Inpatient stay       Comments: @[facility] - Acute on chronic right heart failure     Inpatient stay       Comments: @[facility] - S/P tricuspid valve replacement with worsening right ventricular function     Other and Unspecified Noninfectious Gastroenteritis and Colitis  Procedure/Surgical History     Bone marrow biopsy (07/12/2017)           Colonoscopy (05/27/2017)             Comments: Cecal tubular adenoma, pandiverticulosis w/polypectomy     Esophagogastroduodenoscopy (05/27/2017)             Comments: gastritis, fundic gland polyps     TVR - Tricuspid valve replacement (11/29/2016)           Cardiac angiogram (11/01/2016)             Comments: Summary/Conclusions<br/>PRESENTATION / INDICATIONS<br/>&middot; Pre-surgical evaluation for cardiac surgery<br/>&middot; Severe TR by echo<br/>DIAGNOSTIC - CORONARY<br/>&middot; Co-dominant coronary artery system<br/>&middot; The left main artery was normal in appearance and free of obstructive disease.<br/>&middot; Chronic total occlusion of the proximal LAD<br/>&middot; The circumflex artery has minimal disease.<br/>&middot; The RCA has moderate disease.<br/>&middot; No change since 2013<br/>DIAGNOSTIC - GRAFTS<br/>&middot; Chronic total occlusion in the proximal anastomosis of the SVG graft to the RCA<br/>&middot; The SVG to the ramus intermediate is patent<br/>&middot; The sequential graft to the 1st diagonal is patent.<br/>&middot; The sequential graft limb from the diagonal to the LAD is patent.<br/>&middot; No change since 2013<br/>HEMODYNAMICS<br/>&middot; The LVEDP is mildly elevated<br/>&middot; Severely elevated right atrial pressures<br/>&middot; No evidence of intracardiac shunting     Limited thoracotomy (08/23/2016)             Comments: Left mini thoracotomy and placement of two epicardial screw in leads. &nbsp;Implant of left pectoral pacer generatoe.     Implantation of intravenous single chamber  cardiac pacemaker system (08/04/2016)           Removal of automatic cardiac defibrillator (08/04/2016)             Comments: Generator and leads     Pacemaker change (04/08/2015)           Cardiac angiogram (08/02/2013)             Comments: Summary/Conclusions<br/>PRESENTATION / INDICATIONS <br/>Chest pain<br/>DIAGNOSTIC - CORONARY<br/>Right dominant coronary artery system<br/>DIAGNOSTIC SUMMARY<br/>100% stenosis in the Proximal LAD<br/>30% stenosis in the 1st Marginal<br/>30% stenosis in the Proximal RCA<br/>50% stenosis in the Mid RCA<br/>100% stenosis in the Aorta graft to Distal RCA<br/>DIAGNOSTIC - GRAFTS<br/>The sequential graft to the diagonal branch is patent.<br/>The sequential graft from the diagonal to the LAD is patent.<br/>The SVG to the ramus intermediate is patent<br/>The SVG to RCA is chronically occluded<br/>HEMODYNAMICS<br/>The LVEDP is mildly elevated<br/>RECOMMENDATIONS &amp; PLAN<br/>Medical Rx- no significant change from previous angiogram, there is dramatic mismatch in size between the SVG's and the target arteries with slow filling of the LAD     angiogrqam (02/03/2012)             Comments: Summary/Conclusions<br/>PRESENTATION / INDICATIONS <br/>Dyspnea and fatigue.<br/>DIAGNOSTIC - CORONARY<br/>Right dominant coronary artery system.<br/>LMCA is normal.<br/>LAD is occluded proximally after the 1st septal branch.<br/>LCx has mild luminal irregularities. &nbsp;OM1 has 20-30% proximal stenosis.<br/>RCA has 30-40% diffuse disease in the mid segment and otherwise has mild diffuse luminal irregularities.<br/>DIAGNOSTIC - GRAFTS<br/>The sequential SVG to the diagonal and then LAD is widely patent. &nbsp;There is a significant size mismatch between the graft and the native vessels. &nbsp;The diagonal and distal LAD have no obvious lesions but are small caliber (&lt; 2.0 mm vessels). &nbsp;There is some backfilling into the mid LAD.<br/>The SVG to the ramus is widely patent. &nbsp;There is a  significant size mismatch between the graft and the native vessel. &nbsp;The ramus has no obvious lesion but is small caliber (&lt; 2.0 mm vessel). &nbsp;<br/>The SVG to the RCA is chronically occluded.<br/>LEFT VENTRICULAR FUNCTION<br/>Left ventricular function is mildly reduced with EF of 42% and mild global hypokinesis. &nbsp;No significant MR.<br/>HEMODYNAMICS<br/>The LVEDP is 6 mmHg. &nbsp;No significant gradient across the aortic valve.<br/>RA 11, RV 29/10, PA 34/17 (mean 25), PCWP 18<br/>Amarilys CO 5.1, TDCO 4.6<br/>RECOMMENDATIONS &amp; PLAN<br/>Consider alternative etiology for symptoms.<br/>Lasix dose decreased to 40 mg QD given relative hypotension and patient reporting feeling &quot;dry membranes.&quot;     Colonoscopy (12/15/2006)             Comments: Diverticulosis. No polyps.     Colonoscopy (06/22/2001)             Comments: hyperplastic polyp     Cholecystectomy (06/2001)           CABG - Coronary artery bypass graft (2000)           History of Back Surgery (2000)           Colonoscopy (02/17/1993)             Comments: 1 adenoma, 1 hyperplastic, diverticulosis.     Left shoulder surgery (1991)           Ablation for atrial fibrillation             Comments: Subsequent pacemaker dependence.     Cholecystectomy           Colonoscopy           Esophagogastroduodenoscopy        Medications     nitroglycerin 0.4 mg sublingual tablet: 0.4 mg, 1 tab(s), SL, q5min, not to exceed 3 doses/15 min--if pain persists, seek medical attention, 25 tab(s), PRN: for chest pain.     Protegra oral tablet: 1 tab(s), po, daily.     potassium chloride 20 mEq oral tablet, extended release: See Instructions, 1 tab TID, 30 unknown unit, 5 Refill(s).     Senexon-S: 2 tab(s), po, hs, 0 Refill(s).     acetaminophen 325 mg oral tablet: 650 mg, 2 tab(s), po, q4 hrs, not to exceed 4000 mg/day, 0 Refill(s).     Senokot 8.6 mg oral tablet: 1-2 tab(s), PO, Once a day (at bedtime), PRN: for constipation.     omeprazole 20 mg oral  delayed release capsule: 40 mg, 2 cap(s), po, bid, 90 cap(s), 3 Refill(s).     furosemide 80 mg oral tablet: 80 mg, 1 tab(s), po, bid, Per Dr. Quintana on 3/7/2017, 0 Refill(s).     digoxin 125 mcg (0.125 mg) oral tablet: 125 mcg, 1 tab(s), po, daily, 0 Refill(s).     metOLazone 2.5 mg oral tablet: See Instructions, 1 tab(s) po daily for weight > 222#, 0 Refill(s).     Vitamin B12 1000 mcg/mL injectable solution: 1,000 mcg, im, qmonth, 0 Refill(s).     Metoprolol Succinate ER 25 mg oral tablet, extended release: See Instructions, TAKE ONE-HALF TABLET BY MOUTH TWICE DAILY, 30 unknown unit, 11 Refill(s).     atorvastatin 40 mg oral tablet: See Instructions, TAKE ONE TABLET BY MOUTH AT BEDTIME, 90 unknown unit.     predniSONE 20 mg oral tablet: See Instructions, 2 po daily for 3-5 days prn gout, 30 EA, 0 Refill(s).     mirtazapine 15 mg oral tablet: 15 mg, 1 tab(s), PO, Once a day (at bedtime), 30 tab(s), 5 Refill(s).     allopurinol 300 mg oral tablet: See Instructions, TAKE ONE TABLET BY MOUTH ONCE DAILY, 90 unknown unit, 3 Refill(s).     warfarin 3 mg oral tablet: See Instructions, TAKE 1 & 1/2 TABLETS BY MOUTH ONCE DAILY SUN,TUES,THURS,SAT TAKE ONE TABLET MON,WED,FRI, 40 unknown unit.     [id number] URINARY DRAIN BAG 2000ML MG BEAD: See Instructions, USE AS DIRECTED, 1 unknown unit.     clotrimazole 1% topical cream: 1 nazario, TOP, BID, for 7 day(s), 30 gm, 0 Refill(s).          Allergies    No Known Medication Allergies    adhesive tape  Social History    Smoking Status - 06/22/2018     Never smoker     Alcohol      Past, 03/30/2018     Employment and Education      Employed, Work/School description: [employer] 11/11/2010     Exercise and Physical Activity      Exercise type: Walking., 11/11/2010  Family History    Aneurysm: Father.    Heart disease: Mother.  Immunizations      Vaccine Date Status      influenza virus vaccine, inactivated 09/28/2015 Given      pneumococcal (PCV13) 04/21/2015 Given      influenza virus  vaccine, inactivated 08/28/2014 Given      influenza virus vaccine, inactivated 10/29/2012 Given      influenza virus vaccine, inactivated 09/23/2011 Given      influenza virus vaccine, inactivated 10/26/2010 Given      influenza 10/23/2008 Recorded      Td 09/01/2005 Recorded      pneumococcal 11/28/2001 Recorded  Lab Results       Lab Results (Last 4 results within 90 days)        Sodium Level: 138 mmol/L [135 mmol/L - 146 mmol/L] (06/18/18 10:59:00 CDT)       Sodium Level: 135 mmol/L [135 mmol/L - 146 mmol/L] (05/31/18 14:13:00 CDT)       Sodium Level: 140 mmol/L [135 mmol/L - 146 mmol/L] (03/27/18 12:00:00 CDT)       Sodium Level TR: 139 mEq/L [11.7  - 14.1] (06/04/18 14:18:00 CDT)       Sodium Level TR: 141 mEq/L [11.7  - 14.1] (04/20/18 11:18:00 CDT)       Potassium Level: 3.5 mmol/L [3.5 mmol/L - 5.3 mmol/L] (06/18/18 10:59:00 CDT)       Potassium Level: 3.5 mmol/L [3.5 mmol/L - 5.3 mmol/L] (05/31/18 14:13:00 CDT)       Potassium Level: 3.9 mmol/L [3.5 mmol/L - 5.3 mmol/L] (03/27/18 12:00:00 CDT)       Potassium Level TR: 3.8 mEq/L [11.7  - 14.1] (06/04/18 14:18:00 CDT)       Potassium Level TR: 3.5 mEq/L [11.7  - 14.1] (04/20/18 11:18:00 CDT)       Chloride Level: 96 mmol/L Low [98 mmol/L - 110 mmol/L] (06/18/18 10:59:00 CDT)       Chloride Level: 93 mmol/L Low [98 mmol/L - 110 mmol/L] (05/31/18 14:13:00 CDT)       Chloride Level: 99 mmol/L [98 mmol/L - 110 mmol/L] (03/27/18 12:00:00 CDT)       Chloride TR: 95 mEq/L [11.7  - 14.1] (06/04/18 14:18:00 CDT)       Chloride TR: 100 mEq/L [11.7  - 14.1] (04/20/18 11:18:00 CDT)       CO2 Level: 32 mmol/L High [20 mmol/L - 31 mmol/L] (06/18/18 10:59:00 CDT)       CO2 Level: 34 mmol/L High [20 mmol/L - 31 mmol/L] (05/31/18 14:13:00 CDT)       CO2 Level: 36 mmol/L High [20 mmol/L - 31 mmol/L] (03/27/18 12:00:00 CDT)       CO2 TR: 33 mEq/L [11.7  - 14.1] (06/04/18 14:18:00 CDT)       CO2 TR: 28 mEq/L [11.7  - 14.1] (04/20/18 11:18:00 CDT)       Anion Gap TR: 11 mEq/L  [20 mmol/L - 31 mmol/L] (06/04/18 14:18:00 CDT)       Anion Gap TR: 13 mEq/L [20 mmol/L - 31 mmol/L] (04/20/18 11:18:00 CDT)       Glucose Level: 114 mg/dL High [65 mg/dL - 99 mg/dL] (06/18/18 10:59:00 CDT)       Glucose Level: 92 mg/dL [65 mg/dL - 99 mg/dL] (05/31/18 14:13:00 CDT)       Glucose Level: 98 mg/dL [65 mg/dL - 99 mg/dL] (03/27/18 12:00:00 CDT)       Glucose Level TR: 98 mg/dL [11.7  - 14.1] (06/04/18 14:18:00 CDT)       Glucose Level TR: 118 mg/dL [11.7  - 14.1] (04/20/18 11:18:00 CDT)       BUN: 32 mg/dL High [7 mg/dL - 25 mg/dL] (06/18/18 10:59:00 CDT)       BUN: 35 mg/dL High [7 mg/dL - 25 mg/dL] (05/31/18 14:13:00 CDT)       BUN: 30 mg/dL High [7 mg/dL - 25 mg/dL] (03/27/18 12:00:00 CDT)       BUN TR: 43 mg/dL [11.7  - 14.1] (06/04/18 14:18:00 CDT)       BUN TR: 40 mg/dL [11.7  - 14.1] (04/20/18 11:18:00 CDT)       Creatinine Level: 1.83 mg/dL High [0.7 mg/dL - 1.11 mg/dL] (06/18/18 10:59:00 CDT)       Creatinine Level: 2.03 mg/dL High [0.7 mg/dL - 1.11 mg/dL] (05/31/18 14:13:00 CDT)       Creatinine Level: 1.56 mg/dL High [0.7 mg/dL - 1.11 mg/dL] (03/27/18 12:00:00 CDT)       Creatinine TR: 2.02 mg/dL [11.7  - 14.1] (06/04/18 14:18:00 CDT)       Creatinine TR: 1.83 mg/dL [11.7  - 14.1] (04/20/18 11:18:00 CDT)       BUN/Creat Ratio: 17 [6  - 22] (06/18/18 10:59:00 CDT)       BUN/Creat Ratio: 17 [6  - 22] (05/31/18 14:13:00 CDT)       BUN/Creat Ratio: 19 [6  - 22] (03/27/18 12:00:00 CDT)       BUN/Creatinine Ratio TR: 21 [20 mmol/L - 31 mmol/L] (06/04/18 14:18:00 CDT)       BUN/Creatinine Ratio TR: 22 [20 mmol/L - 31 mmol/L] (04/20/18 11:18:00 CDT)       eGFR: 34 mL/min/1.73m2 Low [8.6 mg/dL - 10.3 mg/dL] (06/18/18 10:59:00 CDT)       eGFR: 30 mL/min/1.73m2 Low [8.6 mg/dL - 10.3 mg/dL] (05/31/18 14:13:00 CDT)       eGFR: 41 mL/min/1.73m2 Low [8.6 mg/dL - 10.3 mg/dL] (03/27/18 12:00:00 CDT)       eGFR TR: 39 mL/min [11.7  - 14.1] (06/04/18 14:18:00 CDT)       GFR Calculated TR: 43 mL/min [11.7  -  14.1] (04/20/18 11:18:00 CDT)       eGFR African American: 40 mL/min/1.73m2 Low [None  - Few] (06/18/18 10:59:00 CDT)       eGFR African American: 35 mL/min/1.73m2 Low [None  - Few] (05/31/18 14:13:00 CDT)       eGFR African American: 48 mL/min/1.73m2 Low [None  - Few] (03/27/18 12:00:00 CDT)       Calcium Level: 9.5 mg/dL [8.6 mg/dL - 10.3 mg/dL] (06/18/18 10:59:00 CDT)       Calcium Level: 9.7 mg/dL [8.6 mg/dL - 10.3 mg/dL] (05/31/18 14:13:00 CDT)       Calcium Level: 9.6 mg/dL [8.6 mg/dL - 10.3 mg/dL] (03/27/18 12:00:00 CDT)       Calcium TR: 10.2 mEq/dL [11.7  - 14.1] (06/04/18 14:18:00 CDT)       Calcium TR: 9.8 mEq/dL [11.7  - 14.1] (04/20/18 11:18:00 CDT)       Magnesium Level TR: 2 mg/dL [11.7  - 14.1] (06/04/18 14:18:00 CDT)       Bilirubin Total TR: 1.5 mg/dL [11.7  - 14.1] (04/20/18 11:18:00 CDT)       Alkaline Phosphatase TR: 358 unit/L [11.7  - 14.1] (04/20/18 11:18:00 CDT)       AST TR: 27 unit/L [11.7  - 14.1] (04/20/18 11:18:00 CDT)       ALT TR: 28 unit/L [11.7  - 14.1] (04/20/18 11:18:00 CDT)       Protein Total TR: 7.7 gm/dL [11.7  - 14.1] (04/20/18 11:18:00 CDT)       Albumin Level TR: 4.3 g/dL [11.7  - 14.1] (04/20/18 11:18:00 CDT)       Globulin TR: 3.4 g/dL [11.7  - 14.1] (04/20/18 11:18:00 CDT)       A/G Ratio TR: 1.3 [11.7  - 14.1] (04/20/18 11:18:00 CDT)       Uric Acid (umol/L) TR: 7.1 umol/L [11.7  - 14.1] (04/20/18 11:18:00 CDT)       Iron TR: 55 nmol/L [11.7  - 14.1] (04/20/18 11:18:00 CDT)       TIBC TR: 297 mg/dL [11.7  - 14.1] (04/20/18 11:18:00 CDT)       Ferritin TR: 263 ng/mL [11.7  - 14.1] (04/20/18 11:18:00 CDT)       B-Natriuretic Peptide (BNP): 91 pg/mL [None  - Few] (03/27/18 12:00:00 CDT)       B-Natriuretic Peptide (BNP) TR: 136 pg/mL [11.7  - 14.1] (06/04/18 14:18:00 CDT)       PSA: 0.5 ng/mL [3.5 mmol/L - 5.3 mmol/L] (05/01/18 10:42:00 CDT)       WBC: 5.6 [3.8  - 10.8] (04/24/18 10:56:00 CDT)       WBC: 4.5 [3.8  - 10.8] (03/27/18 12:00:00 CDT)       WBC TR: 5.2 x10^3/uL  [11.7  - 14.1] (04/20/18 11:18:00 CDT)       RBC: 3.15 Low [4.2  - 5.8] (04/24/18 10:56:00 CDT)       RBC: 3.51 Low [4.2  - 5.8] (03/27/18 12:00:00 CDT)       RBC TR: 3.13 x10^6/uL [11.7  - 14.1] (04/20/18 11:18:00 CDT)       Hgb: 10.1 gm/dL Low [13.2 gm/dL - 17.1 gm/dL] (04/24/18 10:56:00 CDT)       Hgb: 11.4 gm/dL Low [13.2 gm/dL - 17.1 gm/dL] (03/27/18 12:00:00 CDT)       Hgb TR: 10.1 g/dL [11.7  - 14.1] (04/20/18 11:18:00 CDT)       Hct: 30.7 % Low [38.5 % - 50 %] (04/24/18 10:56:00 CDT)       Hct: 33.4 % Low [38.5 % - 50 %] (03/27/18 12:00:00 CDT)       Hct TR: 31.7 % [11.7  - 14.1] (04/20/18 11:18:00 CDT)       MCV: 97.5 fL [80 fL - 100 fL] (04/24/18 10:56:00 CDT)       MCV: 95.2 fL [80 fL - 100 fL] (03/27/18 12:00:00 CDT)       MCV TR: 101 fL [11.7  - 14.1] (04/20/18 11:18:00 CDT)       MCH: 32.1 pg [27 pg - 33 pg] (04/24/18 10:56:00 CDT)       MCH: 32.5 pg [27 pg - 33 pg] (03/27/18 12:00:00 CDT)       MCH TR: 32.3 pg [11.7  - 14.1] (04/20/18 11:18:00 CDT)       MCHC: 32.9 gm/dL [32 gm/dL - 36 gm/dL] (04/24/18 10:56:00 CDT)       MCHC: 34.1 gm/dL [32 gm/dL - 36 gm/dL] (03/27/18 12:00:00 CDT)       MCHC TR: 31.9 gm/dL [11.7  - 14.1] (04/20/18 11:18:00 CDT)       RDW: 14.8 % [11 % - 15 %] (04/24/18 10:56:00 CDT)       RDW: 15.2 % High [11 % - 15 %] (03/27/18 12:00:00 CDT)       RDW TR: 16.2 % [11.7  - 14.1] (04/20/18 11:18:00 CDT)       Platelet: 112 Low [140  - 400] (04/24/18 10:56:00 CDT)       Platelet: 98 Low [140  - 400] (03/27/18 12:00:00 CDT)       Platelet TR: 117 x10^3/uL [11.7  - 14.1] (04/20/18 11:18:00 CDT)       MPV: 11.6 fL [7.5 fL - 12.5 fL] (04/24/18 10:56:00 CDT)       MPV: 11.3 fL [7.5 fL - 12.5 fL] (03/27/18 12:00:00 CDT)       MPV (Mean Platelet Volume) TR: 10.4 fL [20 mmol/L - 31 mmol/L] (04/20/18 11:18:00 CDT)       Lymphocytes TR: 17 % [20 mmol/L - 31 mmol/L] (04/20/18 11:18:00 CDT)       Absolute Lymphocytes TR: 0.9 cells/uL [20 mmol/L - 31 mmol/L] (04/20/18 11:18:00 CDT)        Neutrophils TR: 64.8 % [20 mmol/L - 31 mmol/L] (04/20/18 11:18:00 CDT)       Absolute Neutrophils TR: 3.4 cells/uL [20 mmol/L - 31 mmol/L] (04/20/18 11:18:00 CDT)       Monocytes TR: 13.2 % [20 mmol/L - 31 mmol/L] (04/20/18 11:18:00 CDT)       Absolute Monocytes TR: 0.7 cells/uL [20 mmol/L - 31 mmol/L] (04/20/18 11:18:00 CDT)       Eosinophils TR: 4.4 % [20 mmol/L - 31 mmol/L] (04/20/18 11:18:00 CDT)       Absolute Eosinophils TR: 0.2 cells/uL [20 mmol/L - 31 mmol/L] (04/20/18 11:18:00 CDT)       Basophils TR: 0.6 % [20 mmol/L - 31 mmol/L] (04/20/18 11:18:00 CDT)       Absolute Basophils TR: 0 cells/uL [20 mmol/L - 31 mmol/L] (04/20/18 11:18:00 CDT)       Protime: 22.6 High [11.7  - 14.1] (04/24/18 11:05:00 CDT)       PT: 24.3 High [9  - 11.5] (06/18/18 11:02:00 CDT)       PT: 23.9 High [9  - 11.5] (05/31/18 14:13:00 CDT)       INR: 2.4 High [None  - 5] (06/18/18 11:02:00 CDT)       INR: 2.4 High [None  - 5] (05/31/18 14:13:00 CDT)       INR: 2.0 High [None  - 5] (04/24/18 11:05:00 CDT)       INR: 2.8 [None  - 5] (03/27/18 10:51:00 CDT)       UA pH: 7 [5  - 8] (05/04/18 14:32:00 CDT)       UA pH: 6.5 [5  - 8] (05/01/18 10:44:00 CDT)       UA Specific Gravity: 1.015 [1.001  - 1.035] (05/04/18 14:32:00 CDT)       UA Specific Gravity: 1.015 [1.001  - 1.035] (05/01/18 10:44:00 CDT)       UA Glucose: NEGATIVE [None  - Few] (05/04/18 14:32:00 CDT)       UA Glucose: NEGATIVE [None  - Few] (05/01/18 10:44:00 CDT)       UA Bilirubin: NEGATIVE [None  - Few] (05/04/18 14:32:00 CDT)       UA Bilirubin: NEGATIVE [None  - Few] (05/01/18 10:44:00 CDT)       UA Ketones: NEGATIVE [None  - Few] (05/04/18 14:32:00 CDT)       UA Ketones: NEGATIVE [None  - Few] (05/01/18 10:44:00 CDT)       Urine Occult Blood: 1+ Abnormal [None  - Few] (05/04/18 14:32:00 CDT)       Urine Occult Blood: TRACE Abnormal [None  - Few] (05/01/18 10:44:00 CDT)       UA Protein: TRACE Abnormal [None  - Few] (05/04/18 14:32:00 CDT)       UA Protein: NEGATIVE  [None  - Few] (05/01/18 10:44:00 CDT)       UA Nitrite: NEGATIVE [None  - Few] (05/04/18 14:32:00 CDT)       UA Nitrite: NEGATIVE [None  - Few] (05/01/18 10:44:00 CDT)       UA Leukocyte Esterase: 2+ Abnormal [None  - Few] (05/04/18 14:32:00 CDT)       UA Leukocyte Esterase: 3+ Abnormal [None  - Few] (05/01/18 10:44:00 CDT)       UA WBC Clumps: Present [None  - Few] (05/04/18 14:51:00 CDT)       UA Epithelial Cells: None Seen [None  - Few] (05/04/18 14:51:00 CDT)       UA Epithelial Cells: Few [None  - Few] (05/01/18 11:06:00 CDT)       UA WBC: >100 [None  - 5] (05/04/18 14:51:00 CDT)       UA WBC: >100 [None  - 5] (05/01/18 11:06:00 CDT)       UA RBC: 6-10 [None  - 2] (05/04/18 14:51:00 CDT)       UA RBC: 11-25 [None  - 2] (05/01/18 11:06:00 CDT)       UA Bacteria: Many [None  - Few] (05/04/18 14:51:00 CDT)       UA Bacteria: Moderate [None  - Few] (05/01/18 11:06:00 CDT)       Culture Urine: See comment Abnormal [6  - 22] (05/04/18 14:37:00 CDT)       Culture Urine: See comment Abnormal [6  - 22] (05/01/18 10:46:00 CDT)      rash is red, itchy, progressing and spreading locally, not imporving with otc hydrocortisone or vinegar

## 2022-02-16 NOTE — NURSING NOTE
Comprehensive Intake Entered On:  4/16/2019 3:46 PM CDT    Performed On:  4/16/2019 3:44 PM CDT by Ellen Parker MA               Summary   Chief Complaint :   follow up   Menstrual Status :   N/A   Weight Measured :   220 lb(Converted to: 220 lb 0 oz, 99.79 kg)    Height Measured :   75 in(Converted to: 6 ft 3 in, 190.50 cm)    Body Mass Index :   27.5 kg/m2 (HI)    Body Surface Area :   2.3 m2   Systolic Blood Pressure :   102 mmHg   Diastolic Blood Pressure :   67 mmHg   Mean Arterial Pressure :   79 mmHg   Peripheral Pulse Rate :   88 bpm   BP Site :   Right arm   Ellen Parker MA - 4/16/2019 3:44 PM CDT   Health Status   Allergies Verified? :   Yes   Medication History Verified? :   Yes   Ellen Parker MA - 4/16/2019 3:44 PM CDT   Meds / Allergies   (As Of: 4/16/2019 3:46:26 PM CDT)   Allergies (Active)   adhesive tape  Estimated Onset Date:   Unspecified ; Created By:   Floridalma Calderon; Reaction Status:   Active ; Category:   Other ; Substance:   adhesive tape ; Type:   Allergy ; Updated By:   Floridalma Calderon; Reviewed Date:   3/21/2019 7:52 AM CDT      No Known Medication Allergies  Estimated Onset Date:   Unspecified ; Created By:   Ellen Parker MA; Reaction Status:   Active ; Category:   Drug ; Substance:   No Known Medication Allergies ; Type:   Allergy ; Updated By:   Ellen Parker MA; Reviewed Date:   3/21/2019 7:52 AM CDT        Medication List   (As Of: 4/16/2019 3:46:26 PM CDT)   Prescription/Discharge Order    allopurinol 300 mg oral tablet  :   allopurinol 300 mg oral tablet ; Status:   Prescribed ; Ordered As Mnemonic:   allopurinol 300 mg oral tablet ; Simple Display Line:   See Instructions, TAKE ONE TABLET BY MOUTH ONCE DAILY, 90 unknown unit, 3 Refill(s) ; Ordering Provider:   Shravan Berrios MD; Catalog Code:   allopurinol ; Order Dt/Tm:   5/10/2018 11:15:36 AM          atorvastatin 40 mg oral tablet  :   atorvastatin 40 mg oral tablet ; Status:   Prescribed ; Ordered As Mnemonic:   atorvastatin 40 mg  oral tablet ; Simple Display Line:   See Instructions, TAKE ONE TABLET BY MOUTH AT BEDTIME, 90 unknown unit ; Ordering Provider:   Shravan Berrios MD; Catalog Code:   atorvastatin ; Order Dt/Tm:   11/13/2017 11:13:28 AM          clotrimazole topical  :   clotrimazole topical ; Status:   Prescribed ; Ordered As Mnemonic:   clotrimazole 1% topical cream ; Simple Display Line:   1 nazario, TOP, BID, for 7 day(s), 30 gm, 0 Refill(s) ; Ordering Provider:   Rissa Arauz MD; Catalog Code:   clotrimazole topical ; Order Dt/Tm:   6/22/2018 3:35:41 PM          Metoprolol Succinate ER 25 mg oral tablet, extended release  :   Metoprolol Succinate ER 25 mg oral tablet, extended release ; Status:   Prescribed ; Ordered As Mnemonic:   Metoprolol Succinate ER 25 mg oral tablet, extended release ; Simple Display Line:   See Instructions, TAKE ONE-HALF TABLET BY MOUTH TWICE DAILY, 30 unknown unit, 11 Refill(s) ; Ordering Provider:   Shravan Berrios MD; Catalog Code:   metoprolol ; Order Dt/Tm:   9/25/2017 1:33:08 PM          metroNIDAZOLE topical  :   metroNIDAZOLE topical ; Status:   Prescribed ; Ordered As Mnemonic:   MetroGel 1% topical gel ; Simple Display Line:   1 nazario, Topical, daily, 1 tubes, 5 Refill(s) ; Ordering Provider:   Rissa Arauz MD; Catalog Code:   metroNIDAZOLE topical ; Order Dt/Tm:   6/29/2018 3:46:29 PM          mirtazapine  :   mirtazapine ; Status:   Prescribed ; Ordered As Mnemonic:   mirtazapine 15 mg oral tablet ; Simple Display Line:   15 mg, 1 tab(s), PO, Once a day (at bedtime), 30 tab(s), 5 Refill(s) ; Ordering Provider:   Shravan Berrios MD; Catalog Code:   mirtazapine ; Order Dt/Tm:   3/27/2018 11:38:20 AM          Misc Prescription  :   Misc Prescription ; Status:   Prescribed ; Ordered As Mnemonic:   [id number] URINARY DRAIN BAG 2000ML MG BEAD ; Simple Display Line:   See Instructions, USE AS DIRECTED, 1 unknown unit ; Ordering Provider:   Shravan Berrios MD; Catalog Code:   Miscellaneous  Prescription ; Order Dt/Tm:   6/4/2018 7:34:17 AM          nitroglycerin  :   nitroglycerin ; Status:   Prescribed ; Ordered As Mnemonic:   nitroglycerin 0.4 mg sublingual tablet ; Simple Display Line:   0.4 mg, 1 tab(s), SL, q5min, not to exceed 3 doses/15 min--if pain persists, seek medical attention, 25 tab(s), PRN: for chest pain ; Ordering Provider:   Shravan Berrios MD; Catalog Code:   nitroglycerin ; Order Dt/Tm:   8/28/2014 11:35:30 AM          omeprazole  :   omeprazole ; Status:   Prescribed ; Ordered As Mnemonic:   omeprazole 20 mg oral delayed release capsule ; Simple Display Line:   20 mg, 1 cap(s), po, daily, 90 cap(s), 3 Refill(s) ; Ordering Provider:   Shravan Berrios MD; Catalog Code:   omeprazole ; Order Dt/Tm:   1/9/2017 12:26:06 PM          potassium chloride 20 mEq oral tablet, extended release  :   potassium chloride 20 mEq oral tablet, extended release ; Status:   Prescribed ; Ordered As Mnemonic:   potassium chloride 20 mEq oral tablet, extended release ; Simple Display Line:   See Instructions, 3 tab BID, 30 unknown unit, 5 Refill(s) ; Ordering Provider:   Shravan Berrios MD; Catalog Code:   potassium chloride ; Order Dt/Tm:   4/13/2016 11:25:17 AM          predniSONE  :   predniSONE ; Status:   Prescribed ; Ordered As Mnemonic:   predniSONE 20 mg oral tablet ; Simple Display Line:   See Instructions, 2 po daily for 3-5 days prn gout, 30 EA, 0 Refill(s) ; Ordering Provider:   Shravan Berrios MD; Catalog Code:   predniSONE ; Order Dt/Tm:   12/29/2017 10:06:41 AM          tamsulosin  :   tamsulosin ; Status:   Prescribed ; Ordered As Mnemonic:   tamsulosin 0.4 mg oral capsule ; Simple Display Line:   0.4 mg, 1 cap(s), Oral, daily, 30 cap(s), 0 Refill(s) ; Ordering Provider:   Shravan Berrios MD; Catalog Code:   tamsulosin ; Order Dt/Tm:   7/20/2018 2:46:14 PM          warfarin 3 mg oral tablet  :   warfarin 3 mg oral tablet ; Status:   Prescribed ; Ordered As Mnemonic:   warfarin 3 mg oral  tablet ; Simple Display Line:   1 tab(s), Oral, daily, 90 unknown unit ; Ordering Provider:   Shravan Berrios MD; Catalog Code:   warfarin ; Order Dt/Tm:   1/8/2019 3:42:05 PM            Home Meds    acetaminophen  :   acetaminophen ; Status:   Documented ; Ordered As Mnemonic:   acetaminophen 325 mg oral tablet ; Simple Display Line:   650 mg, 2 tab(s), po, q4 hrs, not to exceed 4000 mg/day, 0 Refill(s) ; Catalog Code:   acetaminophen ; Order Dt/Tm:   12/20/2016 1:58:24 PM          cyanocobalamin  :   cyanocobalamin ; Status:   Documented ; Ordered As Mnemonic:   Vitamin B12 1000 mcg/mL injectable solution ; Simple Display Line:   1,000 mcg, im, qmonth, 0 Refill(s) ; Catalog Code:   cyanocobalamin ; Order Dt/Tm:   7/24/2017 11:12:05 AM          digoxin  :   digoxin ; Status:   Documented ; Ordered As Mnemonic:   digoxin 125 mcg (0.125 mg) oral tablet ; Simple Display Line:   125 mcg, 1 tab(s), po, daily, 0 Refill(s) ; Catalog Code:   digoxin ; Order Dt/Tm:   4/19/2017 10:47:22 AM          furosemide  :   furosemide ; Status:   Documented ; Ordered As Mnemonic:   furosemide 80 mg oral tablet ; Simple Display Line:   40 mg, 0.5 tab(s), po, bid, 0 Refill(s) ; Catalog Code:   furosemide ; Order Dt/Tm:   3/15/2017 3:19:58 PM          multivitamin  :   multivitamin ; Status:   Documented ; Ordered As Mnemonic:   Protegra oral tablet ; Simple Display Line:   1 tab(s), po, daily ; Catalog Code:   multivitamin ; Order Dt/Tm:   6/1/2015 8:38:03 AM          senna  :   senna ; Status:   Documented ; Ordered As Mnemonic:   Senokot 8.6 mg oral tablet ; Simple Display Line:   1-2 tab(s), PO, Once a day (at bedtime), PRN: for constipation ; Catalog Code:   senna ; Order Dt/Tm:   12/20/2016 2:00:45 PM          spironolactone  :   spironolactone ; Status:   Documented ; Ordered As Mnemonic:   spironolactone 50 mg oral tablet ; Simple Display Line:   50 mg, 1 tab(s), Oral, bid, increased to 50 mg BID per Dr Quintana 3/21/19, 0  Refill(s) ; Catalog Code:   spironolactone ; Order Dt/Tm:   3/1/2019 3:52:14 PM

## 2022-02-16 NOTE — NURSING NOTE
Anticoagulation Therapy Entered On:  4/30/2019 4:09 PM CDT    Performed On:  4/30/2019 4:03 PM CDT by Flavia Metzger RN               Warfarin Management   Week 1 Sunday Dose :   1.5    Week 1 Monday Dose :   3    Week 1 Tuesday Dose :   1.5    Week 1 Wednesday Dose :   3    Week 1 Thursday Dose :   1.5    Week 1 Friday Dose :   3    Week 1 Saturday Dose :   1.5    Week 1 Dose Total :   15    Week 2 Sunday Dose :   1.5    Week 2 Monday Dose :   3    Week 2 Tuesday Dose :   1.5    Week 2 Wednesday Dose :   1.5    Week 2 Thursday Dose :   3    Week 2 Friday Dose :   1.5    Week 2 Saturday Dose :   1.5    Week 2 Dose Total :   13.5    Indication :   Atrial fibrillation   Warfarin Management Comments :    Lab Test performed by:  [facility]  [address] [place] [address]   Phone # [phone]  Fax # [phone]   INR Range :   2.0 - 3.0   INR Therapeutic Range :   Yes   Warfarin Abnormal Findings :   Black stools   Flavia Metzger RN - 4/30/2019 4:03 PM CDT   Warfarin Management and Results Grid   Signs of Thrombolic :   No   Signs of Warfarin Intolerance :   No   Changes in Diet or Alcohol Intake :   No   Changes in Medication or Antibiotics :   No   Unusual Bleeding or Bruising :   Yes   Rectal Bleeding or Black Stools :   No   Vitamin K Food Handout :   No   Heart Valve Replacement :   No   Flavia Metzger RN - 4/30/2019 4:03 PM CDT   Anticoagulation Recheck :   2 weeks   Warfarin Special Instructions :   Urgent venous INR = 2.2; per [facility] pt to continue warfarin at 1.5 mg M/W/F and 3 mg ROW; recheck 2 weeks; pt notified in office; sent to ER for possible GI bleed   Flavia Metzger RN - 4/30/2019 4:03 PM CDT

## 2022-02-16 NOTE — NURSING NOTE
Comprehensive Intake Entered On:  7/8/2019 8:13 AM CDT    Performed On:  7/8/2019 8:06 AM CDT by Myrna Dsouza               Summary   Chief Complaint :   Patient here for Pre Op, DOS 7/11/18 for eye surgery at [facility] by Dr. Cornelius   Advance Directive :   Yes   Menstrual Status :   N/A   Weight Measured :   201.0 lb(Converted to: 201 lb 0 oz, 91.17 kg)    Height Measured :   75 in(Converted to: 6 ft 3 in, 190.50 cm)    Body Mass Index :   25.12 kg/m2 (HI)    Body Surface Area :   2.19 m2   Systolic Blood Pressure :   98 mmHg   Diastolic Blood Pressure :   56 mmHg (LOW)    Mean Arterial Pressure :   70 mmHg   Peripheral Pulse Rate :   82 bpm   BP Site :   Right arm   Pulse Site :   Radial artery   BP Method :   Manual   HR Method :   Manual   Temperature Tympanic :   97.6 DegF(Converted to: 36.4 DegC)  (LOW)    Myrna Dsouza - 7/8/2019 8:06 AM CDT   Health Status   Allergies Verified? :   Yes   Medication History Verified? :   Yes   Medical History Verified? :   Yes   Pre-Visit Planning Status :   Completed   Tobacco Use? :   Former smoker   Myrna Dsouza - 7/8/2019 8:06 AM CDT   Consents   Consent for Immunization Exchange :   Consent Granted   Consent for Immunizations to Providers :   Consent Granted   Myrna Dsouza - 7/8/2019 8:06 AM CDT   Meds / Allergies   (As Of: 7/8/2019 8:13:12 AM CDT)   Allergies (Active)   adhesive tape  Estimated Onset Date:   Unspecified ; Created By:   Floridalma Calderon; Reaction Status:   Active ; Category:   Other ; Substance:   adhesive tape ; Type:   Allergy ; Updated By:   Floridalma Calderon; Reviewed Date:   7/8/2019 8:10 AM CDT      No Known Medication Allergies  Estimated Onset Date:   Unspecified ; Created By:   Ellen Parker MA; Reaction Status:   Active ; Category:   Drug ; Substance:   No Known Medication Allergies ; Type:   Allergy ; Updated By:   Ellen Parker MA; Reviewed Date:   7/8/2019 8:10 AM CDT        Medication List   (As Of: 7/8/2019 8:13:12 AM CDT)    Prescription/Discharge Order    cefuroxime  :   cefuroxime ; Status:   Prescribed ; Ordered As Mnemonic:   cefuroxime 500 mg oral tablet ; Simple Display Line:   500 mg, 1 tab(s), Oral, bid, for 10 day(s), 20 tab(s), 0 Refill(s) ; Ordering Provider:   Shravan Berrios MD; Catalog Code:   cefuroxime ; Order Dt/Tm:   6/28/2019 2:07:51 PM          midodrine  :   midodrine ; Status:   Prescribed ; Ordered As Mnemonic:   midodrine 2.5 mg oral tablet ; Simple Display Line:   5 mg, 2 tab(s), Oral, tid, for 30 day(s), 180 tab(s), 2 Refill(s) ; Ordering Provider:   Shravan Berrios MD; Catalog Code:   midodrine ; Order Dt/Tm:   6/28/2019 2:06:56 PM          allopurinol  :   allopurinol ; Status:   Prescribed ; Ordered As Mnemonic:   allopurinol 300 mg oral tablet ; Simple Display Line:   300 mg, 1 tab(s), Oral, daily, for 90 day(s), 90 tab(s), 3 Refill(s) ; Ordering Provider:   Shravan Berrios MD; Catalog Code:   allopurinol ; Order Dt/Tm:   5/3/2019 4:58:18 PM          metoprolol  :   metoprolol ; Status:   Prescribed ; Ordered As Mnemonic:   Metoprolol Succinate ER 25 mg oral tablet, extended release ; Simple Display Line:   See Instructions, TAKE ONE-HALF TABLET BY MOUTH TWICE DAILY, 180 EA, 3 Refill(s) ; Ordering Provider:   Shravan Berrios MD; Catalog Code:   metoprolol ; Order Dt/Tm:   5/3/2019 4:58:09 PM          atorvastatin  :   atorvastatin ; Status:   Prescribed ; Ordered As Mnemonic:   atorvastatin 10 mg oral tablet ; Simple Display Line:   10 mg, 1 tab(s), Oral, hs, 30 tab(s), 0 Refill(s) ; Ordering Provider:   Shravan Berrios MD; Catalog Code:   atorvastatin ; Order Dt/Tm:   5/3/2019 11:54:45 AM          mirtazapine 15 mg oral tablet  :   mirtazapine 15 mg oral tablet ; Status:   Prescribed ; Ordered As Mnemonic:   mirtazapine 15 mg oral tablet ; Simple Display Line:   1 tab(s), Oral, qhs, 30 unknown unit, 11 Refill(s) ; Ordering Provider:   Shravan Berrios MD; Catalog Code:   mirtazapine ; Order Dt/Tm:    4/18/2019 8:36:08 AM          tamsulosin  :   tamsulosin ; Status:   Prescribed ; Ordered As Mnemonic:   tamsulosin 0.4 mg oral capsule ; Simple Display Line:   0.4 mg, 1 cap(s), Oral, daily, 30 cap(s), 0 Refill(s) ; Ordering Provider:   Shravan Berrios MD; Catalog Code:   tamsulosin ; Order Dt/Tm:   7/20/2018 2:46:14 PM          metroNIDAZOLE topical  :   metroNIDAZOLE topical ; Status:   Prescribed ; Ordered As Mnemonic:   MetroGel 1% topical gel ; Simple Display Line:   1 nazario, Topical, daily, 1 tubes, 5 Refill(s) ; Ordering Provider:   Rissa Arauz MD; Catalog Code:   metroNIDAZOLE topical ; Order Dt/Tm:   6/29/2018 3:46:29 PM          clotrimazole topical  :   clotrimazole topical ; Status:   Prescribed ; Ordered As Mnemonic:   clotrimazole 1% topical cream ; Simple Display Line:   1 nazario, TOP, BID, for 7 day(s), 30 gm, 0 Refill(s) ; Ordering Provider:   Rissa Arauz MD; Catalog Code:   clotrimazole topical ; Order Dt/Tm:   6/22/2018 3:35:41 PM          Misc Prescription  :   Misc Prescription ; Status:   Prescribed ; Ordered As Mnemonic:   [id number] URINARY DRAIN BAG 2000ML MG BEAD ; Simple Display Line:   See Instructions, USE AS DIRECTED, 1 unknown unit ; Ordering Provider:   Shravan Berrios MD; Catalog Code:   Miscellaneous Prescription ; Order Dt/Tm:   6/4/2018 7:34:17 AM          predniSONE  :   predniSONE ; Status:   Prescribed ; Ordered As Mnemonic:   predniSONE 20 mg oral tablet ; Simple Display Line:   See Instructions, 2 po daily for 3-5 days prn gout, 30 EA, 0 Refill(s) ; Ordering Provider:   Shravan Berrios MD; Catalog Code:   predniSONE ; Order Dt/Tm:   12/29/2017 10:06:41 AM          nitroglycerin  :   nitroglycerin ; Status:   Prescribed ; Ordered As Mnemonic:   nitroglycerin 0.4 mg sublingual tablet ; Simple Display Line:   0.4 mg, 1 tab(s), SL, q5min, not to exceed 3 doses/15 min--if pain persists, seek medical attention, 25 tab(s), PRN: for chest pain ; Ordering Provider:   Agustina MORELOS,  Shravan; Catalog Code:   nitroglycerin ; Order Dt/Tm:   8/28/2014 11:35:30 AM            Home Meds    famotidine  :   famotidine ; Status:   Documented ; Ordered As Mnemonic:   famotidine 20 mg oral tablet ; Simple Display Line:   20 mg, 1 tab(s), Oral, bid, 0 Refill(s) ; Catalog Code:   famotidine ; Order Dt/Tm:   6/17/2019 3:31:03 PM          spironolactone  :   spironolactone ; Status:   Documented ; Ordered As Mnemonic:   spironolactone 50 mg oral tablet ; Simple Display Line:   50 mg, 1 tab(s), Oral, daily, 0 Refill(s) ; Catalog Code:   spironolactone ; Order Dt/Tm:   3/1/2019 3:52:14 PM          cyanocobalamin  :   cyanocobalamin ; Status:   Documented ; Ordered As Mnemonic:   Vitamin B12 1000 mcg/mL injectable solution ; Simple Display Line:   1,000 mcg, im, qmonth, 0 Refill(s) ; Catalog Code:   cyanocobalamin ; Order Dt/Tm:   7/24/2017 11:12:05 AM          digoxin  :   digoxin ; Status:   Documented ; Ordered As Mnemonic:   digoxin 125 mcg (0.125 mg) oral tablet ; Simple Display Line:   125 mcg, 1 tab(s), po, daily, 0 Refill(s) ; Catalog Code:   digoxin ; Order Dt/Tm:   4/19/2017 10:47:22 AM          furosemide  :   furosemide ; Status:   Documented ; Ordered As Mnemonic:   furosemide 80 mg oral tablet ; Simple Display Line:   40 mg, 0.5 tab(s), po, daily, 0 Refill(s) ; Catalog Code:   furosemide ; Order Dt/Tm:   3/15/2017 3:19:58 PM          acetaminophen  :   acetaminophen ; Status:   Documented ; Ordered As Mnemonic:   acetaminophen 325 mg oral tablet ; Simple Display Line:   650 mg, 2 tab(s), po, q4 hrs, not to exceed 4000 mg/day, 0 Refill(s) ; Catalog Code:   acetaminophen ; Order Dt/Tm:   12/20/2016 1:58:24 PM          multivitamin  :   multivitamin ; Status:   Documented ; Ordered As Mnemonic:   Protegra oral tablet ; Simple Display Line:   1 tab(s), po, daily ; Catalog Code:   multivitamin ; Order Dt/Tm:   6/1/2015 8:38:03 AM

## 2022-02-16 NOTE — PROGRESS NOTES
Patient:   JACINTO JIN            MRN: [id number]            FIN: [id number]               Age:   81 years     Sex:  Male     :  1935   Associated Diagnoses:   Anticoagulated; AF (Atrial Fibrillation); Acute cystitis without hematuria   Author:   Suresh Pichardo MD      Visit Information      Date of Service: 2017 05:34 pm  Performing Location: [facility]  Encounter#: [id number]      Primary Care Provider (PCP):  Shravan Berrios MD    NPI# [id number]      Referring Provider:  Suresh Pichardo MD    NPI# [id number]      Chief Complaint   2017 6:17 PM CDT    Dysuria x 2wks - getting worse.            Additional Information:No additional information recorded during visit.   Chief complaint and symptoms as noted above and confirmed with patient.  Recent lab and diagnostic studies reviewed with patient      History of Present Illness   2017: Complains of 2 week history of lower urinary tract symptoms with malodorous urine.  Last treated for urinary tract infection in January of this year.  Seeing Dr. Abrams tomorrow for follow-up after recent bone marrow biopsy.  No history of antibiotic allergies.         Review of Systems   Constitutional:  No fever, No chills, No weakness, No fatigue.    Eye:  Negative except as documented in history of present illness.    Ear/Nose/Mouth/Throat:  Negative except as documented in history of present illness.    Respiratory:  No shortness of breath.    Cardiovascular:  No chest pain, No palpitations, No peripheral edema, No syncope.    Gastrointestinal:  No nausea, No vomiting, No diarrhea, No abdominal pain.    Genitourinary:  Dysuria, Change in urine stream, No hematuria.    Hematology/Lymphatics:  Negative except as documented in history of present illness.    Endocrine:  No excessive thirst, No polyuria.    Immunologic:  No recurrent fevers.    Musculoskeletal:  No joint pain, No muscle pain.    Neurologic:  Alert and oriented X4, No numbness, No tingling,  No headache.       Health Status   Allergies:    Allergic Reactions (Selected)  Severity Not Documented  Adhesive tape (No reactions were documented)   Medications:  (Selected)   Prescriptions  Prescribed  Keflex 250 mg oral capsule: 1 cap(s) ( 250 mg ), PO, tid, # 21 cap(s), 0 Refill(s), Type: Maintenance, Pharmacy: [facility], 1 cap(s) po tid,x7 day(s)  Metoprolol Succinate ER 25 mg oral tablet, extended release: See Instructions, Instructions: TAKE ONE-HALF TABLET BY MOUTH TWICE DAILY FOR 30 DAYS, # 30 unknown unit, 5 Refill(s), Type: Soft Stop, Pharmacy: [facility]  atorvastatin 40 mg oral tablet: See Instructions, Instructions: TAKE ONE TABLET BY MOUTH AT BEDTIME, # 90 unknown unit, Type: Soft Stop, Pharmacy: [facility]  nitroglycerin 0.4 mg sublingual tablet: 1 tab(s) ( 0.4 mg ), SL, q5min, Instructions: not to exceed 3 doses/15 min--if pain persists, seek medical attention, PRN: for chest pain, # 25 tab(s), 3 Refill(s), Type: Maintenance, Pharmacy: [facility], 1 tab(s) sl q5 min,PRN:for chest pain,Instr...  omeprazole 20 mg oral delayed release capsule: 2 cap(s) ( 40 mg ), po, bid, # 90 cap(s), 3 Refill(s), Type: Maintenance, Pharmacy: [facility]  potassium chloride 20 mEq oral tablet, extended release: See Instructions, Instructions: 1 tab TID, # 30 unknown unit, 5 Refill(s), Type: Soft Stop, Pharmacy: [facility]  triamcinolone 0.1% topical cream: 1 nazario, top, bid, # 120 gm, 0 Refill(s), Type: Maintenance, Pharmacy: [facility], 1 nazario top bid  warfarin 3 mg oral tablet: See Instructions, Instructions: Take 4.5mg, 3mg, 3mg on an alt. qd schedule, # 40 unknown unit, 11 Refill(s), Type: Soft Stop, Pharmacy: [facility]  Documented Medications  Documented  Protegra oral tablet: 1 tab(s), po, daily, 0 Refill(s), Type: Maintenance  Senexon-S: 2 tab(s), po, hs, 0 Refill(s), Type: Maintenance  Senokot 8.6 mg oral tablet: 1-2 tab(s), PO, Once a day (at bedtime), PRN: for constipation, Type:  Maintenance  acetaminophen 325 mg oral tablet: 2 tab(s) ( 650 mg ), po, q4 hrs, Instructions: not to exceed 4000 mg/day, 0 Refill(s), Type: Maintenance  allopurinol: 0 Refill(s), Type: Maintenance  digoxin 125 mcg (0.125 mg) oral tablet: 1 tab(s) ( 125 mcg ), po, daily, 0 Refill(s), Type: Maintenance  furosemide 80 mg oral tablet: 1 tab(s) ( 80 mg ), po, bid, Instructions: Per Dr. Quintana on 3/7/2017, 0 Refill(s), Type: Maintenance  metOLazone 2.5 mg oral tablet: See Instructions, Instructions: 1 tab(s) po daily for weight > 222#, 0 Refill(s), Type: Maintenance   Problem list:    All Problems  Anticoagulated / SNOMED CT 61108638 / Confirmed  Anticardiolipin syndrome / SNOMED CT 8846060207 / Confirmed  CAD (coronary artery disease) / SNOMED CT 0428074893 / Confirmed  Ascites / SNOMED CT 5483946027 / Confirmed  Cardiorenal syndrome / SNOMED CT 9619618203 / Confirmed  AF (Atrial Fibrillation) / SNOMED CT 8399727781 / Confirmed  IBS (irritable bowel syndrome) / SNOMED CT 8653086160 / Confirmed  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 / SNOMED CT 2364314864 / Confirmed  Vitamin B 12 deficiency / SNOMED CT 2895993764 / Confirmed  Osteoarthritis of both knees / SNOMED CT 2602318611 / Confirmed  ICD (implantable cardioverter-defibrillator) infection / SNOMED CT 012294284 / Confirmed  Diverticulosis / SNOMED CT 1304402707 / Confirmed  Dupuytren's contracture of right hand / SNOMED CT 9789018205 / Confirmed  Gout / SNOMED CT 564769686 / Confirmed  CHF (Congestive Heart Failure) / SNOMED CT 838692342 / Confirmed  CHB (complete heart block) / SNOMED CT 4836003135 / Confirmed  H/O acute pancreatitis / SNOMED CT 4205626399 / Confirmed  History of acute bacterial endocarditis / SNOMED CT 5529519722 / Confirmed  History of tricuspid valve replacement / SNOMED CT 2752325392 / Confirmed  High cholesterol / SNOMED CT 89754218 / Confirmed  Acute kidney injury (nontraumatic) / SNOMED CT 6586247108 /  Confirmed  Iron deficiency anemia / SNOMED CT 914717783 / Confirmed  Nonischemic dilated cardiomyopathy / SNOMED CT 0602757953 / Confirmed  Obesity / ICD-9-.00 / Confirmed  KEYONA (obstructive sleep apnea) / SNOMED CT 869110933 / Confirmed  Paralysis, diaphragm / SNOMED CT 245061150 / Confirmed  History of coronary artery bypass graft / SNOMED CT 0962621430 / Confirmed  Colon polyps / SNOMED CT 417592192 / Confirmed  Right heart failure / SNOMED CT 300996174 / Confirmed  Tricuspid valve insufficiency / SNOMED CT [id number] / Confirmed  Type 2 diabetes mellitus / SNOMED CT 966953842 / Confirmed  Inactive: PUD (peptic ulcer disease) / SNOMED CT 1551151731  Resolved: BE (bacterial endocarditis) / SNOMED CT 146355011  Resolved: History of sepsis / SNOMED CT 0287268224  Resolved: Inpatient stay / SNOMED CT 496602368  Resolved: Inpatient stay / SNOMED CT 117007111  Resolved: Inpatient stay / SNOMED CT 560258203  Resolved: Inpatient stay / SNOMED CT 255953950  Resolved: Inpatient stay / SNOMED CT 746827123  Resolved: Inpatient stay / SNOMED CT 914044304  Resolved: Inpatient stay / SNOMED CT 143698500  Resolved: Other and Unspecified Noninfectious Gastroenteritis and Colitis / ICD-9-.9  Canceled: Hypercholesteremia / ICD-9-.0      Histories   Past Medical History:    Active  H/O acute pancreatitis (5157636958): Onset on 12/6/2016 at 81 years.  History of tricuspid valve replacement (8948210739): Onset in the month of 11/2016 at 80 years  History of acute bacterial endocarditis (8182864433): Onset on 7/23/2016 at 80 years.  Anticardiolipin syndrome (6800654404): Onset on 1/1/2005 at 69 years.  KEYONA (obstructive sleep apnea) (883851481): Onset on 1/1/2003 at 67 years.  Comments:  11/1/2013 CDT 10:58 AM CDT - Shravan Berrios MD  Adequate titration to BIPAP 16/11 on 10/15/2013  AF (Atrial Fibrillation) (7137111592)  Presence of combination internal cardiac defibrillator (ICD) and  pacemaker (9426456886)  IBS (irritable bowel syndrome) (2858713011)  Colon polyps (571131117)  CAD (coronary artery disease) (9172998920)  Gout (124099071)  CHF (Congestive Heart Failure) (970165585)  Obesity (278.00)  Anticoagulated (10125933)  High cholesterol (67234768)  Dupuytren's contracture of right hand (2422410572)  Acute kidney injury (nontraumatic) (4891526119)  CHB (complete heart block) (5775100037)  Nonischemic dilated cardiomyopathy (1307883722)  ICD (implantable cardioverter-defibrillator) infection (086531060)  History of coronary artery bypass graft (1338298637)  Tricuspid valve insufficiency ([id number])  Ascites (2805016955)  Right heart failure (897957349)  Type 2 diabetes mellitus (644745614)  Cardiorenal syndrome (9488963935)  Vitamin B 12 deficiency (6939288357)  Resolved  Inpatient stay (917578125): Onset on 5/26/2017 at 81 years.  Resolved on 5/28/2017 at 81 years.  Comments:  5/31/2017 CDT 10:36 PM Floridalma Jarrell  @[facility] - Anemia  Inpatient stay (593312043): Onset on 12/13/2016 at 81 years.  Resolved on 12/19/2016 at 81 years.  Comments:  1/3/2017 CST 9:01 PM Floridalma Brody  @[facility] - Acute on chronic right heart failure  Inpatient stay (901102054): Onset on 12/6/2016 at 81 years.  Resolved on 12/13/2016 at 81 years.  Comments:  12/20/2016 CST 6:02 AM Floridalma Brody  @[facility]  - S/P tricuspid valve replacement with worsening right ventricular function  Inpatient stay (985100699): Onset on 11/29/2016 at 81 years.  Resolved on 12/6/2016 at 81 years.  Comments:  12/20/2016 CST 10:22 PM Floridalma Brody  @[facility] - Severe symptomatic tricuspid valvular insufficiency. Chronic right heart failure.  History of sepsis (6451905938): Onset on 7/26/2016 at 80 years.  Resolved.  Inpatient stay (164700601): Onset on 9/28/2015 at 79 years.  Resolved on 10/2/2015 at 79 years.  Comments:  12/20/2016 CST 6:03 AM Floridalma Brody  @[facility] - Diverticulitis  Inpatient  stay (840636848): Onset on 5/17/2015 at 79 years.  Resolved on 5/29/2015 at 79 years.  Comments:  12/20/2016 CST 6:03 AM Floridalma Brody  @[facility] - Infected pacemaker  Inpatient stay (034976077): Onset on 7/30/2013 at 77 years.  Resolved on 7/30/2013 at 77 years.  Comments:  12/20/2016 CST 6:02 AM Floridalma Brody  @[facility] - Jaw pain, possible anginal equivalent  Other and Unspecified Noninfectious Gastroenteritis and Colitis (558.9):  Resolved.  BE (bacterial endocarditis) (089252019):  Resolved.   Family History:    Aneurysm  Father  Heart disease  Mother     Procedure history:    Esophagogastroduodenoscopy (482603239) on 5/27/2017 at 81 Years.  Comments:  6/1/2017 1:15 PM - Shravan Berrios MD  gastritis, fundic gland polyps  Colonoscopy (874074842) on 5/27/2017 at 81 Years.  Comments:  6/1/2017 1:16 PM - Shravan Berrios MD  Cecal tubular adenoma, pandiverticulosis    5/30/2017 3:36 PM - Mag Vargas CMA  w/polypectomy  TVR - Tricuspid valve replacement (9617951741) on 11/29/2016 at 81 Years.  Cardiac angiogram (5558248351) on 11/1/2016 at 80 Years.  Comments:  11/3/2016 12:05 PM - Shravan Berrios MD  Summary/Conclusions  PRESENTATION / INDICATIONS    Pre-surgical evaluation for cardiac surgery    Severe TR by echo  DIAGNOSTIC - CORONARY    Co-dominant coronary artery system    The left main artery was normal in appearance and free of obstructive disease.    Chronic total occlusion of the proximal LAD    The circumflex artery has minimal disease.    The RCA has moderate disease.    No change since 2013  DIAGNOSTIC - GRAFTS    Chronic total occlusion in the proximal anastomosis of the SVG graft to the RCA    The SVG to the ramus intermediate is patent    The sequential graft to the 1st diagonal is patent.    The sequential graft limb from the diagonal to the LAD is patent.    No change since 2013  HEMODYNAMICS    The LVEDP is mildly elevated    Severely elevated right atrial pressures    No evidence of  intracardiac shunting  Limited thoracotomy (1997079057) on 8/23/2016 at 80 Years.  Comments:  8/29/2016 2:45 PM - Floridalma Calderon  Left mini thoracotomy and placement of two epicardial screw in leads.  Implant of left pectoral pacer generatoe.  Removal of automatic cardiac defibrillator (590839700) on 8/4/2016 at 80 Years.  Comments:  8/29/2016 2:49 PM - Floridalma Calderon  Generator and leads  Implantation of intravenous single chamber cardiac pacemaker system (2140062797) on 8/4/2016 at 80 Years.  Pacemaker change on 4/8/2015 at 79 Years.  Cardiac angiogram (0588234583) on 8/2/2013 at 77 Years.  Comments:  8/8/2013 2:45 PM - Shravan Berrios MD  Summary/Conclusions  PRESENTATION / INDICATIONS   Chest pain  DIAGNOSTIC - CORONARY  Right dominant coronary artery system  DIAGNOSTIC SUMMARY  100% stenosis in the Proximal LAD  30% stenosis in the 1st Marginal  30% stenosis in the Proximal RCA  50% stenosis in the Mid RCA  100% stenosis in the Aorta graft to Distal RCA  DIAGNOSTIC - GRAFTS  The sequential graft to the diagonal branch is patent.  The sequential graft from the diagonal to the LAD is patent.  The SVG to the ramus intermediate is patent  The SVG to RCA is chronically occluded  HEMODYNAMICS  The LVEDP is mildly elevated  RECOMMENDATIONS & PLAN  Medical Rx- no significant change from previous angiogram, there is dramatic mismatch in size between the SVG's and the target arteries with slow filling of the LAD        angiogrqam on 2/3/2012 at 76 Years.  Comments:  4/24/2012 3:33 PM - Shravan Berrios MD  Summary/Conclusions  PRESENTATION / INDICATIONS   Dyspnea and fatigue.  DIAGNOSTIC - CORONARY  Right dominant coronary artery system.  LMCA is normal.  LAD is occluded proximally after the 1st septal branch.  LCx has mild luminal irregularities.  OM1 has 20-30% proximal stenosis.  RCA has 30-40% diffuse disease in the mid segment and otherwise has mild diffuse luminal irregularities.  DIAGNOSTIC - GRAFTS  The sequential  "SVG to the diagonal and then LAD is widely patent.  There is a significant size mismatch between the graft and the native vessels.  The diagonal and distal LAD have no obvious lesions but are small caliber (< 2.0 mm vessels).  There is some backfilling into the mid LAD.  The SVG to the ramus is widely patent.  There is a significant size mismatch between the graft and the native vessel.  The ramus has no obvious lesion but is small caliber (< 2.0 mm vessel).    The SVG to the RCA is chronically occluded.  LEFT VENTRICULAR FUNCTION  Left ventricular function is mildly reduced with EF of 42% and mild global hypokinesis.  No significant MR.  HEMODYNAMICS  The LVEDP is 6 mmHg.  No significant gradient across the aortic valve.  RA 11, RV 29/10, PA 34/17 (mean 25), PCWP 18  Amarilys CO 5.1, TDCO 4.6  RECOMMENDATIONS & PLAN  Consider alternative etiology for symptoms.  Lasix dose decreased to 40 mg QD given relative hypotension and patient reporting feeling \"dry membranes.\"             Colonoscopy (405968882) on 12/15/2006 at 71 Years.  Comments:  8/8/2013 4:06 PM - Shravan Berrios MD  Diverticulosis. No polyps.  Colonoscopy on 6/22/2001 at 65 Years.  Comments:  11/15/2010 8:21 AM - Shravan Berrios MD  hyperplastic polyp  Cholecystectomy (84215635) in the month of 6/2001 at 65 Years.  CABG - Coronary artery bypass graft (547441616) in 2000 at 65 Years.  History of Back Surgery (V45.89) in 2000 at 65 Years.  Colonoscopy (277029947) on 2/17/1993 at 57 Years.  Comments:  8/8/2013 4:06 PM - Shravan Berrios MD  1 adenoma, 1 hyperplastic, diverticulosis.  Left shoulder surgery in 1991 at 56 Years.  Esophagogastroduodenoscopy (648741056).  Cholecystectomy (87027336).  Ablation for atrial fibrillation.  Comments:  5/2/2011 1:56 PM - Madeline Gonzalez  Subsequent pacemaker dependence.  Colonoscopy (195012110).   Social History:        Alcohol Assessment: Current            Current                     Comments:                      " 04/15/2010 - Sam LENORE, Mag                     occasional      Tobacco Assessment: Denies Tobacco Use      Employment and Education Assessment            Employed, Work/School description: Farmer.      Exercise and Physical Activity Assessment            Exercise type: Walking.        Physical Examination   vital signs stable, as noted above   Vital Signs   7/20/2017 6:17 PM CDT Temperature Tympanic 98.1 DegF    Peripheral Pulse Rate 64 bpm    Systolic Blood Pressure 102 mmHg    Diastolic Blood Pressure 62 mmHg    Mean Arterial Pressure 75 mmHg    BP Site Right arm      Measurements from flowsheet : Measurements   7/20/2017 6:17 PM CDT    Weight Measured - Standard                224.6 lb     General:  Alert and oriented, No acute distress.    Eye:  Extraocular movements are intact.    HENT:  Normocephalic, Oral mucosa is moist.    Neck:  Supple, No carotid bruit, No jugular venous distention, No lymphadenopathy.    Respiratory:  Lungs are clear to auscultation, Respirations are non-labored.    Cardiovascular:  Normal rate, No murmur, No edema.    Gastrointestinal:  Soft, Non-tender, Non-distended, Normal bowel sounds.    Genitourinary:  No costovertebral angle tenderness.    Musculoskeletal:  Normal range of motion, Normal strength, No tenderness.    Neurologic:  Alert, Oriented, Normal motor function, No focal deficits.    Cognition and Speech:  Oriented, Speech clear and coherent.    Psychiatric:  Appropriate mood & affect.       Review / Management   Results review:  Lab results   7/20/2017 6:11 PM CDT UA Color Yellow    UA Clarity Slightly Cloudy    UA pH 6.0    UA Specific Gravity 1.015    UA Glucose Negative mg/dL    UA Bilirubin Negative    UA Ketones Negative mg/dL    U Occult Blood Large    UA Protein 30 mg/dL    UA Nitrite Negative    UA Leuk Est Large    UA Urobilinogen Normal    UA WBC Clumps Present    UA Epithelial Cells None Seen    UA WBC >100    UA RBC 11-25    UA Bacteria Many   7/6/2017 3:33  PM CDT WBC 5.5    RBC 3.17  LOW    Hgb 8.4 gm/dL  LOW    Hct 27.3 %  LOW    MCV 86.1 fL    MCH 26.5 pg  LOW    MCHC 30.8 gm/dL  LOW    RDW 19.5 %  HI    Platelet 140    MPV 10.9 fL   7/6/2017 3:31 PM CDT Sodium Level 139 mmol/L    Potassium Level 3.8 mmol/L    Chloride Level 102 mmol/L    CO2 Level 28 mmol/L    Glucose Level 113 mg/dL  HI    BUN 31 mg/dL  HI    Creatinine 2.05 mg/dL  HI    BUN/Creat Ratio 15    eGFR 30 mL/min/1.73m2  LOW    eGFR African American 35 mL/min/1.73m2  LOW    Calcium Level 9.0 mg/dL   7/6/2017 2:57 PM CDT INR 2.7  HI    Prothrombin Time 32.1  HI   6/21/2017 2:48 PM CDT Sodium Level 139 mmol/L    Potassium Level 3.3 mmol/L    Chloride Level 97 mmol/L    CO2 Level 29 mmol/L    AGAP 13    Glucose Level 92 mg/dL    BUN 32 mg/dL    Creatinine 2.19 mg/dL    BUN/Creat Ratio 15    eGFR  35    eGFR Non-African American 29    Calcium Level 9.4 mg/dL    Hgb 8.0 g/dL    MCV 87 fL   6/21/2017 2:42 PM CDT Ferritin 50 ng/mL    Reticulocyte 1.1 %    Retic Abs# 33,440   6/6/2017 11:40 AM CDT INR 2.2   6/6/2017 11:05 AM CDT Sodium Level 142 mmol/L    Potassium Level 3.8 mmol/L    Chloride Level 100 mmol/L    CO2 Level 29 mmol/L    AGAP 13    Glucose Level 111 mg/dL    BUN 48 mg/dL    Creatinine 2.11 mg/dL    BUN/Creat Ratio 23    eGFR  37    eGFR Non-African American 30    Calcium Level 9.9 mg/dL    WBC 4.7    RBC 3.21    Hgb 8.6 g/dL    Hct 28.5 %    MCV 89 fL    MCH 26.8 pg    MCHC 30.2 g/dL    RDW 20.7 %    Platelet 174    MPV 9.7 fL   6/1/2017 11:45 AM CDT INR 1.9   5/28/2017 3:38 PM CDT Sodium Level  mEq/L    Potassium Level TR 3.7 mEq/L    Chloride  mEq/L    CO2 TR 25 mEq/L    Anion Gap TR 12 mEq/L    Glucose Level  mg/dL    BUN TR 34 mg/dL    Creatinine TR 1.68 mg/dL    BUN/Creatinine Ratio TR 20    eGFR TR 40 mL/min    Calcium TR 8.9 mEq/dL    Hgb TR 7.9 g/dL    PT TR 19.2    INR TR 1.6   5/26/2017 2:37 PM CDT Sodium Level 140 mmol/L    Potassium  Level 3.9 mmol/L    Chloride Level 100 mmol/L    CO2 Level 27 mmol/L    AGAP 13    Glucose Level 94 mg/dL    BUN 38 mg/dL    Creatinine 1.88 mg/dL    BUN/Creat Ratio 20    eGFR  42    eGFR Non-African American 35    Calcium Level 9.3 mg/dL    WBC 6.1    RBC 3.02    Hgb 8.1 g/dL    Hct 26.6 %    MCV 88 fL    MCH 26.8 pg    MCHC 30.5 g/dL    RDW 21.4 %    Platelet 178    MPV 9.6 fL    Protime 33.4    INR 3.4   5/23/2017 4:04 PM CDT PT 33.6  HI    INR 3.4  HI   5/23/2017 10:55 AM CDT INR 3.4   5/22/2017 10:59 AM CDT Sodium Level 136 mmol/L    Potassium Level 3.9 mmol/L    Chloride Level 98 mmol/L    CO2 Level 29 mmol/L    Glucose Level 99 mg/dL    BUN 29 mg/dL  HI    Creatinine 1.80 mg/dL  HI    BUN/Creat Ratio 16    eGFR 35 mL/min/1.73m2  LOW    eGFR African American 41 mL/min/1.73m2  LOW    Calcium Level 9.3 mg/dL    Hgb 8.5 gm/dL  LOW    Reticulocyte 1.8 %    Retic Abs# 60,300    PTT 44  HI   5/12/2017 2:19 PM CDT INR 3.2   5/7/2017 7:00 AM CDT Sodium Level  mEq/L    Potassium Level TR 4.1 mEq/L    Chloride  mEq/L    CO2 TR 27 mEq/L    Anion Gap TR 12 mEq/L    Glucose Level  mg/dL    BUN TR 36 mg/dL    Creatinine TR 1.95 mg/dL    BUN/Creatinine Ratio TR 18    eGFR TR 33 mL/min    Calcium TR 9.6 mEq/dL    Hgb TR 8.5 g/dL   5/2/2017 10:39 AM CDT Sodium Level 139 mmol/L    Potassium Level 3.3 mmol/L  LOW    Chloride Level 96 mmol/L  LOW    CO2 Level 32 mmol/L  HI    Glucose Level 172 mg/dL  HI    BUN 40 mg/dL  HI    Creatinine 2.07 mg/dL  HI    BUN/Creat Ratio 19    eGFR 30 mL/min/1.73m2  LOW    eGFR African American 34 mL/min/1.73m2  LOW    Calcium Level 9.8 mg/dL    PT 16.4  HI    INR 1.6  HI   4/28/2017 12:43 PM CDT Sodium Level 140 mmol/L    Potassium Level 3.4 mmol/L    Chloride Level 93 mmol/L    CO2 Level 35 mmol/L    AGAP 12    Glucose Level 85 mg/dL    BUN 43 mg/dL    Creatinine 2.08 mg/dL    BUN/Creat Ratio 21    eGFR  38    eGFR Non- 31     Calcium Level 10.1 mg/dL    Hgb 8.7 g/dL    MCV 82 fL   4/24/2017 2:46 PM CDT Sodium Level  mEq/L    Potassium Level TR 3.3 mEq/L    Chloride TR 97 mEq/L    CO2 TR 31 mEq/L    Glucose Level  mg/dL    BUN TR 37 mg/dL    Creatinine TR 1.81 mg/dL    BUN/Creatinine Ratio TR 20    eGFR TR 36 mL/min    Calcium TR 9.7 mEq/dL    Ferritin TR 34.2 ng/mL    BNP  pg/mL    WBC TR 4.7 x10^3/uL    RBC TR 3.11 x10^6/uL    Hgb TR 7.7 g/dL    Hct TR 25.7 %    MCV TR 83 fL    MCH TR 24.8 pg    MCHC TR 30.0 gm/dL    RDW TR 16.2 %    Platelet  x10^3/uL    MPV (Mean Platelet Volume) TR 9.4 fL    PT TR 21.3    INR TR 1.9   4/24/2017 12:24 PM CDT Sodium Level  mEq/L    Potassium Level TR 3.3 mEq/L    Chloride TR 97 mEq/L    CO2 TR 31 mEq/L    Anion Gap TR 13 mEq/L    Glucose Level  mg/dL    BUN TR 37 mg/dL    Creatinine TR 1.81 mg/dL    Calcium TR 9.7 mEq/dL    WBC TR 4.7 x10^3/uL    RBC TR 3.11 x10^6/uL    Hgb TR 7.7 g/dL    Hct TR 25.7 %    Platelet  x10^3/uL    PT TR 21.3    INR TR 1.9   4/13/2017 1:42 PM CDT INR 1.9   4/13/2017 11:13 AM CDT Sodium Level 139 mmol/L    Potassium Level 3.8 mmol/L    Chloride Level 98 mmol/L    CO2 Level 31 mmol/L    Glucose Level 97 mg/dL    BUN 51 mg/dL  HI    Creatinine 1.81 mg/dL  HI    BUN/Creat Ratio 28  HI    eGFR 35 mL/min/1.73m2  LOW    eGFR African American 40 mL/min/1.73m2  LOW    Calcium Level 10.2 mg/dL    Magnesium Level 2.1 mg/dL    TSH 2.73 mIU/L    B-Natriuretic Peptide 143 pg/mL  HI    PT 19.8  HI    INR 1.9  HI    Digoxin Level 0.6 mcg/L  LOW   .       Impression and Plan   Diagnosis     Anticoagulated (KRJ36-BO Z79.01).     AF (Atrial Fibrillation) (AVP41-EX I48.2).     Acute cystitis without hematuria (CBP79-US N30.00).       - U/A with pyruria and active new LUTS   - process Urine Cx; last treated for UTI in 1/2017   - empirically start on Keflex 250mg TID for 7 days    .) chronic anemia   - recent bone marrow biopsy; scheduled to meet with  Ashley Abrams tomorrow to discuss    Needs INR recheck in 1 week given antibiotic use         Professional Services   Counseling Summary:  This was a 15 minute visit with greater than 50% of that time spent counseling the patient.

## 2022-02-16 NOTE — NURSING NOTE
Anticoagulation Therapy Entered On:  2/4/2019 2:24 PM CST    Performed On:  2/4/2019 2:20 PM CST by Mackenzie Quintana RN               Warfarin Management   Week 1 Sunday Dose :   1.5    Week 1 Monday Dose :   3    Week 1 Tuesday Dose :   1.5    Week 1 Wednesday Dose :   1.5    Week 1 Thursday Dose :   3    Week 1 Friday Dose :   1.5    Week 1 Saturday Dose :   1.5    Week 1 Dose Total :   13.5    Week 2 Sunday Dose :   1.5    Week 2 Monday Dose :   3    Week 2 Tuesday Dose :   1.5    Week 2 Wednesday Dose :   1.5    Week 2 Thursday Dose :   3    Week 2 Friday Dose :   1.5    Week 2 Saturday Dose :   1.5    Week 2 Dose Total :   13.5    Anticoagulation Goal :    Lab Test performed by:  [facility] Office  [address] [place] [address]   Phone # [phone]  Fax # [phone]  Lab Draw   Planned Duration :   Indefinite   Indication :   Atrial fibrillation   INR Range :   2.0 - 3.0   INR Therapeutic Range :   No   Warfarin Abnormal Findings :   none   Anticoagulation Recheck :   2/20/19   Warfarin Physician :   Shravan Berrios MD Special Instructions :   INR = 1.8 per [facility] on 2/1/19 Patient is to stay on 3mg warfarin on Mon and Thurs and 1.5mg the rest of the days of the week. Stop warfarin pre op on 2/8/19 Resume same dose post op and recheck INR around 2/20/19   Mackenzie Quintana RN - 2/4/2019 2:20 PM CST

## 2022-02-16 NOTE — NURSING NOTE
Comprehensive Intake Entered On:  5/17/2019 12:18 PM CDT    Performed On:  5/17/2019 12:09 PM CDT by Vivienne Sarmiento CMA               Summary   Chief Complaint :   Pre-op. DOS-5/28/19 and 6/11/19- Cataracts- [facility]-Dr. Cornelius.    Menstrual Status :   N/A   Weight Measured :   204.6 lb(Converted to: 204 lb 10 oz, 92.80 kg)    Systolic Blood Pressure :   98 mmHg   Diastolic Blood Pressure :   52 mmHg (LOW)    Mean Arterial Pressure :   67 mmHg   Peripheral Pulse Rate :   77 bpm   BP Site :   Right arm   BP Method :   Manual   HR Method :   Electronic   Temperature Tympanic :   97.3 DegF(Converted to: 36.3 DegC)  (LOW)    Oxygen Saturation :   98 %   Vivienne Sarmiento CMA - 5/17/2019 12:09 PM CDT   Health Status   Allergies Verified? :   Yes   Medication History Verified? :   Yes   Pre-Visit Planning Status :   N/A   Tobacco Use? :   Never smoker   Vivienne Sarmiento CMA - 5/17/2019 12:09 PM CDT   Consents   Consent for Immunization Exchange :   Consent Granted   Consent for Immunizations to Providers :   Consent Granted   Vivienne Sarmiento CMA - 5/17/2019 12:09 PM CDT   Meds / Allergies   (As Of: 5/17/2019 12:18:03 PM CDT)   Allergies (Active)   adhesive tape  Estimated Onset Date:   Unspecified ; Created By:   Floridalma Calderon; Reaction Status:   Active ; Category:   Other ; Substance:   adhesive tape ; Type:   Allergy ; Updated By:   Floridalma Calderon; Reviewed Date:   5/3/2019 4:37 PM CDT      No Known Medication Allergies  Estimated Onset Date:   Unspecified ; Created By:   Ellen Parker MA; Reaction Status:   Active ; Category:   Drug ; Substance:   No Known Medication Allergies ; Type:   Allergy ; Updated By:   Ellen Parker MA; Reviewed Date:   5/3/2019 4:37 PM CDT        Medication List   (As Of: 5/17/2019 12:18:03 PM CDT)   Prescription/Discharge Order    allopurinol  :   allopurinol ; Status:   Prescribed ; Ordered As Mnemonic:   allopurinol 300 mg oral tablet ; Simple Display Line:   300 mg, 1 tab(s), Oral, daily, for 90 day(s), 90  tab(s), 3 Refill(s) ; Ordering Provider:   Shravan Berrios MD; Catalog Code:   allopurinol ; Order Dt/Tm:   5/3/2019 4:58:18 PM          atorvastatin  :   atorvastatin ; Status:   Prescribed ; Ordered As Mnemonic:   atorvastatin 10 mg oral tablet ; Simple Display Line:   10 mg, 1 tab(s), Oral, hs, 30 tab(s), 0 Refill(s) ; Ordering Provider:   Shravan Berrios MD; Catalog Code:   atorvastatin ; Order Dt/Tm:   5/3/2019 11:54:45 AM          clotrimazole topical  :   clotrimazole topical ; Status:   Prescribed ; Ordered As Mnemonic:   clotrimazole 1% topical cream ; Simple Display Line:   1 nazario, TOP, BID, for 7 day(s), 30 gm, 0 Refill(s) ; Ordering Provider:   Rissa Arauz MD; Catalog Code:   clotrimazole topical ; Order Dt/Tm:   6/22/2018 3:35:41 PM          metoprolol  :   metoprolol ; Status:   Prescribed ; Ordered As Mnemonic:   Metoprolol Succinate ER 25 mg oral tablet, extended release ; Simple Display Line:   See Instructions, TAKE ONE-HALF TABLET BY MOUTH TWICE DAILY, 180 EA, 3 Refill(s) ; Ordering Provider:   Shravan Berrios MD; Catalog Code:   metoprolol ; Order Dt/Tm:   5/3/2019 4:58:09 PM          metroNIDAZOLE topical  :   metroNIDAZOLE topical ; Status:   Prescribed ; Ordered As Mnemonic:   MetroGel 1% topical gel ; Simple Display Line:   1 nazario, Topical, daily, 1 tubes, 5 Refill(s) ; Ordering Provider:   Rissa Arauz MD; Catalog Code:   metroNIDAZOLE topical ; Order Dt/Tm:   6/29/2018 3:46:29 PM          mirtazapine 15 mg oral tablet  :   mirtazapine 15 mg oral tablet ; Status:   Prescribed ; Ordered As Mnemonic:   mirtazapine 15 mg oral tablet ; Simple Display Line:   1 tab(s), Oral, qhs, 30 unknown unit, 11 Refill(s) ; Ordering Provider:   Shravan Berrios MD; Catalog Code:   mirtazapine ; Order Dt/Tm:   4/18/2019 8:36:08 AM          Misc Prescription  :   Misc Prescription ; Status:   Prescribed ; Ordered As Mnemonic:   [id number] URINARY DRAIN BAG 2000ML MG BEAD ; Simple Display Line:   See  Instructions, USE AS DIRECTED, 1 unknown unit ; Ordering Provider:   Shravan Berrios MD; Catalog Code:   Miscellaneous Prescription ; Order Dt/Tm:   6/4/2018 7:34:17 AM          nitroglycerin  :   nitroglycerin ; Status:   Prescribed ; Ordered As Mnemonic:   nitroglycerin 0.4 mg sublingual tablet ; Simple Display Line:   0.4 mg, 1 tab(s), SL, q5min, not to exceed 3 doses/15 min--if pain persists, seek medical attention, 25 tab(s), PRN: for chest pain ; Ordering Provider:   Shravan Berrios MD; Catalog Code:   nitroglycerin ; Order Dt/Tm:   8/28/2014 11:35:30 AM          omeprazole  :   omeprazole ; Status:   Prescribed ; Ordered As Mnemonic:   omeprazole 20 mg oral delayed release capsule ; Simple Display Line:   20 mg, 1 cap(s), po, daily, 90 cap(s), 3 Refill(s) ; Ordering Provider:   Shravan Berrios MD; Catalog Code:   omeprazole ; Order Dt/Tm:   1/9/2017 12:26:06 PM          potassium chloride 20 mEq oral tablet, extended release  :   potassium chloride 20 mEq oral tablet, extended release ; Status:   Prescribed ; Ordered As Mnemonic:   potassium chloride 20 mEq oral tablet, extended release ; Simple Display Line:   See Instructions, 2 tab BID, 30 unknown unit, 5 Refill(s) ; Ordering Provider:   Shravan Berrios MD; Catalog Code:   potassium chloride ; Order Dt/Tm:   4/13/2016 11:25:17 AM          predniSONE  :   predniSONE ; Status:   Prescribed ; Ordered As Mnemonic:   predniSONE 20 mg oral tablet ; Simple Display Line:   See Instructions, 2 po daily for 3-5 days prn gout, 30 EA, 0 Refill(s) ; Ordering Provider:   Shravan Berrios MD; Catalog Code:   predniSONE ; Order Dt/Tm:   12/29/2017 10:06:41 AM          tamsulosin  :   tamsulosin ; Status:   Prescribed ; Ordered As Mnemonic:   tamsulosin 0.4 mg oral capsule ; Simple Display Line:   0.4 mg, 1 cap(s), Oral, daily, 30 cap(s), 0 Refill(s) ; Ordering Provider:   Shravan Berrios MD; Catalog Code:   tamsulosin ; Order Dt/Tm:   7/20/2018 2:46:14 PM          warfarin   :   warfarin ; Status:   Prescribed ; Ordered As Mnemonic:   warfarin 3 mg oral tablet ; Simple Display Line:   See Instructions, 3mg T, TH, S Yung. 1.5 mg MWF, 100 EA, 3 Refill(s) ; Ordering Provider:   Shravan Berrios MD; Catalog Code:   warfarin ; Order Dt/Tm:   4/23/2019 7:29:25 PM            Home Meds    acetaminophen  :   acetaminophen ; Status:   Documented ; Ordered As Mnemonic:   acetaminophen 325 mg oral tablet ; Simple Display Line:   650 mg, 2 tab(s), po, q4 hrs, not to exceed 4000 mg/day, 0 Refill(s) ; Catalog Code:   acetaminophen ; Order Dt/Tm:   12/20/2016 1:58:24 PM          cyanocobalamin  :   cyanocobalamin ; Status:   Documented ; Ordered As Mnemonic:   Vitamin B12 1000 mcg/mL injectable solution ; Simple Display Line:   1,000 mcg, im, qmonth, 0 Refill(s) ; Catalog Code:   cyanocobalamin ; Order Dt/Tm:   7/24/2017 11:12:05 AM          digoxin  :   digoxin ; Status:   Documented ; Ordered As Mnemonic:   digoxin 125 mcg (0.125 mg) oral tablet ; Simple Display Line:   125 mcg, 1 tab(s), po, daily, 0 Refill(s) ; Catalog Code:   digoxin ; Order Dt/Tm:   4/19/2017 10:47:22 AM          ferrous sulfate  :   ferrous sulfate ; Status:   Documented ; Ordered As Mnemonic:   ferrous sulfate ; Simple Display Line:   Oral, 0 Refill(s) ; Catalog Code:   ferrous sulfate ; Order Dt/Tm:   4/30/2019 2:48:23 PM          furosemide  :   furosemide ; Status:   Documented ; Ordered As Mnemonic:   furosemide 80 mg oral tablet ; Simple Display Line:   40 mg, 0.5 tab(s), po, bid, 0 Refill(s) ; Catalog Code:   furosemide ; Order Dt/Tm:   3/15/2017 3:19:58 PM          multivitamin  :   multivitamin ; Status:   Documented ; Ordered As Mnemonic:   Protegra oral tablet ; Simple Display Line:   1 tab(s), po, daily ; Catalog Code:   multivitamin ; Order Dt/Tm:   6/1/2015 8:38:03 AM          senna  :   senna ; Status:   Documented ; Ordered As Mnemonic:   Senokot 8.6 mg oral tablet ; Simple Display Line:   1-2 tab(s), PO, Once a  day (at bedtime), PRN: for constipation ; Catalog Code:   senna ; Order Dt/Tm:   12/20/2016 2:00:45 PM          spironolactone  :   spironolactone ; Status:   Documented ; Ordered As Mnemonic:   spironolactone 50 mg oral tablet ; Simple Display Line:   50 mg, 1 tab(s), Oral, bid, increased to 50 mg BID per Dr Quintana 3/21/19, 0 Refill(s) ; Catalog Code:   spironolactone ; Order Dt/Tm:   3/1/2019 3:52:14 PM

## 2022-02-16 NOTE — LETTER
(Inserted Image. Unable to display)       January 29, 2019Re:  JACINTO JINDOB:  1935Dorothy Quintana M.D.[address] [facility] [address] [address]Ihzn  Dr. Quintana,The following patient has been referred to your office/practice:  JACINTO JIN Appointment is scheduled for February 7, 2019 at 11:00 a.m. at the [place] office. Please refer to the attached clinical documentation for a summary of ORIN's care.  Please do not hesitate to contact our office if any additional clinical questions arise. All relevant records and transition of care documents should be mailed or faxed. Your assistance in providing continuity of care is appreciated. Sincerely, [facility] [address]) [phone](F) [phone]

## 2022-02-16 NOTE — CARE COORDINATION
Patient:   JACINTO JIN            MRN: [id number]            FIN: [id number]               Age:   83 years     Sex:  Male     :  1935   Associated Diagnoses:   None   Author:   Mag Vargas CMA      Sources of Information:  [ ] Patient, family member, or caregiver (Please list):  [x ] Hospital discharge summary  [ ] Hospital fax  [ ] List of recent hospitalizations or ED visits  [ ] Other:     Discharged From: [facility]  Discharge Date: 19    Diagnosis/Problem: Acute GI bleed    Medication Changes: [ x] Yes [ ] No   Medication List Updated: [ x] Yes [ ] No  Hospital medications reconciled with clinic medications: Yes  Medications          *denotes recorded medication          [id number] URINARY DRAIN BAG 2000ML MG BEAD: See Instructions, USE AS DIRECTED, 1 unknown unit.          *acetaminophen 325 mg oral tablet: 650 mg, 2 tab(s), po, q4 hrs, not to exceed 4000 mg/day, 0 Refill(s).          allopurinol 300 mg oral tablet: 300 mg, 1 tab(s), Oral, daily, for 90 day(s), 90 tab(s), 3 Refill(s).          atorvastatin 10 mg oral tablet: 10 mg, 1 tab(s), Oral, hs, 30 tab(s), 0 Refill(s).          clotrimazole 1% topical cream: 1 nazario, TOP, BID, for 7 day(s), 30 gm, 0 Refill(s).          *Vitamin B12 1000 mcg/mL injectable solution: 1,000 mcg, im, qmonth, 0 Refill(s).          *digoxin 125 mcg (0.125 mg) oral tablet: 125 mcg, 1 tab(s), po, daily, 0 Refill(s).          *famotidine 20 mg oral tablet: 20 mg, 1 tab(s), Oral, bid, 0 Refill(s).          *ferrous sulfate: Oral, 0 Refill(s).          *furosemide 80 mg oral tablet: 40 mg, 0.5 tab(s), po, daily, 0 Refill(s).          Metoprolol Succinate ER 25 mg oral tablet, extended release: See Instructions, TAKE ONE-HALF TABLET BY MOUTH TWICE DAILY, 180 EA, 3 Refill(s).          MetroGel 1% topical gel: 1 nazario, Topical, daily, 1 tubes, 5 Refill(s).          mirtazapine 15 mg oral tablet: 1 tab(s), Oral, qhs, 30 unknown unit, 11 Refill(s).          *Protegra oral tablet: 1  tab(s), po, daily.          nitroglycerin 0.4 mg sublingual tablet: 0.4 mg, 1 tab(s), SL, q5min, not to exceed 3 doses/15 min--if pain persists, seek medical attention, 25 tab(s), PRN: for chest pain.          *Vitamin K1: 100 mcg, Oral, daily, for 14 day(s), 0 Refill(s).          potassium chloride 20 mEq oral tablet, extended release: See Instructions, 2 tab BID, 30 unknown unit, 5 Refill(s).          predniSONE 20 mg oral tablet: See Instructions, 2 po daily for 3-5 days prn gout, 30 EA, 0 Refill(s).          *Senokot 8.6 mg oral tablet: 1-2 tab(s), PO, Once a day (at bedtime), PRN: for constipation.          *spironolactone 50 mg oral tablet: 50 mg, 1 tab(s), Oral, daily, 0 Refill(s).          tamsulosin 0.4 mg oral capsule: 0.4 mg, 1 cap(s), Oral, daily, 30 cap(s), 0 Refill(s).          warfarin 3 mg oral tablet: See Instructions, 3mg T, TH, S Yung. 1.5 mg MWF, 100 EA, 3 Refill(s).      Needs Referral or Lab: [x ] Yes [ ] No  1. Needs Hgb and INR checked at hospital f/u visit  2. Needs to see cardio to discuss possible watchman device. Has f/u on Friday 6/21  3. Needs to get cataract surgery rescheduled    Needs Follow-up Appointment:  [x ] Within 7 days of discharge (highly complex visit)  [ ] Within 14 days of discharge (moderately complex visit)    Appointment Made With: RIAN  Date: 6/18/19    Called and spoke to wife-Mamta was talking to their .     He is doing well. Does have HH nurse. Reviewed new meds/med changes. Confirmed appt w/ [facility] tomorrow. Mamta is a [facility] pt so I will try to meet w/ them tomorrow at f/u appt.Correction: Has f/u with Dr. Quintana on 7/16 and f/u with Dr. Val Shannon at [facility] on 7/1

## 2022-02-16 NOTE — TELEPHONE ENCOUNTER
---------------------  From: Ruthy Augustin LPN (Phone Messages Pool ([id number]_[facility]))   To: [employer] Message Pool ([id number]);     Sent: 7/2/2019 8:55:11 AM CDT  Subject: pre op     PCP:   Shweta MODI     Time of Call:  _ 0817       Person Calling:  _ wife of pt  Phone number:  _ [phone]    Note:   _ Pt Lm stating that pt is going to have cataract surgery 7- with  and would like RIAN to send the okay to the surgery center.     would you like pt to come in for pre op or are you okay with sending letter to [facility]    Last office visit and reason:  _ 6----------------------  From: Ruthy Augustin LPN ([employer] Message Pool ([id number]_[place] [facility] [place]))   To: Shravan Berrios MD;     Cc: Phone Messages Pool ([id number]);      Sent: 7/2/2019 9:57:35 AM CDT  Subject: FW: pre op---------------------  From: Shravan Berrios MD   To: [employer] Message Pool ([id number]);     Sent: 7/2/2019 9:59:42 AM CDT  Subject: RE: pre op     I will need to see him as preop visit is over 30 days ago.Called wife of pt to inform her that pt needs a pre op appointmentTransferred to scheduling

## 2022-02-16 NOTE — LETTER
(Inserted Image. Unable to display)       May 23, 2019      JACINTO JIN  [address], [place] [id number]          Dear JACINTO,      Thank you for selecting [facility] for your healthcare needs.     Our records indicate you are due for the following services:     Non-Fasting Labs    If you had your labs done at another facility or with Direct Access Lab Testing at [facility], please bring in a copy of the results to your next visit, mail a copy, or drop off a copy of your results to your Healthcare Provider.    To schedule an appointment or if you have further questions, please contact your primary clinic:   [facility]       [phone]   [facility]       [phone]              [facility]     [phone]    Powered by [employer] and [employer]    Sincerely,    Shravan Berrios MD, FACP

## 2022-02-16 NOTE — NURSING NOTE
Comprehensive Intake Entered On:  2/1/2019 3:33 PM CST    Performed On:  2/1/2019 3:32 PM CST by Ellen Parker MA               Summary   Chief Complaint :   pre op, surg 2/12/19   Menstrual Status :   N/A   Weight Measured :   230 lb(Converted to: 230 lb 0 oz, 104.33 kg)    Height Measured :   75 in(Converted to: 6 ft 3 in, 190.50 cm)    Body Mass Index :   28.74 kg/m2 (HI)    Body Surface Area :   2.35 m2   Systolic Blood Pressure :   113 mmHg   Diastolic Blood Pressure :   70 mmHg   Mean Arterial Pressure :   84 mmHg   Peripheral Pulse Rate :   90 bpm   BP Site :   Right arm   Ellen Parker MA - 2/1/2019 3:32 PM CST   Health Status   Allergies Verified? :   Yes   Medication History Verified? :   Yes   Ellen Parker MA - 2/1/2019 3:32 PM CST   Meds / Allergies   (As Of: 2/1/2019 3:33:45 PM CST)   Allergies (Active)   adhesive tape  Estimated Onset Date:   Unspecified ; Created By:   Floridalma Calderon; Reaction Status:   Active ; Category:   Other ; Substance:   adhesive tape ; Type:   Allergy ; Updated By:   Floridalma Calderon; Reviewed Date:   12/5/2018 11:32 AM CST      No Known Medication Allergies  Estimated Onset Date:   Unspecified ; Created By:   Ellen Parker MA; Reaction Status:   Active ; Category:   Drug ; Substance:   No Known Medication Allergies ; Type:   Allergy ; Updated By:   Ellen Parker MA; Reviewed Date:   12/5/2018 11:32 AM CST        Medication List   (As Of: 2/1/2019 3:33:45 PM CST)   Prescription/Discharge Order    allopurinol 300 mg oral tablet  :   allopurinol 300 mg oral tablet ; Status:   Prescribed ; Ordered As Mnemonic:   allopurinol 300 mg oral tablet ; Simple Display Line:   See Instructions, TAKE ONE TABLET BY MOUTH ONCE DAILY, 90 unknown unit, 3 Refill(s) ; Ordering Provider:   Shravan Berrios MD; Catalog Code:   allopurinol ; Order Dt/Tm:   5/10/2018 11:15:36 AM          atorvastatin 40 mg oral tablet  :   atorvastatin 40 mg oral tablet ; Status:   Prescribed ; Ordered As Mnemonic:    atorvastatin 40 mg oral tablet ; Simple Display Line:   See Instructions, TAKE ONE TABLET BY MOUTH AT BEDTIME, 90 unknown unit ; Ordering Provider:   Shravan Berrios MD; Catalog Code:   atorvastatin ; Order Dt/Tm:   11/13/2017 11:13:28 AM          clotrimazole topical  :   clotrimazole topical ; Status:   Prescribed ; Ordered As Mnemonic:   clotrimazole 1% topical cream ; Simple Display Line:   1 nazario, TOP, BID, for 7 day(s), 30 gm, 0 Refill(s) ; Ordering Provider:   Rissa Arauz MD; Catalog Code:   clotrimazole topical ; Order Dt/Tm:   6/22/2018 3:35:41 PM          Metoprolol Succinate ER 25 mg oral tablet, extended release  :   Metoprolol Succinate ER 25 mg oral tablet, extended release ; Status:   Prescribed ; Ordered As Mnemonic:   Metoprolol Succinate ER 25 mg oral tablet, extended release ; Simple Display Line:   See Instructions, TAKE ONE-HALF TABLET BY MOUTH TWICE DAILY, 30 unknown unit, 11 Refill(s) ; Ordering Provider:   Shravan Berrios MD; Catalog Code:   metoprolol ; Order Dt/Tm:   9/25/2017 1:33:08 PM          metroNIDAZOLE topical  :   metroNIDAZOLE topical ; Status:   Prescribed ; Ordered As Mnemonic:   MetroGel 1% topical gel ; Simple Display Line:   1 nazario, Topical, daily, 1 tubes, 5 Refill(s) ; Ordering Provider:   Rissa Arauz MD; Catalog Code:   metroNIDAZOLE topical ; Order Dt/Tm:   6/29/2018 3:46:29 PM          mirtazapine  :   mirtazapine ; Status:   Prescribed ; Ordered As Mnemonic:   mirtazapine 15 mg oral tablet ; Simple Display Line:   15 mg, 1 tab(s), PO, Once a day (at bedtime), 30 tab(s), 5 Refill(s) ; Ordering Provider:   Shravan Berrios MD; Catalog Code:   mirtazapine ; Order Dt/Tm:   3/27/2018 11:38:20 AM          Misc Prescription  :   Misc Prescription ; Status:   Prescribed ; Ordered As Mnemonic:   [id number] URINARY DRAIN BAG 2000ML MG BEAD ; Simple Display Line:   See Instructions, USE AS DIRECTED, 1 unknown unit ; Ordering Provider:   Shravan Berrios MD; Catalog Code:    Miscellaneous Prescription ; Order Dt/Tm:   6/4/2018 7:34:17 AM          nitroglycerin  :   nitroglycerin ; Status:   Prescribed ; Ordered As Mnemonic:   nitroglycerin 0.4 mg sublingual tablet ; Simple Display Line:   0.4 mg, 1 tab(s), SL, q5min, not to exceed 3 doses/15 min--if pain persists, seek medical attention, 25 tab(s), PRN: for chest pain ; Ordering Provider:   Shravan Berrios MD; Catalog Code:   nitroglycerin ; Order Dt/Tm:   8/28/2014 11:35:30 AM          omeprazole  :   omeprazole ; Status:   Prescribed ; Ordered As Mnemonic:   omeprazole 20 mg oral delayed release capsule ; Simple Display Line:   20 mg, 1 cap(s), po, daily, 90 cap(s), 3 Refill(s) ; Ordering Provider:   Shravan Berrios MD; Catalog Code:   omeprazole ; Order Dt/Tm:   1/9/2017 12:26:06 PM          potassium chloride 20 mEq oral tablet, extended release  :   potassium chloride 20 mEq oral tablet, extended release ; Status:   Prescribed ; Ordered As Mnemonic:   potassium chloride 20 mEq oral tablet, extended release ; Simple Display Line:   See Instructions, 3 tab BID, 30 unknown unit, 5 Refill(s) ; Ordering Provider:   Shravan Berrios MD; Catalog Code:   potassium chloride ; Order Dt/Tm:   4/13/2016 11:25:17 AM          predniSONE  :   predniSONE ; Status:   Prescribed ; Ordered As Mnemonic:   predniSONE 20 mg oral tablet ; Simple Display Line:   See Instructions, 2 po daily for 3-5 days prn gout, 30 EA, 0 Refill(s) ; Ordering Provider:   Shravan Berrios MD; Catalog Code:   predniSONE ; Order Dt/Tm:   12/29/2017 10:06:41 AM          tamsulosin  :   tamsulosin ; Status:   Prescribed ; Ordered As Mnemonic:   tamsulosin 0.4 mg oral capsule ; Simple Display Line:   0.4 mg, 1 cap(s), Oral, daily, 30 cap(s), 0 Refill(s) ; Ordering Provider:   Shravan Berrios MD; Catalog Code:   tamsulosin ; Order Dt/Tm:   7/20/2018 2:46:14 PM          warfarin 3 mg oral tablet  :   warfarin 3 mg oral tablet ; Status:   Prescribed ; Ordered As Mnemonic:   warfarin  3 mg oral tablet ; Simple Display Line:   1 tab(s), Oral, daily, 90 unknown unit ; Ordering Provider:   Shravan Berrios MD; Catalog Code:   warfarin ; Order Dt/Tm:   1/8/2019 3:42:05 PM            Home Meds    acetaminophen  :   acetaminophen ; Status:   Documented ; Ordered As Mnemonic:   acetaminophen 325 mg oral tablet ; Simple Display Line:   650 mg, 2 tab(s), po, q4 hrs, not to exceed 4000 mg/day, 0 Refill(s) ; Catalog Code:   acetaminophen ; Order Dt/Tm:   12/20/2016 1:58:24 PM          cyanocobalamin  :   cyanocobalamin ; Status:   Documented ; Ordered As Mnemonic:   Vitamin B12 1000 mcg/mL injectable solution ; Simple Display Line:   1,000 mcg, im, qmonth, 0 Refill(s) ; Catalog Code:   cyanocobalamin ; Order Dt/Tm:   7/24/2017 11:12:05 AM          digoxin  :   digoxin ; Status:   Documented ; Ordered As Mnemonic:   digoxin 125 mcg (0.125 mg) oral tablet ; Simple Display Line:   125 mcg, 1 tab(s), po, daily, 0 Refill(s) ; Catalog Code:   digoxin ; Order Dt/Tm:   4/19/2017 10:47:22 AM          furosemide  :   furosemide ; Status:   Documented ; Ordered As Mnemonic:   furosemide 80 mg oral tablet ; Simple Display Line:   80 mg, 1 tab(s), po, bid, take at 8am and 2pm, 0 Refill(s) ; Catalog Code:   furosemide ; Order Dt/Tm:   3/15/2017 3:19:58 PM          metOLazone  :   metOLazone ; Status:   Documented ; Ordered As Mnemonic:   metOLazone 2.5 mg oral tablet ; Simple Display Line:   See Instructions, 1 tab(s) po every other daily for weight > 224#, 0 Refill(s) ; Catalog Code:   metOLazone ; Order Dt/Tm:   4/19/2017 10:48:13 AM          multivitamin  :   multivitamin ; Status:   Documented ; Ordered As Mnemonic:   Protegra oral tablet ; Simple Display Line:   1 tab(s), po, daily ; Catalog Code:   multivitamin ; Order Dt/Tm:   6/1/2015 8:38:03 AM          senna  :   senna ; Status:   Documented ; Ordered As Mnemonic:   Senokot 8.6 mg oral tablet ; Simple Display Line:   1-2 tab(s), PO, Once a day (at bedtime), PRN:  for constipation ; Catalog Code:   senna ; Order Dt/Tm:   12/20/2016 2:00:45 PM

## 2022-02-16 NOTE — NURSING NOTE
Anticoagulation Therapy Entered On:  3/4/2019 4:30 PM CST    Performed On:  3/4/2019 4:28 PM CST by Mackenzie Quintana RN               Warfarin Management   Week 1 Sunday Dose :   1.5    Week 1 Monday Dose :   3    Week 1 Tuesday Dose :   1.5    Week 1 Wednesday Dose :   1.5    Week 1 Thursday Dose :   3    Week 1 Friday Dose :   1.5    Week 1 Saturday Dose :   1.5    Week 1 Dose Total :   13.5    Week 2 Sunday Dose :   1.5    Week 2 Monday Dose :   3    Week 2 Tuesday Dose :   1.5    Week 2 Wednesday Dose :   1.5    Week 2 Thursday Dose :   3    Week 2 Friday Dose :   1.5    Week 2 Saturday Dose :   1.5    Week 2 Dose Total :   13.5    Anticoagulation Goal :    Lab Test performed by:  [facility]  [address] [place] [address]   Phone # [phone]  Fax # [phone]  Lab Draw   Planned Duration :   Indefinite   Indication :   Atrial fibrillation   INR Range :   2.0 - 3.0   INR Therapeutic Range :   No   Warfarin Abnormal Findings :   restarted post op on 2/27/19   Anticoagulation Recheck :   2 weeks   Warfarin Physician :   Shravan Berrios MD Special Instructions :   INR = 1.4 per [facility] on 3/1/19 Patient is to stay on 3mg warfarin on Mon, And Thurs and 1.5 mg the rest of the days of the week Recheck INR in 2 weeks per [facility] Patient advised via call to home.   Mackenzie Quintana RN - 3/4/2019 4:28 PM CST

## 2022-02-16 NOTE — TELEPHONE ENCOUNTER
---------------------  From: Vivienne Sarmiento CMA (Phone Messages Pool ([id number]_[facility]))   Sent: 5/9/2019 9:13:30 AM CDT  Subject: Phone message     PCP:  RIAN     Time of Call:  9:07am       Person Calling:  Mamta's wife  Phone number:  [phone]    Returned call at: 0915    Note:   Patients wife calls requesting a call back regarding patients pre-op appt. They are wondering if he needs to be fasting for any lab work that will be done that day.    Returned call at 0910 and explained to patient and wife that Mamta does not need to be fasting for his pre-op appt. They verbalized understanding and had no further questions.

## 2022-02-16 NOTE — PROGRESS NOTES
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    Accompanied by:  Spouse.    Source of history:  Self, Spouse, Medical record.    History limitation:  None.       Chief Complaint   2019 8:06 AM CDT     Patient here for Pre Op, DOS 18 for eye surgery at [facility] by Dr. Cornelius      History of Present Illness   This patient is scheduled for cataract surgery on the right eye.  He had the left eye done several months ago without difficulty.    He continues to struggle with edema and ascites. He has tolerated the addition of Midodrine.  He has had paracentesis a number of times.    No cough or dyspnea.    He is no longer on Warfarin and has no bleeding or bruising.    No melena or rectal bleeding.    He has no angina.    History of sleep apnea, though after significant weight loss he no longer snores.    He has required chronic support for his anemia with transfusions, IV iron, and Aranesp.    No history of surgical bleeding or problems with sedation or anesthesia.   He does complain of postnasal drainage and nasal congestion.                Review of Systems   Constitutional:  Weakness, Fatigue, Decreased activity, No fever, No chills, No night sweats.    Eye:  Negative except as documented in history of present illness.    Ear/Nose/Mouth/Throat:  Negative except as documented in history of present illness.    Respiratory:  No shortness of breath, No cough.    Cardiovascular:  No chest pain, No palpitations, No peripheral edema.    Gastrointestinal:  No nausea, No vomiting,  No diarrhea, No abdominal pain.    Genitourinary:  H/O urinary retention.    Hematology/Lymphatics:  No bruising tendency, No bleeding tendency.    Endocrine:  No polyuria.    Musculoskeletal:  No muscle pain.    Neurologic:  Negative.       Health Status   Allergies:    Allergic Reactions (Selected)  Severity Not Documented  Adhesive tape (No reactions were documented)  No Known Medication Allergies   Medications:  (Selected)   Prescriptions  Prescribed  [id number] URINARY DRAIN BAG 2000ML MG BEAD: See Instructions, Instructions: USE AS DIRECTED, # 1 unknown unit, Type: Soft Stop, Pharmacy: [facility], USE AS DIRECTED  Flonase 50 mcg/inh nasal spray: = 2 spray(s), Nasal, daily, # 1 EA, 0 Refill(s), Type: Maintenance, Pharmacy: [facility], 2 spray(s) Nasal daily,x30 day(s)  Metoprolol Succinate ER 25 mg oral tablet, extended release: See Instructions, Instructions: TAKE ONE-HALF TABLET BY MOUTH TWICE DAILY, # 180 EA, 3 Refill(s), Type: Soft Stop, Pharmacy: [facility], TAKE ONE-HALF TABLET BY MOUTH TWICE DAILY  MetroGel 1% topical gel: 1 nazario, Topical, daily, # 1 tubes, 5 Refill(s), Type: Maintenance, Pharmacy: [facility], 1 nazario Topical daily  allopurinol 300 mg oral tablet: = 1 tab(s) ( 300 mg ), Oral, daily, # 90 tab(s), 3 Refill(s), Type: Soft Stop, Pharmacy: [facility], 1 tab(s) Oral daily,x90 day(s)  atorvastatin 10 mg oral tablet: = 1 tab(s) ( 10 mg ), Oral, hs, # 30 tab(s), 0 Refill(s), Type: Maintenance, Pharmacy: [facility], Dose change per hospital discharge from [facility] 05/02/19, 1 tab(s) Oral hs  clotrimazole 1% topical cream: 1 nazario, TOP, BID, # 30 gm, 0 Refill(s), Type: Maintenance, Pharmacy: [facility], 1 nazario top bid,x7 day(s)  midodrine 2.5 mg oral tablet: = 2 tab(s) ( 5 mg ), Oral, tid, # 180 tab(s), 2 Refill(s), Type: Maintenance, Pharmacy: [facility]  mirtazapine 15 mg oral tablet: 1 tab(s), Oral, qhs, # 30 unknown unit, 11 Refill(s), Type: Soft Stop, Pharmacy: [facility]  nitroglycerin  0.4 mg sublingual tablet: 1 tab(s) ( 0.4 mg ), SL, q5min, Instructions: not to exceed 3 doses/15 min--if pain persists, seek medical attention, PRN: for chest pain, # 25 tab(s), 3 Refill(s), Type: Maintenance, Pharmacy: [facility], 1 tab(s) sl q5 min,PRN:for chest pain,Instr...  predniSONE 20 mg oral tablet: See Instructions, Instructions: 2 po daily for 3-5 days prn gout, # 30 EA, 0 Refill(s), Type: Maintenance, Pharmacy: [facility], 2 po daily for 3-5 days prn gout  Documented Medications  Documented  Protegra oral tablet: 1 tab(s), po, daily, 0 Refill(s), Type: Maintenance  Vitamin B12 1000 mcg/mL injectable solution: ( 1,000 mcg ), im, qmonth, 0 Refill(s), Type: Maintenance  acetaminophen 325 mg oral tablet: 2 tab(s) ( 650 mg ), po, q4 hrs, Instructions: not to exceed 4000 mg/day, 0 Refill(s), Type: Maintenance  digoxin 125 mcg (0.125 mg) oral tablet: 1 tab(s) ( 125 mcg ), po, daily, 0 Refill(s), Type: Maintenance  famotidine 20 mg oral tablet: = 1 tab(s) ( 20 mg ), Oral, bid, 0 Refill(s), Type: Maintenance  furosemide 80 mg oral tablet: = 0.5 tab(s) ( 40 mg ), po, bid, 0 Refill(s), Type: Maintenance  spironolactone 50 mg oral tablet: = 0.5 tab(s) ( 25 mg ), Oral, bid, 0 Refill(s), Type: Maintenance,    Medications          *denotes recorded medication          [id number] URINARY DRAIN BAG 2000ML MG BEAD: See Instructions, USE AS DIRECTED, 1 unknown unit.          *acetaminophen 325 mg oral tablet: 650 mg, 2 tab(s), po, q4 hrs, not to exceed 4000 mg/day, 0 Refill(s).          allopurinol 300 mg oral tablet: 300 mg, 1 tab(s), Oral, daily, for 90 day(s), 90 tab(s), 3 Refill(s).          atorvastatin 10 mg oral tablet: 10 mg, 1 tab(s), Oral, hs, 30 tab(s), 0 Refill(s).          clotrimazole 1% topical cream: 1 nazario, TOP, BID, for 7 day(s), 30 gm, 0 Refill(s).          *Vitamin B12 1000 mcg/mL injectable solution: 1,000 mcg, im, qmonth, 0 Refill(s).          *digoxin 125 mcg (0.125 mg) oral tablet: 125 mcg, 1 tab(s),  po, daily, 0 Refill(s).          *famotidine 20 mg oral tablet: 20 mg, 1 tab(s), Oral, bid, 0 Refill(s).          Flonase 50 mcg/inh nasal spray: 2 spray(s), Nasal, daily, for 30 day(s), 1 EA, 0 Refill(s).          *furosemide 80 mg oral tablet: 40 mg, 0.5 tab(s), po, bid, 0 Refill(s).          Metoprolol Succinate ER 25 mg oral tablet, extended release: See Instructions, TAKE ONE-HALF TABLET BY MOUTH TWICE DAILY, 180 EA, 3 Refill(s).          MetroGel 1% topical gel: 1 nazario, Topical, daily, 1 tubes, 5 Refill(s).          midodrine 2.5 mg oral tablet: 5 mg, 2 tab(s), Oral, tid, for 30 day(s), 180 tab(s), 2 Refill(s).          mirtazapine 15 mg oral tablet: 1 tab(s), Oral, qhs, 30 unknown unit, 11 Refill(s).          *Protegra oral tablet: 1 tab(s), po, daily.          nitroglycerin 0.4 mg sublingual tablet: 0.4 mg, 1 tab(s), SL, q5min, not to exceed 3 doses/15 min--if pain persists, seek medical attention, 25 tab(s), PRN: for chest pain.          predniSONE 20 mg oral tablet: See Instructions, 2 po daily for 3-5 days prn gout, 30 EA, 0 Refill(s).          *spironolactone 50 mg oral tablet: 25 mg, 0.5 tab(s), Oral, bid, 0 Refill(s).     Problem list:    All Problems  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, chronic / SNOMED CT 5760959362 / Confirmed  Anemia of renal disease / SNOMED CT 112378662 / Confirmed  Acquired arteriovenous malformation of colon / SNOMED CT 216770827 / Confirmed  Anticoagulated / SNOMED CT 58896763 / Confirmed  Anticardiolipin syndrome / SNOMED CT 8519035808 / Confirmed  CAD (coronary artery disease) / SNOMED CT 5150891093 / Confirmed  Ascites / SNOMED CT 2383697394 / Confirmed  BPH with urinary obstruction / SNOMED CT 7779959952 / Confirmed  Cardiorenal syndrome / SNOMED CT 0027866937 / Confirmed  AF (Atrial Fibrillation) / SNOMED CT 6148636518 / Confirmed  Chronic diastolic CHF (congestive heart failure), NYHA class 3 / SNOMED CT 5078416716 / Confirmed  Stage 3 chronic kidney disease / SNOMED CT 7279284357  / Confirmed  Cirrhosis / SNOMED CT 75714790 / Confirmed  B12 deficiency / SNOMED CT 409888441 / Confirmed  IBS (irritable bowel syndrome) / SNOMED CT 1641820015 / Confirmed  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 / SNOMED CT 3348369428 / Confirmed  Vitamin B 12 deficiency / SNOMED CT 9808539671 / Confirmed  Osteoarthritis of both knees / SNOMED CT 8882649146 / Confirmed  Depression / SNOMED CT 19011097 / Confirmed  ICD (implantable cardioverter-defibrillator) infection / SNOMED CT 134789197 / Confirmed  Diverticulosis / SNOMED CT 0966551870 / Confirmed  Dupuytren's contracture of right hand / SNOMED CT 3823930499 / Confirmed  Gout / SNOMED CT 412582780 / Confirmed  CHB (complete heart block) / SNOMED CT 1524854181 / Confirmed  H/O acute pancreatitis / SNOMED CT 6705291842 / Confirmed  History of acute bacterial endocarditis / SNOMED CT 6118798542 / Confirmed  History of GI bleed / SNOMED CT 6347206510 / Confirmed  Hx of colonic polyp / SNOMED CT 5889980539 / Confirmed  History of tricuspid valve replacement / SNOMED CT 3987227398 / Confirmed  High cholesterol / SNOMED CT 54151543 / Confirmed  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 / SNOMED CT 6680289530 / Confirmed  Acute kidney injury (nontraumatic) / SNOMED CT 0828489926 / Confirmed  Iron deficiency anemia / SNOMED CT 117028265 / Confirmed  MGUS (monoclonal gammopathy of unknown significance) / SNOMED CT 922142166 / Confirmed  Nonischemic dilated cardiomyopathy / SNOMED CT 7686254453 / Confirmed  Obesity / SNOMED CT 3025433120 / Confirmed  KEYONA (obstructive sleep apnea) / SNOMED CT 258397385 / Confirmed  Paralysis, diaphragm / SNOMED CT 342741848 / Confirmed  History of coronary artery bypass graft / SNOMED CT 2966593653 / Confirmed  Right heart failure / SNOMED CT 552552875 / Confirmed  Inguinal hernia, right / SNOMED CT 437311523 / Confirmed  Tricuspid valve insufficiency / SNOMED CT  [id number] / Confirmed  Inactive: PUD (peptic ulcer disease) / SNOMED CT 5137673484  Resolved: BE (bacterial endocarditis) / SNOMED CT 096200529  Resolved: History of sepsis / SNOMED CT 2248296266  Resolved: Inpatient stay / SNOMED CT 396466545  Resolved: Inpatient stay / SNOMED CT 506631922  Resolved: Inpatient stay / SNOMED CT 148996531  Resolved: Inpatient stay / SNOMED CT 171343950  Resolved: Inpatient stay / SNOMED CT 842600991  Resolved: Inpatient stay / SNOMED CT 426057134  Resolved: Inpatient stay / SNOMED CT 612467181  Resolved: Inpatient stay / SNOMED CT 713964769  Resolved: Inpatient stay / SNOMED CT 862866399  Resolved: Other and Unspecified Noninfectious Gastroenteritis and Colitis / ICD-9-.9  Resolved: Colon polyps / SNOMED CT 089285366  Canceled: 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 / SNOMED CT 6975830762  Canceled: Ascites / SNOMED CT 4103065523  Canceled: 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 / SNOMED CT 09498423  Canceled: Benign hypertensive CKD / SNOMED CT 9518934  Canceled: Benign prostatic hyperplasia (BPH) with urinary urgency / SNOMED CT 750094809  Canceled: Hypercholesteremia / ICD-9-.0  Canceled: Hypertensive HF (heart failure) / SNOMED CT 24463948  Canceled: Type 2 diabetes mellitus / SNOMED CT 500393919      Histories   Past Medical History:    Active  History of GI bleed (2952810795): Onset on 12/5/2018 at 83 years.  Comments:  5/6/2019 CDT 6:41 AM CDT - Floridalma Calderon  Again 04/30/2019  H/O acute pancreatitis (7634971351): Onset on 12/6/2016 at 81 years.  History of tricuspid valve replacement (4397125366): Onset in the month of 11/2016 at 80 years  History of acute bacterial endocarditis (2533242865): Onset on 7/23/2016 at 80 years.  Anticardiolipin syndrome (5564955895): Onset on 1/1/2005 at 69 years.  KEYONA (obstructive sleep apnea) (928082009): Onset on 1/1/2003 at 67 years.  Comments:  11/1/2013 CDT 10:58 AM KATHERINET - Agustina MORELOS,  Shravan  Adequate titration to BIPAP 16/11 on 10/15/2013  AF (Atrial Fibrillation) (2882387092)  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 (1072997543)  IBS (irritable bowel syndrome) (6207576927)  CAD (coronary artery disease) (6129998366)  Gout (228105656)  Chronic diastolic CHF (congestive heart failure), NYHA class 3 (8577473249)  Obesity (6673809842)  Anticoagulated (25783812)  High cholesterol (08257750)  Dupuytren's contracture of right hand (8820792186)  Acute kidney injury (nontraumatic) (2721326590)  CHB (complete heart block) (1095934728)  Nonischemic dilated cardiomyopathy (3512202742)  ICD (implantable cardioverter-defibrillator) infection (833945187)  History of coronary artery bypass graft (3052571433)  Tricuspid valve insufficiency ([id number])  Right heart failure (203359055)  Cardiorenal syndrome (6143663948)  Vitamin B 12 deficiency (8705256911)  Resolved  Inpatient stay (731416092): Onset on 4/30/2019 at 83 years.  Resolved on 5/2/2019 at 83 years.  Comments:  5/6/2019 CDT 6:40 AM Floridalma Jarrell  @[place], [place] - Acute GI bleeding  Inpatient stay (281531585): Onset on 12/5/2018 at 83 years.  Resolved on 12/6/2018 at 83 years.  Comments:  12/10/2018 CST 6:59 AM Floridalma Brody  @[place], [place] - GI bleeding, suspected upper source  Inpatient stay (527937383): Onset on 5/26/2017 at 81 years.  Resolved on 5/28/2017 at 81 years.  Comments:  5/31/2017 CDT 10:36 PM Floridalma Jarrell  @[facility] - Anemia  Inpatient stay (447107172): Onset on 12/13/2016 at 81 years.  Resolved on 12/19/2016 at 81 years.  Comments:  1/3/2017 CST 9:01 PM Floridalma Brody  @[facility] Acute on chronic right heart failure  Inpatient stay (127724714): Onset on 12/6/2016 at 81 years.  Resolved on 12/13/2016 at 81 years.  Comments:  12/20/2016 CST 6:02 AM Floridalma Brody  @[facility]  - S/P tricuspid valve replacement with worsening  right ventricular function  Inpatient stay (653028759): Onset on 11/29/2016 at 81 years.  Resolved on 12/6/2016 at 81 years.  Comments:  12/20/2016 CST 10:22 PM Floridalma Brody  @[facility] - Severe symptomatic tricuspid valvular insufficiency. Chronic right heart failure.  History of sepsis (0928869095): Onset on 7/26/2016 at 80 years.  Resolved.  Inpatient stay (093654639): Onset on 9/28/2015 at 79 years.  Resolved on 10/2/2015 at 79 years.  Comments:  12/20/2016 CST 6:03 AM Floridalma Brody  @[facility] - Diverticulitis  Inpatient stay ([id number]): Onset on 5/17/2015 at 79 years.  Resolved on 5/29/2015 at 79 years.  Comments:  12/20/2016 CST 6:03 AM Floridalma Brody  @[facility] - Infected pacemaker  Inpatient stay (868900830): Onset on 7/30/2013 at 77 years.  Resolved on 7/30/2013 at 77 years.  Comments:  12/20/2016 CST 6:02 AM Floridalma Brody  @[facility] - Jaw pain, possible anginal equivalent  Colon polyps (514889019):  Resolved.  Other and Unspecified Noninfectious Gastroenteritis and Colitis (558.9):  Resolved.  BE (bacterial endocarditis) (837859865):  Resolved.   Family History:    Aneurysm  Father  Heart disease  Mother     Procedure history:    Esophagogastroduodenoscopy (627741698) on 6/8/2019 at 83 Years.  Comments:  6/10/2019 5:17 PM CDT - Shravan Berrios MD  Indication: GI bleeding  Sedation: Fentanyl 50 mcg, Versed 2 mg  Findings: Multiple gastric polyps one of which was oozing. Esophagus and duodenum normal  Rec: D/C PPI, H2 blocker, hold Warfarin  Left Extracapsular Cataract extraction with intraocular lens implant ([id number]) on 5/28/2019 at 83 Years.  Comments:  6/7/2019 2:13 PM CDT - Ana Geiger  Left.    5/30/2019 2:08 PM CDT - Osman/Myrna LONDON Dr.  Esophagogastroduodenoscopy (617762767) on 2/12/2019 at 83 Years.  Esophagogastroduodenoscopy and biopsy ([id number]) on 12/7/2018 at 83 Years.  Comments:  12/11/2018 2:43 PM CHYNA - Shravan Berrios MD  Indication: GIB  Sedatoin: Fentanyl 50 mcg,  Versed 2 mg  findings: Negative with negative duodenal biopsy  Rec: Colonoscopy  Colonoscopy (762500741) on 12/7/2018 at 83 Years.  Comments:  12/11/2018 2:46 PM Shravan Willard MD  Indication: GIB  Sedation: fentanyl 50 mcg, Versed 1 mg  Findings: Diverticulosis, 2 small AVMs with bleeding in ascending treated with APC  Rec: Consider further workup based on resoponse to treatemetn  Abdominal paracentesis (660854295) on 12/6/2018 at 83 Years.  Comments:  12/11/2018 2:40 PM Shravan Willard MD  Negative cytology  Bone marrow biopsy ([id number]) on 7/12/2017 at 81 Years.  Esophagogastroduodenoscopy (718508182) on 5/27/2017 at 81 Years.  Comments:  6/1/2017 1:15 PM Shravan Alvarez MD  gastritis, fundic gland polyps  Colonoscopy ([id number]) on 5/27/2017 at 81 Years.  Comments:  6/1/2017 1:16 PM Shravan Alvarez MD  Cecal tubular adenoma, pandiverticulosis    5/30/2017 3:36 PM JANIE Vargas CMA, Mag  w/polypectomy  TVR - Tricuspid valve replacement (4942878213) on 11/29/2016 at 81 Years.  Cardiac angiogram ([id number]) on 11/1/2016 at 80 Years.  Comments:  11/3/2016 12:05 PM Shravan Alvarez MD  Summary/Conclusions  PRESENTATION / INDICATIONS    Pre-surgical evaluation for cardiac surgery    Severe TR by echo  DIAGNOSTIC - CORONARY    Co-dominant coronary artery system    The left main artery was normal in appearance and free of obstructive disease.    Chronic total occlusion of the proximal LAD    The circumflex artery has minimal disease.    The RCA has moderate disease.    No change since 2013  DIAGNOSTIC - GRAFTS    Chronic total occlusion in the proximal anastomosis of the SVG graft to the RCA    The SVG to the ramus intermediate is patent    The sequential graft to the 1st diagonal is patent.    The sequential graft limb from the diagonal to the LAD is patent.    No change since 2013  HEMODYNAMICS    The LVEDP is mildly elevated    Severely elevated right atrial pressures    No evidence  of intracardiac shunting  Limited thoracotomy ([id number]) on 8/23/2016 at 80 Years.  Comments:  8/29/2016 2:45 PM Floridalma Jarrell  Left mini thoracotomy and placement of two epicardial screw in leads.  Implant of left pectoral pacer generatoe.  Removal of automatic cardiac defibrillator ([id number]) on 8/4/2016 at 80 Years.  Comments:  8/29/2016 2:49 PM Floridalma Jarrell  Generator and leads  Implantation of intravenous single chamber cardiac pacemaker system ([id number]) on 8/4/2016 at 80 Years.  Pacemaker change on 4/8/2015 at 79 Years.  Cardiac angiogram ([id number]) on 8/2/2013 at 77 Years.  Comments:  8/8/2013 2:45 PM Shravan Alvarez MD  Summary/Conclusions  PRESENTATION / INDICATIONS   Chest pain  DIAGNOSTIC - CORONARY  Right dominant coronary artery system  DIAGNOSTIC SUMMARY  100% stenosis in the Proximal LAD  30% stenosis in the 1st Marginal  30% stenosis in the Proximal RCA  50% stenosis in the Mid RCA  100% stenosis in the Aorta graft to Distal RCA  DIAGNOSTIC - GRAFTS  The sequential graft to the diagonal branch is patent.  The sequential graft from the diagonal to the LAD is patent.  The SVG to the ramus intermediate is patent  The SVG to RCA is chronically occluded  HEMODYNAMICS  The LVEDP is mildly elevated  RECOMMENDATIONS & PLAN  Medical Rx- no significant change from previous angiogram, there is dramatic mismatch in size between the SVG's and the target arteries with slow filling of the LAD        angiogrqam on 2/3/2012 at 76 Years.  Comments:  4/24/2012 3:33 PM Shravan Alvarez MD  Summary/Conclusions  PRESENTATION / INDICATIONS   Dyspnea and fatigue.  DIAGNOSTIC - CORONARY  Right dominant coronary artery system.  LMCA is normal.  LAD is occluded proximally after the 1st septal branch.  LCx has mild luminal irregularities.  OM1 has 20-30% proximal stenosis.  RCA has 30-40% diffuse disease in the mid segment and otherwise has mild diffuse luminal irregularities.  DIAGNOSTIC -  "GRAFTS  The sequential SVG to the diagonal and then LAD is widely patent.  There is a significant size mismatch between the graft and the native vessels.  The diagonal and distal LAD have no obvious lesions but are small caliber (< 2.0 mm vessels).  There is some backfilling into the mid LAD.  The SVG to the ramus is widely patent.  There is a significant size mismatch between the graft and the native vessel.  The ramus has no obvious lesion but is small caliber (< 2.0 mm vessel).    The SVG to the RCA is chronically occluded.  LEFT VENTRICULAR FUNCTION  Left ventricular function is mildly reduced with EF of 42% and mild global hypokinesis.  No significant MR.  HEMODYNAMICS  The LVEDP is 6 mmHg.  No significant gradient across the aortic valve.  RA 11, RV 29/10, PA 34/17 (mean 25), PCWP 18  Amarilys CO 5.1, TDCO 4.6  RECOMMENDATIONS & PLAN  Consider alternative etiology for symptoms.  Lasix dose decreased to 40 mg QD given relative hypotension and patient reporting feeling \"dry membranes.\"             Colonoscopy ([id number]) on 12/15/2006 at 71 Years.  Comments:  8/8/2013 4:06 PM Shravan Alvarez MD  Diverticulosis. No polyps.  Colonoscopy on 6/22/2001 at 65 Years.  Comments:  11/15/2010 8:21 AM Shravan Willard MD  hyperplastic polyp  Cholecystectomy (61761095) in the month of 6/2001 at 65 Years.  CABG - Coronary artery bypass graft (651578102) in 2000 at 65 Years.  History of Back Surgery (V45.89) in 2000 at 65 Years.  Colonoscopy ([id number]) on 2/17/1993 at 57 Years.  Comments:  8/8/2013 4:06 PM Shravan Alvarez MD  1 adenoma, 1 hyperplastic, diverticulosis.  Left shoulder surgery in 1991 at 56 Years.  Esophagogastroduodenoscopy (119871115).  Cholecystectomy (46821736).  Ablation for atrial fibrillation.  Comments:  5/2/2011 1:56 PM Madeline Murray  Subsequent pacemaker dependence.  Colonoscopy ([id number]).   Social History:        Alcohol Assessment            Past      Tobacco Assessment         "    Former smoker, quit more than 30 days ago                     Comments:                      08/23/2018 - Uvaldo CMA, Andrew                     Pt quit 2/5/1963      Employment and Education Assessment            Employed, Work/School description: Farmer.      Exercise and Physical Activity Assessment            Exercise type: Walking.      Physical Examination   Vital Signs   7/8/2019 8:06 AM CDT Temperature Tympanic 97.6 DegF  LOW    Peripheral Pulse Rate 82 bpm    Pulse Site Radial artery    HR Method Manual    Systolic Blood Pressure 98 mmHg    Diastolic Blood Pressure 56 mmHg  LOW    Mean Arterial Pressure 70 mmHg    BP Site Right arm    BP Method Manual      Measurements from flowsheet : Measurements   7/8/2019 8:06 AM CDT Height Measured - Standard 75 in    Weight Measured - Standard 201.0 lb    BSA 2.19 m2    Body Mass Index 25.12 kg/m2  HI      General:  Alert and oriented, No acute distress.    Eye:  Normal conjunctiva.    Airway:       Mallampati classification: I (soft palate, fauces, uvula, pillars visible).    HENT:  Normocephalic, Tympanic membranes are clear, Normal hearing, Oral mucosa is moist, No pharyngeal erythema.    Neck:  Supple, Non-tender, No jugular venous distention, No lymphadenopathy, No thyromegaly.    Respiratory:  Respirations are non-labored, Decreased BS left base unchanged. No wheezes or rales.    Cardiovascular:  Normal rate, Regular rhythm, No gallop, Normal peripheral perfusion, No edema, Device right chest. Scar left chest.         Systolic murmur: Location ( [place] ), Intensity ( Grade I ), Systolic.    Gastrointestinal:  Non-tender.         Abdomen: Distended, Obese.    Genitourinary:  Large right inguinal hernia.    Musculoskeletal:  Normal gait.    Integumentary:  pale.    Neurologic:  No focal deficits.    Cognition and Speech:  Speech clear and coherent, Functional cognition intact.    Psychiatric:  Cooperative, Appropriate mood & affect.       Review / Management    Results review:  Lab results   6/28/2019 1:43 PM CDT Sodium Level  mEq/L    Potassium Level TR 4.2 mEq/L    Chloride TR 99 mEq/L    CO2 TR 26 mEq/L    Anion Gap TR 9 mEq/L    Glucose Level  mg/dL    BUN TR 45 mg/dL    Creatinine TR 1.73 mg/dL    BUN/Creatinine Ratio TR 26    eGFR TR 38 mL/min    Calcium TR 8.8 mEq/dL    Bilirubin Total TR 0.5 mg/dL    Alkaline Phosphatase  unit/L    AST TR 24 unit/L    ALT TR 20 unit/L    Protein Total TR 6.5 gm/dL    Albumin Level TR 2.9 g/dL    Globulin TR 3.6 g/dL    A/G Ratio TR 0.8 mg/dL    WBC TR 5.5 x10^3/uL    RBC TR 2.76 x10^6/uL    Hgb TR 7.9 g/dL    Hct TR 25.9 %    MCV TR 94 fL    MCH TR 28.6 pg    MCHC TR 30.5 gm/dL    RDW TR 18.6 %    Platelet  x10^3/uL   .       Impression and Plan   Diagnosis     ICD (implantable cardioverter-defibrillator) infection (JZM17-LP T82.7XXA).     Ascites (ZXB24-WQ R18.8).     Cataract, right (FXC59-XD H26.9).     History of tricuspid valve replacement (MLX63-UI Z95.2).     Allergic rhinitis, seasonal (QIM13-EA J30.2).     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, chronic (PNW25-BN D50.0).     CAD (coronary artery disease) (QMT52-GI I25.10).     Right heart failure (JLD11-YB I50.9).     Cirrhosis (CWW93-UR K74.60).     Summary:  ASA 3. Stable for cataract.    Orders     Orders (Selected)   Outpatient Orders  Ordered  Return to Clinic (Request): Return in 1 week  Ordered (In Transit)  Basic Metabolic Panel* ([facility]): Specimen Type: Serum, Collection Date: 07/08/19 8:23:00 CDT  CBC (h/h, RBC, indices, WBC, Plt)* ([facility]): Specimen Type: Blood, Collection Date: 07/08/19 8:23:00 CDT  Prescriptions  Prescribed  Flonase 50 mcg/inh nasal spray: = 2 spray(s), Nasal, daily, # 1 EA, 0 Refill(s), Type: Maintenance, Pharmacy: [facility], 2 spray(s) Nasal daily,x30 day(s)  midodrine 2.5 mg oral tablet: = 2 tab(s) ( 5 mg ), Oral, tid, # 180 tab(s), 2 Refill(s), Type: Maintenance, Pharmacy: [facility]  Documented  Medications  Documented  furosemide 80 mg oral tablet: = 0.5 tab(s) ( 40 mg ), po, bid, 0 Refill(s), Type: Maintenance  spironolactone 50 mg oral tablet: = 0.5 tab(s) ( 25 mg ), Oral, bid, 0 Refill(s), Type: Maintenance.

## 2022-02-16 NOTE — PROGRESS NOTES
Chief Complaint    prostate check  History of Present Illness      Patient is here to have his prostate evaluated due to concerns from Dr. Abrams.  He is on high doses of diuretics for CHF.  He has chronic nocturia.  He feels like he needs to urinate every time he stands up.  Stream is normal.  Sometimes he has urgency.  No incontinence.  No dysuria or hematuria.  He does feel like his mood has improved with mirtazapine.  He sleeping a bit better.  Appetite is a little better.  Review of Systems      No angina, bleeding, PND, orthopnea, edema.  Physical Exam   Vitals & Measurements    HR: 81(Peripheral)  BP: 121/78     HT: 75 in  WT: 229 lb  BMI: 28.62       Patient appears comfortable.  Alert and oriented.  His wife is here with him today.  Chest reveals diminished breath sounds at the left base due to paralysis of the diaphragm unchanged.  Cardiac exam is regular.  No edema.  Device in left chest.  On rectal exam his prostate is symmetric and minimally enlarged 1+ no nodules or tenderness.  Assessment/Plan       Benign prostatic hyperplasia (BPH) with urinary urgency (N40.1)        I am reluctant to treat the patient with alpha-blocker given his medical problems and I discussed this with the patient and his wife.  We will check a UA UC consider urology referral or antimuscarinic agent pending that.         Orders:          [id number] office outpatient visit 25 minutes (Charge), Quantity: 1, Benign prostatic hyperplasia (BPH) with urinary urgency  Prostate cancer screening          Culture, Urine, Routine* ([facility]), Specimen Type: Urine (Clean Catch), Collection Date: 05/01/18 10:14:00 CDT          POC URINALYSIS, UA* ([facility]), Specimen Type: Urine, Collection Date: 05/01/18 10:14:00 CDT                Depression (F32.9)         Modest improvement follow-up as previously directed.                Prostate cancer screening (Z12.5)         Exam is benign will get a PSA.         Orders:          [id number] office outpatient visit  25 minutes (Charge), Quantity: 1, Benign prostatic hyperplasia (BPH) with urinary urgency  Prostate cancer screening          PSA, Total (Room)* ([facility]), Specimen Type: Serum, Collection Date: 05/01/18 10:14:00 CDT           Patient Information     Name:JACINTO JIN      Address:      [address] [place] [id number]     Sex:Male     YOB: 1935     [phone]     Emergency Contact:GERA JIN     MRN:[id number]     FIN:[id number]     Location:[facility]     Date of Service:05/01/2018      Primary Care Physician:       Shravan Berrios MD, [phone]      Attending Physician:       Shravan Berrios MD, [phone]  Problem List/Past Medical History    Ongoing     Acute kidney injury (nontraumatic)     AF (Atrial Fibrillation)     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     Anticardiolipin syndrome     Anticoagulated     Ascites     B12 deficiency     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     Benign prostatic hyperplasia (BPH) with urinary urgency     CAD (coronary artery disease)     Cardiorenal syndrome     CHB (complete heart block)     CHF (Congestive Heart Failure)     Colon polyps     Depression     Diverticulosis     Dupuytren's contracture of right hand     Gout     H/O acute pancreatitis     High cholesterol     History of acute bacterial endocarditis     History of coronary artery bypass graft     History of tricuspid valve replacement     IBS (irritable bowel syndrome)     ICD (implantable cardioverter-defibrillator) infection     Iron deficiency anemia     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     Nonischemic dilated cardiomyopathy     Obesity     KEYONA (obstructive sleep apnea)       Comments: Adequate titration to BIPAP 16/11 on 10/15/2013     Osteoarthritis of both knees     Paralysis, diaphragm       Comments: Left     Presence of combination internal cardiac defibrillator (ICD)  and pacemaker     PUD (peptic ulcer disease)     Right heart failure     Tricuspid valve insufficiency     Vitamin B 12 deficiency    Historical     BE (bacterial endocarditis)     History of sepsis     Inpatient stay       Comments: @[facility] - Diverticulitis     Inpatient stay       Comments: @[facility] - Severe symptomatic tricuspid valvular insufficiency. Chronic right heart failure.     Inpatient stay       Comments: @[facility] - Anemia     Inpatient stay       Comments: @[facility] - Jaw pain, possible anginal equivalent     Inpatient stay       Comments: @[facility] - Infected pacemaker     Inpatient stay       Comments: @[facility] - Acute on chronic right heart failure     Inpatient stay       Comments: @[facility] - S/P tricuspid valve replacement with worsening right ventricular function     Other and Unspecified Noninfectious Gastroenteritis and Colitis  Procedure/Surgical History     Bone marrow biopsy (07/12/2017)     Colonoscopy (05/27/2017)       Comments: Cecal tubular adenoma, pandiverticulosis w/polypectomy     Esophagogastroduodenoscopy (05/27/2017)       Comments: gastritis, fundic gland polyps     TVR - Tricuspid valve replacement (11/29/2016)     Cardiac angiogram (11/01/2016)       Comments: Summary/Conclusions<br/>PRESENTATION / INDICATIONS<br/>&middot; Pre-surgical evaluation for cardiac surgery<br/>&middot; Severe TR by echo<br/>DIAGNOSTIC - CORONARY<br/>&middot; Co-dominant coronary artery system<br/>&middot; The left main artery was normal in appearance and free of obstructive disease.<br/>&middot; Chronic total occlusion of the proximal LAD<br/>&middot; The circumflex artery has minimal disease.<br/>&middot; The RCA has moderate disease.<br/>&middot; No change since 2013<br/>DIAGNOSTIC - GRAFTS<br/>&middot; Chronic total occlusion in the proximal anastomosis of the SVG graft to the RCA<br/>&middot; The SVG to the ramus intermediate is patent<br/>&middot; The sequential graft to the 1st diagonal is patent.<br/>&middot;  The sequential graft limb from the diagonal to the LAD is patent.<br/>&middot; No change since 2013<br/>HEMODYNAMICS<br/>&middot; The LVEDP is mildly elevated<br/>&middot; Severely elevated right atrial pressures<br/>&middot; No evidence of intracardiac shunting     Limited thoracotomy (08/23/2016)       Comments: Left mini thoracotomy and placement of two epicardial screw in leads. &nbsp;Implant of left pectoral pacer generatoe.     Implantation of intravenous single chamber cardiac pacemaker system (08/04/2016)     Removal of automatic cardiac defibrillator (08/04/2016)       Comments: Generator and leads     Pacemaker change (04/08/2015)     Cardiac angiogram (08/02/2013)       Comments: Summary/Conclusions<br/>PRESENTATION / INDICATIONS <br/>Chest pain<br/>DIAGNOSTIC - CORONARY<br/>Right dominant coronary artery system<br/>DIAGNOSTIC SUMMARY<br/>100% stenosis in the Proximal LAD<br/>30% stenosis in the 1st Marginal<br/>30% stenosis in the Proximal RCA<br/>50% stenosis in the Mid RCA<br/>100% stenosis in the Aorta graft to Distal RCA<br/>DIAGNOSTIC - GRAFTS<br/>The sequential graft to the diagonal branch is patent.<br/>The sequential graft from the diagonal to the LAD is patent.<br/>The SVG to the ramus intermediate is patent<br/>The SVG to RCA is chronically occluded<br/>HEMODYNAMICS<br/>The LVEDP is mildly elevated<br/>RECOMMENDATIONS &amp; PLAN<br/>Medical Rx- no significant change from previous angiogram, there is dramatic mismatch in size between the SVG's and the target arteries with slow filling of the LAD     angiogrqam (02/03/2012)       Comments: Summary/Conclusions<br/>PRESENTATION / INDICATIONS <br/>Dyspnea and fatigue.<br/>DIAGNOSTIC - CORONARY<br/>Right dominant coronary artery system.<br/>LMCA is normal.<br/>LAD is occluded proximally after the 1st septal branch.<br/>LCx has mild luminal irregularities. &nbsp;OM1 has 20-30% proximal stenosis.<br/>RCA has 30-40% diffuse disease in the mid segment  and otherwise has mild diffuse luminal irregularities.<br/>DIAGNOSTIC - GRAFTS<br/>The sequential SVG to the diagonal and then LAD is widely patent. &nbsp;There is a significant size mismatch between the graft and the native vessels. &nbsp;The diagonal and distal LAD have no obvious lesions but are small caliber (&lt; 2.0 mm vessels). &nbsp;There is some backfilling into the mid LAD.<br/>The SVG to the ramus is widely patent. &nbsp;There is a significant size mismatch between the graft and the native vessel. &nbsp;The ramus has no obvious lesion but is small caliber (&lt; 2.0 mm vessel). &nbsp;<br/>The SVG to the RCA is chronically occluded.<br/>LEFT VENTRICULAR FUNCTION<br/>Left ventricular function is mildly reduced with EF of 42% and mild global hypokinesis. &nbsp;No significant MR.<br/>HEMODYNAMICS<br/>The LVEDP is 6 mmHg. &nbsp;No significant gradient across the aortic valve.<br/>RA 11, RV 29/10, PA 34/17 (mean 25), PCWP 18<br/>Amarilys CO 5.1, TDCO 4.6<br/>RECOMMENDATIONS &amp; PLAN<br/>Consider alternative etiology for symptoms.<br/>Lasix dose decreased to 40 mg QD given relative hypotension and patient reporting feeling &quot;dry membranes.&quot;     Colonoscopy (12/15/2006)       Comments: Diverticulosis. No polyps.     Colonoscopy (06/22/2001)       Comments: hyperplastic polyp     Cholecystectomy (06/2001)     CABG - Coronary artery bypass graft (2000)     History of Back Surgery (2000)     Colonoscopy (02/17/1993)       Comments: 1 adenoma, 1 hyperplastic, diverticulosis.     Left shoulder surgery (1991)     Ablation for atrial fibrillation       Comments: Subsequent pacemaker dependence.     Cholecystectomy     Colonoscopy     Esophagogastroduodenoscopy  Medications        nitroglycerin 0.4 mg sublingual tablet: 0.4 mg, 1 tab(s), SL, q5min, not to exceed 3 doses/15 min--if pain persists, seek medical attention, 25 tab(s), PRN: for chest pain.        Protegra oral tablet: 1 tab(s), po, daily.         potassium chloride 20 mEq oral tablet, extended release: See Instructions, 1 tab TID, 30 unknown unit, 5 Refill(s).        Senexon-S: 2 tab(s), po, hs, 0 Refill(s).        acetaminophen 325 mg oral tablet: 650 mg, 2 tab(s), po, q4 hrs, not to exceed 4000 mg/day, 0 Refill(s).        Senokot 8.6 mg oral tablet: 1-2 tab(s), PO, Once a day (at bedtime), PRN: for constipation.        omeprazole 20 mg oral delayed release capsule: 40 mg, 2 cap(s), po, bid, 90 cap(s), 3 Refill(s).        furosemide 80 mg oral tablet: 80 mg, 1 tab(s), po, bid, Per Dr. Quintana on 3/7/2017, 0 Refill(s).        digoxin 125 mcg (0.125 mg) oral tablet: 125 mcg, 1 tab(s), po, daily, 0 Refill(s).        metOLazone 2.5 mg oral tablet: See Instructions, 1 tab(s) po daily for weight > 222#, 0 Refill(s).        warfarin 3 mg oral tablet: See Instructions, Take 4.5mg, 3mg, 3mg on an alt. qd schedule, 40 unknown unit, 11 Refill(s).        allopurinol: 300 mg, po, daily, 0 Refill(s).        Vitamin B12 1000 mcg/mL injectable solution: 1,000 mcg, im, qmonth, 0 Refill(s).        Metoprolol Succinate ER 25 mg oral tablet, extended release: See Instructions, TAKE ONE-HALF TABLET BY MOUTH TWICE DAILY, 30 unknown unit, 11 Refill(s).        atorvastatin 40 mg oral tablet: See Instructions, TAKE ONE TABLET BY MOUTH AT BEDTIME, 90 unknown unit.        predniSONE 20 mg oral tablet: See Instructions, 2 po daily for 3-5 days prn gout, 30 EA, 0 Refill(s).        mirtazapine 15 mg oral tablet: 15 mg, 1 tab(s), PO, Once a day (at bedtime), 30 tab(s), 5 Refill(s).                Allergies    No Known Medication Allergies    adhesive tape  Social History    Smoking Status - 05/01/2018     Never smoker     Alcohol      Past, 03/30/2018     Employment and Education      Employed, Work/School description: [employer] 11/11/2010     Exercise and Physical Activity      Exercise type: Walking., 11/11/2010  Family History    Aneurysm: Father.    Heart disease:  Mother.  Immunizations      Vaccine Date Status      influenza virus vaccine, inactivated 09/28/2015 Given      pneumococcal (PCV13) 04/21/2015 Given      influenza virus vaccine, inactivated 08/28/2014 Given      influenza virus vaccine, inactivated 10/29/2012 Given      influenza virus vaccine, inactivated 09/23/2011 Given      influenza virus vaccine, inactivated 10/26/2010 Given      influenza 10/23/2008 Recorded      Td 09/01/2005 Recorded      pneumococcal 11/28/2001 Recorded  Lab Results          Lab Results (Last 4 results within 90 days)           Sodium Level: 140 mmol/L [135 mmol/L - 146 mmol/L] (03/27/18 12:00:00)          Sodium Level: 141 mmol/L [135 mmol/L - 146 mmol/L] (02/26/18 14:47:00)          Potassium Level: 3.9 mmol/L [3.5 mmol/L - 5.3 mmol/L] (03/27/18 12:00:00)          Potassium Level: 4.4 mmol/L [3.5 mmol/L - 5.3 mmol/L] (02/26/18 14:47:00)          Chloride Level: 99 mmol/L [98 mmol/L - 110 mmol/L] (03/27/18 12:00:00)          Chloride Level: 104 mmol/L [98 mmol/L - 110 mmol/L] (02/26/18 14:47:00)          CO2 Level: 36 mmol/L High [20 mmol/L - 31 mmol/L] (03/27/18 12:00:00)          CO2 Level: 31 mmol/L [20 mmol/L - 31 mmol/L] (02/26/18 14:47:00)          Glucose Level: 98 mg/dL [65 mg/dL - 99 mg/dL] (03/27/18 12:00:00)          Glucose Level: 79 mg/dL [65 mg/dL - 99 mg/dL] (02/26/18 14:47:00)          BUN: 30 mg/dL High [7 mg/dL - 25 mg/dL] (03/27/18 12:00:00)          BUN: 28 mg/dL High [7 mg/dL - 25 mg/dL] (02/26/18 14:47:00)          Creatinine Level: 1.56 mg/dL High [0.7 mg/dL - 1.11 mg/dL] (03/27/18 12:00:00)          Creatinine Level: 1.55 mg/dL High [0.7 mg/dL - 1.11 mg/dL] (02/26/18 14:47:00)          BUN/Creat Ratio: 19 [6  - 22] (03/27/18 12:00:00)          BUN/Creat Ratio: 18 [6  - 22] (02/26/18 14:47:00)          eGFR: 41 mL/min/1.73m2 Low [8.6 mg/dL - 10.3 mg/dL] (03/27/18 12:00:00)          eGFR: 42 mL/min/1.73m2 Low [8.6 mg/dL - 10.3 mg/dL] (02/26/18 14:47:00)           eGFR African American: 48 mL/min/1.73m2 Low [None  - Few] (03/27/18 12:00:00)          eGFR African American: 48 mL/min/1.73m2 Low [None  - Few] (02/26/18 14:47:00)          Calcium Level: 9.6 mg/dL [8.6 mg/dL - 10.3 mg/dL] (03/27/18 12:00:00)          Calcium Level: 9.2 mg/dL [8.6 mg/dL - 10.3 mg/dL] (02/26/18 14:47:00)          Cholesterol: 89 mg/dL [8.6 mg/dL - 10.3 mg/dL] (03/13/18 08:45:00)          Non-HDL Cholesterol: 59 [20 mmol/L - 31 mmol/L] (03/13/18 08:45:00)          HDL: 30 mg/dL Low [38.5 % - 50 %] (03/13/18 08:45:00)          Cholesterol/HDL Ratio: 3 [None  - 5] (03/13/18 08:45:00)          LDL: 42 [None  - 5] (03/13/18 08:45:00)          Triglyceride: 85 mg/dL [8.6 mg/dL - 10.3 mg/dL] (03/13/18 08:45:00)          B-Natriuretic Peptide (BNP): 91 pg/mL [None  - Few] (03/27/18 12:00:00)          B-Natriuretic Peptide (BNP): 87 pg/mL [None  - Few] (02/26/18 14:47:00)          WBC: 5.6 [3.8  - 10.8] (04/24/18 10:56:00)          WBC: 4.5 [3.8  - 10.8] (03/27/18 12:00:00)          RBC: 3.15 Low [4.2  - 5.8] (04/24/18 10:56:00)          RBC: 3.51 Low [4.2  - 5.8] (03/27/18 12:00:00)          Hgb: 10.1 gm/dL Low [13.2 gm/dL - 17.1 gm/dL] (04/24/18 10:56:00)          Hgb: 11.4 gm/dL Low [13.2 gm/dL - 17.1 gm/dL] (03/27/18 12:00:00)          Hgb: 11 gm/dL Low [13.2 gm/dL - 17.1 gm/dL] (02/26/18 14:47:00)          Hct: 30.7 % Low [38.5 % - 50 %] (04/24/18 10:56:00)          Hct: 33.4 % Low [38.5 % - 50 %] (03/27/18 12:00:00)          MCV: 97.5 fL [80 fL - 100 fL] (04/24/18 10:56:00)          MCV: 95.2 fL [80 fL - 100 fL] (03/27/18 12:00:00)          MCH: 32.1 pg [27 pg - 33 pg] (04/24/18 10:56:00)          MCH: 32.5 pg [27 pg - 33 pg] (03/27/18 12:00:00)          MCHC: 32.9 gm/dL [32 gm/dL - 36 gm/dL] (04/24/18 10:56:00)          MCHC: 34.1 gm/dL [32 gm/dL - 36 gm/dL] (03/27/18 12:00:00)          RDW: 14.8 % [11 % - 15 %] (04/24/18 10:56:00)          RDW: 15.2 % High [11 % - 15 %] (03/27/18 12:00:00)           Platelet: 112 Low [140  - 400] (04/24/18 10:56:00)          Platelet: 98 Low [140  - 400] (03/27/18 12:00:00)          MPV: 11.6 fL [7.5 fL - 12.5 fL] (04/24/18 10:56:00)          MPV: 11.3 fL [7.5 fL - 12.5 fL] (03/27/18 12:00:00)          Protime: 22.6 High [11.7  - 14.1] (04/24/18 11:05:00)          Protime: 30.2 High [11.7  - 14.1] (03/13/18 08:51:00)          PT: 23.2 High [9  - 11.5] (02/26/18 14:47:00)          INR: 2.0 High [None  - 5] (04/24/18 11:05:00)          INR: 2.8 [None  - 5] (03/27/18 10:51:00)          INR: 3.0 High [None  - 5] (03/13/18 08:51:00)          INR: 2.3 High [None  - 5] (02/26/18 14:47:00)          UA pH: 6.5 [5  - 8] (05/01/18 10:44:00)          UA Specific Gravity: 1.015 [1.001  - 1.035] (05/01/18 10:44:00)          UA Glucose: NEGATIVE [None  - Few] (05/01/18 10:44:00)          UA Bilirubin: NEGATIVE [None  - Few] (05/01/18 10:44:00)          UA Ketones: NEGATIVE [None  - Few] (05/01/18 10:44:00)          Urine Occult Blood: TRACE Abnormal [None  - Few] (05/01/18 10:44:00)          UA Protein: NEGATIVE [None  - Few] (05/01/18 10:44:00)          UA Nitrite: NEGATIVE [None  - Few] (05/01/18 10:44:00)          UA Leukocyte Esterase: 3+ Abnormal [None  - Few] (05/01/18 10:44:00)          UA Epithelial Cells: Few [None  - Few] (05/01/18 11:06:00)          UA WBC: >100 [None  - 5] (05/01/18 11:06:00)          UA RBC: 11-25 [None  - 2] (05/01/18 11:06:00)          UA Bacteria: Moderate [None  - Few] (05/01/18 11:06:00)

## 2022-02-16 NOTE — PROGRESS NOTES
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    Accompanied by:  No one.    Source of history:  Self, Medical record.    History limitation:  None.       Chief Complaint   2019 3:32 PM CST     pre op, surg 19      History of Present Illness    The patient is a chronically ill 83-year-old who is scheduled for small bowel endoscopy for AV malformation in the setting of chronic anemia.  He is chronically anticoagulated due to atrial fibrillation and history of DVT and was instructed to stop warfarin for four full days before the procedure.  He has a history of sleep apnea but with his illnesses and weight loss he really no longer snores.  He does have chronic fatigue.  He has right heart failure with chronic edema and has developed ascites as well.  He has no angina.  No hematuria.  He has benign prostatic hypertrophy with urinary retention requiring self-catheterization.  He has defibrillator, pacemaker, and prosthetic tricuspid valve, and chronic kidney disease.  Functional capacity is poor due to his multiple chronic health conditions.  His weight has been stable.    Since starting the antidepressant he is sleeping better and appetite remains poor but mood is improved.           Review of Systems   Constitutional:  Weakness, Fatigue, Decreased activity, Loss of  appetite, No fever, No chills, No night sweats.    Eye:  Negative.    Respiratory:  No shortness of breath, No cough.    Cardiovascular:  No chest pain, No palpitations, No peripheral edema.    Gastrointestinal:  No nausea, No vomiting, No diarrhea, No abdominal pain.    Genitourinary:  Negative except as documented in history of present illness.    Hematology/Lymphatics:  Bruising tendency, No bleeding tendency.    Endocrine:  No polyuria.    Musculoskeletal:  No muscle pain.    Neurologic:  Alert and oriented X4, No confusion, No headache.       Health Status   Allergies:    Allergic Reactions (Selected)  Severity Not Documented  Adhesive tape (No reactions were documented)  No Known Medication Allergies   Medications:  (Selected)   Prescriptions  Prescribed  022524 URINARY DRAIN BAG 2000ML MG BEAD: See Instructions, Instructions: USE AS DIRECTED, # 1 unknown unit, Type: Soft Stop, Pharmacy: [facility], USE AS DIRECTED  Metoprolol Succinate ER 25 mg oral tablet, extended release: See Instructions, Instructions: TAKE ONE-HALF TABLET BY MOUTH TWICE DAILY, # 30 unknown unit, 11 Refill(s), Type: Soft Stop, Pharmacy: [facility]  MetroGel 1% topical gel: 1 nazario, Topical, daily, # 1 tubes, 5 Refill(s), Type: Maintenance, Pharmacy: [facility], 1 nazario Topical daily  allopurinol 300 mg oral tablet: See Instructions, Instructions: TAKE ONE TABLET BY MOUTH ONCE DAILY, # 90 unknown unit, 3 Refill(s), Type: Soft Stop, Pharmacy: [facility], TAKE ONE TABLET BY MOUTH ONCE DAILY  atorvastatin 40 mg oral tablet: See Instructions, Instructions: TAKE ONE TABLET BY MOUTH AT BEDTIME, # 90 unknown unit, Type: Soft Stop, Pharmacy: [facility]  clotrimazole 1% topical cream: 1 nazario, TOP, BID, # 30 gm, 0 Refill(s), Type: Maintenance, Pharmacy: [facility], 1 nazario top bid,x7 day(s)  mirtazapine 15 mg oral tablet: 1 tab(s) ( 15 mg ), PO, Once a day (at bedtime), # 30 tab(s), 5 Refill(s), Type: Maintenance, Pharmacy: [facility], 1  tab(s) po hs  nitroglycerin 0.4 mg sublingual tablet: 1 tab(s) ( 0.4 mg ), SL, q5min, Instructions: not to exceed 3 doses/15 min--if pain persists, seek medical attention, PRN: for chest pain, # 25 tab(s), 3 Refill(s), Type: Maintenance, Pharmacy: [facility], 1 tab(s) sl q5 min,PRN:for chest pain,Instr...  omeprazole 20 mg oral delayed release capsule: = 1 cap(s) ( 20 mg ), po, daily, # 90 cap(s), 3 Refill(s), Type: Maintenance, Pharmacy: [facility]  potassium chloride 20 mEq oral tablet, extended release: See Instructions, Instructions: 3 tab BID, # 30 unknown unit, 5 Refill(s), Type: Soft Stop, Pharmacy: [facility]  predniSONE 20 mg oral tablet: See Instructions, Instructions: 2 po daily for 3-5 days prn gout, # 30 EA, 0 Refill(s), Type: Maintenance, Pharmacy: [facility], 2 po daily for 3-5 days prn gout  tamsulosin 0.4 mg oral capsule: 1 cap(s) ( 0.4 mg ), Oral, daily, # 30 cap(s), 0 Refill(s), Type: Maintenance, Pharmacy: [facility], 1 cap(s) Oral daily  warfarin 3 mg oral tablet: 1 tab(s), Oral, daily, # 90 unknown unit, Type: Soft Stop, Pharmacy: [facility]  Documented Medications  Documented  Protegra oral tablet: 1 tab(s), po, daily, 0 Refill(s), Type: Maintenance  Senokot 8.6 mg oral tablet: 1-2 tab(s), PO, Once a day (at bedtime), PRN: for constipation, Type: Maintenance  Vitamin B12 1000 mcg/mL injectable solution: ( 1,000 mcg ), im, qmonth, 0 Refill(s), Type: Maintenance  acetaminophen 325 mg oral tablet: 2 tab(s) ( 650 mg ), po, q4 hrs, Instructions: not to exceed 4000 mg/day, 0 Refill(s), Type: Maintenance  digoxin 125 mcg (0.125 mg) oral tablet: 1 tab(s) ( 125 mcg ), po, daily, 0 Refill(s), Type: Maintenance  furosemide 80 mg oral tablet: = 1 tab(s) ( 80 mg ), po, bid, Instructions: take at 8am and 2pm, 0 Refill(s), Type: Maintenance  metOLazone 2.5 mg oral tablet: See Instructions, Instructions: 1 tab(s) po every other daily for weight > 224#, 0 Refill(s), Type: Maintenance   Problem  list:    All Problems  Acquired arteriovenous malformation of colon / SNOMED CT 526080938 / Confirmed  Acute kidney injury (nontraumatic) / SNOMED CT 8203760636 / Confirmed  AF (Atrial Fibrillation) / SNOMED CT 0667792670 / Confirmed  Anemia of renal disease / SNOMED CT 754395094 / Confirmed  Anticardiolipin syndrome / SNOMED CT 2571382150 / Confirmed  Anticoagulated / SNOMED CT 85795114 / Confirmed  Ascites / SNOMED CT 4292215659 / Confirmed  B12 deficiency / SNOMED CT 529592747 / Confirmed  BPH with urinary obstruction / SNOMED CT 0772205825 / Confirmed  CAD (coronary artery disease) / SNOMED CT 5573147997 / Confirmed  Cardiorenal syndrome / SNOMED CT 4930014510 / Confirmed  CHB (complete heart block) / SNOMED CT 2523360816 / Confirmed  Chronic diastolic CHF (congestive heart failure), NYHA class 3 / SNOMED CT 8008822815 / Confirmed  Depression / SNOMED CT 08352824 / Confirmed  Diverticulosis / SNOMED CT 6034837476 / Confirmed  Dupuytren's contracture of right hand / SNOMED CT 8425790333 / Confirmed  Gout / SNOMED CT 537306448 / Confirmed  H/O acute pancreatitis / SNOMED CT 5812633826 / Confirmed  High cholesterol / SNOMED CT 30111038 / Confirmed  History of acute bacterial endocarditis / SNOMED CT 9675731560 / Confirmed  History of coronary artery bypass graft / SNOMED CT 7575412779 / Confirmed  History of GI bleed / SNOMED CT 8665725772 / Confirmed  History of tricuspid valve replacement / SNOMED CT 2342592223 / Confirmed  Hx of colonic polyp / SNOMED CT 4948279139 / Confirmed  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 / SNOMED CT 3639667926 / Confirmed  IBS (irritable bowel syndrome) / SNOMED CT 3360107530 / Confirmed  ICD (implantable cardioverter-defibrillator) infection / SNOMED CT 881263015 / Confirmed  Inguinal hernia, right / SNOMED CT 633488444 / Confirmed  Iron deficiency anemia / SNOMED CT 020604439 / Confirmed  MGUS (monoclonal gammopathy of unknown significance) / SNOMED CT 541394636 /  Confirmed  Nonischemic dilated cardiomyopathy / SNOMED CT 9412600317 / Confirmed  Obesity / SNOMED CT 2244636121 / Confirmed  KEYONA (obstructive sleep apnea) / SNOMED CT 427456810 / Confirmed  Osteoarthritis of both knees / SNOMED CT 1715042078 / Confirmed  Paralysis, diaphragm / SNOMED CT 312213004 / Confirmed  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 / SNOMED CT 8477470933 / Confirmed  Right heart failure / SNOMED CT 860853609 / Confirmed  Stage 3 chronic kidney disease / SNOMED CT 5293895772 / Confirmed  Tricuspid valve insufficiency / SNOMED CT [id number] / Confirmed  Vitamin B 12 deficiency / SNOMED CT 7847308888 / Confirmed  Inactive: PUD (peptic ulcer disease) / SNOMED CT 4698190824  Resolved: BE (bacterial endocarditis) / SNOMED CT 714116296  Resolved: Colon polyps / SNOMED CT 449342244  Resolved: History of sepsis / SNOMED CT 3463652424  Resolved: Inpatient stay / SNOMED CT 630346760  Resolved: Inpatient stay / SNOMED CT 581126779  Resolved: Inpatient stay / SNOMED CT 905371567  Resolved: Inpatient stay / SNOMED CT 150929382  Resolved: Inpatient stay / SNOMED CT 397444520  Resolved: Inpatient stay / SNOMED CT 585971769  Resolved: Inpatient stay / SNOMED CT 963171089  Resolved: Inpatient stay / SNOMED CT 986528197  Resolved: Other and Unspecified Noninfectious Gastroenteritis and Colitis / ICD-9-.9  Canceled: 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 / SNOMED CT 9207808506  Canceled: Ascites / SNOMED CT 6873769265  Canceled: 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 / SNOMED CT 78105521  Canceled: Benign hypertensive CKD / SNOMED CT 4348921  Canceled: Benign prostatic hyperplasia (BPH) with urinary urgency / SNOMED CT 574018752  Canceled: Hypercholesteremia / ICD-9-.0  Canceled: Hypertensive HF (heart failure) / SNOMED CT 98988293  Canceled: Type 2 diabetes mellitus / SNOMED CT 911992365      Histories   Past Medical History:     Active  History of GI bleed (0914694409): Onset on 12/5/2018 at 83 years.  H/O acute pancreatitis (6546915756): Onset on 12/6/2016 at 81 years.  History of tricuspid valve replacement (5467398066): Onset in the month of 11/2016 at 80 years  History of acute bacterial endocarditis (1758668573): Onset on 7/23/2016 at 80 years.  Anticardiolipin syndrome (7785450376): Onset on 1/1/2005 at 69 years.  KEYONA (obstructive sleep apnea) (735850277): Onset on 1/1/2003 at 67 years.  Comments:  11/1/2013 CDT 10:58 AM CDT - Shravan Berrios MD  Adequate titration to BIPAP 16/11 on 10/15/2013  AF (Atrial Fibrillation) (1942381732)  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 (5824024978)  IBS (irritable bowel syndrome) (7461090064)  CAD (coronary artery disease) (5023360519)  Gout (909139003)  Chronic diastolic CHF (congestive heart failure), NYHA class 3 (2483201379)  Obesity (3511261214)  Anticoagulated (72100158)  High cholesterol (42437553)  Dupuytren's contracture of right hand (7833113574)  Acute kidney injury (nontraumatic) (6549095513)  CHB (complete heart block) (2271549788)  Nonischemic dilated cardiomyopathy (4668187164)  ICD (implantable cardioverter-defibrillator) infection (151064081)  History of coronary artery bypass graft (6046891495)  Tricuspid valve insufficiency ([id number])  Right heart failure (278503995)  Cardiorenal syndrome (9673952171)  Vitamin B 12 deficiency (5174716645)  Resolved  Inpatient stay (088078921): Onset on 12/5/2018 at 83 years.  Resolved on 12/6/2018 at 83 years.  Comments:  12/10/2018 CST 6:59 AM CST - Floridalma Calderon  @[facility], [place] - GI bleeding, suspected upper source  Inpatient stay (273279388): Onset on 5/26/2017 at 81 years.  Resolved on 5/28/2017 at 81 years.  Comments:  5/31/2017 CDT 10:36 PM CDT - Floridalma Calderon  @[facility]  Inpatient stay (914135437): Onset on 12/13/2016 at 81 years.  Resolved on 12/19/2016  at 81 years.  Comments:  1/3/2017 CST 9:01 PM Floridalma Brody  @[facility] - Acute on chronic right heart failure  Inpatient stay (095342121): Onset on 12/6/2016 at 81 years.  Resolved on 12/13/2016 at 81 years.  Comments:  12/20/2016 CST 6:02 AM Floridalma Brody  @[facility]  - S/P tricuspid valve replacement with worsening right ventricular function  Inpatient stay (360234199): Onset on 11/29/2016 at 81 years.  Resolved on 12/6/2016 at 81 years.  Comments:  12/20/2016 CST 10:22 PM Floridalma Brody - Severe symptomatic tricuspid valvular insufficiency. Chronic right heart failure.  History of sepsis (7215046634): Onset on 7/26/2016 at 80 years.  Resolved.  Inpatient stay (672828666): Onset on 9/28/2015 at 79 years.  Resolved on 10/2/2015 at 79 years.  Comments:  12/20/2016 CST 6:03 AM Floridalma Brody  @[facility] - Diverticulitis  Inpatient stay (605353294): Onset on 5/17/2015 at 79 years.  Resolved on 5/29/2015 at 79 years.  Comments:  12/20/2016 CST 6:03 AM Floridalma Brody  @[facility] - Infected pacemaker  Inpatient stay (210664414): Onset on 7/30/2013 at 77 years.  Resolved on 7/30/2013 at 77 years.  Comments:  12/20/2016 CST 6:02 AM [facility] - Jaw pain, possible anginal equivalent  Colon polyps (738459173):  Resolved.  Other and Unspecified Noninfectious Gastroenteritis and Colitis (558.9):  Resolved.  BE (bacterial endocarditis) (029598709):  Resolved.   Family History:    Aneurysm  Father  Heart disease  Mother     Procedure history:    Esophagogastroduodenoscopy and biopsy (6035937025) on 12/7/2018 at 83 Years.  Comments:  12/11/2018 2:43 PM Shravan Willard MD  Indication: GIB  Sedatoin: Fentanyl 50 mcg, Versed 2 mg  findings: Negative with negative duodenal biopsy  Rec: Colonoscopy  Colonoscopy ([id number]) on 12/7/2018 at 83 Years.  Comments:  12/11/2018 2:46 PM CST - Shravan Berrios MD  Indication: GIB  Sedation: fentanyl 50 mcg, Versed 1 mg  Findings: Diverticulosis, 2  small AVMs with bleeding in ascending treated with APC  Rec: Consider further workup based on tu amin  Abdominal paracentesis (687821456) on 12/6/2018 at 83 Years.  Comments:  12/11/2018 2:40 PM Shraavn Willard MD  Negative cytology  Bone marrow biopsy ([id number]) on 7/12/2017 at 81 Years.  Esophagogastroduodenoscopy (859690797) on 5/27/2017 at 81 Years.  Comments:  6/1/2017 1:15 PM Shravan Alvarez MD  gastritis, fundic gland polyps  Colonoscopy ([id number]) on 5/27/2017 at 81 Years.  Comments:  6/1/2017 1:16 PM Shravan Alvarez MD  Cecal tubular adenoma, pandiverticulosis    5/30/2017 3:36 PM JANIE - Mag Vargas CMA  w/polypectomy  TVR - Tricuspid valve replacement (0291695152) on 11/29/2016 at 81 Years.  Cardiac angiogram (3293262647) on 11/1/2016 at 80 Years.  Comments:  11/3/2016 12:05 PM Shravan Alvarez MD  Summary/Conclusions  PRESENTATION / INDICATIONS    Pre-surgical evaluation for cardiac surgery    Severe TR by echo  DIAGNOSTIC - CORONARY    Co-dominant coronary artery system    The left main artery was normal in appearance and free of obstructive disease.    Chronic total occlusion of the proximal LAD    The circumflex artery has minimal disease.    The RCA has moderate disease.    No change since 2013  DIAGNOSTIC - GRAFTS    Chronic total occlusion in the proximal anastomosis of the SVG graft to the RCA    The SVG to the ramus intermediate is patent    The sequential graft to the 1st diagonal is patent.    The sequential graft limb from the diagonal to the LAD is patent.    No change since 2013  HEMODYNAMICS    The LVEDP is mildly elevated    Severely elevated right atrial pressures    No evidence of intracardiac shunting  Limited thoracotomy (5869120152) on 8/23/2016 at 80 Years.  Comments:  8/29/2016 2:45 PM Floridalma Jarrell  Left mini thoracotomy and placement of two epicardial screw in leads.  Implant of left pectoral pacer generatoe.  Removal of automatic  cardiac defibrillator (454627213) on 8/4/2016 at 80 Years.  Comments:  8/29/2016 2:49 PM CDT - Floridalma Calderon  Generator and leads  Implantation of intravenous single chamber cardiac pacemaker system (3199672740) on 8/4/2016 at 80 Years.  Pacemaker change on 4/8/2015 at 79 Years.  Cardiac angiogram (6410742433) on 8/2/2013 at 77 Years.  Comments:  8/8/2013 2:45 PM KATHERINET - Shravan Berrios MD  Summary/Conclusions  PRESENTATION / INDICATIONS   Chest pain  DIAGNOSTIC - CORONARY  Right dominant coronary artery system  DIAGNOSTIC SUMMARY  100% stenosis in the Proximal LAD  30% stenosis in the 1st Marginal  30% stenosis in the Proximal RCA  50% stenosis in the Mid RCA  100% stenosis in the Aorta graft to Distal RCA  DIAGNOSTIC - GRAFTS  The sequential graft to the diagonal branch is patent.  The sequential graft from the diagonal to the LAD is patent.  The SVG to the ramus intermediate is patent  The SVG to RCA is chronically occluded  HEMODYNAMICS  The LVEDP is mildly elevated  RECOMMENDATIONS & PLAN  Medical Rx- no significant change from previous angiogram, there is dramatic mismatch in size between the SVG's and the target arteries with slow filling of the LAD        angiogrqam on 2/3/2012 at 76 Years.  Comments:  4/24/2012 3:33 PM JANIE - Shravan Berrios MD  Summary/Conclusions  PRESENTATION / INDICATIONS   Dyspnea and fatigue.  DIAGNOSTIC - CORONARY  Right dominant coronary artery system.  LMCA is normal.  LAD is occluded proximally after the 1st septal branch.  LCx has mild luminal irregularities.  OM1 has 20-30% proximal stenosis.  RCA has 30-40% diffuse disease in the mid segment and otherwise has mild diffuse luminal irregularities.  DIAGNOSTIC - GRAFTS  The sequential SVG to the diagonal and then LAD is widely patent.  There is a significant size mismatch between the graft and the native vessels.  The diagonal and distal LAD have no obvious lesions but are small caliber (< 2.0 mm vessels).  There is some backfilling  "into the mid LAD.  The SVG to the ramus is widely patent.  There is a significant size mismatch between the graft and the native vessel.  The ramus has no obvious lesion but is small caliber (< 2.0 mm vessel).    The SVG to the RCA is chronically occluded.  LEFT VENTRICULAR FUNCTION  Left ventricular function is mildly reduced with EF of 42% and mild global hypokinesis.  No significant MR.  HEMODYNAMICS  The LVEDP is 6 mmHg.  No significant gradient across the aortic valve.  RA 11, RV 29/10, PA 34/17 (mean 25), PCWP 18  Amarilys CO 5.1, TDCO 4.6  RECOMMENDATIONS & PLAN  Consider alternative etiology for symptoms.  Lasix dose decreased to 40 mg QD given relative hypotension and patient reporting feeling \"dry membranes.\"             Colonoscopy ([id number]) on 12/15/2006 at 71 Years.  Comments:  8/8/2013 4:06 PM Shravan Alvarez MD  Diverticulosis. No polyps.  Colonoscopy on 6/22/2001 at 65 Years.  Comments:  11/15/2010 8:21 AM Shravan Willard MD  hyperplastic polyp  Cholecystectomy (39469606) in the month of 6/2001 at 65 Years.  CABG - Coronary artery bypass graft (083594645) in 2000 at 65 Years.  History of Back Surgery (V45.89) in 2000 at 65 Years.  Colonoscopy (586406141) on 2/17/1993 at 57 Years.  Comments:  8/8/2013 4:06 PM Shravan Alvarez MD  1 adenoma, 1 hyperplastic, diverticulosis.  Left shoulder surgery in 1991 at 56 Years.  Esophagogastroduodenoscopy (617615647).  Cholecystectomy (58501001).  Ablation for atrial fibrillation.  Comments:  5/2/2011 1:56 PM Madeline Murray  Subsequent pacemaker dependence.  Colonoscopy (973954956).   Social History:        Alcohol Assessment            Past      Tobacco Assessment            Former smoker, quit more than 30 days ago                     Comments:                      08/23/2018 - Andrew Bailon CMA                     Pt quit 2/5/1963      Employment and Education Assessment            Employed, Work/School description: Farmer.      Exercise " and Physical Activity Assessment            Exercise type: Walking.      Physical Examination   Vital Signs   2/1/2019 3:32 PM CST Peripheral Pulse Rate 90 bpm    Systolic Blood Pressure 113 mmHg    Diastolic Blood Pressure 70 mmHg    Mean Arterial Pressure 84 mmHg    BP Site Right arm      Measurements from flowsheet : Measurements   2/1/2019 3:32 PM CST Height Measured - Standard 75 in    Weight Measured - Standard 230 lb    BSA 2.35 m2    Body Mass Index 28.74 kg/m2  HI      General:  Alert and oriented, No acute distress.    Eye:  Normal conjunctiva.    Airway:       Mallampati classification: I (soft palate, fauces, uvula, pillars visible).    HENT:  Normocephalic, Normal hearing, Oral mucosa is moist.    Neck:  Supple, Non-tender, No carotid bruit, No jugular venous distention, No lymphadenopathy, No thyromegaly.    Respiratory:  Respirations are non-labored, Decreased BS left base unchanged. No wheezes or rales.    Cardiovascular:  Normal rate, Regular rhythm, No gallop, Normal peripheral perfusion, No edema, Device right chest. Scar left chest.         Systolic murmur: Location ( [place] ), Intensity ( Grade I ), Systolic.    Gastrointestinal:  Non-tender.         Abdomen: Distended, Obese.    Genitourinary:  Large right inguinal hernia.    Musculoskeletal:  Walks with cane.    Integumentary:  pale, Ecchymoses.    Neurologic:  No focal deficits.    Cognition and Speech:  Speech clear and coherent, Functional cognition intact.    Psychiatric:  Cooperative, Appropriate mood & affect.       Review / Management   Results for JACINTO JIN (MRN [id number]) as of 2/1/2019 16:44   Ref. Range 2/1/2019 14:02   WHITE BLOOD COUNT         Latest Ref Range: 4.5 - 11.0 thou/cu mm 4.9   RED BLOOD COUNT           Latest Ref Range: 4.30 - 5.90 mil/cu mm 3.09 (L)   HEMOGLOBIN                Latest Ref Range: 13.5 - 17.5 g/dL 8.6 (L)   HEMATOCRIT                Latest Ref Range: 37.0 - 53.0 % 28.2 (L)   MCV                        Latest Ref Range: 80 - 100 fL 91   MCH                       Latest Ref Range: 26.0 - 34.0 pg 27.8   MCHC                      Latest Ref Range: 32.0 - 36.0 g/dL 30.5 (L)   RDW                       Latest Ref Range: 11.5 - 15.5 % 20.0 (H)   PLATELET COUNT            Latest Ref Range: 140 - 440 thou/cu mm 129 (L)      ECG interpretation:  Time  12/5/2018 4:47:00 PM, Rate  71  beats per minute, Paced.       Impression and Plan   Diagnosis     AF (Atrial Fibrillation) (XJC15-VI I48.2).     Anticoagulated (TKC94-AT Z79.01).     CAD (coronary artery disease) (VKO88-EP I25.10).     History of tricuspid valve replacement (MOK00-PK Z95.2).     Ascites (UJS67-EH R18.8).     Depression (LHK57-PQ F32.9).     AVM (arteriovenous malformation) of small bowel, acquired with hemorrhage (YKV80-XZ K55.8).     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 (ULC48-SS Z95.810).     Inguinal hernia, right (PKG08-HU K40.90).     BPH with urinary obstruction (CCA73-RP N40.1).     Stage 3 chronic kidney disease (WDP98-GS N18.3).     Right heart failure (PLT55-XT I50.9).     KEYONA (obstructive sleep apnea) (OEX84-SX G47.33).     Iron deficiency anemia (OVU70-JG D50.9).     Summary:  Stable for endoscopy with MAC..    Orders     Orders (Selected)   Outpatient Orders  Ordered (In Transit)  Basic Metabolic Panel* ([facility]): Specimen Type: Serum, Collection Date: 02/01/19 15:54:00 CST  Prothrombin time-INR* ([facility]): Specimen Type: Blood, Collection Date: 02/01/19 15:54:00 CST.     Stop Warfarin for 4 full days before procedure.

## 2022-02-16 NOTE — TELEPHONE ENCOUNTER
Entered by Ellen Parker MA on January 08, 2019 3:42:06 PM CST  ---------------------  From: Ellen Parker MA   To: [facility]    Sent: 1/8/2019 3:42:06 PM CST  Subject: Medication Management     ** Submitted: **  Complete:warfarin (warfarin 3 mg oral tablet)   Signed by Ellen Parker MA  1/8/2019 3:42:00 PM    ** Approved **  warfarin (WARFARIN 3MG TABLET)  TAKE 1 TABLET BY MOUTH ONCE DAILY  Qty:  90 unknown unit        Days Supply:  0        Refills:  0          FLEX     Route To Pharmacy - [facility]   Signed by Ellen Parker MA            ** Patient matched by Ellen Parker MA on 1/8/2019 3:39:29 PM CST **      ------------------------------------------  From: [facility]  To: Shravan Berrios MD  Sent: January 8, 2019 3:36:38 PM CST  Subject: Medication Management  Due: January 9, 2019 3:36:38 PM CST    ** On Hold Pending Signature **  Drug: warfarin (warfarin 3 mg oral tablet)  TAKE 1 TABLET BY MOUTH ONCE DAILY  Quantity: 90 unknown unit  Days Supply: 0         Refills: 0  Substitutions Allowed  Notes from Pharmacy:     Dispensed Drug: warfarin (warfarin 3 mg oral tablet)  TAKE 1 TABLET BY MOUTH ONCE DAILY  Quantity: 90 unknown unit  Days Supply: 0         Refills: 0  Substitutions Allowed  Notes from Pharmacy:   ------------------------------------------

## 2022-02-16 NOTE — NURSING NOTE
Comprehensive Intake Entered On:  8/15/2019 8:12 AM CDT    Performed On:  8/15/2019 8:07 AM CDT by Radha Juan               Summary   Chief Complaint :   Echo f/u.    Advance Directive :   Yes   Menstrual Status :   N/A   Weight Measured :   193 lb(Converted to: 193 lb 0 oz, 87.54 kg)    Height Measured :   75 in(Converted to: 6 ft 3 in, 190.50 cm)    Body Mass Index :   24.12 kg/m2   Body Surface Area :   2.15 m2   Systolic Blood Pressure :   103 mmHg   Diastolic Blood Pressure :   64 mmHg   Mean Arterial Pressure :   77 mmHg   Peripheral Pulse Rate :   77 bpm   Radha Juan - 8/15/2019 8:07 AM CDT   Health Status   Allergies Verified? :   Yes   Medication History Verified? :   Yes   Medical History Verified? :   Yes   Pre-Visit Planning Status :   Completed   Tobacco Use? :   Former smoker   Radha Juan - 8/15/2019 8:07 AM CDT   Consents   Consent for Immunization Exchange :   Consent Granted   Consent for Immunizations to Providers :   Consent Granted   Radha Juan - 8/15/2019 8:07 AM CDT   Meds / Allergies   (As Of: 8/15/2019 8:12:22 AM CDT)   Allergies (Active)   adhesive tape  Estimated Onset Date:   Unspecified ; Created By:   Floridalma Calderon; Reaction Status:   Active ; Category:   Other ; Substance:   adhesive tape ; Type:   Allergy ; Updated By:   Floridalma Calderon; Reviewed Date:   8/15/2019 8:10 AM CDT      No Known Medication Allergies  Estimated Onset Date:   Unspecified ; Created By:   Ellen Parker MA; Reaction Status:   Active ; Category:   Drug ; Substance:   No Known Medication Allergies ; Type:   Allergy ; Updated By:   Ellen Parker MA; Reviewed Date:   8/15/2019 8:10 AM CDT        Medication List   (As Of: 8/15/2019 8:12:22 AM CDT)   Prescription/Discharge Order    nitroglycerin  :   nitroglycerin ; Status:   Prescribed ; Ordered As Mnemonic:   nitroglycerin 0.4 mg sublingual tablet ; Simple Display Line:   0.4 mg, 1 tab(s), SL, q5min, not to exceed 3  doses/15 min--if pain persists, seek medical attention, 25 tab(s), PRN: for chest pain ; Ordering Provider:   Shravan Berrios MD; Catalog Code:   nitroglycerin ; Order Dt/Tm:   8/28/2014 11:35:30 AM          predniSONE  :   predniSONE ; Status:   Prescribed ; Ordered As Mnemonic:   predniSONE 20 mg oral tablet ; Simple Display Line:   See Instructions, 2 po daily for 3-5 days prn gout, 30 EA, 0 Refill(s) ; Ordering Provider:   Shravan Berrios MD; Catalog Code:   predniSONE ; Order Dt/Tm:   12/29/2017 10:06:41 AM          Misc Prescription  :   Misc Prescription ; Status:   Prescribed ; Ordered As Mnemonic:   222184 URINARY DRAIN BAG 2000ML MG BEAD ; Simple Display Line:   See Instructions, USE AS DIRECTED, 1 unknown unit ; Ordering Provider:   Shravan Berrios MD; Catalog Code:   Miscellaneous Prescription ; Order Dt/Tm:   6/4/2018 7:34:17 AM          clotrimazole topical  :   clotrimazole topical ; Status:   Prescribed ; Ordered As Mnemonic:   clotrimazole 1% topical cream ; Simple Display Line:   1 nazario, TOP, BID, for 7 day(s), 30 gm, 0 Refill(s) ; Ordering Provider:   Rissa Arauz MD; Catalog Code:   clotrimazole topical ; Order Dt/Tm:   6/22/2018 3:35:41 PM          metroNIDAZOLE topical  :   metroNIDAZOLE topical ; Status:   Prescribed ; Ordered As Mnemonic:   MetroGel 1% topical gel ; Simple Display Line:   1 nazario, Topical, daily, 1 tubes, 5 Refill(s) ; Ordering Provider:   Rissa Arauz MD; Catalog Code:   metroNIDAZOLE topical ; Order Dt/Tm:   6/29/2018 3:46:29 PM          mirtazapine 15 mg oral tablet  :   mirtazapine 15 mg oral tablet ; Status:   Prescribed ; Ordered As Mnemonic:   mirtazapine 15 mg oral tablet ; Simple Display Line:   1 tab(s), Oral, qhs, 30 unknown unit, 11 Refill(s) ; Ordering Provider:   Shravan Berrios MD; Catalog Code:   mirtazapine ; Order Dt/Tm:   4/18/2019 8:36:08 AM          atorvastatin  :   atorvastatin ; Status:   Prescribed ; Ordered As Mnemonic:   atorvastatin 10 mg  oral tablet ; Simple Display Line:   10 mg, 1 tab(s), Oral, hs, 30 tab(s), 0 Refill(s) ; Ordering Provider:   Shravan Berrios MD; Catalog Code:   atorvastatin ; Order Dt/Tm:   5/3/2019 11:54:45 AM          metoprolol  :   metoprolol ; Status:   Prescribed ; Ordered As Mnemonic:   Metoprolol Succinate ER 25 mg oral tablet, extended release ; Simple Display Line:   See Instructions, TAKE ONE-HALF TABLET BY MOUTH TWICE DAILY, 180 EA, 3 Refill(s) ; Ordering Provider:   Shravan Berrios MD; Catalog Code:   metoprolol ; Order Dt/Tm:   5/3/2019 4:58:09 PM          allopurinol  :   allopurinol ; Status:   Prescribed ; Ordered As Mnemonic:   allopurinol 300 mg oral tablet ; Simple Display Line:   300 mg, 1 tab(s), Oral, daily, for 90 day(s), 90 tab(s), 3 Refill(s) ; Ordering Provider:   Shravan Berrios MD; Catalog Code:   allopurinol ; Order Dt/Tm:   5/3/2019 4:58:18 PM          midodrine  :   midodrine ; Status:   Prescribed ; Ordered As Mnemonic:   midodrine 2.5 mg oral tablet ; Simple Display Line:   5 mg, 2 tab(s), Oral, tid, for 30 day(s), 180 tab(s), 2 Refill(s) ; Ordering Provider:   Shravan Berrios MD; Catalog Code:   midodrine ; Order Dt/Tm:   6/28/2019 2:06:56 PM          fluticasone nasal  :   fluticasone nasal ; Status:   Prescribed ; Ordered As Mnemonic:   Flonase 50 mcg/inh nasal spray ; Simple Display Line:   2 spray(s), Nasal, daily, for 30 day(s), 1 EA, 0 Refill(s) ; Ordering Provider:   Shravan Berrios MD; Catalog Code:   fluticasone nasal ; Order Dt/Tm:   7/8/2019 8:23:11 AM          spironolactone  :   spironolactone ; Status:   Prescribed ; Ordered As Mnemonic:   spironolactone 50 mg oral tablet ; Simple Display Line:   25 mg, 0.5 tab(s), Oral, bid, 30 tab(s), 2 Refill(s) ; Ordering Provider:   Shravan Berrios MD; Catalog Code:   spironolactone ; Order Dt/Tm:   7/11/2019 11:58:45 AM          famotidine  :   famotidine ; Status:   Prescribed ; Ordered As Mnemonic:   famotidine 20 mg oral tablet ; Simple  Display Line:   1 tab(s), Oral, bid, 180 EA, 3 Refill(s) ; Ordering Provider:   Shravan Berrios MD; Catalog Code:   famotidine ; Order Dt/Tm:   7/11/2019 3:48:29 PM            Home Meds    multivitamin  :   multivitamin ; Status:   Documented ; Ordered As Mnemonic:   Protegra oral tablet ; Simple Display Line:   1 tab(s), po, daily ; Catalog Code:   multivitamin ; Order Dt/Tm:   6/1/2015 8:38:03 AM          acetaminophen  :   acetaminophen ; Status:   Documented ; Ordered As Mnemonic:   acetaminophen 325 mg oral tablet ; Simple Display Line:   650 mg, 2 tab(s), po, q4 hrs, not to exceed 4000 mg/day, 0 Refill(s) ; Catalog Code:   acetaminophen ; Order Dt/Tm:   12/20/2016 1:58:24 PM          furosemide  :   furosemide ; Status:   Documented ; Ordered As Mnemonic:   furosemide 80 mg oral tablet ; Simple Display Line:   40 mg, 0.5 tab(s), po, bid, 0 Refill(s) ; Catalog Code:   furosemide ; Order Dt/Tm:   3/15/2017 3:19:58 PM          digoxin  :   digoxin ; Status:   Documented ; Ordered As Mnemonic:   digoxin 125 mcg (0.125 mg) oral tablet ; Simple Display Line:   125 mcg, 1 tab(s), po, daily, 0 Refill(s) ; Catalog Code:   digoxin ; Order Dt/Tm:   4/19/2017 10:47:22 AM          cyanocobalamin  :   cyanocobalamin ; Status:   Documented ; Ordered As Mnemonic:   Vitamin B12 1000 mcg/mL injectable solution ; Simple Display Line:   1,000 mcg, im, qmonth, 0 Refill(s) ; Catalog Code:   cyanocobalamin ; Order Dt/Tm:   7/24/2017 11:12:05 AM

## 2022-02-16 NOTE — NURSING NOTE
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; fluid in lungs    Advance Directive :   Yes   Menstrual Status :   N/A   Weight Measured :   198 lb(Converted to: 198 lb 0 oz, 89.81 kg)    Height Measured :   75 in(Converted to: 6 ft 3 in, 190.50 cm)    Body Mass Index :   24.75 kg/m2   Body Surface Area :   2.18 m2   Systolic Blood Pressure :   90 mmHg   Diastolic Blood Pressure :   58 mmHg (LOW)    Mean Arterial Pressure :   69 mmHg   Peripheral Pulse Rate :   72 bpm   BP Site :   Right arm   BP Method :   Manual   Temperature Tympanic :   97.6 DegF(Converted to: 36.4 DegC)  (LOW)    Oxygen Saturation :   94 %   Myrna Dsouza - 6/28/2019 1:36 PM CDT   Health Status   Allergies Verified? :   Yes   Medication History Verified? :   Yes   Medical History Verified? :   Yes   Pre-Visit Planning Status :   Completed   Tobacco Use? :   Former smoker   Myrna Dsouza - 6/28/2019 1:36 PM CDT   Consents   Consent for Immunization Exchange :   Consent Granted   Consent for Immunizations to Providers :   Consent Granted   Myrna Dsouza - 6/28/2019 1:36 PM CDT   Meds / Allergies   (As Of: 6/28/2019 1:48:09 PM CDT)   Allergies (Active)   adhesive tape  Estimated Onset Date:   Unspecified ; Created By:   Floridalma Calderon; Reaction Status:   Active ; Category:   Other ; Substance:   adhesive tape ; Type:   Allergy ; Updated By:   Floridalma Calderon; Reviewed Date:   6/28/2019 1:42 PM CDT      No Known Medication Allergies  Estimated Onset Date:   Unspecified ; Created By:   Ellen Parker MA; Reaction Status:   Active ; Category:   Drug ; Substance:   No Known Medication Allergies ; Type:   Allergy ; Updated By:   Ellen Parker MA; Reviewed Date:   6/28/2019 1:42 PM CDT        Medication List   (As Of: 6/28/2019 1:48:09 PM CDT)    Prescription/Discharge Order    allopurinol  :   allopurinol ; Status:   Prescribed ; Ordered As Mnemonic:   allopurinol 300 mg oral tablet ; Simple Display Line:   300 mg, 1 tab(s), Oral, daily, for 90 day(s), 90 tab(s), 3 Refill(s) ; Ordering Provider:   Shravan Berrios MD; Catalog Code:   allopurinol ; Order Dt/Tm:   5/3/2019 4:58:18 PM          metoprolol  :   metoprolol ; Status:   Prescribed ; Ordered As Mnemonic:   Metoprolol Succinate ER 25 mg oral tablet, extended release ; Simple Display Line:   See Instructions, TAKE ONE-HALF TABLET BY MOUTH TWICE DAILY, 180 EA, 3 Refill(s) ; Ordering Provider:   Shravan Berrios MD; Catalog Code:   metoprolol ; Order Dt/Tm:   5/3/2019 4:58:09 PM          atorvastatin  :   atorvastatin ; Status:   Prescribed ; Ordered As Mnemonic:   atorvastatin 10 mg oral tablet ; Simple Display Line:   10 mg, 1 tab(s), Oral, hs, 30 tab(s), 0 Refill(s) ; Ordering Provider:   Shravan Berrios MD; Catalog Code:   atorvastatin ; Order Dt/Tm:   5/3/2019 11:54:45 AM          mirtazapine 15 mg oral tablet  :   mirtazapine 15 mg oral tablet ; Status:   Prescribed ; Ordered As Mnemonic:   mirtazapine 15 mg oral tablet ; Simple Display Line:   1 tab(s), Oral, qhs, 30 unknown unit, 11 Refill(s) ; Ordering Provider:   Shravan Berrios MD; Catalog Code:   mirtazapine ; Order Dt/Tm:   4/18/2019 8:36:08 AM          tamsulosin  :   tamsulosin ; Status:   Prescribed ; Ordered As Mnemonic:   tamsulosin 0.4 mg oral capsule ; Simple Display Line:   0.4 mg, 1 cap(s), Oral, daily, 30 cap(s), 0 Refill(s) ; Ordering Provider:   Shravan Berrios MD; Catalog Code:   tamsulosin ; Order Dt/Tm:   7/20/2018 2:46:14 PM          metroNIDAZOLE topical  :   metroNIDAZOLE topical ; Status:   Prescribed ; Ordered As Mnemonic:   MetroGel 1% topical gel ; Simple Display Line:   1 nazario, Topical, daily, 1 tubes, 5 Refill(s) ; Ordering Provider:   Rissa Arauz MD; Catalog Code:   metroNIDAZOLE topical ; Order Dt/Tm:    6/29/2018 3:46:29 PM          clotrimazole topical  :   clotrimazole topical ; Status:   Prescribed ; Ordered As Mnemonic:   clotrimazole 1% topical cream ; Simple Display Line:   1 nazario, TOP, BID, for 7 day(s), 30 gm, 0 Refill(s) ; Ordering Provider:   Rissa Arauz MD; Catalog Code:   clotrimazole topical ; Order Dt/Tm:   6/22/2018 3:35:41 PM          Misc Prescription  :   Misc Prescription ; Status:   Prescribed ; Ordered As Mnemonic:   [id number] URINARY DRAIN BAG 2000ML MG BEAD ; Simple Display Line:   See Instructions, USE AS DIRECTED, 1 unknown unit ; Ordering Provider:   Shravan Berrios MD; Catalog Code:   Miscellaneous Prescription ; Order Dt/Tm:   6/4/2018 7:34:17 AM          predniSONE  :   predniSONE ; Status:   Prescribed ; Ordered As Mnemonic:   predniSONE 20 mg oral tablet ; Simple Display Line:   See Instructions, 2 po daily for 3-5 days prn gout, 30 EA, 0 Refill(s) ; Ordering Provider:   Shravan Berrios MD; Catalog Code:   predniSONE ; Order Dt/Tm:   12/29/2017 10:06:41 AM          nitroglycerin  :   nitroglycerin ; Status:   Prescribed ; Ordered As Mnemonic:   nitroglycerin 0.4 mg sublingual tablet ; Simple Display Line:   0.4 mg, 1 tab(s), SL, q5min, not to exceed 3 doses/15 min--if pain persists, seek medical attention, 25 tab(s), PRN: for chest pain ; Ordering Provider:   Shravan Berrios MD; Catalog Code:   nitroglycerin ; Order Dt/Tm:   8/28/2014 11:35:30 AM            Home Meds    famotidine  :   famotidine ; Status:   Documented ; Ordered As Mnemonic:   famotidine 20 mg oral tablet ; Simple Display Line:   20 mg, 1 tab(s), Oral, bid, 0 Refill(s) ; Catalog Code:   famotidine ; Order Dt/Tm:   6/17/2019 3:31:03 PM          phytonadione  :   phytonadione ; Status:   Documented ; Ordered As Mnemonic:   Vitamin K1 ; Simple Display Line:   100 mcg, Oral, daily, for 14 day(s), 0 Refill(s) ; Catalog Code:   phytonadione ; Order Dt/Tm:   6/17/2019 3:31:19 PM          spironolactone  :   spironolactone  ; Status:   Documented ; Ordered As Mnemonic:   spironolactone 50 mg oral tablet ; Simple Display Line:   50 mg, 1 tab(s), Oral, bid, 0 Refill(s) ; Catalog Code:   spironolactone ; Order Dt/Tm:   3/1/2019 3:52:14 PM          cyanocobalamin  :   cyanocobalamin ; Status:   Documented ; Ordered As Mnemonic:   Vitamin B12 1000 mcg/mL injectable solution ; Simple Display Line:   1,000 mcg, im, qmonth, 0 Refill(s) ; Catalog Code:   cyanocobalamin ; Order Dt/Tm:   7/24/2017 11:12:05 AM          digoxin  :   digoxin ; Status:   Documented ; Ordered As Mnemonic:   digoxin 125 mcg (0.125 mg) oral tablet ; Simple Display Line:   125 mcg, 1 tab(s), po, daily, 0 Refill(s) ; Catalog Code:   digoxin ; Order Dt/Tm:   4/19/2017 10:47:22 AM          furosemide  :   furosemide ; Status:   Documented ; Ordered As Mnemonic:   furosemide 80 mg oral tablet ; Simple Display Line:   40 mg, 0.5 tab(s), po, bid, 0 Refill(s) ; Catalog Code:   furosemide ; Order Dt/Tm:   3/15/2017 3:19:58 PM          acetaminophen  :   acetaminophen ; Status:   Documented ; Ordered As Mnemonic:   acetaminophen 325 mg oral tablet ; Simple Display Line:   650 mg, 2 tab(s), po, q4 hrs, not to exceed 4000 mg/day, 0 Refill(s) ; Catalog Code:   acetaminophen ; Order Dt/Tm:   12/20/2016 1:58:24 PM          multivitamin  :   multivitamin ; Status:   Documented ; Ordered As Mnemonic:   Protegra oral tablet ; Simple Display Line:   1 tab(s), po, daily ; Catalog Code:   multivitamin ; Order Dt/Tm:   6/1/2015 8:38:03 AM

## 2022-02-16 NOTE — NURSING NOTE
Anticoagulation Therapy Entered On:  6/7/2019 2:36 PM CDT    Performed On:  6/7/2019 2:33 PM CDT by Flavia Metzger RN               Warfarin Management   Week 1 Sunday Dose :   3    Week 1 Monday Dose :   1.5    Week 1 Tuesday Dose :   3    Week 1 Wednesday Dose :   1.5    Week 1 Thursday Dose :   3    Week 1 Friday Dose :   1.5    Week 1 Saturday Dose :   3    Week 1 Dose Total :   16.5    Week 2 Sunday Dose :   3    Week 2 Monday Dose :   1.5    Week 2 Tuesday Dose :   3    Week 2 Wednesday Dose :   1.5    Week 2 Thursday Dose :   3    Week 2 Friday Dose :   1.5    Week 2 Saturday Dose :   3    Week 2 Dose Total :   16.5    Indication :   Atrial fibrillation   [facility]   International Normalization Ratio :   4.5    INR Range :   2.0 - 3.0   INR Therapeutic Range :   No   Warfarin Abnormal Findings :   Patient receiving infusion at hospital   Anticoagulation Recheck :   1 week   Warfarin Special Instructions :   Per [facility] pt to hold warfarin one week and check INR in 1 week; LVM on home phone with directions.   Flavia Metzger RN - 6/7/2019 2:33 PM CDT

## 2022-02-16 NOTE — NURSING NOTE
Comprehensive Intake Entered On:  7/16/2019 11:20 AM CDT    Performed On:  7/16/2019 11:12 AM CDT by Sarah Cote RN               Summary   Chief Complaint :   Patient is here for cardio follow up.    Advance Directive :   Yes   Menstrual Status :   N/A   Weight Measured :   201.6 lb(Converted to: 201 lb 10 oz, 91.44 kg)    Systolic Blood Pressure :   94 mmHg   Diastolic Blood Pressure :   58 mmHg (LOW)    Mean Arterial Pressure :   70 mmHg   Peripheral Pulse Rate :   76 bpm   BP Site :   Right arm   BP Method :   Manual   HR Method :   Electronic   Sarah Cote RN - 7/16/2019 11:12 AM CDT   Health Status   Allergies Verified? :   Yes   Medication History Verified? :   Yes   Pre-Visit Planning Status :   Completed   Sarah Cote RN - 7/16/2019 11:12 AM CDT   Meds / Allergies   (As Of: 7/16/2019 11:20:03 AM CDT)   Allergies (Active)   adhesive tape  Estimated Onset Date:   Unspecified ; Created By:   Floridalma Calderon; Reaction Status:   Active ; Category:   Other ; Substance:   adhesive tape ; Type:   Allergy ; Updated By:   Floridamla Calderon; Reviewed Date:   7/16/2019 11:15 AM CDT      No Known Medication Allergies  Estimated Onset Date:   Unspecified ; Created By:   Ellen Parker MA; Reaction Status:   Active ; Category:   Drug ; Substance:   No Known Medication Allergies ; Type:   Allergy ; Updated By:   Ellen Parker MA; Reviewed Date:   7/16/2019 11:15 AM CDT        Medication List   (As Of: 7/16/2019 11:20:03 AM CDT)   Prescription/Discharge Order    spironolactone  :   spironolactone ; Status:   Prescribed ; Ordered As Mnemonic:   spironolactone 50 mg oral tablet ; Simple Display Line:   25 mg, 0.5 tab(s), Oral, bid, 30 tab(s), 2 Refill(s) ; Ordering Provider:   Shravan Berrios MD; Catalog Code:   spironolactone ; Order Dt/Tm:   7/11/2019 11:58:45 AM          predniSONE  :   predniSONE ; Status:   Prescribed ; Ordered As Mnemonic:   predniSONE 20 mg oral tablet ; Simple Display Line:   See Instructions, 2 po daily for  3-5 days prn gout, 30 EA, 0 Refill(s) ; Ordering Provider:   Shravan Berrios MD; Catalog Code:   predniSONE ; Order Dt/Tm:   12/29/2017 10:06:41 AM          nitroglycerin  :   nitroglycerin ; Status:   Prescribed ; Ordered As Mnemonic:   nitroglycerin 0.4 mg sublingual tablet ; Simple Display Line:   0.4 mg, 1 tab(s), SL, q5min, not to exceed 3 doses/15 min--if pain persists, seek medical attention, 25 tab(s), PRN: for chest pain ; Ordering Provider:   Shravan Berrios MD; Catalog Code:   nitroglycerin ; Order Dt/Tm:   8/28/2014 11:35:30 AM          Misc Prescription  :   Misc Prescription ; Status:   Prescribed ; Ordered As Mnemonic:   [id number] URINARY DRAIN BAG 2000ML MG BEAD ; Simple Display Line:   See Instructions, USE AS DIRECTED, 1 unknown unit ; Ordering Provider:   Shravan Berrios MD; Catalog Code:   Miscellaneous Prescription ; Order Dt/Tm:   6/4/2018 7:34:17 AM          mirtazapine 15 mg oral tablet  :   mirtazapine 15 mg oral tablet ; Status:   Prescribed ; Ordered As Mnemonic:   mirtazapine 15 mg oral tablet ; Simple Display Line:   1 tab(s), Oral, qhs, 30 unknown unit, 11 Refill(s) ; Ordering Provider:   Shravan Berrios MD; Catalog Code:   mirtazapine ; Order Dt/Tm:   4/18/2019 8:36:08 AM          midodrine  :   midodrine ; Status:   Prescribed ; Ordered As Mnemonic:   midodrine 2.5 mg oral tablet ; Simple Display Line:   5 mg, 2 tab(s), Oral, tid, for 30 day(s), 180 tab(s), 2 Refill(s) ; Ordering Provider:   Shravan Berrios MD; Catalog Code:   midodrine ; Order Dt/Tm:   6/28/2019 2:06:56 PM          metroNIDAZOLE topical  :   metroNIDAZOLE topical ; Status:   Prescribed ; Ordered As Mnemonic:   MetroGel 1% topical gel ; Simple Display Line:   1 nazario, Topical, daily, 1 tubes, 5 Refill(s) ; Ordering Provider:   Izabel Arauz MDica; Catalog Code:   metroNIDAZOLE topical ; Order Dt/Tm:   6/29/2018 3:46:29 PM          metoprolol  :   metoprolol ; Status:   Prescribed ; Ordered As Mnemonic:   Metoprolol  Succinate ER 25 mg oral tablet, extended release ; Simple Display Line:   See Instructions, TAKE ONE-HALF TABLET BY MOUTH TWICE DAILY, 180 EA, 3 Refill(s) ; Ordering Provider:   Shravan Berrios MD; Catalog Code:   metoprolol ; Order Dt/Tm:   5/3/2019 4:58:09 PM          fluticasone nasal  :   fluticasone nasal ; Status:   Prescribed ; Ordered As Mnemonic:   Flonase 50 mcg/inh nasal spray ; Simple Display Line:   2 spray(s), Nasal, daily, for 30 day(s), 1 EA, 0 Refill(s) ; Ordering Provider:   Shravan Berrios MD; Catalog Code:   fluticasone nasal ; Order Dt/Tm:   7/8/2019 8:23:11 AM          famotidine  :   famotidine ; Status:   Prescribed ; Ordered As Mnemonic:   famotidine 20 mg oral tablet ; Simple Display Line:   1 tab(s), Oral, bid, 180 EA, 3 Refill(s) ; Ordering Provider:   Shravan Berrios MD; Catalog Code:   famotidine ; Order Dt/Tm:   7/11/2019 3:48:29 PM          clotrimazole topical  :   clotrimazole topical ; Status:   Prescribed ; Ordered As Mnemonic:   clotrimazole 1% topical cream ; Simple Display Line:   1 nazario, TOP, BID, for 7 day(s), 30 gm, 0 Refill(s) ; Ordering Provider:   Rissa Arauz MD; Catalog Code:   clotrimazole topical ; Order Dt/Tm:   6/22/2018 3:35:41 PM          atorvastatin  :   atorvastatin ; Status:   Prescribed ; Ordered As Mnemonic:   atorvastatin 10 mg oral tablet ; Simple Display Line:   10 mg, 1 tab(s), Oral, hs, 30 tab(s), 0 Refill(s) ; Ordering Provider:   Shravan Berrios MD; Catalog Code:   atorvastatin ; Order Dt/Tm:   5/3/2019 11:54:45 AM          allopurinol  :   allopurinol ; Status:   Prescribed ; Ordered As Mnemonic:   allopurinol 300 mg oral tablet ; Simple Display Line:   300 mg, 1 tab(s), Oral, daily, for 90 day(s), 90 tab(s), 3 Refill(s) ; Ordering Provider:   Shravan Berrios MD; Catalog Code:   allopurinol ; Order Dt/Tm:   5/3/2019 4:58:18 PM            Home Meds    multivitamin  :   multivitamin ; Status:   Documented ; Ordered As Mnemonic:   Protegra oral  tablet ; Simple Display Line:   1 tab(s), po, daily ; Catalog Code:   multivitamin ; Order Dt/Tm:   6/1/2015 8:38:03 AM          furosemide  :   furosemide ; Status:   Documented ; Ordered As Mnemonic:   furosemide 80 mg oral tablet ; Simple Display Line:   40 mg, 0.5 tab(s), po, bid, 0 Refill(s) ; Catalog Code:   furosemide ; Order Dt/Tm:   3/15/2017 3:19:58 PM          digoxin  :   digoxin ; Status:   Documented ; Ordered As Mnemonic:   digoxin 125 mcg (0.125 mg) oral tablet ; Simple Display Line:   125 mcg, 1 tab(s), po, daily, 0 Refill(s) ; Catalog Code:   digoxin ; Order Dt/Tm:   4/19/2017 10:47:22 AM          cyanocobalamin  :   cyanocobalamin ; Status:   Documented ; Ordered As Mnemonic:   Vitamin B12 1000 mcg/mL injectable solution ; Simple Display Line:   1,000 mcg, im, qmonth, 0 Refill(s) ; Catalog Code:   cyanocobalamin ; Order Dt/Tm:   7/24/2017 11:12:05 AM          acetaminophen  :   acetaminophen ; Status:   Documented ; Ordered As Mnemonic:   acetaminophen 325 mg oral tablet ; Simple Display Line:   650 mg, 2 tab(s), po, q4 hrs, not to exceed 4000 mg/day, 0 Refill(s) ; Catalog Code:   acetaminophen ; Order Dt/Tm:   12/20/2016 1:58:24 PM

## 2022-02-16 NOTE — NURSING NOTE
Quick Intake Entered On:  1/25/2019 3:31 PM CST    Performed On:  1/25/2019 3:31 PM CST by Flavia Metzger RN               Summary   Chief Complaint :   BP   Menstrual Status :   N/A   Systolic Blood Pressure :   102 mmHg   Diastolic Blood Pressure :   64 mmHg   Mean Arterial Pressure :   77 mmHg   BP Site :   Right arm   BP Method :   Manual   Flavia Metzger RN - 1/25/2019 3:31 PM CST

## 2022-02-16 NOTE — TELEPHONE ENCOUNTER
---------------------  From: Idalia Carson CMA (eRx Pool ([id number]_[facility]))   To: [facility] Message Pool ([id number]_[place]);     Sent: 4/17/2019 6:01:56 PM CDT  Subject: FW: Medication Management   Due Date/Time: 4/18/2019 1:44:00 PM CDT     Medication Refill needing approval    PCP:   RIAN    Medication:   mirtazapine 15mg  Last Filled:  3/27/18     Quantity:  30  Refills:  5    Date of last office visit and reason:   4/16/19 medical f/u RIAN    Return to Clinic order placed?  2 weeks            ** Patient matched by Idalia Carson CMA on 4/17/2019 5:58:38 PM CDT **      ------------------------------------------  From: [facility]  To: Shravan Berrios MD  Sent: April 17, 2019 1:44:40 PM CDT  Subject: Medication Management  Due: April 18, 2019 1:44:40 PM CDT    ** On Hold Pending Signature **  Drug: mirtazapine (mirtazapine 15 mg oral tablet)  TAKE ONE TABLET BY MOUTH AT BEDTIME  Quantity: 30 unknown unit  Days Supply: 0         Refills: 0  Substitutions Allowed  Notes from Pharmacy:     Dispensed Drug: mirtazapine (mirtazapine 15 mg oral tablet)  TAKE ONE TABLET BY MOUTH AT BEDTIME  Quantity: 30 unknown unit  Days Supply: 0         Refills: 0  Substitutions Allowed  Notes from Pharmacy:   ---------------------------------------------------------------  From: Ellen Parker MA ([facility] Message Pool ([id number]_[place]))   To: Shravan Berrios MD;     Sent: 4/18/2019 7:05:00 AM CDT  Subject: FW: Medication Management   Due Date/Time: 4/18/2019 1:44:00 PM CDT---------------------  From: Shravan Berrios MD   To: [facility]    Sent: 4/18/2019 8:36:08 AM CDT  Subject: FW: Medication Management     ** Submitted: **  Complete:mirtazapine (mirtazapine 15 mg oral tablet)   Signed by Shravan Berrios MD  4/18/2019 8:36:00 AM    ** Approved with modifications: **  mirtazapine (MIRTAZAPINE  TAB 15MG TABLET)  TAKE ONE TABLET BY MOUTH AT BEDTIME  Qty:  30 unknown unit        Days Supply:  0        Refills:  11           Substitutions Allowed     Route To Pharmacy - [facility]

## 2022-02-16 NOTE — TELEPHONE ENCOUNTER
"---------------------  From: Krystina Pyle LPN (Phone Messages Pool ([id number]))   To: [facility] Message Pool ([id number]);     Sent: 7/11/2019 9:51:18 AM CDT  Subject: Medication refill       Phone Message    PCP: RIAN    Time of call: 9:31am message left    Person calling: Codie, pt's wife  Contact # : [phone]    MESSAGE: Pt is needing a medication refill for Spironolactone. Pt's wife would like the rx sent to [facility].    Last visit/reason: 7/8/19 - pre op exam ([facility])    9:40am Called and spoke with Codie. Per med list, Spironolactone is only documented, never prescribed. She says that Dr Cartagena, \"bladder doctor\", originally prescribed this but the patient is not seeing him any longer. She says that [facility] had recently asked the patient to increase his dose. Per pt's wife, he is taking Spironolactone 50mg  - taking 1/2 tablet (25mg) twice daily which is the same as we have documented in chart. Please advise.---------------------  From: Osman/Myrna LONDON ([facility] Message Pool ([id number]))   To: Shravan Berrios MD;     Sent: 7/11/2019 10:22:31 AM CDT  Subject: FW: Medication refill---------------------  From: Shravan Berrios MD   To: [facility] Message Pool ([id number]_[place]);     Sent: 7/11/2019 11:30:42 AM CDT  Subject: RE: Medication refill     okCalled pt. No VM left due to [facility].  "

## 2022-02-16 NOTE — CARE COORDINATION
ACTION PLAN   Goal(s): Cardiac management    CC to follow-up with Mamta REDDY. Do monthly chart review. [place]/wife very good about calling if they need something.     Today s Date: 12/16/15                                                                     Goal(s) completion date: ongoing     Steps to be taken to manage CHF (cardio has been managing)    Baseline weight: 213-214lbs When will I accomplish these steps Barriers Status   (4/19/17) -Bumex d/c    -Lasix 80mg BID    -Digoxin 0.125mcg    -Metolazone prn to get weights down    Cardio f/u in 3 mo (9/2017)   (4/26/27) ED visit- was advised to take Metolazone qd until appt w/ [facility] on 4/28/17. Has lost 1lbs so far.    Cardio appt scheduled w/ Lilian on 6/1.    (5/2/17) Appt w/ [facility] completed. Metolazone now on hold again. Wt down to 214 lbs which is his dry weight. Recheck w/ [facility] in 2 weeks. Labs at [facility]    (5/4/17) Weight up to 220. Per [facility] no med change/no Metolazone. Needs to be on TID dosing of potassium d/t hypokalemia. Recheck BMP on Sun (5/8). Discussed sodium intake-sticking to 1500mg and will monitor this closely. CC to check in next wk.     (5/9/17) Wt up to 223lbs. Per [facility] OK to take qd metolazone if weight >222lbs. F/u appt w/ [facility] on Friday 5/12, repeat BMP and INR in 1 week.     (6/27/17) Per cardio-cont. w/ current diuretics. Need to see hematology ([facility]). Cardio f/u in 3 mo and BMP in 2 weeks.     (7/12/17) No change in labs-keep cardio f/u as scheduled (due in Sept 2017)    (12/5/17) Never completed f/u with cardio-appt with Lilian on 12/12/17       Steps to be taken to manage uncontrolled iron deficiency anemia When will I accomplish these steps Barriers Status   (12/5/17) OV w/ [facility] on 10/20/17. Cont. w/ Vitamin B12 injections. Previous iron deficiency anemia resolved.               Steps to be taken to manage When will I accomplish these steps Barriers Status

## 2022-02-16 NOTE — TELEPHONE ENCOUNTER
---------------------  From: Val Tovar (Phone Messages Pool ([id number]_[facility]))   To: [facility] Message Pool ([id number]_[place]);     Sent: 1/23/2019 11:45:38 AM CST !  Subject: Orders      Phone Message    PCP RIAN      Time of Call  11:36      Person Calling Regla @ [facility]  Phone number _[phone]    Note  _ Regla left message stating that pt is scheduled at [place] 2/12/19 with Dr. Abdias Menezes for small bowel enteroscopy with cautery and they need a Coumadin hold order for 4-5 days. Pt MRN # in [facility] is [id number]. Please advise Regla.    Last office visit and reason _12/11/2018 GI bleed---------------------  From: Ellen Parker MA ([facility] Message Pool ([id number]_[place]))   To: Shravan Berrios MD;     Sent: 1/24/2019 7:25:20 AM CST  Subject: FW: Orders---------------------  From: Shravan Berrios MD   To: [facility] Message Pool ([id number]_[place]);     Sent: 1/24/2019 3:22:17 PM CST  Subject: RE: Orders     okmessaverna left on Regla line that patient should hold coumadin 4 days prior and resume night after procedure

## 2022-02-16 NOTE — CARE COORDINATION
Pt appears on [facility] chronic disease panel as out of parameters for A1C.  Pt was seen 7/20/2018, A1C was 5.9, pt has RTC 4-6 wks.

## 2022-02-16 NOTE — CARE COORDINATION
Patient:   JACINTO JIN            MRN: [id number]            FIN: [id number]               Age:   83 years     Sex:  Male     :  1935   Associated Diagnoses:   None   Author:   Mag Vargas CMA      Sources of Information:  [ ] Patient, family member, or caregiver (Please list):  [ x] Hospital discharge summary  [ ] Hospital fax  [ ] List of recent hospitalizations or ED visits  [ ] Other:     Discharged From: [facility]  Discharge Date: 18    Diagnosis/Problem: Acute GI bleeding    Medication Changes: [x ] Yes [ ] No   Medication List Updated: [x ] Yes [ ] No  Hospital medication list reconciled w/ clinic medication list: Yes  Medications          *denotes recorded medication          [id number] URINARY DRAIN BAG 2000ML MG BEAD: See Instructions, USE AS DIRECTED, 1 unknown unit.          *acetaminophen 325 mg oral tablet: 650 mg, 2 tab(s), po, q4 hrs, not to exceed 4000 mg/day, 0 Refill(s).          allopurinol 300 mg oral tablet: See Instructions, TAKE ONE TABLET BY MOUTH ONCE DAILY, 90 unknown unit, 3 Refill(s).          atorvastatin 40 mg oral tablet: See Instructions, TAKE ONE TABLET BY MOUTH AT BEDTIME, 90 unknown unit.          Keflex 500 mg oral capsule: 500 mg, 1 cap(s), PO, QID, for 7 day(s), 28 cap(s), 0 Refill(s).          clotrimazole 1% topical cream: 1 nazario, TOP, BID, for 7 day(s), 30 gm, 0 Refill(s).          *Vitamin B12 1000 mcg/mL injectable solution: 1,000 mcg, im, qmonth, 0 Refill(s).          *digoxin 125 mcg (0.125 mg) oral tablet: 125 mcg, 1 tab(s), po, daily, 0 Refill(s).          *furosemide 80 mg oral tablet: 80 mg, 1 tab(s), po, bid, Per Dr. Quintana on 3/7/2017, 0 Refill(s).          *metOLazone 2.5 mg oral tablet: See Instructions, 1 tab(s) po every other daily for weight > 227#, 0 Refill(s).          Metoprolol Succinate ER 25 mg oral tablet, extended release: See Instructions, TAKE ONE-HALF TABLET BY MOUTH TWICE DAILY, 30 unknown unit, 11 Refill(s).          MetroGel 1% topical gel:  1 nazario, Topical, daily, 1 tubes, 5 Refill(s).          mirtazapine 15 mg oral tablet: 15 mg, 1 tab(s), PO, Once a day (at bedtime), 30 tab(s), 5 Refill(s).          *Protegra oral tablet: 1 tab(s), po, daily.          nitroglycerin 0.4 mg sublingual tablet: 0.4 mg, 1 tab(s), SL, q5min, not to exceed 3 doses/15 min--if pain persists, seek medical attention, 25 tab(s), PRN: for chest pain.          omeprazole 20 mg oral delayed release capsule: 20 mg, 1 cap(s), po, daily, 90 cap(s), 3 Refill(s).          potassium chloride 20 mEq oral tablet, extended release: See Instructions, 3 tab BID, 30 unknown unit, 5 Refill(s).          predniSONE 20 mg oral tablet: See Instructions, 2 po daily for 3-5 days prn gout, 30 EA, 0 Refill(s).          *Senokot 8.6 mg oral tablet: 1-2 tab(s), PO, Once a day (at bedtime), PRN: for constipation.          tamsulosin 0.4 mg oral capsule: 0.4 mg, 1 cap(s), Oral, daily, 30 cap(s), 0 Refill(s).          warfarin 3 mg oral tablet: 3 mg, 1 tab(s), Oral, daily, for 90 day(s), 90 tab(s), 0 Refill(s).      Needs Referral or Lab: [ x] Yes [ ] No  1. Needs INR check and CBC per hospital discharge    Needs Follow-up Appointment:  [ x] Within 7 days of discharge (highly complex visit)  [ ] Within 14 days of discharge (moderately complex visit)    Appointment Made With: RIAN  Date: 12/11/18    Additional Information Needed and Requested:  [ ] Yes:  [ ] Noappt completed

## 2022-02-16 NOTE — TELEPHONE ENCOUNTER
Entered by Flavia Metzger RN on July 18, 2019 3:36:32 PM CDT  ---------------------  From: Flavia Metzger RN   To: [facility]    Sent: 7/18/2019 3:36:32 PM CDT  Subject: Medication Management     ** Not Approved: Request responded to by other means, This was filled for several months on 6/28/19 **  midodrine (MIDODRINE    TAB 5MG TABLET)  TAKE 1 TABLET BY MOUTH 3 TIMES A DAY for 30 days  Qty:  90 unknown unit        Days Supply:  0        Refills:  0          Substitutions Allowed     Route To Pharmacy - [facility]   Note from Pharmacy:  PT STATES NEW DIRECTIONS SHOULD BE: 2 TABLETS TID  Signed by Flavia Metzger RN    ** Not Approved: Request responded to by other means, This was filled for several months on 6/28/19 **  midodrine (MIDODRINE    TAB 5MG TABLET)  TAKE 1 TABLET BY MOUTH 3 TIMES A DAY for 30 days  Qty:  90 unknown unit        Days Supply:  0        Refills:  0          Substitutions Allowed     Route To Pharmacy - [facility]   Note from Pharmacy:  PT STATES NEW DIRECTIONS GIVEN:  2 T ABLETS TID   PLEASE PROVIDE A NEW PRESCRIPTION  -THANK YOU!  Signed by Flavia Metzger RN            ** Patient matched by Flavia Metzger RN on 7/18/2019 3:33:16 PM CDT **      ------------------------------------------  From: [facility]  To: Shravan Berrios MD  Sent: July 18, 2019 12:03:05 PM CDT  Subject: Medication Management  Due: July 19, 2019 12:03:05 PM CDT    ** On Hold Pending Signature **  Drug: midodrine (midodrine 5 mg oral tablet)  TAKE 1 TABLET BY MOUTH 3 TIMES A DAY for 30 days  Quantity: 90 unknown unit  Days Supply: 0         Refills: 0  Substitutions Allowed  Notes from Pharmacy: PT STATES NEW DIRECTIONS SHOULD BE: 2 TABLETS TID    Dispensed Drug: midodrine (midodrine 5 mg oral tablet)  TAKE 1 TABLET BY MOUTH 3 TIMES A DAY for 30 days  Quantity: 90 unknown unit  Days Supply: 0         Refills: 0  Substitutions Allowed  Notes from Pharmacy: PT STATES NEW DIRECTIONS SHOULD BE: 2 TABLETS TID    **  On Hold Pending Signature **  Drug: midodrine (midodrine 5 mg oral tablet)  TAKE 1 TABLET BY MOUTH 3 TIMES A DAY for 30 days  Quantity: 90 unknown unit  Days Supply: 0         Refills: 0  Substitutions Allowed  Notes from Pharmacy: PT STATES NEW DIRECTIONS GIVEN:  2 T YARELI TID   PLEASE PROVIDE A NEW PRESCRIPTION  -THANK YOU!    Dispensed Drug: midodrine (midodrine 5 mg oral tablet)  TAKE 1 TABLET BY MOUTH 3 TIMES A DAY for 30 days  Quantity: 90 unknown unit  Days Supply: 0         Refills: 0  Substitutions Allowed  Notes from Pharmacy: PT STATES NEW DIRECTIONS GIVEN:  2 T ABLECARINA TID   PLEASE PROVIDE A NEW PRESCRIPTION  -THANK YOU!  ------------------------------------------This medication was filled approximately 3 weeks ago with several refills by [facility].  Requesting refill too soon.

## 2022-02-16 NOTE — NURSING NOTE
Comprehensive Intake Entered On:  4/30/2019 2:49 PM CDT    Performed On:  4/30/2019 2:47 PM CDT by Ellen Parker MA               Summary   Chief Complaint :   follow up, black stool since starting ferrous sulfate   Menstrual Status :   N/A   Weight Measured :   215 lb(Converted to: 215 lb 0 oz, 97.52 kg)    Height Measured :   75 in(Converted to: 6 ft 3 in, 190.50 cm)    Body Mass Index :   26.87 kg/m2 (HI)    Body Surface Area :   2.27 m2   Systolic Blood Pressure :   103 mmHg   Diastolic Blood Pressure :   69 mmHg   Mean Arterial Pressure :   80 mmHg   Peripheral Pulse Rate :   92 bpm   BP Site :   Right arm   Ellen Parker MA - 4/30/2019 2:47 PM CDT   Meds / Allergies   (As Of: 4/30/2019 2:49:37 PM CDT)   Allergies (Active)   adhesive tape  Estimated Onset Date:   Unspecified ; Created By:   Floridalma Calderon; Reaction Status:   Active ; Category:   Other ; Substance:   adhesive tape ; Type:   Allergy ; Updated By:   Floridalma Calderon; Reviewed Date:   3/21/2019 7:52 AM CDT      No Known Medication Allergies  Estimated Onset Date:   Unspecified ; Created By:   Ellen Parker MA; Reaction Status:   Active ; Category:   Drug ; Substance:   No Known Medication Allergies ; Type:   Allergy ; Updated By:   Ellen Parker MA; Reviewed Date:   3/21/2019 7:52 AM CDT        Medication List   (As Of: 4/30/2019 2:49:37 PM CDT)   Prescription/Discharge Order    allopurinol 300 mg oral tablet  :   allopurinol 300 mg oral tablet ; Status:   Prescribed ; Ordered As Mnemonic:   allopurinol 300 mg oral tablet ; Simple Display Line:   See Instructions, TAKE ONE TABLET BY MOUTH ONCE DAILY, 90 unknown unit, 3 Refill(s) ; Ordering Provider:   Shravan Berrios MD; Catalog Code:   allopurinol ; Order Dt/Tm:   5/10/2018 11:15:36 AM          atorvastatin 40 mg oral tablet  :   atorvastatin 40 mg oral tablet ; Status:   Prescribed ; Ordered As Mnemonic:   atorvastatin 40 mg oral tablet ; Simple Display Line:   See Instructions, TAKE ONE TABLET BY MOUTH  AT BEDTIME, 90 unknown unit ; Ordering Provider:   Shravan Berrios MD; Catalog Code:   atorvastatin ; Order Dt/Tm:   11/13/2017 11:13:28 AM          clotrimazole topical  :   clotrimazole topical ; Status:   Prescribed ; Ordered As Mnemonic:   clotrimazole 1% topical cream ; Simple Display Line:   1 nazario, TOP, BID, for 7 day(s), 30 gm, 0 Refill(s) ; Ordering Provider:   Rissa Arauz MD; Catalog Code:   clotrimazole topical ; Order Dt/Tm:   6/22/2018 3:35:41 PM          Metoprolol Succinate ER 25 mg oral tablet, extended release  :   Metoprolol Succinate ER 25 mg oral tablet, extended release ; Status:   Prescribed ; Ordered As Mnemonic:   Metoprolol Succinate ER 25 mg oral tablet, extended release ; Simple Display Line:   See Instructions, TAKE ONE-HALF TABLET BY MOUTH TWICE DAILY, 30 unknown unit, 11 Refill(s) ; Ordering Provider:   Shravan Berrios MD; Catalog Code:   metoprolol ; Order Dt/Tm:   9/25/2017 1:33:08 PM          metroNIDAZOLE topical  :   metroNIDAZOLE topical ; Status:   Prescribed ; Ordered As Mnemonic:   MetroGel 1% topical gel ; Simple Display Line:   1 nazario, Topical, daily, 1 tubes, 5 Refill(s) ; Ordering Provider:   Rissa Arauz MD; Catalog Code:   metroNIDAZOLE topical ; Order Dt/Tm:   6/29/2018 3:46:29 PM          mirtazapine 15 mg oral tablet  :   mirtazapine 15 mg oral tablet ; Status:   Prescribed ; Ordered As Mnemonic:   mirtazapine 15 mg oral tablet ; Simple Display Line:   1 tab(s), Oral, qhs, 30 unknown unit, 11 Refill(s) ; Ordering Provider:   Shravan Berrios MD; Catalog Code:   mirtazapine ; Order Dt/Tm:   4/18/2019 8:36:08 AM          Misc Prescription  :   Misc Prescription ; Status:   Prescribed ; Ordered As Mnemonic:   [id number] URINARY DRAIN BAG 2000ML MG BEAD ; Simple Display Line:   See Instructions, USE AS DIRECTED, 1 unknown unit ; Ordering Provider:   Shravan Berrios MD; Catalog Code:   Miscellaneous Prescription ; Order Dt/Tm:   6/4/2018 7:34:17 AM           nitroglycerin  :   nitroglycerin ; Status:   Prescribed ; Ordered As Mnemonic:   nitroglycerin 0.4 mg sublingual tablet ; Simple Display Line:   0.4 mg, 1 tab(s), SL, q5min, not to exceed 3 doses/15 min--if pain persists, seek medical attention, 25 tab(s), PRN: for chest pain ; Ordering Provider:   Shravan Berrios MD; Catalog Code:   nitroglycerin ; Order Dt/Tm:   8/28/2014 11:35:30 AM          omeprazole  :   omeprazole ; Status:   Prescribed ; Ordered As Mnemonic:   omeprazole 20 mg oral delayed release capsule ; Simple Display Line:   20 mg, 1 cap(s), po, daily, 90 cap(s), 3 Refill(s) ; Ordering Provider:   Shravan Berrios MD; Catalog Code:   omeprazole ; Order Dt/Tm:   1/9/2017 12:26:06 PM          potassium chloride 20 mEq oral tablet, extended release  :   potassium chloride 20 mEq oral tablet, extended release ; Status:   Prescribed ; Ordered As Mnemonic:   potassium chloride 20 mEq oral tablet, extended release ; Simple Display Line:   See Instructions, 3 tab BID, 30 unknown unit, 5 Refill(s) ; Ordering Provider:   Shravan Berrios MD; Catalog Code:   potassium chloride ; Order Dt/Tm:   4/13/2016 11:25:17 AM          predniSONE  :   predniSONE ; Status:   Prescribed ; Ordered As Mnemonic:   predniSONE 20 mg oral tablet ; Simple Display Line:   See Instructions, 2 po daily for 3-5 days prn gout, 30 EA, 0 Refill(s) ; Ordering Provider:   Shravan Berrios MD; Catalog Code:   predniSONE ; Order Dt/Tm:   12/29/2017 10:06:41 AM          tamsulosin  :   tamsulosin ; Status:   Prescribed ; Ordered As Mnemonic:   tamsulosin 0.4 mg oral capsule ; Simple Display Line:   0.4 mg, 1 cap(s), Oral, daily, 30 cap(s), 0 Refill(s) ; Ordering Provider:   Shravan Berrios MD; Catalog Code:   tamsulosin ; Order Dt/Tm:   7/20/2018 2:46:14 PM          warfarin  :   warfarin ; Status:   Prescribed ; Ordered As Mnemonic:   warfarin 3 mg oral tablet ; Simple Display Line:   1 tab(s), Oral, daily, or use as directed, 90 tab(s), 3  Refill(s) ; Ordering Provider:   Shravan Berrios MD; Catalog Code:   warfarin ; Order Dt/Tm:   4/23/2019 7:29:25 PM            Home Meds    acetaminophen  :   acetaminophen ; Status:   Documented ; Ordered As Mnemonic:   acetaminophen 325 mg oral tablet ; Simple Display Line:   650 mg, 2 tab(s), po, q4 hrs, not to exceed 4000 mg/day, 0 Refill(s) ; Catalog Code:   acetaminophen ; Order Dt/Tm:   12/20/2016 1:58:24 PM          cyanocobalamin  :   cyanocobalamin ; Status:   Documented ; Ordered As Mnemonic:   Vitamin B12 1000 mcg/mL injectable solution ; Simple Display Line:   1,000 mcg, im, qmonth, 0 Refill(s) ; Catalog Code:   cyanocobalamin ; Order Dt/Tm:   7/24/2017 11:12:05 AM          digoxin  :   digoxin ; Status:   Documented ; Ordered As Mnemonic:   digoxin 125 mcg (0.125 mg) oral tablet ; Simple Display Line:   125 mcg, 1 tab(s), po, daily, 0 Refill(s) ; Catalog Code:   digoxin ; Order Dt/Tm:   4/19/2017 10:47:22 AM          ferrous sulfate  :   ferrous sulfate ; Status:   Documented ; Ordered As Mnemonic:   ferrous sulfate ; Simple Display Line:   Oral, 0 Refill(s) ; Catalog Code:   ferrous sulfate ; Order Dt/Tm:   4/30/2019 2:48:23 PM          furosemide  :   furosemide ; Status:   Documented ; Ordered As Mnemonic:   furosemide 80 mg oral tablet ; Simple Display Line:   40 mg, 0.5 tab(s), po, bid, 0 Refill(s) ; Catalog Code:   furosemide ; Order Dt/Tm:   3/15/2017 3:19:58 PM          multivitamin  :   multivitamin ; Status:   Documented ; Ordered As Mnemonic:   Protegra oral tablet ; Simple Display Line:   1 tab(s), po, daily ; Catalog Code:   multivitamin ; Order Dt/Tm:   6/1/2015 8:38:03 AM          senna  :   senna ; Status:   Documented ; Ordered As Mnemonic:   Senokot 8.6 mg oral tablet ; Simple Display Line:   1-2 tab(s), PO, Once a day (at bedtime), PRN: for constipation ; Catalog Code:   senna ; Order Dt/Tm:   12/20/2016 2:00:45 PM          spironolactone  :   spironolactone ; Status:   Documented ;  Ordered As Mnemonic:   spironolactone 50 mg oral tablet ; Simple Display Line:   50 mg, 1 tab(s), Oral, bid, increased to 50 mg BID per Dr Quintana 3/21/19, 0 Refill(s) ; Catalog Code:   spironolactone ; Order Dt/Tm:   3/1/2019 3:52:14 PM

## 2022-02-16 NOTE — PROGRESS NOTES
Chief Complaint    Hospital discharge f/u-feeling weak. No more blood in stool. States he still has a hernia that is bothering him. Has ?'s about Omeprazole dose Lasix dose.  History of Present Illness      Patient hospitalized December 6-8 with a GI bleed.  EGD was unrevealing.  Colonoscopy revealed diverticular disease and 2 AVMs which were ablated.  He has ascites likely from his right heart failure this was tapped and cytology was negative.  His chronic atrial fibrillation is anticoagulated chronic kidney disease and chronic heart failure as well as replaced has been well.  He has BPH with urinary retention and had his test today today.  Review of Systems      No edema, PND, orthopnea.  He is chronically fatigued.  No melena or rectal bleeding.  Physical Exam   Vitals & Measurements    T: 96.9   F (Tympanic)  HR: 108(Peripheral)  BP: 106/64  SpO2: 97%     HT: 75 in  WT: 231 lb  BMI: 28.87       Patient appears comfortable.  Alert and oriented.  Chest reveals chronically diminished breath sounds at the left base due to his paralyzed hemidiaphragm.  Cardiac exam is regular.  No pitting edema.  Abdomen is obese but nontender.  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  We will repeat CBC in a week or 2.         Ordered:          37276 transitional care manage service 7 day discharge (Charge), Quantity: 1, Right heart failure  Anticoagulated  Stage 3 chronic kidney disease  Acquired arteriovenous malformation of colon  BPH with urinary obstruction  Inguinal hernia, right                Anticoagulated (Z79.01)        Repeat INR in a week for INR 1.8 today.          Ordered:          70508 transitional care manage service 7 day discharge (Charge), Quantity: 1, Right heart failure  Anticoagulated  Stage 3 chronic kidney disease  Acquired arteriovenous malformation of colon  BPH with urinary obstruction  Inguinal hernia, right                BPH with urinary  obstruction (N40.1)         Workup in progress to see if he will be a candidate for TURP.         Ordered:          82191 transitional care manage service 7 day discharge (Charge), Quantity: 1, Right heart failure  Anticoagulated  Stage 3 chronic kidney disease  Acquired arteriovenous malformation of colon  BPH with urinary obstruction  Inguinal hernia, right                Inguinal hernia, right (K40.90)         Unchanged.         Ordered:          76457 transitional care manage service 7 day discharge (Charge), Quantity: 1, Right heart failure  Anticoagulated  Stage 3 chronic kidney disease  Acquired arteriovenous malformation of colon  BPH with urinary obstruction  Inguinal hernia, right                Right heart failure (I50.9)         Stable.         Ordered:          97775 transitional care manage service 7 day discharge (Charge), Quantity: 1, Right heart failure  Anticoagulated  Stage 3 chronic kidney disease  Acquired arteriovenous malformation of colon  BPH with urinary obstruction  Inguinal hernia, right                Stage 3 chronic kidney disease (N18.3)         Stable.  We will repeat the BMP in a week or 2.         Ordered:          06182 transitional care manage service 7 day discharge (Charge), Quantity: 1, Right heart failure  Anticoagulated  Stage 3 chronic kidney disease  Acquired arteriovenous malformation of colon  BPH with urinary obstruction  Inguinal hernia, right                Orders:         cephalexin, = 1 cap(s) ( 500 mg ), PO, QID, # 28 cap(s), 0 Refill(s), Type: Maintenance, Pharmacy: [facility], 1 cap(s) Oral qid,x7 day(s), (Completed)         Return to Clinic (Request), RFV: BMP and CBC, Return in 1-2 weeks  Patient Information     Name:ALEX JINN E      Address:      [address] [place] [id number]     Sex:Male     YOB: 1935     [phone]     Emergency Contact:GERA JIN     MRN:[id number]     FIN:[id number]      Location:[facility]     Date of Service:12/11/2018      Primary Care Physician:       Shravan Berrios MD, [phone]      Attending Physician:       Shravan Berrios MD, [phone]  Problem List/Past Medical History    Ongoing     Acquired arteriovenous malformation of colon       Comments: Treated.     Acute kidney injury (nontraumatic)     AF (Atrial Fibrillation)     Anemia of renal disease     Anticardiolipin syndrome     Anticoagulated     Ascites       Comments: Negative cytology 12/6/18     B12 deficiency     Benign hypertensive CKD     Benign prostatic hyperplasia (BPH) with urinary urgency     BPH with urinary obstruction     CAD (coronary artery disease)     Cardiorenal syndrome     CHB (complete heart block)     Chronic diastolic CHF (congestive heart failure), NYHA class 3     Depression     Diverticulosis     Dupuytren's contracture of right hand     Gout     H/O acute pancreatitis     High cholesterol     History of acute bacterial endocarditis     History of coronary artery bypass graft     History of GI bleed     History of tricuspid valve replacement     Hx of colonic polyp     Hypertensive HF (heart failure)     IBS (irritable bowel syndrome)     ICD (implantable cardioverter-defibrillator) infection     Inguinal hernia, right     Iron deficiency anemia     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     Nonischemic dilated cardiomyopathy     Obesity     KEYONA (obstructive sleep apnea)       Comments: Adequate titration to BIPAP 16/11 on 10/15/2013     Osteoarthritis of both knees     Paralysis, diaphragm       Comments: Left     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     PUD (peptic ulcer disease)     Right heart failure     Stage 3 chronic kidney disease     Tricuspid valve insufficiency     Vitamin B 12 deficiency    Historical     BE (bacterial endocarditis)     Colon polyps     History of sepsis      Inpatient stay       Comments: @[facility] - Jaw pain, possible anginal equivalent     Inpatient stay       Comments: @[facility] - Infected pacemaker     Inpatient stay       Comments: @[facility] - Diverticulitis     Inpatient stay       Comments: @[facility] - S/P tricuspid valve replacement with worsening right ventricular function     Inpatient stay       Comments: @[facility] - Severe symptomatic tricuspid valvular insufficiency. Chronic right heart failure.     Inpatient stay       Comments: @[facility] - Acute on chronic right heart failure     Inpatient stay       Comments: @[facility] - Anemia     Inpatient stay       Comments: @[facility], [place] - GI bleeding, suspected upper source     Other and Unspecified Noninfectious Gastroenteritis and Colitis  Procedure/Surgical History     Colonoscopy (12/07/2018)      Comments: Indication: [facility]      Sedation: fentanyl 50 mcg, Versed 1 mg      Findings: Diverticulosis, 2 small AVMs with bleeding in ascending treated with APC      Rec: Consider further workup based on tu to treatemetn.     Esophagogastroduodenoscopy and biopsy (12/07/2018)      Comments: Indication: [facility]      Sedatoin: Fentanyl 50 mcg, Versed 2 mg      findings: Negative with negative duodenal biopsy      Rec: Colonoscopy.     Abdominal paracentesis (12/06/2018)      Comments: Negative cytology.     Bone marrow biopsy (07/12/2017)     Colonoscopy (05/27/2017)      Comments: Cecal tubular adenoma, pandiverticulosis. w/polypectomy.     Esophagogastroduodenoscopy (05/27/2017)      Comments: gastritis, fundic gland polyps.     TVR - Tricuspid valve replacement (11/29/2016)     Cardiac angiogram (11/01/2016)      Comments: Summary/Conclusions      PRESENTATION / INDICATIONS        Pre-surgical evaluation for cardiac surgery        Severe TR by echo      DIAGNOSTIC - CORONARY        Co-dominant coronary artery system        The left main artery was normal in appearance and free of obstructive disease.        Chronic total  occlusion of the proximal LAD        The circumflex artery has minimal disease.        The RCA has moderate disease.        No change since 2013      DIAGNOSTIC - GRAFTS        Chronic total occlusion in the proximal anastomosis of the SVG graft to the RCA        The SVG to the ramus intermediate is patent        The sequential graft to the 1st diagonal is patent.        The sequential graft limb from the diagonal to the LAD is patent.        No change since 2013      HEMODYNAMICS        The LVEDP is mildly elevated        Severely elevated right atrial pressures        No evidence of intracardiac shunting.     Limited thoracotomy (08/23/2016)      Comments: Left mini thoracotomy and placement of two epicardial screw in leads.  Implant of left pectoral pacer generatoe..     Implantation of intravenous single chamber cardiac pacemaker system (08/04/2016)     Removal of automatic cardiac defibrillator (08/04/2016)      Comments: Generator and leads.     Pacemaker change (04/08/2015)     Cardiac angiogram (08/02/2013)      Comments: Summary/Conclusions      PRESENTATION / INDICATIONS      Chest pain      DIAGNOSTIC - CORONARY      Right dominant coronary artery system      DIAGNOSTIC SUMMARY      100% stenosis in the Proximal LAD      30% stenosis in the 1st Marginal      30% stenosis in the Proximal RCA      50% stenosis in the Mid RCA      100% stenosis in the Aorta graft to Distal RCA      DIAGNOSTIC - GRAFTS      The sequential graft to the diagonal branch is patent.      The sequential graft from the diagonal to the LAD is patent.      The SVG to the ramus intermediate is patent      The SVG to RCA is chronically occluded      HEMODYNAMICS      The LVEDP is mildly elevated      RECOMMENDATIONS & PLAN      Medical Rx- no significant change from previous angiogram, there is dramatic mismatch in size between the SVG's and the target arteries with slow filling of the LAD.     angiogrqam (02/03/2012)      Comments:  "Summary/Conclusions      PRESENTATION / INDICATIONS      Dyspnea and fatigue.      DIAGNOSTIC - CORONARY      Right dominant coronary artery system.      LMCA is normal.      LAD is occluded proximally after the 1st septal branch.      LCx has mild luminal irregularities.  OM1 has 20-30% proximal stenosis.      RCA has 30-40% diffuse disease in the mid segment and otherwise has mild diffuse luminal irregularities.      DIAGNOSTIC - GRAFTS      The sequential SVG to the diagonal and then LAD is widely patent.  There is a significant size mismatch between the graft and the native vessels.  The diagonal and distal LAD have no obvious lesions but are small caliber (< 2.0 mm vessels).  There is some backfilling into the mid LAD.      The SVG to the ramus is widely patent.  There is a significant size mismatch between the graft and the native vessel.  The ramus has no obvious lesion but is small caliber (< 2.0 mm vessel).        The SVG to the RCA is chronically occluded.      LEFT VENTRICULAR FUNCTION      Left ventricular function is mildly reduced with EF of 42% and mild global hypokinesis.  No significant MR.      HEMODYNAMICS      The LVEDP is 6 mmHg.  No significant gradient across the aortic valve.      RA 11, RV 29/10, PA 34/17 (mean 25), PCWP 18      Amarilys CO 5.1, TDCO 4.6      RECOMMENDATIONS & PLAN      Consider alternative etiology for symptoms.      Lasix dose decreased to 40 mg QD given relative hypotension and patient reporting feeling \"dry membranes.\".     Colonoscopy (12/15/2006)      Comments: Diverticulosis. No polyps..     Colonoscopy (06/22/2001)      Comments: hyperplastic polyp.     Cholecystectomy (06.2001)     CABG - Coronary artery bypass graft (2000)     History of Back Surgery (2000)     Colonoscopy (02/17/1993)      Comments: 1 adenoma, 1 hyperplastic, diverticulosis..     Left shoulder surgery (1991)     Ablation for atrial fibrillation      Comments: Subsequent pacemaker dependence..     " Cholecystectomy     Colonoscopy     Esophagogastroduodenoscopy  Medications        nitroglycerin 0.4 mg sublingual tablet: 0.4 mg, 1 tab(s), SL, q5min, not to exceed 3 doses/15 min--if pain persists, seek medical attention, 25 tab(s), PRN: for chest pain.        Protegra oral tablet: 1 tab(s), po, daily.        potassium chloride 20 mEq oral tablet, extended release: See Instructions, 3 tab BID, 30 unknown unit, 5 Refill(s).        acetaminophen 325 mg oral tablet: 650 mg, 2 tab(s), po, q4 hrs, not to exceed 4000 mg/day, 0 Refill(s).        Senokot 8.6 mg oral tablet: 1-2 tab(s), PO, Once a day (at bedtime), PRN: for constipation.        omeprazole 20 mg oral delayed release capsule: 20 mg, 1 cap(s), po, daily, 90 cap(s), 3 Refill(s).        furosemide 80 mg oral tablet: 80 mg, 1 tab(s), po, bid, Per Dr. Quintana on 3/7/2017, 0 Refill(s).        digoxin 125 mcg (0.125 mg) oral tablet: 125 mcg, 1 tab(s), po, daily, 0 Refill(s).        metOLazone 2.5 mg oral tablet: See Instructions, 1 tab(s) po every other daily for weight > 224#, 0 Refill(s).        Vitamin B12 1000 mcg/mL injectable solution: 1,000 mcg, im, qmonth, 0 Refill(s).        Metoprolol Succinate ER 25 mg oral tablet, extended release: See Instructions, TAKE ONE-HALF TABLET BY MOUTH TWICE DAILY, 30 unknown unit, 11 Refill(s).        atorvastatin 40 mg oral tablet: See Instructions, TAKE ONE TABLET BY MOUTH AT BEDTIME, 90 unknown unit.        predniSONE 20 mg oral tablet: See Instructions, 2 po daily for 3-5 days prn gout, 30 EA, 0 Refill(s).        mirtazapine 15 mg oral tablet: 15 mg, 1 tab(s), PO, Once a day (at bedtime), 30 tab(s), 5 Refill(s).        allopurinol 300 mg oral tablet: See Instructions, TAKE ONE TABLET BY MOUTH ONCE DAILY, 90 unknown unit, 3 Refill(s).        [id number] URINARY DRAIN BAG 2000ML MG BEAD: See Instructions, USE AS DIRECTED, 1 unknown unit.        clotrimazole 1% topical cream: 1 nazario, TOP, BID, for 7 day(s), 30 gm, 0 Refill(s).         MetroGel 1% topical gel: 1 nazario, Topical, daily, 1 tubes, 5 Refill(s).        tamsulosin 0.4 mg oral capsule: 0.4 mg, 1 cap(s), Oral, daily, 30 cap(s), 0 Refill(s).        warfarin 3 mg oral tablet: 3 mg, 1 tab(s), Oral, daily, for 90 day(s), 90 tab(s), 0 Refill(s).         Allergies    No Known Medication Allergies    adhesive tape  Social History    Smoking Status - 12/11/2018     Former smoker     Alcohol      Past, 03/30/2018     Employment and Education      Employed, Work/School description: [employer] 11/11/2010     Exercise and Physical Activity      Exercise type: Walking., 11/11/2010     Tobacco      Former smoker, quit more than 30 days ago, 08/23/2018  Family History    Aneurysm: Father.    Heart disease: Mother.  Immunizations      Vaccine Date Status      influenza virus vaccine, inactivated 11/27/2018 Given      influenza virus vaccine, inactivated 09/28/2015 Given      pneumococcal (PCV13) 04/21/2015 Given      influenza virus vaccine, inactivated 08/28/2014 Given      influenza virus vaccine, inactivated 10/29/2012 Given      influenza virus vaccine, inactivated 09/23/2011 Given      influenza virus vaccine, inactivated 10/26/2010 Given      influenza 10/23/2008 Recorded      Td 09/01/2005 Recorded      pneumococcal 11/28/2001 Recorded  Lab Results          Lab Results (Last 4 results within 90 days)           WBC TR: 5.8 x10^3/uL [None  - Few] (12/07/18 16:21:00)          RBC TR: 2.51 x10^6/uL [None  - Few] (12/07/18 16:21:00)          Hgb TR: 8 g/dL [None  - Few] (12/08/18 16:19:00)          Hgb TR: 7.8 g/dL [None  - Few] (12/07/18 16:21:00)          Hct TR: 25.2 % [None  - Few] (12/07/18 16:21:00)          MCV TR: 101 fL [None  - Few] (12/08/18 16:19:00)          MCV TR: 100 fL [None  - Few] (12/07/18 16:21:00)          MCH TR: 31.1 pg [None  - Few] (12/07/18 16:21:00)          MCHC TR: 31 gm/dL [None  - Few] (12/07/18 16:21:00)          RDW TR: 16.3 % [None  - Few] (12/07/18 16:21:00)           Platelet TR: 108 x10^3/uL [None  - Few] (12/07/18 16:21:00)          MPV (Mean Platelet Volume) TR: 10.7 fL [None  - Few] (12/07/18 16:21:00)          PT: 32.8 High [9  - 11.5] (12/04/18 16:01:00)          PT TR: 20.1 [None  - Few] (12/07/18 16:21:00)          INR: 3.4 High [None  - 5] (12/04/18 16:01:00)          INR: 2.9 [None  - 5] (10/31/18 10:19:00)          INR: 2.3 [None  - 5] (09/17/18 13:47:00)          INR TR: 1.8 [None  - Few] (12/07/18 16:21:00)          UA pH: 6.5 [5  - 8] (12/04/18 16:06:00)          UA Specific Gravity: 1.01 [1.001  - 1.035] (12/04/18 16:06:00)          UA Glucose: NEGATIVE [NEGATIVE  - NEGATIVE] (12/04/18 16:06:00)          UA Bilirubin: NEGATIVE [NEGATIVE  - NEGATIVE] (12/04/18 16:06:00)          UA Ketones: NEGATIVE [NEGATIVE  - NEGATIVE] (12/04/18 16:06:00)          Urine Occult Blood: 1+ Abnormal [NEGATIVE  - NEGATIVE] (12/04/18 16:06:00)          UA Protein: NEGATIVE [NEGATIVE  - NEGATIVE] (12/04/18 16:06:00)          UA Nitrite: POSITIVE Abnormal [NEGATIVE  - NEGATIVE] (12/04/18 16:06:00)          UA Leukocyte Esterase: 3+ Abnormal [NEGATIVE  - NEGATIVE] (12/04/18 16:06:00)          UA Epithelial Cells: Few [None  - Few] (12/04/18 16:18:00)          UA WBC: >100 [None  - 5] (12/04/18 16:18:00)          UA RBC: 0-2 [None  - 2] (12/04/18 16:18:00)          UA Bacteria: Few [None  - Few] (12/04/18 16:18:00)          Culture Urine: See comment Abnormal [1.001  - 1.035] (12/04/18 16:08:00)          Culture Urine: See comment Abnormal [1.001  - 1.035] (11/26/18 10:09:00)

## 2022-02-16 NOTE — NURSING NOTE
Comprehensive Intake Entered On:  9/18/2019 11:43 AM CDT    Performed On:  9/18/2019 11:33 AM CDT by Sabrina Lazar LPN               Summary   Chief Complaint :   Fluid in abdominal area, increasing the last two days. Concerns with unable to walk, weak.    Advance Directive :   Yes   Menstrual Status :   N/A   Ht/Wt Measurement Refused by Patient? :   Yes   Systolic Blood Pressure :   100 mmHg   Diastolic Blood Pressure :   48 mmHg (LOW)    Mean Arterial Pressure :   65 mmHg   Peripheral Pulse Rate :   64 bpm   BP Site :   Right arm   Pulse Site :   Radial artery   BP Method :   Manual   HR Method :   Manual   Temperature Tympanic :   99.5 DegF(Converted to: 37.5 DegC)    Sabrina Lazar LPN - 9/18/2019 11:33 AM CDT   Health Status   Allergies Verified? :   Yes   Medication History Verified? :   Yes   Pre-Visit Planning Status :   Completed   Sabrina Lazar LPN - 9/18/2019 11:33 AM CDT   Consents   Consent for Immunization Exchange :   Consent Granted   Consent for Immunizations to Providers :   Consent Granted   Sabrina Lazar LPN - 9/18/2019 11:33 AM CDT   Meds / Allergies   (As Of: 9/18/2019 11:43:41 AM CDT)   Allergies (Active)   adhesive tape  Estimated Onset Date:   Unspecified ; Created By:   Floridalma Calderon; Reaction Status:   Active ; Category:   Other ; Substance:   adhesive tape ; Type:   Allergy ; Updated By:   Floridalma Calderon; Reviewed Date:   8/15/2019 8:10 AM CDT      No Known Medication Allergies  Estimated Onset Date:   Unspecified ; Created By:   Ellen Parker MA; Reaction Status:   Active ; Category:   Drug ; Substance:   No Known Medication Allergies ; Type:   Allergy ; Updated By:   Ellen Parker MA; Reviewed Date:   8/15/2019 8:10 AM CDT        Medication List   (As Of: 9/18/2019 11:43:41 AM CDT)   Prescription/Discharge Order    midodrine  :   midodrine ; Status:   Prescribed ; Ordered As Mnemonic:   midodrine 2.5 mg oral tablet ; Simple Display Line:   5 mg, 2 tab(s), Oral, tid, for  30 day(s), 180 tab(s), 2 Refill(s) ; Ordering Provider:   Shravan Berrios MD; Catalog Code:   midodrine ; Order Dt/Tm:   9/5/2019 12:51:54 PM          spironolactone  :   spironolactone ; Status:   Prescribed ; Ordered As Mnemonic:   spironolactone 50 mg oral tablet ; Simple Display Line:   25 mg, 0.5 tab(s), Oral, bid, 30 tab(s), 2 Refill(s) ; Ordering Provider:   Shravan Berrios MD; Catalog Code:   spironolactone ; Order Dt/Tm:   9/5/2019 12:51:50 PM          digoxin  :   digoxin ; Status:   Prescribed ; Ordered As Mnemonic:   digoxin 125 mcg (0.125 mg) oral tablet ; Simple Display Line:   125 mcg, 1 tab(s), po, daily, 30 tab(s), 0 Refill(s) ; Ordering Provider:   Shravan Berrios MD; Catalog Code:   digoxin ; Order Dt/Tm:   9/5/2019 12:51:42 PM          famotidine  :   famotidine ; Status:   Prescribed ; Ordered As Mnemonic:   famotidine 20 mg oral tablet ; Simple Display Line:   1 tab(s), Oral, bid, 180 EA, 3 Refill(s) ; Ordering Provider:   Shravan Berrios MD; Catalog Code:   famotidine ; Order Dt/Tm:   7/11/2019 3:48:29 PM          fluticasone nasal  :   fluticasone nasal ; Status:   Prescribed ; Ordered As Mnemonic:   Flonase 50 mcg/inh nasal spray ; Simple Display Line:   2 spray(s), Nasal, daily, for 30 day(s), 1 EA, 0 Refill(s) ; Ordering Provider:   Shravan Berrios MD; Catalog Code:   fluticasone nasal ; Order Dt/Tm:   7/8/2019 8:23:11 AM          allopurinol  :   allopurinol ; Status:   Prescribed ; Ordered As Mnemonic:   allopurinol 300 mg oral tablet ; Simple Display Line:   300 mg, 1 tab(s), Oral, daily, for 90 day(s), 90 tab(s), 3 Refill(s) ; Ordering Provider:   Shravan Berrios MD; Catalog Code:   allopurinol ; Order Dt/Tm:   5/3/2019 4:58:18 PM          metoprolol  :   metoprolol ; Status:   Prescribed ; Ordered As Mnemonic:   Metoprolol Succinate ER 25 mg oral tablet, extended release ; Simple Display Line:   See Instructions, TAKE ONE-HALF TABLET BY MOUTH TWICE DAILY, 180 EA, 3 Refill(s) ;  Ordering Provider:   Shravan Berrios MD; Catalog Code:   metoprolol ; Order Dt/Tm:   5/3/2019 4:58:09 PM          atorvastatin  :   atorvastatin ; Status:   Prescribed ; Ordered As Mnemonic:   atorvastatin 10 mg oral tablet ; Simple Display Line:   10 mg, 1 tab(s), Oral, hs, 30 tab(s), 0 Refill(s) ; Ordering Provider:   Shravan Berrios MD; Catalog Code:   atorvastatin ; Order Dt/Tm:   5/3/2019 11:54:45 AM          mirtazapine 15 mg oral tablet  :   mirtazapine 15 mg oral tablet ; Status:   Prescribed ; Ordered As Mnemonic:   mirtazapine 15 mg oral tablet ; Simple Display Line:   1 tab(s), Oral, qhs, 30 unknown unit, 11 Refill(s) ; Ordering Provider:   Shravan Berrios MD; Catalog Code:   mirtazapine ; Order Dt/Tm:   4/18/2019 8:36:08 AM          metroNIDAZOLE topical  :   metroNIDAZOLE topical ; Status:   Prescribed ; Ordered As Mnemonic:   MetroGel 1% topical gel ; Simple Display Line:   1 nazario, Topical, daily, 1 tubes, 5 Refill(s) ; Ordering Provider:   Rissa Arauz MD; Catalog Code:   metroNIDAZOLE topical ; Order Dt/Tm:   6/29/2018 3:46:29 PM          clotrimazole topical  :   clotrimazole topical ; Status:   Prescribed ; Ordered As Mnemonic:   clotrimazole 1% topical cream ; Simple Display Line:   1 nazario, TOP, BID, for 7 day(s), 30 gm, 0 Refill(s) ; Ordering Provider:   Rissa Arauz MD; Catalog Code:   clotrimazole topical ; Order Dt/Tm:   6/22/2018 3:35:41 PM          Misc Prescription  :   Misc Prescription ; Status:   Prescribed ; Ordered As Mnemonic:   [id number] URINARY DRAIN BAG 2000ML MG BEAD ; Simple Display Line:   See Instructions, USE AS DIRECTED, 1 unknown unit ; Ordering Provider:   Shravan Berrios MD; Catalog Code:   Miscellaneous Prescription ; Order Dt/Tm:   6/4/2018 7:34:17 AM          predniSONE  :   predniSONE ; Status:   Prescribed ; Ordered As Mnemonic:   predniSONE 20 mg oral tablet ; Simple Display Line:   See Instructions, 2 po daily for 3-5 days prn gout, 30 EA, 0 Refill(s) ;  Ordering Provider:   Shravan Berrios MD; Catalog Code:   predniSONE ; Order Dt/Tm:   12/29/2017 10:06:41 AM          nitroglycerin  :   nitroglycerin ; Status:   Prescribed ; Ordered As Mnemonic:   nitroglycerin 0.4 mg sublingual tablet ; Simple Display Line:   0.4 mg, 1 tab(s), SL, q5min, not to exceed 3 doses/15 min--if pain persists, seek medical attention, 25 tab(s), PRN: for chest pain ; Ordering Provider:   Shravan Berrios MD; Catalog Code:   nitroglycerin ; Order Dt/Tm:   8/28/2014 11:35:30 AM            Home Meds    cyanocobalamin  :   cyanocobalamin ; Status:   Documented ; Ordered As Mnemonic:   Vitamin B12 1000 mcg/mL injectable solution ; Simple Display Line:   1,000 mcg, im, qmonth, 0 Refill(s) ; Catalog Code:   cyanocobalamin ; Order Dt/Tm:   7/24/2017 11:12:05 AM          furosemide  :   furosemide ; Status:   Documented ; Ordered As Mnemonic:   furosemide 80 mg oral tablet ; Simple Display Line:   40 mg, 0.5 tab(s), po, bid, Pt may take an additional 40 mg PRN for increased lower extremity edema per Dr Quintana 8/15/19, 0 Refill(s) ; Catalog Code:   furosemide ; Order Dt/Tm:   3/15/2017 3:19:58 PM          acetaminophen  :   acetaminophen ; Status:   Documented ; Ordered As Mnemonic:   acetaminophen 325 mg oral tablet ; Simple Display Line:   650 mg, 2 tab(s), po, q4 hrs, not to exceed 4000 mg/day, 0 Refill(s) ; Catalog Code:   acetaminophen ; Order Dt/Tm:   12/20/2016 1:58:24 PM          multivitamin  :   multivitamin ; Status:   Documented ; Ordered As Mnemonic:   Protegra oral tablet ; Simple Display Line:   1 tab(s), po, daily ; Catalog Code:   multivitamin ; Order Dt/Tm:   6/1/2015 8:38:03 AM

## 2022-02-16 NOTE — TELEPHONE ENCOUNTER
---------------------  From: Shravan Berrios MD   To: [facility]R Pool ( [address] [place] [address]);     Sent: 6/7/2019 1:01:58 PM CDT  Subject: Patient Message - Results Notification   Actions: Notify the patient with results       Patient in infusion center getting blood. INR 4.5. Have him hold Warfarin for one week. Repeat INR in one week.---------------------  From: Flavia Metzger RN (INR Pool ( [address] [place] [address]))   To: Shravan Berrios MD;     Sent: 6/7/2019 1:29:50 PM CDT  Subject: RE: Patient Message - Results Notification     Call to patient at 1328  I LVM on home phone with directions and asked for a call back to confirm he got the message---------------------  From: Flavia Metzger RN ([facility]R [facility] ( [address] [place] [address]))   To: Phone Messages Pool ([address] [place] [address]);     Sent: 6/7/2019 2:32:05 PM CDT  Subject: FW: Patient Message - Results Notification     If patient or wife calls back and is put through to the message center, please give them warfarin directions or notify INR staff of call right away.    Thank you!---------------------  From: Shravan Berrios MD   To: [facility]R [facility] ( [address] [place] [address]);     Sent: 6/7/2019 2:34:17 PM CDT  Subject: RE: Patient Message - Results Notification     You can reach him at the infusion center [id number]---------------------  From: Flavia Metzger RN (INR Pool ( [address] [place] [address]))   To: Shravan Berrios MD;     Sent: 6/7/2019 3:03:26 PM CDT  Subject: RE: Patient Message - Results Notification     I called the infusion center    Patient has been transferred to [facility]

## 2022-02-16 NOTE — NURSING NOTE
"Comprehensive Intake Entered On:  5/31/2019 4:10 PM CDT    Performed On:  5/31/2019 4:00 PM CDT by Andrew Bailon CMA               Summary   Chief Complaint :   Pt here for a 2 week FU on his Blood Transfusions.  Pt states he is \"blowing up with fluid\"   Advance Directive :   Yes   Menstrual Status :   N/A   Weight Measured :   218 lb(Converted to: 218 lb 0 oz, 98.88 kg)    Height Measured :   75 in(Converted to: 6 ft 3 in, 190.50 cm)    Body Mass Index :   27.25 kg/m2 (HI)    Body Surface Area :   2.29 m2   Systolic Blood Pressure :   96 mmHg   Diastolic Blood Pressure :   62 mmHg   Mean Arterial Pressure :   73 mmHg   Peripheral Pulse Rate :   80 bpm   BP Site :   Right arm   Pulse Site :   Radial artery   Temperature Tympanic :   97.1 DegF(Converted to: 36.2 DegC)  (LOW)    Respiratory Rate :   20 br/min   Oxygen Saturation :   95 %   Andrew Bailon CMA - 5/31/2019 4:00 PM CDT   Health Status   Allergies Verified? :   Yes   Medication History Verified? :   Yes   Medical History Verified? :   Yes   Pre-Visit Planning Status :   Not completed   Tobacco Use? :   Former smoker   Andrew Bailon CMA - 5/31/2019 4:00 PM CDT   Meds / Allergies   (As Of: 5/31/2019 4:10:52 PM CDT)   Allergies (Active)   adhesive tape  Estimated Onset Date:   Unspecified ; Created By:   Floridalma Calderon; Reaction Status:   Active ; Category:   Other ; Substance:   adhesive tape ; Type:   Allergy ; Updated By:   Floridalma Calderon; Reviewed Date:   5/31/2019 4:08 PM CDT      No Known Medication Allergies  Estimated Onset Date:   Unspecified ; Created By:   Ellen Parker MA; Reaction Status:   Active ; Category:   Drug ; Substance:   No Known Medication Allergies ; Type:   Allergy ; Updated By:   Ellen Parker MA; Reviewed Date:   5/31/2019 4:08 PM CDT        Medication List   (As Of: 5/31/2019 4:10:52 PM CDT)   Prescription/Discharge Order    allopurinol  :   allopurinol ; Status:   Prescribed ; Ordered As Mnemonic:   allopurinol 300 mg oral tablet ; " Simple Display Line:   300 mg, 1 tab(s), Oral, daily, for 90 day(s), 90 tab(s), 3 Refill(s) ; Ordering Provider:   Shravan Berrios MD; Catalog Code:   allopurinol ; Order Dt/Tm:   5/3/2019 4:58:18 PM          metoprolol  :   metoprolol ; Status:   Prescribed ; Ordered As Mnemonic:   Metoprolol Succinate ER 25 mg oral tablet, extended release ; Simple Display Line:   See Instructions, TAKE ONE-HALF TABLET BY MOUTH TWICE DAILY, 180 EA, 3 Refill(s) ; Ordering Provider:   Shravan Berrios MD; Catalog Code:   metoprolol ; Order Dt/Tm:   5/3/2019 4:58:09 PM          atorvastatin  :   atorvastatin ; Status:   Prescribed ; Ordered As Mnemonic:   atorvastatin 10 mg oral tablet ; Simple Display Line:   10 mg, 1 tab(s), Oral, hs, 30 tab(s), 0 Refill(s) ; Ordering Provider:   Shravan Berrios MD; Catalog Code:   atorvastatin ; Order Dt/Tm:   5/3/2019 11:54:45 AM          warfarin  :   warfarin ; Status:   Prescribed ; Ordered As Mnemonic:   warfarin 3 mg oral tablet ; Simple Display Line:   See Instructions, 3mg T, TH, S Yung. 1.5 mg MWF, 100 EA, 3 Refill(s) ; Ordering Provider:   Shravan Berrios MD; Catalog Code:   warfarin ; Order Dt/Tm:   4/23/2019 7:29:25 PM          mirtazapine 15 mg oral tablet  :   mirtazapine 15 mg oral tablet ; Status:   Prescribed ; Ordered As Mnemonic:   mirtazapine 15 mg oral tablet ; Simple Display Line:   1 tab(s), Oral, qhs, 30 unknown unit, 11 Refill(s) ; Ordering Provider:   Shravan Berrios MD; Catalog Code:   mirtazapine ; Order Dt/Tm:   4/18/2019 8:36:08 AM          tamsulosin  :   tamsulosin ; Status:   Prescribed ; Ordered As Mnemonic:   tamsulosin 0.4 mg oral capsule ; Simple Display Line:   0.4 mg, 1 cap(s), Oral, daily, 30 cap(s), 0 Refill(s) ; Ordering Provider:   Shravan Berrios MD; Catalog Code:   tamsulosin ; Order Dt/Tm:   7/20/2018 2:46:14 PM          metroNIDAZOLE topical  :   metroNIDAZOLE topical ; Status:   Prescribed ; Ordered As Mnemonic:   MetroGel 1% topical gel ; Simple  Display Line:   1 nazario, Topical, daily, 1 tubes, 5 Refill(s) ; Ordering Provider:   Rissa Arauz MD; Catalog Code:   metroNIDAZOLE topical ; Order Dt/Tm:   6/29/2018 3:46:29 PM          clotrimazole topical  :   clotrimazole topical ; Status:   Prescribed ; Ordered As Mnemonic:   clotrimazole 1% topical cream ; Simple Display Line:   1 nazario, TOP, BID, for 7 day(s), 30 gm, 0 Refill(s) ; Ordering Provider:   Rissa Arauz MD; Catalog Code:   clotrimazole topical ; Order Dt/Tm:   6/22/2018 3:35:41 PM          Misc Prescription  :   Misc Prescription ; Status:   Prescribed ; Ordered As Mnemonic:   [id number] URINARY DRAIN BAG 2000ML MG BEAD ; Simple Display Line:   See Instructions, USE AS DIRECTED, 1 unknown unit ; Ordering Provider:   Shravan Berrios MD; Catalog Code:   Miscellaneous Prescription ; Order Dt/Tm:   6/4/2018 7:34:17 AM          predniSONE  :   predniSONE ; Status:   Prescribed ; Ordered As Mnemonic:   predniSONE 20 mg oral tablet ; Simple Display Line:   See Instructions, 2 po daily for 3-5 days prn gout, 30 EA, 0 Refill(s) ; Ordering Provider:   Shravan Berrios MD; Catalog Code:   predniSONE ; Order Dt/Tm:   12/29/2017 10:06:41 AM          omeprazole  :   omeprazole ; Status:   Prescribed ; Ordered As Mnemonic:   omeprazole 20 mg oral delayed release capsule ; Simple Display Line:   20 mg, 1 cap(s), po, daily, 90 cap(s), 3 Refill(s) ; Ordering Provider:   Shravan Berrios MD; Catalog Code:   omeprazole ; Order Dt/Tm:   1/9/2017 12:26:06 PM          potassium chloride 20 mEq oral tablet, extended release  :   potassium chloride 20 mEq oral tablet, extended release ; Status:   Prescribed ; Ordered As Mnemonic:   potassium chloride 20 mEq oral tablet, extended release ; Simple Display Line:   See Instructions, 2 tab BID, 30 unknown unit, 5 Refill(s) ; Ordering Provider:   Shravan Berrios MD; Catalog Code:   potassium chloride ; Order Dt/Tm:   4/13/2016 11:25:17 AM          nitroglycerin  :    nitroglycerin ; Status:   Prescribed ; Ordered As Mnemonic:   nitroglycerin 0.4 mg sublingual tablet ; Simple Display Line:   0.4 mg, 1 tab(s), SL, q5min, not to exceed 3 doses/15 min--if pain persists, seek medical attention, 25 tab(s), PRN: for chest pain ; Ordering Provider:   Shravan Berrios MD; Catalog Code:   nitroglycerin ; Order Dt/Tm:   8/28/2014 11:35:30 AM            Home Meds    ferrous sulfate  :   ferrous sulfate ; Status:   Documented ; Ordered As Mnemonic:   ferrous sulfate ; Simple Display Line:   Oral, 0 Refill(s) ; Catalog Code:   ferrous sulfate ; Order Dt/Tm:   4/30/2019 2:48:23 PM          spironolactone  :   spironolactone ; Status:   Documented ; Ordered As Mnemonic:   spironolactone 50 mg oral tablet ; Simple Display Line:   50 mg, 1 tab(s), Oral, bid, increased to 50 mg BID per Dr Quintana 3/21/19, 0 Refill(s) ; Catalog Code:   spironolactone ; Order Dt/Tm:   3/1/2019 3:52:14 PM          cyanocobalamin  :   cyanocobalamin ; Status:   Documented ; Ordered As Mnemonic:   Vitamin B12 1000 mcg/mL injectable solution ; Simple Display Line:   1,000 mcg, im, qmonth, 0 Refill(s) ; Catalog Code:   cyanocobalamin ; Order Dt/Tm:   7/24/2017 11:12:05 AM          digoxin  :   digoxin ; Status:   Documented ; Ordered As Mnemonic:   digoxin 125 mcg (0.125 mg) oral tablet ; Simple Display Line:   125 mcg, 1 tab(s), po, daily, 0 Refill(s) ; Catalog Code:   digoxin ; Order Dt/Tm:   4/19/2017 10:47:22 AM          furosemide  :   furosemide ; Status:   Documented ; Ordered As Mnemonic:   furosemide 80 mg oral tablet ; Simple Display Line:   40 mg, 0.5 tab(s), po, bid, 0 Refill(s) ; Catalog Code:   furosemide ; Order Dt/Tm:   3/15/2017 3:19:58 PM          senna  :   senna ; Status:   Documented ; Ordered As Mnemonic:   Senokot 8.6 mg oral tablet ; Simple Display Line:   1-2 tab(s), PO, Once a day (at bedtime), PRN: for constipation ; Catalog Code:   senna ; Order Dt/Tm:   12/20/2016 2:00:45 PM           acetaminophen  :   acetaminophen ; Status:   Documented ; Ordered As Mnemonic:   acetaminophen 325 mg oral tablet ; Simple Display Line:   650 mg, 2 tab(s), po, q4 hrs, not to exceed 4000 mg/day, 0 Refill(s) ; Catalog Code:   acetaminophen ; Order Dt/Tm:   12/20/2016 1:58:24 PM          multivitamin  :   multivitamin ; Status:   Documented ; Ordered As Mnemonic:   Protegra oral tablet ; Simple Display Line:   1 tab(s), po, daily ; Catalog Code:   multivitamin ; Order Dt/Tm:   6/1/2015 8:38:03 AM            Social History   Social History   (As Of: 5/31/2019 4:10:52 PM CDT)   Alcohol:        Past   (Last Updated: 3/30/2018 8:33:55 AM CDT by Michelle Sykes)          Tobacco:        Former smoker, quit more than 30 days ago   Comments:  8/23/2018 11:02 AM - Andrew Bailon CMA: Pt quit 2/5/1961   (Last Updated: 8/23/2018 11:02:21 AM CDT by Andrew Bailon CMA)          Employment and Education:        Employed, Work/School description: Farmer.   (Last Updated: 11/11/2010 8:59:24 AM CST by Neelima Lincoln)          Exercise and Physical Activity:        Exercise type: Walking.   (Last Updated: 11/11/2010 8:59:13 AM CST by Neelima Lincoln)

## 2022-02-16 NOTE — NURSING NOTE
Patient presents for change of indwelling Child catheter per Clement Allen. Indwelling Fr 18 Child cath is discontinued after deflating 10 cc balloon. A Danish 18 Child with a 10 cc balloon is placed using sterile procedure per Dr. Allen after triple cleanse of the glans penis with Betadine for a diagnosis of urine retention.  Received a good flow of clear yellow urine via new patent catheter.  The fore skin is replaced.  Catheter tubing is secured with a stat lock and new drainage bag with extension is placed.  Patient will observe for S& Sx of UTI and drink ample fluids. Be seen if any concerns.

## 2022-02-16 NOTE — TELEPHONE ENCOUNTER
---------------------  From: Shravan Berrios MD   To: [facility] [facility] Message Pool ([id number] [facility] [place]);     Sent: 5/13/2019 8:35:50 AM CDT  Subject: RE: General Message     I would like to see him 6-8 weeks from now.      ---------------------  From: Madeline Valentine MA ([facility] ([id number]))   To: Shravan Berrios MD;     Sent: 5/13/2019 7:54:48 AM CDT  Subject: FW: General Message           ---------------------  From: Khadra Sinhg   To: [employer] Message Pool ([id number]); Khadra Singh;     Sent: 5/10/2019 11:31:46 AM CDT  Subject: General Message     Reminder dated 3/29 for pt to follow-up in 6-8 weeks for CHF.  Pt seen 4/30 and noted CHF stable.  Also seen 5/3.  Wondering if this reminder is now complete?    Thank you,  Khadra---------------------  From: Madeline Valentine MA ([employer] Message Pool ([id number]))   To: Khadra Singh;     Sent: 5/13/2019 10:04:07 AM CDT  Subject: FW: General Message

## 2022-02-16 NOTE — TELEPHONE ENCOUNTER
---------------------  From: Flavia Metzger RN (Phone Messages Pool ([id number]))   To: [facility] Message Pool ([address] [place] [address]);     Sent: 6/24/2019 12:21:39 PM CDT  Subject: Low BP's     Phone message    PCP:   RIAN      Time of Call:  1201       Person Calling:  Lee Ann from [facility]  Phone number:  [phone]    Returned call at: _    Note:   Patient has been having low BP's for a few days and not feeling well. Yesterday readings: 78/44, 81/47, 80/43 and today so far: 90/55.    Lee Ann wondering if [facility] wants to adjust any BP meds for patient.    Please advise.---------------------  From: Radha Juan ([facility] Message Pool ([facility]))   To: Shravan Berrios MD;     Sent: 6/24/2019 12:59:04 PM CDT  Subject: FW: Low BP's---------------------  From: Shravan Berrios MD   To: [facility] Message Pool ([facility]);     Sent: 6/24/2019 3:39:00 PM CDT  Subject: RE: Low BP's     What is his weight? Is it up or down? Hold Furosemide, Spironolactone, and Tamsulosin.LVMTCB.---------------------  From: Laura Patrick LPN (Phone Messages Pool ([address] [place] [address]))   To: [facility] Message Pool ([address] [place] [address]);     Sent: 6/25/2019 9:42:03 AM CDT  Subject: FW: Low BP's     Called and gave Lee Ann with [facility] message from [facility].  She would like to verify that [facility] wants to hold furosemide, spironolactone, and Tamsulosin if patient had +3 pitting edema yesterday when she visited.   She states that since she has been seeing him his weights have been decreasing but she did not have exact weight with her from yesterdays visit.  Please advise.---------------------  From: Adrianne Max ([employer] [facility] ([id number]_[place]))   To: Shravan Berrios MD;     Sent: 6/25/2019 10:48:33 AM CDT  Subject: FW: Low BP's---------------------  From: Shravan Berrios MD   To: [facility] ([id number]_[place]);     Sent: 6/25/2019 11:17:59 AM CDT  Subject: RE: Low BP's      Continue Spironolactone and Furosemide but reduce both by 50%. Stop Tamsulosin. I would like her to find out his weight and the trend.Notified Lee Ann by phone call. Verbalized understanding and will report back with weight when she sees pt on 6/27.

## 2022-02-16 NOTE — TELEPHONE ENCOUNTER
Entered by Idalia Carson CMA on August 19, 2019 5:59:01 PM CDT  ---------------------  From: Idalia Carson CMA   To: [facility]    Sent: 8/19/2019 5:59:01 PM CDT  Subject: Medication Management     ** Not Approved: Patient has requested refill too soon **  midodrine (MIDODRINE    TAB 5MG TABLET)  TAKE 2 TABLETS BY MOUTH 3 TIMES A DAY  Qty:  180 unknown unit        Days Supply:  0        Refills:  0          Substitutions Allowed     Route To Pharmacy - [facility]   Signed by Idalia Carson CMA            ** Not Approved: duplicate **  midodrine (MIDODRINE    TAB 5MG TABLET)  TAKE 2 TABLETS BY MOUTH 3 TIMES A DAY  Qty:  180 unknown unit        Days Supply:  0        Refills:  0          Substitutions Allowed     Route To Pharmacy - [facility]   Signed by Idalia Carson CMA            ** Patient matched by Idalia Carson CMA on 8/19/2019 5:58:08 PM CDT **      ------------------------------------------  From: [facility]  To: Shravan Berrios MD  Sent: August 19, 2019 9:24:19 AM CDT  Subject: Medication Management  Due: August 20, 2019 9:24:19 AM CDT    ** On Hold Pending Signature **  Drug: midodrine (midodrine 5 mg oral tablet)  TAKE 2 TABLETS BY MOUTH 3 TIMES A DAY  Quantity: 180 unknown unit Days Supply: 0         Refills: 0  Substitutions Allowed  Notes from Pharmacy:     Dispensed Drug: midodrine (midodrine 5 mg oral tablet)  TAKE 2 TABLETS BY MOUTH 3 TIMES A DAY  Quantity: 180 unknown unit Days Supply: 0         Refills: 0  Substitutions Allowed  Notes from Pharmacy:     ** On Hold Pending Signature **  Drug: midodrine (midodrine 5 mg oral tablet)  TAKE 2 TABLETS BY MOUTH 3 TIMES A DAY  Quantity: 180 unknown unit Days Supply: 0         Refills: 0  Substitutions Allowed  Notes from Pharmacy:     Dispensed Drug: midodrine (midodrine 5 mg oral tablet)  TAKE 2 TABLETS BY MOUTH 3 TIMES A DAY  Quantity: 180 unknown unit Days Supply: 0         Refills: 0  Substitutions Allowed  Notes from Pharmacy:    ------------------------------------------

## 2022-02-16 NOTE — TELEPHONE ENCOUNTER
---------------------From: Domenica CANTU, Flavia MERCADO (INR Pool ( [id number])) To: [facility] Message Pool ([id number]);   Sent: 1/25/2019 3:33:40 PM CSTSubject: INR needed at next appointment Patient had therapeutic INR today and per protocol was advised to stay on same dose of warfarin and recheck in 1 week.Patient has appt with [facility] for pre-op on 2/1/19 and would like to have INR done at that time.---------------------From: Ellen Parker MA ([facility] Message Pool ([id number])) To: Shravan Berrios MD;   Sent: 1/28/2019 6:57:14 AM CSTSubject: FW: INR needed at next appointment---------------------From: Shravan Berrios MD To: [facility] Message Pool ([id number]);   Sent: 1/28/2019 8:51:21 AM CSTSubject: RE: INR needed at next appointment ok

## 2022-02-16 NOTE — RESULTS
Anticoagulation Therapy Entered On:  3/18/2019 10:58 AM CDT    Performed On:  3/18/2019 10:15 AM CDT by Mackenzie Quintana RN               Warfarin Management   Week 1 Sunday Dose :   1.5    Week 1 Monday Dose :   3    Week 1 Tuesday Dose :   1.5    Week 1 Wednesday Dose :   1.5    Week 1 Thursday Dose :   3    Week 1 Friday Dose :   1.5    Week 1 Saturday Dose :   1.5    Week 1 Dose Total :   13.5    Week 2 Sunday Dose :   1.5    Week 2 Monday Dose :   3    Week 2 Tuesday Dose :   1.5    Week 2 Wednesday Dose :   1.5    Week 2 Thursday Dose :   3    Week 2 Friday Dose :   1.5    Week 2 Saturday Dose :   1.5    Week 2 Dose Total :   13.5    Anticoagulation Goal :    Lab Test performed by:  [facility] Office  [address] [place] [id number]   Phone # [phone]  Fax # [phone]  Lab Draw   Planned Duration :   Indefinite   Indication :   Atrial fibrillation   Warfarin Management Comments :   [facility] Office  [address], [address]  [phone]  Capillary Specimen     International Normalization Ratio :   1.8    INR Range :   2.0 - 3.0   INR Therapeutic Range :   Yes   Anticoagulation Recheck :   2 weeks   Warfarin Physician :   Shravan Berrios MD   Warfarin Special Instructions :   INR = 1.8 per POC Patient is to stay on 3mg warfarin on Mon and Thurs and 1.5mg the rest of the days of the week Recheck INR in 2 weeks per JDL Patient advised in call to cell.   Mackenzie Quintana RN - 3/18/2019 10:56 AM CDT

## 2022-02-16 NOTE — NURSING NOTE
Vital Signs Entered On:  8/6/2019 12:55 PM CDT    Performed On:  8/6/2019 12:54 PM CDT by Suha Carbajal               Vital Signs   Systolic Blood Pressure :   92 mmHg   Diastolic Blood Pressure :   61 mmHg   Mean Arterial Pressure :   71 mmHg   Peripheral Pulse Rate :   99 bpm   Suha Carbajal - 8/6/2019 12:54 PM CDT

## 2022-02-16 NOTE — PROGRESS NOTES
Chief Complaint    Hospital f/u  History of Present Illness      Vicki was hospitalized April 30 to the second transfused 2 units of blood with a hemoglobin of 8.5 at discharge.  Melena resolved.  I spoke with Dr. Joyner his hematologist today and he would like to see him Friday.  Patient feels better after the transfusion.  No abdominal pain nausea vomiting.  No increased dyspnea or edema.  No angina.  Review of Systems      No other bleeding.  No confusion.  Weight has been trending down.  Physical Exam   Vitals & Measurements    HR: 74(Peripheral)  BP: 100/66  SpO2: 98%     HT: 75 in  WT: 214 lb       Patient appears comfortable.  Alert and oriented.  Not tachypneic.  Sclera anicteric.  Chest clear.  Cardiac exam is regular.  Device right chest.  Abdomen obese nontender.  Large inguinal hernia.  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  Follow-up hematology.  CBC next week.                AF (Atrial Fibrillation) (I48.2)         Stable.  INR next week.                Cirrhosis (K74.60)         Due to chronic right heart failure                Iron deficiency anemia (D50.9)         Recent transfusion.         Ordered:          Referral (Request), 05/03/19 16:55:00 CDT, Referred to: Hematology, Referred to: Anya, Reason for referral: Anemia, Additional instructions: I spoke with Dr. Joyner today and he wants to see the pateint 5/10/19, Vitamin B 12 deficiency  Iron deficiency anemia                Right heart failure (I50.9)         Stable.                Stage 3 chronic kidney disease (N18.3)        Has been taking furosemide 20 mg daily and I suggested decreasing that to 80 mg in divided doses daily.  On twice daily spironolactone.  The NP next week.                Vitamin B 12 deficiency (E53.8)          Ordered:          Referral (Request), 05/03/19 16:55:00 CDT, Referred to: Hematology, Referred to: Anya, Reason for referral: Anemia, Additional instructions: I spoke with Dr. Joyner today and he wants to see the pateint 5/10/19, Vitamin B 12 deficiency  Iron deficiency anemia                Orders:         allopurinol, = 1 tab(s) ( 300 mg ), Oral, daily, # 90 tab(s), 3 Refill(s), Type: Soft Stop, Pharmacy: [facility], 1 tab(s) Oral daily,x90 day(s), (Ordered)         atorvastatin, = 1 tab(s) ( 10 mg ), Oral, hs, # 30 tab(s), 0 Refill(s), Type: Maintenance, Pharmacy: [facility], Dose change per hospital discharge from [facility] 05/02/19, 1 tab(s) Oral hs, (Ordered)         metoprolol, See Instructions, Instructions: TAKE ONE-HALF TABLET BY MOUTH TWICE DAILY, # 180 EA, 3 Refill(s), Type: Soft Stop, Pharmacy: [facility], TAKE ONE-HALF TABLET BY MOUTH TWICE DAILY, (Ordered)         potassium chloride, See Instructions, Instructions: 2 tab BID, # 30 unknown unit, 5 Refill(s), Type: Soft Stop, Pharmacy: [facility], (Ordered)         warfarin, See Instructions, Instructions: 3mg T, TH, S Yung. 1.5 mg MWF, # 100 EA, 3 Refill(s), Type: Soft Stop, Pharmacy: [facility], (Ordered)         Return to Clinic (Request), RFV: BMP with next INR         Return to Clinic (Request), RFV: Waiting INR, BMP, CBC., Return in 5/8/19, Instructions: Copy to Dr. Joyner         Return to Clinic (Request), Return in 3 weeks  Patient Information     Name:JACINTO JIN      Address:      [address] [place] [id number]     Sex:Male     YOB: 1935     [phone]     Emergency Contact:GERA JIN     MRN:[id number]     FIN:[id number]     Location:[facility]     Date of Service:05/03/2019      Primary Care Physician:       Shravan Berrios MD, [phone]      Attending Physician:       Shravan Berrios MD, [phone]  Problem List/Past Medical History    Ongoing     Acquired  arteriovenous malformation of colon       Comments: Treated.     Acute kidney injury (nontraumatic)     AF (Atrial Fibrillation)     Anemia of renal disease     Anticardiolipin syndrome     Anticoagulated     Ascites       Comments: Negative cytology 12/6/18     B12 deficiency     BPH with urinary obstruction     CAD (coronary artery disease)     Cardiorenal syndrome     CHB (complete heart block)     Chronic diastolic CHF (congestive heart failure), NYHA class 3     Cirrhosis     Depression     Diverticulosis     Dupuytren's contracture of right hand     Gout     H/O acute pancreatitis     High cholesterol     History of acute bacterial endocarditis     History of coronary artery bypass graft     History of GI bleed     History of tricuspid valve replacement     Hx of colonic polyp     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     IBS (irritable bowel syndrome)     ICD (implantable cardioverter-defibrillator) infection     Inguinal hernia, right     Iron deficiency anemia     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     Nonischemic dilated cardiomyopathy     Obesity     KEYONA (obstructive sleep apnea)       Comments: Adequate titration to BIPAP 16/11 on 10/15/2013     Osteoarthritis of both knees     Paralysis, diaphragm       Comments: Left     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     PUD (peptic ulcer disease)     Right heart failure     Stage 3 chronic kidney disease     Tricuspid valve insufficiency     Vitamin B 12 deficiency    Historical     BE (bacterial endocarditis)     Colon polyps     History of sepsis     Inpatient stay       Comments: @[facility] - Jaw pain, possible anginal equivalent     Inpatient stay       Comments: @[facility] - Infected pacemaker     Inpatient stay       Comments: @[facility] - Diverticulitis     Inpatient stay       Comments: @[facility] - S/P tricuspid valve replacement with worsening right ventricular  function     Inpatient stay       Comments: @[facility] - Severe symptomatic tricuspid valvular insufficiency. Chronic right heart failure.     Inpatient stay       Comments: @[facility] - Acute on chronic right heart failure     Inpatient stay       Comments: @[facility] - Anemia     Inpatient stay       Comments: @[facility], [place] - GI bleeding, suspected upper source     Other and Unspecified Noninfectious Gastroenteritis and Colitis  Procedure/Surgical History     Esophagogastroduodenoscopy (02/12/2019)     Colonoscopy (12/07/2018)      Comments: Indication: GIB      Sedation: fentanyl 50 mcg, Versed 1 mg      Findings: Diverticulosis, 2 small AVMs with bleeding in ascending treated with APC      Rec: Consider further workup based on resgabriele to treatemetn.     Esophagogastroduodenoscopy and biopsy (12/07/2018)      Comments: Indication: GIB      Sedatoin: Fentanyl 50 mcg, Versed 2 mg      findings: Negative with negative duodenal biopsy      Rec: Colonoscopy.     Abdominal paracentesis (12/06/2018)      Comments: Negative cytology.     Bone marrow biopsy (07/12/2017)     Colonoscopy (05/27/2017)      Comments: Cecal tubular adenoma, pandiverticulosis. w/polypectomy.     Esophagogastroduodenoscopy (05/27/2017)      Comments: gastritis, fundic gland polyps.     TVR - Tricuspid valve replacement (11/29/2016)     Cardiac angiogram (11/01/2016)      Comments: Summary/Conclusions      PRESENTATION / INDICATIONS        Pre-surgical evaluation for cardiac surgery        Severe TR by echo      DIAGNOSTIC - CORONARY        Co-dominant coronary artery system        The left main artery was normal in appearance and free of obstructive disease.        Chronic total occlusion of the proximal LAD        The circumflex artery has minimal disease.        The RCA has moderate disease.        No change since 2013      DIAGNOSTIC - GRAFTS        Chronic total occlusion in the proximal anastomosis of the SVG graft to the RCA         The SVG to the ramus intermediate is patent        The sequential graft to the 1st diagonal is patent.        The sequential graft limb from the diagonal to the LAD is patent.        No change since 2013      HEMODYNAMICS        The LVEDP is mildly elevated        Severely elevated right atrial pressures        No evidence of intracardiac shunting.     Limited thoracotomy (08/23/2016)      Comments: Left mini thoracotomy and placement of two epicardial screw in leads.  Implant of left pectoral pacer generatoe..     Implantation of intravenous single chamber cardiac pacemaker system (08/04/2016)     Removal of automatic cardiac defibrillator (08/04/2016)      Comments: Generator and leads.     Pacemaker change (04/08/2015)     Cardiac angiogram (08/02/2013)      Comments: Summary/Conclusions      PRESENTATION / INDICATIONS      Chest pain      DIAGNOSTIC - CORONARY      Right dominant coronary artery system      DIAGNOSTIC SUMMARY      100% stenosis in the Proximal LAD      30% stenosis in the 1st Marginal      30% stenosis in the Proximal RCA      50% stenosis in the Mid RCA      100% stenosis in the Aorta graft to Distal RCA      DIAGNOSTIC - GRAFTS      The sequential graft to the diagonal branch is patent.      The sequential graft from the diagonal to the LAD is patent.      The SVG to the ramus intermediate is patent      The SVG to RCA is chronically occluded      HEMODYNAMICS      The LVEDP is mildly elevated      RECOMMENDATIONS & PLAN      Medical Rx- no significant change from previous angiogram, there is dramatic mismatch in size between the SVG's and the target arteries with slow filling of the LAD.     angiogrqam (02/03/2012)      Comments: Summary/Conclusions      PRESENTATION / INDICATIONS      Dyspnea and fatigue.      DIAGNOSTIC - CORONARY      Right dominant coronary artery system.      LMCA is normal.      LAD is occluded proximally after the 1st septal branch.      LCx has mild luminal  "irregularities.  OM1 has 20-30% proximal stenosis.      RCA has 30-40% diffuse disease in the mid segment and otherwise has mild diffuse luminal irregularities.      DIAGNOSTIC - GRAFTS      The sequential SVG to the diagonal and then LAD is widely patent.  There is a significant size mismatch between the graft and the native vessels.  The diagonal and distal LAD have no obvious lesions but are small caliber (< 2.0 mm vessels).  There is some backfilling into the mid LAD.      The SVG to the ramus is widely patent.  There is a significant size mismatch between the graft and the native vessel.  The ramus has no obvious lesion but is small caliber (< 2.0 mm vessel).        The SVG to the RCA is chronically occluded.      LEFT VENTRICULAR FUNCTION      Left ventricular function is mildly reduced with EF of 42% and mild global hypokinesis.  No significant MR.      HEMODYNAMICS      The LVEDP is 6 mmHg.  No significant gradient across the aortic valve.      RA 11, RV 29/10, PA 34/17 (mean 25), PCWP 18      Amarilys CO 5.1, TDCO 4.6      RECOMMENDATIONS & PLAN      Consider alternative etiology for symptoms.      Lasix dose decreased to 40 mg QD given relative hypotension and patient reporting feeling \"dry membranes.\".     Colonoscopy (12/15/2006)      Comments: Diverticulosis. No polyps..     Colonoscopy (06/22/2001)      Comments: hyperplastic polyp.     Cholecystectomy (06.2001)     CABG - Coronary artery bypass graft (2000)     History of Back Surgery (2000)     Colonoscopy (02/17/1993)      Comments: 1 adenoma, 1 hyperplastic, diverticulosis..     Left shoulder surgery (1991)     Ablation for atrial fibrillation      Comments: Subsequent pacemaker dependence..     Cholecystectomy     Colonoscopy     Esophagogastroduodenoscopy  Medications        nitroglycerin 0.4 mg sublingual tablet: 0.4 mg, 1 tab(s), SL, q5min, not to exceed 3 doses/15 min--if pain persists, seek medical attention, 25 tab(s), PRN: for chest pain.       "  Protegra oral tablet: 1 tab(s), po, daily.        potassium chloride 20 mEq oral tablet, extended release: See Instructions, 2 tab BID, 30 unknown unit, 5 Refill(s).        acetaminophen 325 mg oral tablet: 650 mg, 2 tab(s), po, q4 hrs, not to exceed 4000 mg/day, 0 Refill(s).        Senokot 8.6 mg oral tablet: 1-2 tab(s), PO, Once a day (at bedtime), PRN: for constipation.        omeprazole 20 mg oral delayed release capsule: 20 mg, 1 cap(s), po, daily, 90 cap(s), 3 Refill(s).        furosemide 80 mg oral tablet: 40 mg, 0.5 tab(s), po, bid, 0 Refill(s).        digoxin 125 mcg (0.125 mg) oral tablet: 125 mcg, 1 tab(s), po, daily, 0 Refill(s).        Vitamin B12 1000 mcg/mL injectable solution: 1,000 mcg, im, qmonth, 0 Refill(s).        predniSONE 20 mg oral tablet: See Instructions, 2 po daily for 3-5 days prn gout, 30 EA, 0 Refill(s).        [id number] URINARY DRAIN BAG 2000ML MG BEAD: See Instructions, USE AS DIRECTED, 1 unknown unit.        clotrimazole 1% topical cream: 1 nazario, TOP, BID, for 7 day(s), 30 gm, 0 Refill(s).        MetroGel 1% topical gel: 1 nazario, Topical, daily, 1 tubes, 5 Refill(s).        tamsulosin 0.4 mg oral capsule: 0.4 mg, 1 cap(s), Oral, daily, 30 cap(s), 0 Refill(s).        spironolactone 50 mg oral tablet: 50 mg, 1 tab(s), Oral, bid, increased to 50 mg BID per Dr Quintana 3/21/19, 0 Refill(s).        mirtazapine 15 mg oral tablet: 1 tab(s), Oral, qhs, 30 unknown unit, 11 Refill(s).        warfarin 3 mg oral tablet: See Instructions, 3mg T, TH, S Yung. 1.5 mg MWF, 100 EA, 3 Refill(s).        ferrous sulfate: Oral, 0 Refill(s).        atorvastatin 10 mg oral tablet: 10 mg, 1 tab(s), Oral, hs, 30 tab(s), 0 Refill(s).        Metoprolol Succinate ER 25 mg oral tablet, extended release: See Instructions, TAKE ONE-HALF TABLET BY MOUTH TWICE DAILY, 180 EA, 3 Refill(s).        allopurinol 300 mg oral tablet: 300 mg, 1 tab(s), Oral, daily, for 90 day(s), 90 tab(s), 3 Refill(s).         Allergies    No Known  Medication Allergies    adhesive tape  Social History    Smoking Status - 05/03/2019     Never smoker     Alcohol      Past, 03/30/2018     Employment and Education      Employed, Work/School description: [employer] 11/11/2010     Exercise and Physical Activity      Exercise type: Walking., 11/11/2010     Tobacco      Former smoker, quit more than 30 days ago, 08/23/2018  Family History    Aneurysm: Father.    Heart disease: Mother.  Immunizations      Vaccine Date Status      influenza virus vaccine, inactivated 11/27/2018 Given      influenza virus vaccine, inactivated 09/28/2015 Given      pneumococcal (PCV13) 04/21/2015 Given      influenza virus vaccine, inactivated 08/28/2014 Given      influenza virus vaccine, inactivated 10/29/2012 Given      influenza virus vaccine, inactivated 09/23/2011 Given      influenza virus vaccine, inactivated 10/26/2010 Given      influenza 10/23/2008 Recorded      Td 09/01/2005 Recorded      pneumococcal 11/28/2001 Recorded  Lab Results          Lab Results (Last 4 results within 90 days)           Sodium Level: 135 [135 mmol/L - 145 mmol/L] (04/30/19 14:45:00)          Sodium Level: 136 mmol/L [135 mmol/L - 146 mmol/L] (04/18/19 08:48:00)          Sodium Level: 137 mmol/L [135 mmol/L - 146 mmol/L] (04/02/19 09:01:00)          Sodium Level: 137 mmol/L [135 mmol/L - 146 mmol/L] (03/14/19 08:54:00)          Sodium Level TR: 138 mEq/L (02/27/19 10:02:00)          Sodium Level TR: 138 mEq/L (02/14/19 11:22:00)          Potassium Level: 4.8 [3.5 mmol/L - 5 mmol/L] (04/30/19 14:45:00)          Potassium Level: 4.6 mmol/L [3.5 mmol/L - 5.3 mmol/L] (04/18/19 08:48:00)          Potassium Level: 4.8 mmol/L [3.5 mmol/L - 5.3 mmol/L] (04/02/19 09:01:00)          Potassium Level: 4.2 mmol/L [3.5 mmol/L - 5.3 mmol/L] (03/14/19 08:54:00)          Potassium Level TR: 3.9 mEq/L (02/27/19 10:02:00)          Potassium Level TR: 3.9 mEq/L (02/14/19 11:22:00)          Chloride Level: 99 [98 mmol/L -  110 mmol/L] (04/30/19 14:45:00)          Chloride Level: 102 mmol/L [98 mmol/L - 110 mmol/L] (04/18/19 08:48:00)          Chloride Level: 102 mmol/L [98 mmol/L - 110 mmol/L] (04/02/19 09:01:00)          Chloride Level: 103 mmol/L [98 mmol/L - 110 mmol/L] (03/14/19 08:54:00)          Chloride TR: 97 mEq/L (02/27/19 10:02:00)          Chloride TR: 97 mEq/L (02/14/19 11:22:00)          CO2 Level: 27 [21 mmol/L - 31 mmol/L] (04/30/19 14:45:00)          CO2 Level: 27 mmol/L [20 mmol/L - 32 mmol/L] (04/18/19 08:48:00)          CO2 Level: 26 mmol/L [20 mmol/L - 32 mmol/L] (04/02/19 09:01:00)          CO2 Level: 27 mmol/L [20 mmol/L - 32 mmol/L] (03/14/19 08:54:00)          CO2 TR: 32 mEq/L (02/27/19 10:02:00)          CO2 TR: 32 mEq/L (02/14/19 11:22:00)          AGAP: 9 [5  - 18] (04/30/19 14:45:00)          Anion Gap TR: 12.6 mEq/L (02/27/19 10:02:00)          Glucose Level: 109 High [65 mg/dL - 100 mg/dL] (04/30/19 14:45:00)          Glucose Level: 98 mg/dL [65 mg/dL - 99 mg/dL] (04/18/19 08:48:00)          Glucose Level: 98 mg/dL [65 mg/dL - 99 mg/dL] (04/02/19 09:01:00)          Glucose Level: 91 mg/dL [65 mg/dL - 99 mg/dL] (03/14/19 08:54:00)          Glucose Level TR: 92 mg/dL (02/27/19 10:02:00)          Glucose Level TR: 92 mg/dL (02/14/19 11:22:00)          BUN: 67 High [8 mg/dL - 25 mg/dL] (04/30/19 14:45:00)          BUN: 55 mg/dL High [7 mg/dL - 25 mg/dL] (04/18/19 08:48:00)          BUN: 61 mg/dL High [7 mg/dL - 25 mg/dL] (04/02/19 09:01:00)          BUN: 48 mg/dL High [7 mg/dL - 25 mg/dL] (03/14/19 08:54:00)          BUN TR: 45 mg/dL (02/27/19 10:02:00)          BUN TR: 45 mg/dL (02/14/19 11:22:00)          Creatinine Level: 2.46 High [0.72 mg/dL - 1.25 mg/dL] (04/30/19 14:45:00)          Creatinine Level: 2.34 mg/dL High [0.7 mg/dL - 1.11 mg/dL] (04/18/19 08:48:00)          Creatinine Level: 2.26 mg/dL High [0.7 mg/dL - 1.11 mg/dL] (04/02/19 09:01:00)          Creatinine Level: 1.94 mg/dL High [0.7 mg/dL -  1.11 mg/dL] (03/14/19 08:54:00)          Creatinine TR: 2.56 mg/dL (05/02/19 05:32:00)          Creatinine TR: 2.22 mg/dL (02/27/19 10:02:00)          Creatinine TR: 2.22 mg/dL (02/14/19 11:22:00)          BUN/Creat Ratio: 27 High [10  - 20] (04/30/19 14:45:00)          BUN/Creat Ratio: 24 High [6  - 22] (04/18/19 08:48:00)          BUN/Creat Ratio: 27 High [6  - 22] (04/02/19 09:01:00)          BUN/Creat Ratio: 25 High [6  - 22] (03/14/19 08:54:00)          BUN/Creatinine Ratio TR: 20.27 (02/27/19 10:02:00)          BUN/Creatinine Ratio TR: 20.27 (02/14/19 11:22:00)          eGFR: 25 mL/min/1.73m2 Low (04/18/19 08:48:00)          eGFR: 26 mL/min/1.73m2 Low (04/02/19 09:01:00)          eGFR: 32 mL/min/1.73m2 Low (03/14/19 08:54:00)          eGFR: 31 mL/min/1.73m2 Low (03/01/19 16:05:00)          eGFR TR: 30 mL/min (02/27/19 10:02:00)          eGFR TR: 30 mL/min (02/14/19 11:22:00)          eGFR : 31 Low (04/30/19 14:45:00)          eGFR African American: 29 mL/min/1.73m2 Low (04/18/19 08:48:00)          eGFR African American: 30 mL/min/1.73m2 Low (04/02/19 09:01:00)          eGFR African American: 37 mL/min/1.73m2 Low (03/14/19 08:54:00)          eGFR Non-: 25 Low (04/30/19 14:45:00)          Calcium Level: 9.7 [8.5 mg/dL - 10.5 mg/dL] (04/30/19 14:45:00)          Calcium Level: 9.4 mg/dL [8.6 mg/dL - 10.3 mg/dL] (04/18/19 08:48:00)          Calcium Level: 9.5 mg/dL [8.6 mg/dL - 10.3 mg/dL] (04/02/19 09:01:00)          Calcium Level: 9.2 mg/dL [8.6 mg/dL - 10.3 mg/dL] (03/14/19 08:54:00)          Calcium TR: 8.7 mEq/dL (02/27/19 10:02:00)          Calcium TR: 8.7 mEq/dL (02/14/19 11:22:00)          Bilirubin Total: 0.8 mg/dL [0.2 mg/dL - 1.2 mg/dL] (03/14/19 08:54:00)          Bilirubin Total TR: 1.8 mg/dL (02/27/19 10:02:00)          Bilirubin Total TR: 1.8 mg/dL (02/14/19 11:22:00)          Alkaline Phosphatase: 247 unit/L High [40 unit/L - 115 unit/L] (03/14/19 08:54:00)           Alkaline Phosphatase TR: 215 unit/L (02/27/19 10:02:00)          Alkaline Phosphatase TR: 215 unit/L (02/14/19 11:22:00)          AST/SGOT: 20 unit/L [10 unit/L - 35 unit/L] (03/14/19 08:54:00)          AST TR: 21 unit/L (02/27/19 10:02:00)          AST TR: 21 unit/L (02/14/19 11:22:00)          ALT/SGPT: 17 unit/L [9 unit/L - 46 unit/L] (03/14/19 08:54:00)          ALT TR: 20 unit/L (02/27/19 10:02:00)          ALT TR: 20 unit/L (02/14/19 11:22:00)          Protein Total: 6.9 gm/dL [6.1 gm/dL - 8.1 gm/dL] (03/14/19 08:54:00)          Protein Total TR: 7.1 gm/dL (02/27/19 10:02:00)          Protein Total TR: 7.1 gm/dL (02/14/19 11:22:00)          Albumin Level: 4 gm/dL [3.6 gm/dL - 5.1 gm/dL] (03/14/19 08:54:00)          Albumin Level TR: 3.7 g/dL (02/27/19 10:02:00)          Albumin Level TR: 3.7 g/dL (02/14/19 11:22:00)          Globulin: 2.9 [1.9  - 3.7] (03/14/19 08:54:00)          A/G Ratio: 1.4 [1  - 2.5] (03/14/19 08:54:00)          A/G Ratio TR: 1.09 (02/27/19 10:02:00)          Cholesterol: 82 mg/dL (04/02/19 09:01:00)          Non-HDL Cholesterol: 49 (04/02/19 09:01:00)          HDL: 33 mg/dL Low (04/02/19 09:01:00)          Cholesterol/HDL Ratio: 2.5 (04/02/19 09:01:00)          LDL: 35 (04/02/19 09:01:00)          Triglyceride: 64 mg/dL (04/02/19 09:01:00)          AFP TR: Low (02/27/19 10:02:00)          AFP TR: Low (02/14/19 11:22:00)          WBC TR: 5.9 x10^3/uL (03/01/19 16:28:00)          RBC TR: 3.56 x10^6/uL (03/01/19 16:28:00)          Hgb: 7.7 Low [13.5 g/dL - 17.5 g/dL] (04/30/19 14:45:00)          Hgb: 8.6 gm/dL Low [13.2 gm/dL - 17.1 gm/dL] (04/18/19 08:48:00)          Hgb TR: 8.4 g/dL (05/02/19 05:32:00)          Hgb TR: 10.3 g/dL (03/01/19 16:28:00)          Hct TR: 33.1 % (03/01/19 16:28:00)          MCV: 94 [80 fL - 100 fL] (04/30/19 14:45:00)          MCV TR: 93 fL (03/01/19 16:28:00)          MCH TR: 28.9 pg (03/01/19 16:28:00)          MCHC TR: 31.1 gm/dL (03/01/19 16:28:00)           RDW TR: 22.4 % (03/01/19 16:28:00)          Platelet TR: 155 x10^3/uL (03/01/19 16:28:00)          MPV (Mean Platelet Volume) TR: 10.9 fL (03/01/19 16:28:00)          Lymphocytes TR: 16.5 % (03/01/19 16:28:00)          Absolute Lymphocytes TR: 1 cells/uL (03/01/19 16:28:00)          Neutrophils TR: 68.4 % (03/01/19 16:28:00)          Absolute Neutrophils TR: 4 cells/uL (03/01/19 16:28:00)          Monocytes TR: 10.4 % (03/01/19 16:28:00)          Absolute Monocytes TR: 0.6 cells/uL (03/01/19 16:28:00)          Eosinophils TR: 4.4 % (03/01/19 16:28:00)          Absolute Eosinophils TR: 0.3 cells/uL (03/01/19 16:28:00)          Basophils TR: 0.3 % (03/01/19 16:28:00)          Absolute Basophils TR: 0 cells/uL (03/01/19 16:28:00)          Protime: 24.1 High [11.9  - 13.9] (04/30/19 14:45:00)          PT: 17.5 High [9  - 11.5] (04/02/19 09:01:00)          PT: 13.9 High [9  - 11.5] (03/01/19 16:05:00)          INR: 2.2 High (04/30/19 14:45:00)          INR: 1.7 (04/15/19 10:10:00)          INR: 1.8 (04/03/19 14:42:00)          INR: 1.8 High (04/02/19 09:01:00)          INR TR: 1.9 (05/02/19 05:32:00)          Hep A Ab TR: Reactive (02/27/19 10:02:00)          Hep A Ab TR: Reactive (02/14/19 11:22:00)          Hep B Core Ab TR: Negative (02/27/19 10:02:00)          Hep B Core Ab TR: Negative (02/14/19 11:22:00)          Hep Be Ab TR: Negative (02/27/19 10:02:00)          Hep Bs Ag TR: Non-Reactive (02/14/19 11:22:00)          Hep Bs Ab TR: Non-Reactive (02/14/19 11:22:00)          Hep C Ab TR: Non-Reactive (02/14/19 11:22:00)          Digoxin Level: 1.2 mcg/L [0.8 mcg/L - 2 mcg/L] (04/18/19 08:48:00)

## 2022-02-16 NOTE — LETTER
(Inserted Image. Unable to display)       July 16, 2019      JACINTO JIN  [address] [place], [place] [id number]          Dear JACINTO,      Thank you for selecting [facility] for your healthcare needs.     Our records indicate you are due for the following services:     Follow-up Office Visit    To schedule an appointment or if you have further questions, please contact your primary clinic:   [facility]ending [facility] to [facility]       [phone]   [facility]       [phone]              [facility]     [phone]    Powered by [employer]    Sincerely,    Shravan Berrios MD, FACP

## 2022-02-16 NOTE — TELEPHONE ENCOUNTER
Entered by Idalia Carson CMA on April 23, 2019 7:32:53 PM CDT  ---------------------  From: Idalia Carson CMA   To: [facility]    Sent: 4/23/2019 7:32:53 PM CDT  Subject: Medication Management     ** Not Approved: Request responded to by other means **  warfarin (WARFARIN 3MG TABLET)  TAKE 1 TABLET BY MOUTH ONCE DAILY  Qty:  90 unknown unit        Days Supply:  0        Refills:  0          Substitutions Allowed     Route To Pharmacy - [facility]   Signed by Idalia Carson CMA            ** Patient matched by Idalia Carson CMA on 4/23/2019 7:10:08 PM CDT **      ------------------------------------------  From: [facility]  To: Shravan Berrios MD  Sent: April 23, 2019 12:07:29 PM CDT  Subject: Medication Management  Due: April 24, 2019 12:07:29 PM CDT    ** On Hold Pending Signature **  Drug: warfarin (warfarin 3 mg oral tablet)  TAKE 1 TABLET BY MOUTH ONCE DAILY  Quantity: 90 unknown unit  Days Supply: 0         Refills: 0  Substitutions Allowed  Notes from Pharmacy:     Dispensed Drug: warfarin (warfarin 3 mg oral tablet)  TAKE 1 TABLET BY MOUTH ONCE DAILY  Quantity: 90 unknown unit  Days Supply: 0         Refills: 0  Substitutions Allowed  Notes from Pharmacy:   ------------------------------------------Pt was just in for medical f/u with [facility]. INR UTD and has upcoming recheck appt. Refilled #90 and 3 RF.

## 2022-02-16 NOTE — PROGRESS NOTES
Chief Complaint    follow up  History of Present Illness      Patient's weight on his home scale has been between 210 and 216.  He tells me that he was just notified to decrease his furosemide from 80-40 mg after his lab work.  No bleeding, increased dyspnea, edema, increased girth.  Review of Systems      No fevers, chills, melena, rectal bleeding, confusion.  Physical Exam   Vitals & Measurements    HR: 88(Peripheral)  BP: 102/67     HT: 75 in  WT: 220 lb  BMI: 27.5       Patient appears comfortable.  Alert and oriented.  Sclerae anicteric.  Chest reveals breath sounds left base which is chronic.  Cardiac exam is regular.  Pacemaker present.  No edema.  Abdomen is distended nontender.  Tense.  Assessment/Plan       AF (Atrial Fibrillation) (I48.2)         Stable         Ordered:          84637 prothrombin time (Form/Charge), AF (Atrial Fibrillation)  Anticoagulated          78072 office outpatient visit 25 minutes (Charge), Quantity: 1, Cirrhosis  Right heart failure  Stage 3 chronic kidney disease  Anticoagulated  AF (Atrial Fibrillation)                Anticoagulated (Z79.01)         INR in 2 weeks.         Ordered:          44350 prothrombin time (Form/Charge), AF (Atrial Fibrillation)  Anticoagulated          51014 office outpatient visit 25 minutes (Charge), Quantity: 1, Cirrhosis  Right heart failure  Stage 3 chronic kidney disease  Anticoagulated  AF (Atrial Fibrillation)                Cirrhosis (K74.60)         No bleeding or confusion.  Still appears to have ascites.  Weight is markedly down since starting Spironolactone.  As his weight is stable will wait to get another BMP for 2 weeks.  Patient will call for weight over 220 pounds.         Ordered:          81254 office outpatient visit 25 minutes (Charge), Quantity: 1, Cirrhosis  Right heart failure  Stage 3 chronic kidney disease  Anticoagulated  AF (Atrial Fibrillation)          Return to Clinic (Request), Return in 2 weeks                 Right heart failure (I50.9)         Stable         Ordered:          55407 office outpatient visit 25 minutes (Charge), Quantity: 1, Cirrhosis  Right heart failure  Stage 3 chronic kidney disease  Anticoagulated  AF (Atrial Fibrillation)          Return to Clinic (Request), Return in 2 weeks                Stage 3 chronic kidney disease (N18.3)         Creatinine did increase a bit with the impressive diuresis he has had and we decreased his furosemide.  BMP in 2 weeks.         Ordered:          89672 office outpatient visit 25 minutes (Charge), Quantity: 1, Cirrhosis  Right heart failure  Stage 3 chronic kidney disease  Anticoagulated  AF (Atrial Fibrillation)                Orders:         Return to Clinic (Request), RFV: BMP with next INR  Patient Information     Name:JACINTO JIN      Address:      [address] [place] [id number]     Sex:Male     YOB: 1935     [phone]     Emergency Contact:GERA JIN     MRN:[id number]     FIN:[id number]     Location:[facility]     Date of Service:04/16/2019      Primary Care Physician:       Shravan Berrios MD, [phone]      Attending Physician:       Shravan Berrios MD, [phone]  Problem List/Past Medical History    Ongoing     Acquired arteriovenous malformation of colon       Comments: Treated.     Acute kidney injury (nontraumatic)     AF (Atrial Fibrillation)     Anemia of renal disease     Anticardiolipin syndrome     Anticoagulated     Ascites       Comments: Negative cytology 12/6/18     B12 deficiency     BPH with urinary obstruction     CAD (coronary artery disease)     Cardiorenal syndrome     CHB (complete heart block)     Chronic diastolic CHF (congestive heart failure), NYHA class 3     Cirrhosis     Depression     Diverticulosis     Dupuytren's contracture of right hand     Gout     H/O acute pancreatitis     High cholesterol     History of acute bacterial endocarditis      History of coronary artery bypass graft     History of GI bleed     History of tricuspid valve replacement     Hx of colonic polyp     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     IBS (irritable bowel syndrome)     ICD (implantable cardioverter-defibrillator) infection     Inguinal hernia, right     Iron deficiency anemia     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     Nonischemic dilated cardiomyopathy     Obesity     KEYONA (obstructive sleep apnea)       Comments: Adequate titration to BIPAP 16/11 on 10/15/2013     Osteoarthritis of both knees     Paralysis, diaphragm       Comments: Left     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     PUD (peptic ulcer disease)     Right heart failure     Stage 3 chronic kidney disease     Tricuspid valve insufficiency     Vitamin B 12 deficiency    Historical     BE (bacterial endocarditis)     Colon polyps     History of sepsis     Inpatient stay       Comments: @[facility] - Jaw pain, possible anginal equivalent     Inpatient stay       Comments: @[facility] - Infected pacemaker     Inpatient stay       Comments: @[facility] - Diverticulitis     Inpatient stay       Comments: @[facility] - S/P tricuspid valve replacement with worsening right ventricular function     Inpatient stay       Comments: @[facility] - Severe symptomatic tricuspid valvular insufficiency. Chronic right heart failure.     Inpatient stay       Comments: @[facility] - Acute on chronic right heart failure     Inpatient stay       Comments: @[facility] - Anemia     Inpatient stay       Comments: @[facility] [place] [place] - GI bleeding, suspected upper source     Other and Unspecified Noninfectious Gastroenteritis and Colitis  Procedure/Surgical History     Esophagogastroduodenoscopy (02/12/2019)     Colonoscopy (12/07/2018)      Comments: Indication: GIB      Sedation: fentanyl 50 mcg, Versed 1 mg      Findings: Diverticulosis, 2 small AVMs with  bleeding in ascending treated with APC      Rec: Consider further workup based on resbridget to treatemetn.     Esophagogastroduodenoscopy and biopsy (12/07/2018)      Comments: Indication: GIB      Sedatoin: Fentanyl 50 mcg, Versed 2 mg      findings: Negative with negative duodenal biopsy      Rec: Colonoscopy.     Abdominal paracentesis (12/06/2018)      Comments: Negative cytology.     Bone marrow biopsy (07/12/2017)     Colonoscopy (05/27/2017)      Comments: Cecal tubular adenoma, pandiverticulosis. w/polypectomy.     Esophagogastroduodenoscopy (05/27/2017)      Comments: gastritis, fundic gland polyps.     TVR - Tricuspid valve replacement (11/29/2016)     Cardiac angiogram (11/01/2016)      Comments: Summary/Conclusions      PRESENTATION / INDICATIONS        Pre-surgical evaluation for cardiac surgery        Severe TR by echo      DIAGNOSTIC - CORONARY        Co-dominant coronary artery system        The left main artery was normal in appearance and free of obstructive disease.        Chronic total occlusion of the proximal LAD        The circumflex artery has minimal disease.        The RCA has moderate disease.        No change since 2013      DIAGNOSTIC - GRAFTS        Chronic total occlusion in the proximal anastomosis of the SVG graft to the RCA        The SVG to the ramus intermediate is patent        The sequential graft to the 1st diagonal is patent.        The sequential graft limb from the diagonal to the LAD is patent.        No change since 2013      HEMODYNAMICS        The LVEDP is mildly elevated        Severely elevated right atrial pressures        No evidence of intracardiac shunting.     Limited thoracotomy (08/23/2016)      Comments: Left mini thoracotomy and placement of two epicardial screw in leads.  Implant of left pectoral pacer generatoe..     Implantation of intravenous single chamber cardiac pacemaker system (08/04/2016)     Removal of automatic cardiac defibrillator (08/04/2016)       Comments: Generator and leads.     Pacemaker change (04/08/2015)     Cardiac angiogram (08/02/2013)      Comments: Summary/Conclusions      PRESENTATION / INDICATIONS      Chest pain      DIAGNOSTIC - CORONARY      Right dominant coronary artery system      DIAGNOSTIC SUMMARY      100% stenosis in the Proximal LAD      30% stenosis in the 1st Marginal      30% stenosis in the Proximal RCA      50% stenosis in the Mid RCA      100% stenosis in the Aorta graft to Distal RCA      DIAGNOSTIC - GRAFTS      The sequential graft to the diagonal branch is patent.      The sequential graft from the diagonal to the LAD is patent.      The SVG to the ramus intermediate is patent      The SVG to RCA is chronically occluded      HEMODYNAMICS      The LVEDP is mildly elevated      RECOMMENDATIONS & PLAN      Medical Rx- no significant change from previous angiogram, there is dramatic mismatch in size between the SVG's and the target arteries with slow filling of the LAD.     angiogrqam (02/03/2012)      Comments: Summary/Conclusions      PRESENTATION / INDICATIONS      Dyspnea and fatigue.      DIAGNOSTIC - CORONARY      Right dominant coronary artery system.      LMCA is normal.      LAD is occluded proximally after the 1st septal branch.      LCx has mild luminal irregularities.  OM1 has 20-30% proximal stenosis.      RCA has 30-40% diffuse disease in the mid segment and otherwise has mild diffuse luminal irregularities.      DIAGNOSTIC - GRAFTS      The sequential SVG to the diagonal and then LAD is widely patent.  There is a significant size mismatch between the graft and the native vessels.  The diagonal and distal LAD have no obvious lesions but are small caliber (< 2.0 mm vessels).  There is some backfilling into the mid LAD.      The SVG to the ramus is widely patent.  There is a significant size mismatch between the graft and the native vessel.  The ramus has no obvious lesion but is small caliber (< 2.0 mm vessel).   "      The SVG to the RCA is chronically occluded.      LEFT VENTRICULAR FUNCTION      Left ventricular function is mildly reduced with EF of 42% and mild global hypokinesis.  No significant MR.      HEMODYNAMICS      The LVEDP is 6 mmHg.  No significant gradient across the aortic valve.      RA 11, RV 29/10, PA 34/17 (mean 25), PCWP 18      Amarilys CO 5.1, TDCO 4.6      RECOMMENDATIONS & PLAN      Consider alternative etiology for symptoms.      Lasix dose decreased to 40 mg QD given relative hypotension and patient reporting feeling \"dry membranes.\".     Colonoscopy (12/15/2006)      Comments: Diverticulosis. No polyps..     Colonoscopy (06/22/2001)      Comments: hyperplastic polyp.     Cholecystectomy (06.2001)     CABG - Coronary artery bypass graft (2000)     History of Back Surgery (2000)     Colonoscopy (02/17/1993)      Comments: 1 adenoma, 1 hyperplastic, diverticulosis..     Left shoulder surgery (1991)     Ablation for atrial fibrillation      Comments: Subsequent pacemaker dependence..     Cholecystectomy     Colonoscopy     Esophagogastroduodenoscopy  Medications        nitroglycerin 0.4 mg sublingual tablet: 0.4 mg, 1 tab(s), SL, q5min, not to exceed 3 doses/15 min--if pain persists, seek medical attention, 25 tab(s), PRN: for chest pain.        Protegra oral tablet: 1 tab(s), po, daily.        potassium chloride 20 mEq oral tablet, extended release: See Instructions, 3 tab BID, 30 unknown unit, 5 Refill(s).        acetaminophen 325 mg oral tablet: 650 mg, 2 tab(s), po, q4 hrs, not to exceed 4000 mg/day, 0 Refill(s).        Senokot 8.6 mg oral tablet: 1-2 tab(s), PO, Once a day (at bedtime), PRN: for constipation.        omeprazole 20 mg oral delayed release capsule: 20 mg, 1 cap(s), po, daily, 90 cap(s), 3 Refill(s).        furosemide 80 mg oral tablet: 40 mg, 0.5 tab(s), po, bid, 0 Refill(s).        digoxin 125 mcg (0.125 mg) oral tablet: 125 mcg, 1 tab(s), po, daily, 0 Refill(s).        Vitamin B12 " 1000 mcg/mL injectable solution: 1,000 mcg, im, qmonth, 0 Refill(s).        Metoprolol Succinate ER 25 mg oral tablet, extended release: See Instructions, TAKE ONE-HALF TABLET BY MOUTH TWICE DAILY, 30 unknown unit, 11 Refill(s).        atorvastatin 40 mg oral tablet: See Instructions, TAKE ONE TABLET BY MOUTH AT BEDTIME, 90 unknown unit.        predniSONE 20 mg oral tablet: See Instructions, 2 po daily for 3-5 days prn gout, 30 EA, 0 Refill(s).        mirtazapine 15 mg oral tablet: 15 mg, 1 tab(s), PO, Once a day (at bedtime), 30 tab(s), 5 Refill(s).        allopurinol 300 mg oral tablet: See Instructions, TAKE ONE TABLET BY MOUTH ONCE DAILY, 90 unknown unit, 3 Refill(s).        [id number] URINARY DRAIN BAG 2000ML MG BEAD: See Instructions, USE AS DIRECTED, 1 unknown unit.        clotrimazole 1% topical cream: 1 nazario, TOP, BID, for 7 day(s), 30 gm, 0 Refill(s).        MetroGel 1% topical gel: 1 nazario, Topical, daily, 1 tubes, 5 Refill(s).        tamsulosin 0.4 mg oral capsule: 0.4 mg, 1 cap(s), Oral, daily, 30 cap(s), 0 Refill(s).        warfarin 3 mg oral tablet: 1 tab(s), Oral, daily, 90 unknown unit.        spironolactone 50 mg oral tablet: 50 mg, 1 tab(s), Oral, bid, increased to 50 mg BID per Dr Quintana 3/21/19, 0 Refill(s).         Allergies    No Known Medication Allergies    adhesive tape  Social History    Smoking Status - 03/28/2019     Never smoker     Alcohol      Past, 03/30/2018     Employment and Education      Employed, Work/School description: [employer] 11/11/2010     Exercise and Physical Activity      Exercise type: Walking., 11/11/2010     Tobacco      Former smoker, quit more than 30 days ago, 08/23/2018  Family History    Aneurysm: Father.    Heart disease: Mother.  Immunizations      Vaccine Date Status      influenza virus vaccine, inactivated 11/27/2018 Given      influenza virus vaccine, inactivated 09/28/2015 Given      pneumococcal (PCV13) 04/21/2015 Given      influenza virus vaccine, inactivated  08/28/2014 Given      influenza virus vaccine, inactivated 10/29/2012 Given      influenza virus vaccine, inactivated 09/23/2011 Given      influenza virus vaccine, inactivated 10/26/2010 Given      influenza 10/23/2008 Recorded      Td 09/01/2005 Recorded      pneumococcal 11/28/2001 Recorded  Lab Results          Lab Results (Last 4 results within 90 days)           Sodium Level: 137 mmol/L [135 mmol/L - 146 mmol/L] (04/02/19 09:01:00)          Sodium Level: 137 mmol/L [135 mmol/L - 146 mmol/L] (03/14/19 08:54:00)          Sodium Level: 137 mmol/L [135 mmol/L - 146 mmol/L] (03/01/19 16:05:00)          Sodium Level: 139 mmol/L [135 mmol/L - 146 mmol/L] (02/22/19 13:26:00)          Sodium Level TR: 138 mEq/L (02/27/19 10:02:00)          Sodium Level TR: 138 mEq/L (02/14/19 11:22:00)          Potassium Level: 4.8 mmol/L [3.5 mmol/L - 5.3 mmol/L] (04/02/19 09:01:00)          Potassium Level: 4.2 mmol/L [3.5 mmol/L - 5.3 mmol/L] (03/14/19 08:54:00)          Potassium Level: 4.4 mmol/L [3.5 mmol/L - 5.3 mmol/L] (03/01/19 16:05:00)          Potassium Level: 4.4 mmol/L [3.5 mmol/L - 5.3 mmol/L] (02/22/19 13:26:00)          Potassium Level TR: 3.9 mEq/L (02/27/19 10:02:00)          Potassium Level TR: 3.9 mEq/L (02/14/19 11:22:00)          Chloride Level: 102 mmol/L [98 mmol/L - 110 mmol/L] (04/02/19 09:01:00)          Chloride Level: 103 mmol/L [98 mmol/L - 110 mmol/L] (03/14/19 08:54:00)          Chloride Level: 101 mmol/L [98 mmol/L - 110 mmol/L] (03/01/19 16:05:00)          Chloride Level: 105 mmol/L [98 mmol/L - 110 mmol/L] (02/22/19 13:26:00)          Chloride TR: 97 mEq/L (02/27/19 10:02:00)          Chloride TR: 97 mEq/L (02/14/19 11:22:00)          CO2 Level: 26 mmol/L [20 mmol/L - 32 mmol/L] (04/02/19 09:01:00)          CO2 Level: 27 mmol/L [20 mmol/L - 32 mmol/L] (03/14/19 08:54:00)          CO2 Level: 29 mmol/L [20 mmol/L - 32 mmol/L] (03/01/19 16:05:00)          CO2 Level: 27 mmol/L [20 mmol/L - 32 mmol/L]  (02/22/19 13:26:00)          CO2 TR: 32 mEq/L (02/27/19 10:02:00)          CO2 TR: 32 mEq/L (02/14/19 11:22:00)          Anion Gap TR: 12.6 mEq/L (02/27/19 10:02:00)          Glucose Level: 98 mg/dL [65 mg/dL - 99 mg/dL] (04/02/19 09:01:00)          Glucose Level: 91 mg/dL [65 mg/dL - 99 mg/dL] (03/14/19 08:54:00)          Glucose Level: 95 mg/dL [65 mg/dL - 99 mg/dL] (03/01/19 16:05:00)          Glucose Level: 85 mg/dL [65 mg/dL - 99 mg/dL] (02/22/19 13:26:00)          Glucose Level TR: 92 mg/dL (02/27/19 10:02:00)          Glucose Level TR: 92 mg/dL (02/14/19 11:22:00)          BUN: 61 mg/dL High [7 mg/dL - 25 mg/dL] (04/02/19 09:01:00)          BUN: 48 mg/dL High [7 mg/dL - 25 mg/dL] (03/14/19 08:54:00)          BUN: 46 mg/dL High [7 mg/dL - 25 mg/dL] (03/01/19 16:05:00)          BUN: 41 mg/dL High [7 mg/dL - 25 mg/dL] (02/22/19 13:26:00)          BUN TR: 45 mg/dL (02/27/19 10:02:00)          BUN TR: 45 mg/dL (02/14/19 11:22:00)          Creatinine Level: 2.26 mg/dL High [0.7 mg/dL - 1.11 mg/dL] (04/02/19 09:01:00)          Creatinine Level: 1.94 mg/dL High [0.7 mg/dL - 1.11 mg/dL] (03/14/19 08:54:00)          Creatinine Level: 1.98 mg/dL High [0.7 mg/dL - 1.11 mg/dL] (03/01/19 16:05:00)          Creatinine Level: 1.7 mg/dL High [0.7 mg/dL - 1.11 mg/dL] (02/22/19 13:26:00)          Creatinine TR: 2.22 mg/dL (02/27/19 10:02:00)          Creatinine TR: 2.22 mg/dL (02/14/19 11:22:00)          BUN/Creat Ratio: 27 High [6  - 22] (04/02/19 09:01:00)          BUN/Creat Ratio: 25 High [6  - 22] (03/14/19 08:54:00)          BUN/Creat Ratio: 23 High [6  - 22] (03/01/19 16:05:00)          BUN/Creat Ratio: 24 High [6  - 22] (02/22/19 13:26:00)          BUN/Creatinine Ratio TR: 20.27 (02/27/19 10:02:00)          BUN/Creatinine Ratio TR: 20.27 (02/14/19 11:22:00)          eGFR: 26 mL/min/1.73m2 Low (04/02/19 09:01:00)          eGFR: 32 mL/min/1.73m2 Low (03/14/19 08:54:00)          eGFR: 31 mL/min/1.73m2 Low (03/01/19  16:05:00)          eGFR: 37 mL/min/1.73m2 Low (02/22/19 13:26:00)          eGFR TR: 30 mL/min (02/27/19 10:02:00)          eGFR TR: 30 mL/min (02/14/19 11:22:00)          eGFR African American: 30 mL/min/1.73m2 Low (04/02/19 09:01:00)          eGFR African American: 37 mL/min/1.73m2 Low (03/14/19 08:54:00)          eGFR African American: 36 mL/min/1.73m2 Low (03/01/19 16:05:00)          eGFR African American: 43 mL/min/1.73m2 Low (02/22/19 13:26:00)          Calcium Level: 9.5 mg/dL [8.6 mg/dL - 10.3 mg/dL] (04/02/19 09:01:00)          Calcium Level: 9.2 mg/dL [8.6 mg/dL - 10.3 mg/dL] (03/14/19 08:54:00)          Calcium Level: 9.3 mg/dL [8.6 mg/dL - 10.3 mg/dL] (03/01/19 16:05:00)          Calcium Level: 8.6 mg/dL [8.6 mg/dL - 10.3 mg/dL] (02/22/19 13:26:00)          Calcium TR: 8.7 mEq/dL (02/27/19 10:02:00)          Calcium TR: 8.7 mEq/dL (02/14/19 11:22:00)          Bilirubin Total: 0.8 mg/dL [0.2 mg/dL - 1.2 mg/dL] (03/14/19 08:54:00)          Bilirubin Total TR: 1.8 mg/dL (02/27/19 10:02:00)          Bilirubin Total TR: 1.8 mg/dL (02/14/19 11:22:00)          Alkaline Phosphatase: 247 unit/L High [40 unit/L - 115 unit/L] (03/14/19 08:54:00)          Alkaline Phosphatase TR: 215 unit/L (02/27/19 10:02:00)          Alkaline Phosphatase TR: 215 unit/L (02/14/19 11:22:00)          AST/SGOT: 20 unit/L [10 unit/L - 35 unit/L] (03/14/19 08:54:00)          AST TR: 21 unit/L (02/27/19 10:02:00)          AST TR: 21 unit/L (02/14/19 11:22:00)          ALT/SGPT: 17 unit/L [9 unit/L - 46 unit/L] (03/14/19 08:54:00)          ALT TR: 20 unit/L (02/27/19 10:02:00)          ALT TR: 20 unit/L (02/14/19 11:22:00)          Protein Total: 6.9 gm/dL [6.1 gm/dL - 8.1 gm/dL] (03/14/19 08:54:00)          Protein Total TR: 7.1 gm/dL (02/27/19 10:02:00)          Protein Total TR: 7.1 gm/dL (02/14/19 11:22:00)          Albumin Level: 4 gm/dL [3.6 gm/dL - 5.1 gm/dL] (03/14/19 08:54:00)          Albumin Level TR: 3.7 g/dL (02/27/19  10:02:00)          Albumin Level TR: 3.7 g/dL (02/14/19 11:22:00)          Globulin: 2.9 [1.9  - 3.7] (03/14/19 08:54:00)          A/G Ratio: 1.4 [1  - 2.5] (03/14/19 08:54:00)          A/G Ratio TR: 1.09 (02/27/19 10:02:00)          Cholesterol: 82 mg/dL (04/02/19 09:01:00)          Non-HDL Cholesterol: 49 (04/02/19 09:01:00)          HDL: 33 mg/dL Low (04/02/19 09:01:00)          Cholesterol/HDL Ratio: 2.5 (04/02/19 09:01:00)          LDL: 35 (04/02/19 09:01:00)          Triglyceride: 64 mg/dL (04/02/19 09:01:00)          AFP TR: Low (02/27/19 10:02:00)          AFP TR: Low (02/14/19 11:22:00)          WBC TR: 5.9 x10^3/uL (03/01/19 16:28:00)          WBC TR: 4.9 x10^3/uL (02/01/19 14:10:00)          RBC TR: 3.56 x10^6/uL (03/01/19 16:28:00)          RBC TR: 3.09 x10^6/uL (02/01/19 14:10:00)          Hgb TR: 10.3 g/dL (03/01/19 16:28:00)          Hgb TR: 8.6 g/dL (02/01/19 14:10:00)          Hct TR: 33.1 % (03/01/19 16:28:00)          Hct TR: 28.2 % (02/01/19 14:10:00)          MCV TR: 93 fL (03/01/19 16:28:00)          MCV TR: 91 fL (02/01/19 14:10:00)          MCH TR: 28.9 pg (03/01/19 16:28:00)          MCH TR: 27.8 pg (02/01/19 14:10:00)          MCHC TR: 31.1 gm/dL (03/01/19 16:28:00)          MCHC TR: 30.5 gm/dL (02/01/19 14:10:00)          RDW TR: 22.4 % (03/01/19 16:28:00)          RDW TR: 20 % (02/01/19 14:10:00)          Platelet TR: 155 x10^3/uL (03/01/19 16:28:00)          Platelet TR: 129 x10^3/uL (02/01/19 14:10:00)          MPV (Mean Platelet Volume) TR: 10.9 fL (03/01/19 16:28:00)          MPV (Mean Platelet Volume) TR: 9.7 fL (02/01/19 14:10:00)          Lymphocytes TR: 16.5 % (03/01/19 16:28:00)          Lymphocytes TR: 16.8 % (02/01/19 14:10:00)          Absolute Lymphocytes TR: 1 cells/uL (03/01/19 16:28:00)          Absolute Lymphocytes TR: 0.8 cells/uL (02/01/19 14:10:00)          Neutrophils TR: 68.4 % (03/01/19 16:28:00)          Neutrophils TR: 66.3 % (02/01/19 14:10:00)          Absolute  Neutrophils TR: 4 cells/uL (03/01/19 16:28:00)          Absolute Neutrophils TR: 3.3 cells/uL (02/01/19 14:10:00)          Monocytes TR: 10.4 % (03/01/19 16:28:00)          Monocytes TR: 11 % (02/01/19 14:10:00)          Absolute Monocytes TR: 0.6 cells/uL (03/01/19 16:28:00)          Absolute Monocytes TR: 0.5 cells/uL (02/01/19 14:10:00)          Eosinophils TR: 4.4 % (03/01/19 16:28:00)          Eosinophils TR: 5.5 % (02/01/19 14:10:00)          Absolute Eosinophils TR: 0.3 cells/uL (03/01/19 16:28:00)          Absolute Eosinophils TR: 0.3 cells/uL (02/01/19 14:10:00)          Basophils TR: 0.3 % (03/01/19 16:28:00)          Basophils TR: 0.4 % (02/01/19 14:10:00)          Absolute Basophils TR: 0 cells/uL (03/01/19 16:28:00)          Absolute Basophils TR: 0 cells/uL (02/01/19 14:10:00)          PT: 17.5 High [9  - 11.5] (04/02/19 09:01:00)          PT: 13.9 High [9  - 11.5] (03/01/19 16:05:00)          PT: 18.4 High [9  - 11.5] (02/01/19 16:00:00)          INR: 1.7 (04/15/19 10:10:00)          INR: 1.8 (04/03/19 14:42:00)          INR: 1.8 High (04/02/19 09:01:00)          INR: 1.8 (03/18/19 10:15:00)          Hep A Ab TR: Reactive (02/27/19 10:02:00)          Hep A Ab TR: Reactive (02/14/19 11:22:00)          Hep B Core Ab TR: Negative (02/27/19 10:02:00)          Hep B Core Ab TR: Negative (02/14/19 11:22:00)          Hep Be Ab TR: Negative (02/27/19 10:02:00)          Hep Bs Ag TR: Non-Reactive (02/14/19 11:22:00)          Hep Bs Ab TR: Non-Reactive (02/14/19 11:22:00)          Hep C Ab TR: Non-Reactive (02/14/19 11:22:00)

## 2022-02-16 NOTE — NURSING NOTE
Quick Intake Entered On:  1/18/2019 3:47 PM CST    Performed On:  1/18/2019 3:47 PM CST by Mackenzie Quintana RN               Summary   Menstrual Status :   N/A   Systolic Blood Pressure :   104 mmHg   Diastolic Blood Pressure :   60 mmHg   Mean Arterial Pressure :   75 mmHg   Mackenzie Quintana RN - 1/18/2019 3:47 PM CST

## 2022-02-16 NOTE — TELEPHONE ENCOUNTER
---------------------  From: Radha Brooks CMA   To: Referral Coordinators Pool ([id number]_[facility]);     Sent: 5/3/2019 5:02:53 PM CDT  Subject: Hematology order     Please contact patient to set up Hematology appointment per JDL.

## 2022-02-16 NOTE — PROGRESS NOTES
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    Accompanied by:  Spouse.    Source of history:  Self, Spouse, Medical record.    History limitation:  None.       Chief Complaint   2019 12:09 PM CDT   Pre-op. DOS-19 and 19- Cataracts- [facility]-Dr. Cornelius.      History of Present Illness    The patient is a chronically ill man with multiple medical problems scheduled for cataract surgery.  He is in relatively stable condition.  He has severe tricuspid regurgitation which has been repaired but has chronic liver disease with cirrhosis and ascites.  He had paracentesis earlier this week.  He is not having problems with dyspnea or edema.  He is on warfarin and is not having bleeding.  He has a history of chronic GI tract blood loss from AVMs.  He has no angina.  He has a history of sleep apnea though after significant weight loss he no longer has symptoms.  No history of problems with sedation or anesthesia.     He saw Dr. Carmona recently and his blood regimen has been changed.  [facility] is [facility] today.          Review of Systems   Constitutional:  Weakness, Fatigue, Decreased activity, No fever, No chills, No night sweats.    Eye:  Negative except as documented in history of present illness.    Respiratory:  No shortness of breath, No cough.    Cardiovascular:  No chest pain, No palpitations, No peripheral edema.    Gastrointestinal:  No nausea, No vomiting, No diarrhea, No  abdominal pain.    Genitourinary:  H/O urinary retention.    Hematology/Lymphatics:  Bruising tendency, No bleeding tendency.    Endocrine:  No polyuria.    Musculoskeletal:  No muscle pain.    Neurologic:  Negative.       Health Status   Allergies:    Allergic Reactions (Selected)  Severity Not Documented  Adhesive tape (No reactions were documented)  No Known Medication Allergies   Medications:  (Selected)   Prescriptions  Prescribed  [id number] URINARY DRAIN BAG 2000ML MG BEAD: See Instructions, Instructions: USE AS DIRECTED, # 1 unknown unit, Type: Soft Stop, Pharmacy: [facility], USE AS DIRECTED  Metoprolol Succinate ER 25 mg oral tablet, extended release: See Instructions, Instructions: TAKE ONE-HALF TABLET BY MOUTH TWICE DAILY, # 180 EA, 3 Refill(s), Type: Soft Stop, Pharmacy: [facility], TAKE ONE-HALF TABLET BY MOUTH TWICE DAILY  MetroGel 1% topical gel: 1 nazario, Topical, daily, # 1 tubes, 5 Refill(s), Type: Maintenance, Pharmacy: [facility], 1 nazario Topical daily  allopurinol 300 mg oral tablet: = 1 tab(s) ( 300 mg ), Oral, daily, # 90 tab(s), 3 Refill(s), Type: Soft Stop, Pharmacy: [facility], 1 tab(s) Oral daily,x90 day(s)  atorvastatin 10 mg oral tablet: = 1 tab(s) ( 10 mg ), Oral, hs, # 30 tab(s), 0 Refill(s), Type: Maintenance, Pharmacy: [facility], Dose change per hospital discharge from [facility] 05/02/19, 1 tab(s) Oral hs  clotrimazole 1% topical cream: 1 nazario, TOP, BID, # 30 gm, 0 Refill(s), Type: Maintenance, Pharmacy: [facility], 1 nazario top bid,x7 day(s)  mirtazapine 15 mg oral tablet: 1 tab(s), Oral, qhs, # 30 unknown unit, 11 Refill(s), Type: Soft Stop, Pharmacy: [facility]  nitroglycerin 0.4 mg sublingual tablet: 1 tab(s) ( 0.4 mg ), SL, q5min, Instructions: not to exceed 3 doses/15 min--if pain persists, seek medical attention, PRN: for chest pain, # 25 tab(s), 3 Refill(s), Type: Maintenance, Pharmacy: [facility], 1 tab(s) sl q5 min,PRN:for chest pain,Instr...  omeprazole 20 mg oral delayed  release capsule: = 1 cap(s) ( 20 mg ), po, daily, # 90 cap(s), 3 Refill(s), Type: Maintenance, Pharmacy: [facility]  potassium chloride 20 mEq oral tablet, extended release: See Instructions, Instructions: 2 tab BID, # 30 unknown unit, 5 Refill(s), Type: Soft Stop, Pharmacy: [facility]  predniSONE 20 mg oral tablet: See Instructions, Instructions: 2 po daily for 3-5 days prn gout, # 30 EA, 0 Refill(s), Type: Maintenance, Pharmacy: [facility], 2 po daily for 3-5 days prn gout  tamsulosin 0.4 mg oral capsule: 1 cap(s) ( 0.4 mg ), Oral, daily, # 30 cap(s), 0 Refill(s), Type: Maintenance, Pharmacy: [facility], 1 cap(s) Oral daily  warfarin 3 mg oral tablet: See Instructions, Instructions: 3mg T, TH, S Yung. 1.5 mg MWF, # 100 EA, 3 Refill(s), Type: Soft Stop, Pharmacy: [facility]  Documented Medications  Documented  Protegra oral tablet: 1 tab(s), po, daily, 0 Refill(s), Type: Maintenance  Senokot 8.6 mg oral tablet: 1-2 tab(s), PO, Once a day (at bedtime), PRN: for constipation, Type: Maintenance  Vitamin B12 1000 mcg/mL injectable solution: ( 1,000 mcg ), im, qmonth, 0 Refill(s), Type: Maintenance  acetaminophen 325 mg oral tablet: 2 tab(s) ( 650 mg ), po, q4 hrs, Instructions: not to exceed 4000 mg/day, 0 Refill(s), Type: Maintenance  digoxin 125 mcg (0.125 mg) oral tablet: 1 tab(s) ( 125 mcg ), po, daily, 0 Refill(s), Type: Maintenance  ferrous sulfate: Oral, 0 Refill(s), Type: Maintenance  furosemide 80 mg oral tablet: = 0.5 tab(s) ( 40 mg ), po, bid, 0 Refill(s), Type: Maintenance  spironolactone 50 mg oral tablet: = 1 tab(s) ( 50 mg ), Oral, bid, Instructions: increased to 50 mg BID per Dr Quintana 3/21/19, 0 Refill(s), Type: Maintenance   Problem list:    All Problems  Acquired arteriovenous malformation of colon / SNOMED CT 974736981 / Confirmed  Acute kidney injury (nontraumatic) / SNOMED CT 3644513861 / Confirmed  AF (Atrial Fibrillation) / SNOMED CT 1091271989 / Confirmed  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,  chronic / SNOMED CT 7863001915 / Confirmed  Anemia of renal disease / SNOMED CT 083070132 / Confirmed  Anticardiolipin syndrome / SNOMED CT 7864285363 / Confirmed  Anticoagulated / SNOMED CT 80167679 / Confirmed  Ascites / SNOMED CT 9102504095 / Confirmed  B12 deficiency / SNOMED CT 836851909 / Confirmed  BPH with urinary obstruction / SNOMED CT 9332581906 / Confirmed  CAD (coronary artery disease) / SNOMED CT 3914039298 / Confirmed  Cardiorenal syndrome / SNOMED CT 2556197632 / Confirmed  CHB (complete heart block) / SNOMED CT 3457922759 / Confirmed  Chronic diastolic CHF (congestive heart failure), NYHA class 3 / SNOMED CT 8516521925 / Confirmed  Cirrhosis / SNOMED CT 42585921 / Confirmed  Depression / SNOMED CT 75025715 / Confirmed  Diverticulosis / SNOMED CT 9307547645 / Confirmed  Dupuytren's contracture of right hand / SNOMED CT 4961187991 / Confirmed  Gout / SNOMED CT 154192979 / Confirmed  H/O acute pancreatitis / SNOMED CT 8456591878 / Confirmed  High cholesterol / SNOMED CT 15376027 / Confirmed  History of acute bacterial endocarditis / SNOMED CT 8527910214 / Confirmed  History of coronary artery bypass graft / SNOMED CT 1019291389 / Confirmed  History of GI bleed / SNOMED CT 7220096070 / Confirmed  History of tricuspid valve replacement / SNOMED CT 8340534979 / Confirmed  Hx of colonic polyp / SNOMED CT 7373701089 / Confirmed  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 / SNOMED CT 3255023459 / Confirmed  IBS (irritable bowel syndrome) / SNOMED CT 4682700431 / Confirmed  ICD (implantable cardioverter-defibrillator) infection / SNOMED CT 386726750 / Confirmed  Inguinal hernia, right / SNOMED CT 922081387 / Confirmed  Iron deficiency anemia / SNOMED CT 716689880 / Confirmed  MGUS (monoclonal gammopathy of unknown significance) / SNOMED CT 568002797 / Confirmed  Nonischemic dilated cardiomyopathy / SNOMED CT 8450234224 / Confirmed  Obesity / SNOMED CT 2675883457 / Confirmed  KEYONA (obstructive  sleep apnea) / SNOMED CT 475505263 / Confirmed  Osteoarthritis of both knees / SNOMED CT 9360658739 / Confirmed  Paralysis, diaphragm / SNOMED CT 949986837 / Confirmed  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 / SNOMED CT 2570635355 / Confirmed  Right heart failure / SNOMED CT 932027208 / Confirmed  Stage 3 chronic kidney disease / SNOMED CT 4644796486 / Confirmed  Tricuspid valve insufficiency / SNOMED CT [id number] / Confirmed  Vitamin B 12 deficiency / SNOMED CT 6584656266 / Confirmed  Inactive: PUD (peptic ulcer disease) / SNOMED CT 7659643690  Resolved: BE (bacterial endocarditis) / SNOMED CT 216645773  Resolved: Colon polyps / SNOMED CT 696627013  Resolved: History of sepsis / SNOMED CT 0494792677  Resolved: Inpatient stay / SNOMED CT 182272405  Resolved: Inpatient stay / SNOMED CT 104712298  Resolved: Inpatient stay / SNOMED CT 723028771  Resolved: Inpatient stay / SNOMED CT 851887082  Resolved: Inpatient stay / SNOMED CT 674555598  Resolved: Inpatient stay / SNOMED CT 626764541  Resolved: Inpatient stay / SNOMED CT 933001234  Resolved: Inpatient stay / SNOMED CT 311825600  Resolved: Inpatient stay / SNOMED CT 961983163  Resolved: Other and Unspecified Noninfectious Gastroenteritis and Colitis / ICD-9-.9  Canceled: 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 / SNOMED CT 9795916929  Canceled: Ascites / SNOMED CT 9311611627  Canceled: 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 / SNOMED CT 50081881  Canceled: Benign hypertensive CKD / SNOMED CT 9726287  Canceled: Benign prostatic hyperplasia (BPH) with urinary urgency / SNOMED CT 983753700  Canceled: Hypercholesteremia / ICD-9-.0  Canceled: Hypertensive HF (heart failure) / SNOMED CT 15829733  Canceled: Type 2 diabetes mellitus / SNOMED CT 721265593      Histories   Past Medical History:    Active  History of GI bleed (8156808412): Onset on 12/5/2018 at 83  years.  Comments:  5/6/2019 CDT 6:41 AM KATHERINET - Floridalma Calderon  Again 04/30/2019  H/O acute pancreatitis (3408193405): Onset on 12/6/2016 at 81 years.  History of tricuspid valve replacement (3644956960): Onset in the month of 11/2016 at 80 years  History of acute bacterial endocarditis (9314249290): Onset on 7/23/2016 at 80 years.  Anticardiolipin syndrome (6342242682): Onset on 1/1/2005 at 69 years.  KEYONA (obstructive sleep apnea) (837660246): Onset on 1/1/2003 at 67 years.  Comments:  11/1/2013 CDT 10:58 AM CDT - Shravan Berrios MD  Adequate titration to BIPAP 16/11 on 10/15/2013  AF (Atrial Fibrillation) (5502570153)  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 (6380452476)  IBS (irritable bowel syndrome) (7788421603)  CAD (coronary artery disease) (5888909598)  Gout (710752768)  Chronic diastolic CHF (congestive heart failure), NYHA class 3 (2024599823)  Obesity (4992775504)  Anticoagulated (83302102)  High cholesterol (49530567)  Dupuytren's contracture of right hand (8941233251)  Acute kidney injury (nontraumatic) (3990981936)  CHB (complete heart block) (6021851975)  Nonischemic dilated cardiomyopathy (7772878905)  ICD (implantable cardioverter-defibrillator) infection (693047861)  History of coronary artery bypass graft (4535341397)  Tricuspid valve insufficiency ([id number])  Right heart failure (749962937)  Cardiorenal syndrome (8730818067)  Vitamin B 12 deficiency (8802858073)  Resolved  Inpatient stay (131694020): Onset on 4/30/2019 at 83 years.  Resolved on 5/2/2019 at 83 years.  Comments:  5/6/2019 CDT 6:40 AM JANIE - Floridalma Calderon  @[place], [place] - Acute GI bleeding  Inpatient stay (168663491): Onset on 12/5/2018 at 83 years.  Resolved on 12/6/2018 at 83 years.  Comments:  12/10/2018 CST 6:59 AM CST - Floridalma Calderon  @[facility], [place] - GI bleeding, suspected upper source  Inpatient stay (956748818): Onset on 5/26/2017 at  81 years.  Resolved on 5/28/2017 at 81 years.  Comments:  5/31/2017 CDT 10:36 PM CDT - Floridalma Calderon  @[facility] - Anemia  Inpatient stay (090465090): Onset on 12/13/2016 at 81 years.  Resolved on 12/19/2016 at 81 years.  Comments:  1/3/2017 CST 9:01 PM Floridalma Brody  @[facility] - Acute on chronic right heart failure  Inpatient stay (854381623): Onset on 12/6/2016 at 81 years.  Resolved on 12/13/2016 at 81 years.  Comments:  12/20/2016 CST 6:02 AM Floridalma Brody  @[facility]  - S/P tricuspid valve replacement with worsening right ventricular function  Inpatient stay (538903097): Onset on 11/29/2016 at 81 years.  Resolved on 12/6/2016 at 81 years.  Comments:  12/20/2016 CST 10:22 PM Floridalma Brody  @[facility] - Severe symptomatic tricuspid valvular insufficiency. Chronic right heart failure.  History of sepsis (8659014566): Onset on 7/26/2016 at 80 years.  Resolved.  Inpatient stay (006537806): Onset on 9/28/2015 at 79 years.  Resolved on 10/2/2015 at 79 years.  Comments:  12/20/2016 CST 6:03 AM Floridalma Brody  @[facility] - Diverticulitis  Inpatient stay (954079368): Onset on 5/17/2015 at 79 years.  Resolved on 5/29/2015 at 79 years.  Comments:  12/20/2016 CST 6:03 AM Floridalma Bordy  @[facility] - Infected pacemaker  Inpatient stay (147366081): Onset on 7/30/2013 at 77 years.  Resolved on 7/30/2013 at 77 years.  Comments:  12/20/2016 CST 6:02 AM Floridalma Brody  @[facility] - Jaw pain, possible anginal equivalent  Colon polyps (351641827):  Resolved.  Other and Unspecified Noninfectious Gastroenteritis and Colitis (558.9):  Resolved.  BE (bacterial endocarditis) (213283672):  Resolved.   Family History:    Aneurysm  Father  Heart disease  Mother     Procedure history:    Esophagogastroduodenoscopy (218380840) on 2/12/2019 at 83 Years.  Esophagogastroduodenoscopy and biopsy (2108571790) on 12/7/2018 at 83 Years.  Comments:  12/11/2018 2:43 PM CHYNA - Shravan Berrios MD  Indication: GIB  Sedatoin: Fentanyl 50 mcg, Versed 2  mg  findings: Negative with negative duodenal biopsy  Rec: Colonoscopy  Colonoscopy (982920834) on 12/7/2018 at 83 Years.  Comments:  12/11/2018 2:46 PM Shravan Willard MD  Indication: GIB  Sedation: fentanyl 50 mcg, Versed 1 mg  Findings: Diverticulosis, 2 small AVMs with bleeding in ascending treated with APC  Rec: Consider further workup based on resoponse to treatemetn  Abdominal paracentesis (524105871) on 12/6/2018 at 83 Years.  Comments:  12/11/2018 2:40 PM Shravan Willard MD  Negative cytology  Bone marrow biopsy ([id number]) on 7/12/2017 at 81 Years.  Esophagogastroduodenoscopy (786578099) on 5/27/2017 at 81 Years.  Comments:  6/1/2017 1:15 PM Shravan Alvarez MD  gastritis, fundic gland polyps  Colonoscopy ([id number]) on 5/27/2017 at 81 Years.  Comments:  6/1/2017 1:16 PM Shravan Alvarez MD  Cecal tubular adenoma, pandiverticulosis    5/30/2017 3:36 PM KATHERINET - Sam CMA, Mag  w/polypectomy  TVR - Tricuspid valve replacement (6114692288) on 11/29/2016 at 81 Years.  Cardiac angiogram (1948964520) on 11/1/2016 at 80 Years.  Comments:  11/3/2016 12:05 PM Shravan Alvarez MD  Summary/Conclusions  PRESENTATION / INDICATIONS    Pre-surgical evaluation for cardiac surgery    Severe TR by echo  DIAGNOSTIC - CORONARY    Co-dominant coronary artery system    The left main artery was normal in appearance and free of obstructive disease.    Chronic total occlusion of the proximal LAD    The circumflex artery has minimal disease.    The RCA has moderate disease.    No change since 2013  DIAGNOSTIC - GRAFTS    Chronic total occlusion in the proximal anastomosis of the SVG graft to the RCA    The SVG to the ramus intermediate is patent    The sequential graft to the 1st diagonal is patent.    The sequential graft limb from the diagonal to the LAD is patent.    No change since 2013  HEMODYNAMICS    The LVEDP is mildly elevated    Severely elevated right atrial pressures    No evidence of  intracardiac shunting  Limited thoracotomy (4977833847) on 8/23/2016 at 80 Years.  Comments:  8/29/2016 2:45 PM Floridalma Jarrell  Left mini thoracotomy and placement of two epicardial screw in leads.  Implant of left pectoral pacer generatoe.  Removal of automatic cardiac defibrillator (873395107) on 8/4/2016 at 80 Years.  Comments:  8/29/2016 2:49 PM Floridalma Jarrell  Generator and leads  Implantation of intravenous single chamber cardiac pacemaker system (3137537101) on 8/4/2016 at 80 Years.  Pacemaker change on 4/8/2015 at 79 Years.  Cardiac angiogram (3490543276) on 8/2/2013 at 77 Years.  Comments:  8/8/2013 2:45 PM Shravan Alvarez MD  Summary/Conclusions  PRESENTATION / INDICATIONS   Chest pain  DIAGNOSTIC - CORONARY  Right dominant coronary artery system  DIAGNOSTIC SUMMARY  100% stenosis in the Proximal LAD  30% stenosis in the 1st Marginal  30% stenosis in the Proximal RCA  50% stenosis in the Mid RCA  100% stenosis in the Aorta graft to Distal RCA  DIAGNOSTIC - GRAFTS  The sequential graft to the diagonal branch is patent.  The sequential graft from the diagonal to the LAD is patent.  The SVG to the ramus intermediate is patent  The SVG to RCA is chronically occluded  HEMODYNAMICS  The LVEDP is mildly elevated  RECOMMENDATIONS & PLAN  Medical Rx- no significant change from previous angiogram, there is dramatic mismatch in size between the SVG's and the target arteries with slow filling of the LAD        angiogrqam on 2/3/2012 at 76 Years.  Comments:  4/24/2012 3:33 PM Shravan Alvarez MD  Summary/Conclusions  PRESENTATION / INDICATIONS   Dyspnea and fatigue.  DIAGNOSTIC - CORONARY  Right dominant coronary artery system.  LMCA is normal.  LAD is occluded proximally after the 1st septal branch.  LCx has mild luminal irregularities.  OM1 has 20-30% proximal stenosis.  RCA has 30-40% diffuse disease in the mid segment and otherwise has mild diffuse luminal irregularities.  DIAGNOSTIC -  "GRAFTS  The sequential SVG to the diagonal and then LAD is widely patent.  There is a significant size mismatch between the graft and the native vessels.  The diagonal and distal LAD have no obvious lesions but are small caliber (< 2.0 mm vessels).  There is some backfilling into the mid LAD.  The SVG to the ramus is widely patent.  There is a significant size mismatch between the graft and the native vessel.  The ramus has no obvious lesion but is small caliber (< 2.0 mm vessel).    The SVG to the RCA is chronically occluded.  LEFT VENTRICULAR FUNCTION  Left ventricular function is mildly reduced with EF of 42% and mild global hypokinesis.  No significant MR.  HEMODYNAMICS  The LVEDP is 6 mmHg.  No significant gradient across the aortic valve.  RA 11, RV 29/10, PA 34/17 (mean 25), PCWP 18  Amarilys CO 5.1, TDCO 4.6  RECOMMENDATIONS & PLAN  Consider alternative etiology for symptoms.  Lasix dose decreased to 40 mg QD given relative hypotension and patient reporting feeling \"dry membranes.\"             Colonoscopy ([id number]) on 12/15/2006 at 71 Years.  Comments:  8/8/2013 4:06 PM Shravan Alvarez MD  Diverticulosis. No polyps.  Colonoscopy on 6/22/2001 at 65 Years.  Comments:  11/15/2010 8:21 AM Shravan Willard MD  hyperplastic polyp  Cholecystectomy (38399301) in the month of 6/2001 at 65 Years.  CABG - Coronary artery bypass graft (601880484) in 2000 at 65 Years.  History of Back Surgery (V45.89) in 2000 at 65 Years.  Colonoscopy (267392962) on 2/17/1993 at 57 Years.  Comments:  8/8/2013 4:06 PM Shravan Alvarez MD  1 adenoma, 1 hyperplastic, diverticulosis.  Left shoulder surgery in 1991 at 56 Years.  Esophagogastroduodenoscopy (123319656).  Cholecystectomy (75917790).  Ablation for atrial fibrillation.  Comments:  5/2/2011 1:56 PM Madeline Murray  Subsequent pacemaker dependence.  Colonoscopy (623434465).   Social History:        Alcohol Assessment            Past      Tobacco Assessment         "    Former smoker, quit more than 30 days ago                     Comments:                      08/23/2018 - Uvaldo CMA, Andrew                     Pt quit 2/5/1963      Employment and Education Assessment            Employed, Work/School description: Farmer.      Exercise and Physical Activity Assessment            Exercise type: Walking.      Physical Examination   Vital Signs   5/17/2019 12:09 PM CDT Temperature Tympanic 97.3 DegF  LOW    Peripheral Pulse Rate 77 bpm    HR Method Electronic    Systolic Blood Pressure 98 mmHg    Diastolic Blood Pressure 52 mmHg  LOW    Mean Arterial Pressure 67 mmHg    BP Site Right arm    BP Method Manual    Oxygen Saturation 98 %      Measurements from flowsheet : Measurements   5/17/2019 12:09 PM CDT   Weight Measured - Standard                204.6 lb     General:  Alert and oriented, No acute distress.    Eye:  Normal conjunctiva.    Airway:       Mallampati classification: I (soft palate, fauces, uvula, pillars visible).    HENT:  Normocephalic, Normal hearing, Oral mucosa is moist.    Neck:  Supple, Non-tender, No carotid bruit, No jugular venous distention, No lymphadenopathy, No thyromegaly.    Respiratory:  Respirations are non-labored, Decreased BS left base unchanged. No wheezes or rales.    Cardiovascular:  Normal rate, Regular rhythm, No gallop, Normal peripheral perfusion, No edema, Device right chest. Scar left chest.         Systolic murmur: Location ( [place] ), Intensity ( Grade I ), Systolic.    Gastrointestinal:  Non-tender.         Abdomen: Distended, Obese.    Genitourinary:  Large right inguinal hernia.    Musculoskeletal:  Normal gait.    Integumentary:  pale, Ecchymoses.    Neurologic:  No focal deficits.    Cognition and Speech:  Speech clear and coherent, Functional cognition intact.    Psychiatric:  Cooperative, Appropriate mood & affect.       Impression and Plan   Diagnosis     Anticoagulated (VOT55-ED Z79.01).     History of tricuspid valve replacement  (YXE91-UJ Z95.2).     Acquired arteriovenous malformation of colon (SEW20-GZ K55.20).     AF (Atrial Fibrillation) (ISC08-AL I48.2).     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, chronic (LDJ94-WM D50.0).     Ascites (RZJ85-GN R18.8).     Cardiorenal syndrome (MCP52-SR I13.10).     Tricuspid valve insufficiency (IZM86-ZU I07.1).     CAD (coronary artery disease) (CZD79-AT I25.10).     BPH with urinary obstruction (JVW20-XN N40.1).     Stage 3 chronic kidney disease (FWQ80-PE N18.3).     Summary:  ASA 3. Stable for cataract.    Orders     Orders (Selected)   Outpatient Orders  Ordered  Return to Clinic (Request): Return in 2 weeks.     INR, BMP, CBC today.

## 2022-02-16 NOTE — LETTER
(Inserted Image. Unable to display)     April 01, 2019      JACINTO JIN  [address]  [place], [place] [id number]          Dear JACINTO,      Thank you for selecting [facility] for your healthcare needs.      Our records indicate you are due for the following services:     Non-Fasting Labs.    If you had your labs done at another facility or with Direct Access Lab Testing at [facility], please bring in a copy of the results to your next visit, mail a copy, or drop off a copy of your results to your Healthcare Provider.      To schedule an appointment or if you have further questions, please contact your primary clinic:   [facility]       [phone]   [facility]       [phone]              [facility]     [phone]      Powered by [employer]    Sincerely,    Dorothy Quintana MD

## 2022-02-16 NOTE — CARE COORDINATION
Sources of Information:  [ ] Patient, family member, or caregiver (Please list):  [x ] Hospital discharge summary  [ ] Hospital fax  [ ] List of recent hospitalizations or ED visits  [ ] Other:     Discharged From: [facility]  Discharge Date: 5/28/17    Diagnosis/Problem: Anemia    Medication Changes: [x ] Yes [ ] No   Medication List Updated: [x ] Yes [ ] No    Needs Referral or Lab: [x ] Yes [ ] No   Needs INR in 3-4 days and BMP and CBC in 2 weeks    Needs Follow-up Appointment:  [x ] Within 7 days of discharge (highly complex visit)  [ ] Within 14 days of discharge (moderately complex visit)    Appointment Made With: None made yet  Date: N/A    HOWARDTCTato called CC back-states he is feeling better today. He did gain weight in the hospital. Was up to 230 but now down today to 228. Seeing cardio tomorrow at 8am-transferred him to schedule an appt with JDL and INR tomorrow after seeing Lilian.appt completed

## 2022-02-16 NOTE — TELEPHONE ENCOUNTER
---------------------  From: Gillian Mcguire CMA (Phone Messages Pool ([id number]_[facility]))   To: [facility] Message Pool ([id number]_[place] [facility] [place]);     Sent: 7/19/2019 1:29:34 PM CDT  Subject: General Message-med refill     Phone Message    PCP:   RIAN      Time of Call:  1320       Person Calling:  [facility]  Phone number:  _    Returned call at: [phone]    Note:   pharmacy calling to verify Midodrine rx.  Directions are 5mg TID,  Pt's wife states now it's 10mg BID.  Please confirm dose so they can run through insurance.  Pt is out and ins won't cover early fill due to directions      Last office visit and reason:  7/8 preop---------------------  From: Osman/Myrna LONDON (Seriously Message Pool ([id number] [facility] [place]))   To: Shravan Berrios MD;     Sent: 7/19/2019 1:30:12 PM CDT  Subject: FW: General Message-med refill---------------------  From: Shravan Berrios MD   To: [facility] Message Pool ([id number]_[place] [facility] [place]);     Sent: 7/19/2019 2:30:04 PM CDT  Subject: RE: General Message-med refill     10 mg TIDNoted.

## 2022-02-16 NOTE — LETTER
(Inserted Image. Unable to display)   June 19, 2019      JACINTO JIN      [address]  [place], [place] [id number]        Dear JACINTO,    Thank you for selecting [facility] (previously [facility]) for your healthcare needs.  Below you will find the results of your recent tests done at our clinic.     Results are acceptable.      Result Name Current Result Previous Result Reference Range   Sodium Level (mmol/L) ((L)) 131 6/18/2019  136 5/8/2019 135 - 146   Potassium Level (mmol/L)  5.0 6/18/2019 ((H)) 5.5 5/8/2019 3.5 - 5.3   Chloride Level (mmol/L)  99 6/18/2019  100 5/8/2019 98 - 110   CO2 Level (mmol/L)  29 6/18/2019  27 5/8/2019 20 - 32   Glucose Level (mg/dL)  97 6/18/2019 ((H)) 109 5/8/2019 65 - 99   BUN (mg/dL) ((H)) 39 6/18/2019 ((H)) 64 5/8/2019 7 - 25   Creatinine Level (mg/dL) ((H)) 1.64 6/18/2019  0.70 - 1.11   eGFR (mL/min/1.73m2) ((L)) 39 6/18/2019 ((L)) 25 4/18/2019 > OR = 60 -    Calcium Level (mg/dL) ((L)) 8.5 6/18/2019  9.5 5/8/2019 8.6 - 10.3   WBC  5.3 6/18/2019  6.5 5/8/2019 3.8 - 10.8   Hgb (gm/dL) ((L)) 8.2 6/18/2019 ((L)) 8.6 5/8/2019 13.2 - 17.1   Platelet  231 6/18/2019  143 5/8/2019 140 - 400   INR  1.1 6/18/2019  4.5 6/7/2019        Please contact me or my assistant at [phone] if you have any questions.     Sincerely,        Shravan Berrios MD

## 2022-02-16 NOTE — PROGRESS NOTES
Chief Complaint    Discuss possible hernia, also prostate concerns  History of Present Illness      Oriented has a known right inguinal hernia which is really been asymptomatic and a very complicated medical situation with heart disease.  The hernia is been getting bigger and would like to do something about it.      He has had urinary retention and saw Dr. Allen yesterday with the discussion being between catheterization, what sounds like a TURP or similar procedure.  Review of Systems      No increased dyspnea.  No chest pain or bleeding.  No headache.  Physical Exam   Vitals & Measurements    T: 97.2   F (Tympanic)  HR: 76(Peripheral)  BP: 106/60     HT: 75 in  WT: 228 lb  BMI: 28.49       Patient appears comfortable.  Chronically ill-appearing.  Alert and oriented.  Chest modestly diminished breath sounds at the left base which are chronic.  Cardiac exam is regular.  He has a device.  No significant edema.  January exam reveals a large right inguinal hernia without tenderness.  It is easily reducible.  Assessment/Plan       Anticoagulated (Z79.01)         Stable         Orders:          50160 office outpatient visit 25 minutes (Charge), Quantity: 1, BPH with urinary obstruction  Inguinal hernia, right  Right heart failure  Nonischemic dilated cardiomyopathy  Anticoagulated                BPH with urinary obstruction (N40.1)         We will review Dr. Allen's note when available and discuss with cardiology his candidacy for surgery.         Orders:          [id number] office outpatient visit 25 minutes (Charge), Quantity: 1, BPH with urinary obstruction  Inguinal hernia, right  Right heart failure  Nonischemic dilated cardiomyopathy  Anticoagulated                Inguinal hernia, right (K40.90)         Discussed with cardiology with regard to his surgery.         Orders:          [id number] office outpatient visit 25 minutes (Charge), Quantity: 1, BPH with urinary obstruction  Inguinal hernia, right  Right heart  failure  Nonischemic dilated cardiomyopathy  Anticoagulated                Nonischemic dilated cardiomyopathy (I42.9)         Stable         Orders:          [id number] office outpatient visit 25 minutes (Charge), Quantity: 1, BPH with urinary obstruction  Inguinal hernia, right  Right heart failure  Nonischemic dilated cardiomyopathy  Anticoagulated                Right heart failure (I50.9)         Stable         Orders:          06671 office outpatient visit 25 minutes (Charge), Quantity: 1, BPH with urinary obstruction  Inguinal hernia, right  Right heart failure  Nonischemic dilated cardiomyopathy  Anticoagulated                Orders:         omeprazole, = 1 cap(s) ( 20 mg ), po, daily, # 90 cap(s), 3 Refill(s), Type: Maintenance, Pharmacy: [facility], (Ordered)  Patient Information     Name:JACINTO JIN      Address:      [address] [place] [id number]     Sex:Male     YOB: 1935     [phone]     Emergency Contact:GERA JIN     MRN:[id number]     FIN:[id number]     Location[facility]     Date of Service:11/27/2018      Primary Care Physician:       Shravan Berrios MD, [phone]      Attending Physician:       Shravan Berrios MD, [phone]  Problem List/Past Medical History    Ongoing     Acute kidney injury (nontraumatic)     AF (Atrial Fibrillation)     Anemia of renal disease     Anticardiolipin syndrome     Anticoagulated     Ascites     B12 deficiency     Benign hypertensive CKD     Benign prostatic hyperplasia (BPH) with urinary urgency     BPH with urinary obstruction     CAD (coronary artery disease)     Cardiorenal syndrome     CHB (complete heart block)     CHF (Congestive Heart Failure)     Depression     Diverticulosis     Dupuytren's contracture of right hand     Gout     H/O acute pancreatitis     High cholesterol     History of acute bacterial endocarditis     History of coronary artery bypass graft     History  of tricuspid valve replacement     Hx of colonic polyp     Hypertensive HF (heart failure)     IBS (irritable bowel syndrome)     ICD (implantable cardioverter-defibrillator) infection     Inguinal hernia, right     Iron deficiency anemia     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     Nonischemic dilated cardiomyopathy     Obesity     KEYONA (obstructive sleep apnea)       Comments: Adequate titration to BIPAP 16/11 on 10/15/2013     Osteoarthritis of both knees     Paralysis, diaphragm       Comments: Left     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     PUD (peptic ulcer disease)     Right heart failure     Stage 3 chronic kidney disease     Tricuspid valve insufficiency     Vitamin B 12 deficiency    Historical     BE (bacterial endocarditis)     Colon polyps     History of sepsis     Inpatient stay       Comments: @[facility] - Jaw pain, possible anginal equivalent     Inpatient stay       Comments: @[facility] - Infected pacemaker     Inpatient stay       Comments: @[facility] - Diverticulitis     Inpatient stay       Comments: @[facility] - S/P tricuspid valve replacement with worsening right ventricular function     Inpatient stay       Comments: @[facility] - Severe symptomatic tricuspid valvular insufficiency. Chronic right heart failure.     Inpatient stay       Comments: @[facility] - Acute on chronic right heart failure     Inpatient stay       Comments: @[facility] - Anemia     Other and Unspecified Noninfectious Gastroenteritis and Colitis  Procedure/Surgical History     Bone marrow biopsy (07/12/2017)     Colonoscopy (05/27/2017)      Comments:      Cecal tubular adenoma, pandiverticulosis      w/polypectomy     Esophagogastroduodenoscopy (05/27/2017)      Comments:      gastritis, fundic gland polyps     TVR - Tricuspid valve replacement (11/29/2016)     Cardiac angiogram (11/01/2016)      Comments:      Summary/Conclusions      PRESENTATION / INDICATIONS         Pre-surgical evaluation for cardiac surgery        Severe TR by echo      DIAGNOSTIC - CORONARY        Co-dominant coronary artery system        The left main artery was normal in appearance and free of obstructive disease.        Chronic total occlusion of the proximal LAD        The circumflex artery has minimal disease.        The RCA has moderate disease.        No change since 2013      DIAGNOSTIC - GRAFTS        Chronic total occlusion in the proximal anastomosis of the SVG graft to the RCA        The SVG to the ramus intermediate is patent        The sequential graft to the 1st diagonal is patent.        The sequential graft limb from the diagonal to the LAD is patent.        No change since 2013      HEMODYNAMICS        The LVEDP is mildly elevated        Severely elevated right atrial pressures        No evidence of intracardiac shunting     Limited thoracotomy (08/23/2016)      Comments:      Left mini thoracotomy and placement of two epicardial screw in leads.  Implant of left pectoral pacer generatoe.     Implantation of intravenous single chamber cardiac pacemaker system (08/04/2016)     Removal of automatic cardiac defibrillator (08/04/2016)      Comments:      Generator and leads     Pacemaker change (04/08/2015)     Cardiac angiogram (08/02/2013)      Comments:      Summary/Conclusions      PRESENTATION / INDICATIONS      Chest pain      DIAGNOSTIC - CORONARY      Right dominant coronary artery system      DIAGNOSTIC SUMMARY      100% stenosis in the Proximal LAD      30% stenosis in the 1st Marginal      30% stenosis in the Proximal RCA      50% stenosis in the Mid RCA      100% stenosis in the Aorta graft to Distal RCA      DIAGNOSTIC - GRAFTS      The sequential graft to the diagonal branch is patent.      The sequential graft from the diagonal to the LAD is patent.      The SVG to the ramus intermediate is patent      The SVG to RCA is chronically occluded      HEMODYNAMICS      The LVEDP is  "mildly elevated      RECOMMENDATIONS & PLAN      Medical Rx- no significant change from previous angiogram, there is dramatic mismatch in size between the SVG's and the target arteries with slow filling of the LAD     angiogrqam (02/03/2012)      Comments:      Summary/Conclusions      PRESENTATION / INDICATIONS      Dyspnea and fatigue.      DIAGNOSTIC - CORONARY      Right dominant coronary artery system.      LMCA is normal.      LAD is occluded proximally after the 1st septal branch.      LCx has mild luminal irregularities.  OM1 has 20-30% proximal stenosis.      RCA has 30-40% diffuse disease in the mid segment and otherwise has mild diffuse luminal irregularities.      DIAGNOSTIC - GRAFTS      The sequential SVG to the diagonal and then LAD is widely patent.  There is a significant size mismatch between the graft and the native vessels.  The diagonal and distal LAD have no obvious lesions but are small caliber (< 2.0 mm vessels).  There is some backfilling into the mid LAD.      The SVG to the ramus is widely patent.  There is a significant size mismatch between the graft and the native vessel.  The ramus has no obvious lesion but is small caliber (< 2.0 mm vessel).        The SVG to the RCA is chronically occluded.      LEFT VENTRICULAR FUNCTION      Left ventricular function is mildly reduced with EF of 42% and mild global hypokinesis.  No significant MR.      HEMODYNAMICS      The LVEDP is 6 mmHg.  No significant gradient across the aortic valve.      RA 11, RV 29/10, PA 34/17 (mean 25), PCWP 18      Amarilys CO 5.1, TDCO 4.6      RECOMMENDATIONS & PLAN      Consider alternative etiology for symptoms.      Lasix dose decreased to 40 mg QD given relative hypotension and patient reporting feeling \"dry membranes.\"     Colonoscopy (12/15/2006)      Comments:      Diverticulosis. No polyps.     Colonoscopy (06/22/2001)      Comments:      hyperplastic polyp     Cholecystectomy (06/2001)     CABG - Coronary artery " bypass graft (2000)     History of Back Surgery (2000)     Colonoscopy (02/17/1993)      Comments:      1 adenoma, 1 hyperplastic, diverticulosis.     Left shoulder surgery (1991)     Ablation for atrial fibrillation      Comments:      Subsequent pacemaker dependence.     Cholecystectomy     Colonoscopy     Esophagogastroduodenoscopy  Medications        nitroglycerin 0.4 mg sublingual tablet: 0.4 mg, 1 tab(s), SL, q5min, not to exceed 3 doses/15 min--if pain persists, seek medical attention, 25 tab(s), PRN: for chest pain.        Protegra oral tablet: 1 tab(s), po, daily.        potassium chloride 20 mEq oral tablet, extended release: See Instructions, 3 tab BID, 30 unknown unit, 5 Refill(s).        acetaminophen 325 mg oral tablet: 650 mg, 2 tab(s), po, q4 hrs, not to exceed 4000 mg/day, 0 Refill(s).        Senokot 8.6 mg oral tablet: 1-2 tab(s), PO, Once a day (at bedtime), PRN: for constipation.        omeprazole 20 mg oral delayed release capsule: 20 mg, 1 cap(s), po, daily, 90 cap(s), 3 Refill(s).        furosemide 80 mg oral tablet: 80 mg, 1 tab(s), po, bid, Per Dr. Quintana on 3/7/2017, 0 Refill(s).        digoxin 125 mcg (0.125 mg) oral tablet: 125 mcg, 1 tab(s), po, daily, 0 Refill(s).        metOLazone 2.5 mg oral tablet: See Instructions, 1 tab(s) po every other daily for weight > 227#, 0 Refill(s).        Vitamin B12 1000 mcg/mL injectable solution: 1,000 mcg, im, qmonth, 0 Refill(s).        Metoprolol Succinate ER 25 mg oral tablet, extended release: See Instructions, TAKE ONE-HALF TABLET BY MOUTH TWICE DAILY, 30 unknown unit, 11 Refill(s).        atorvastatin 40 mg oral tablet: See Instructions, TAKE ONE TABLET BY MOUTH AT BEDTIME, 90 unknown unit.        predniSONE 20 mg oral tablet: See Instructions, 2 po daily for 3-5 days prn gout, 30 EA, 0 Refill(s).        mirtazapine 15 mg oral tablet: 15 mg, 1 tab(s), PO, Once a day (at bedtime), 30 tab(s), 5 Refill(s).        allopurinol 300 mg oral tablet: See  Instructions, TAKE ONE TABLET BY MOUTH ONCE DAILY, 90 unknown unit, 3 Refill(s).        [id number] URINARY DRAIN BAG 2000ML MG BEAD: See Instructions, USE AS DIRECTED, 1 unknown unit.        clotrimazole 1% topical cream: 1 nazario, TOP, BID, for 7 day(s), 30 gm, 0 Refill(s).        MetroGel 1% topical gel: 1 nazario, Topical, daily, 1 tubes, 5 Refill(s).        tamsulosin 0.4 mg oral capsule: 0.4 mg, 1 cap(s), Oral, daily, 30 cap(s), 0 Refill(s).        warfarin 3 mg oral tablet: 3 mg, 1 tab(s), Oral, daily, for 90 day(s), 90 tab(s), 0 Refill(s).         Allergies    No Known Medication Allergies    adhesive tape  Social History    Smoking Status - 11/27/2018     Former smoker     Alcohol      Past, 03/30/2018     Employment and Education      Employed, Work/School description: [employer] 11/11/2010     Exercise and Physical Activity      Exercise type: Walking., 11/11/2010     Tobacco      Former smoker, quit more than 30 days ago, 08/23/2018  Family History    Aneurysm: Father.    Heart disease: Mother.  Immunizations      Vaccine Date Status      influenza virus vaccine, inactivated 09/28/2015 Given      pneumococcal (PCV13) 04/21/2015 Given      influenza virus vaccine, inactivated 08/28/2014 Given      influenza virus vaccine, inactivated 10/29/2012 Given      influenza virus vaccine, inactivated 09/23/2011 Given      influenza virus vaccine, inactivated 10/26/2010 Given      influenza 10/23/2008 Recorded      Td 09/01/2005 Recorded      pneumococcal 11/28/2001 Recorded  Lab Results          Lab Results (Last 4 results within 90 days)           Sodium Level TR: 143 mEq/L (09/10/18 08:53:00)          Potassium Level TR: 4.2 mEq/L (09/10/18 08:53:00)          Chloride TR: 106 mEq/L (09/10/18 08:53:00)          CO2 TR: 26 mEq/L (09/10/18 08:53:00)          Anion Gap TR: 11 mEq/L (09/10/18 08:53:00)          Glucose Level TR: 120 mg/dL (09/10/18 08:53:00)          BUN TR: 35 mg/dL (09/10/18 08:53:00)          Creatinine TR:  2.13 mg/dL (09/10/18 08:53:00)          BUN/Creatinine Ratio TR: 16 (09/10/18 08:53:00)          GFR Calculated TR: 36 mL/min (09/10/18 08:53:00)          Calcium TR: 9.7 mEq/dL (09/10/18 08:53:00)          Bilirubin Total TR: 1.4 mg/dL (09/10/18 08:53:00)          AST TR: 24 unit/L (09/10/18 08:53:00)          ALT TR: 21 unit/L (09/10/18 08:53:00)          Protein Total TR: 7.6 gm/dL (09/10/18 08:53:00)          Albumin Level TR: 4.2 g/dL (09/10/18 08:53:00)          Globulin TR: 3.4 g/dL (09/10/18 08:53:00)          A/G Ratio TR: 1.2 (09/10/18 08:53:00)          Iron TR: 41 nmol/L (09/10/18 08:53:00)          TIBC TR: 263 mg/dL (09/10/18 08:53:00)          WBC TR: 5.7 x10^3/uL (09/10/18 08:53:00)          RBC TR: 3.08 x10^6/uL (09/10/18 08:53:00)          Hgb TR: 9.7 g/dL (09/10/18 08:53:00)          Hct TR: 30.4 % (09/10/18 08:53:00)          MCV TR: 99 fL (09/10/18 08:53:00)          MCH TR: 31.5 pg (09/10/18 08:53:00)          MCHC TR: 31.9 gm/dL (09/10/18 08:53:00)          RDW TR: 17.4 % (09/10/18 08:53:00)          Platelet TR: 101 x10^3/uL (09/10/18 08:53:00)          MPV (Mean Platelet Volume) TR: 11.1 fL (09/10/18 08:53:00)          Lymphocytes TR: 14.8 % (09/10/18 08:53:00)          Absolute Lymphocytes TR: 0.8 cells/uL (09/10/18 08:53:00)          Neutrophils TR: 67.8 % (09/10/18 08:53:00)          Absolute Neutrophils TR: 3.9 cells/uL (09/10/18 08:53:00)          Monocytes TR: 10.5 % (09/10/18 08:53:00)          Absolute Monocytes TR: 0.6 cells/uL (09/10/18 08:53:00)          Eosinophils TR: 6.5 % (09/10/18 08:53:00)          Absolute Eosinophils TR: 0.4 cells/uL (09/10/18 08:53:00)          Basophils TR: 0.4 % (09/10/18 08:53:00)          Absolute Basophils TR: 0 cells/uL (09/10/18 08:53:00)          INR: 2.9 (10/31/18 10:19:00)          INR: 2.3 (09/17/18 13:47:00)

## 2022-02-16 NOTE — CARE COORDINATION
ACTION PLAN   Goal(s): Cardiac management    CC to follow-up with Mamta PRN. Do monthly chart review. Mamta/wife very good about calling if they need something.     Today s Date: 12/16/15                                                                     Goal(s) completion date: ongoing     Steps to be taken to manage CHF (cardio has been managing)    Baseline weight: 213-214lbs When will I accomplish these steps Barriers Status   (4/19/17) -Bumex d/c    -Lasix 80mg BID    -Digoxin 0.125mcg    -Metolazone prn to get weights down    F/u in 4-6 weeks w/ cardio   (4/26/27) ED visit- was advised to take Metolazone qd until appt w/ [facility] on 4/28/17. Has lost 1lbs so far.    Cardio appt scheduled w/ Lilian on 6/1.       Steps to be taken to manage iron deficiency anemia When will I accomplish these steps Barriers Status   Venofer 200mg IV times 5 doses over 2 weeks. BMP and Hgb in 1 week (5/2/17). Orders faxed to infusion center today.

## 2022-02-16 NOTE — PROGRESS NOTES
Chief Complaint    right elbow sore, wakes him up at night, questions about b12 injection, break out on face  History of Present Illness      Patient complains of is chronic right olecranon bursitis.  It has not been hot red or more swollen.  He was using metronidazole topical for his face for rash it returned when he stopped using it.  No edema, PND, orthopnea.  No bleeding.  Review of Systems      No chest pain, increased dyspnea, PND, orthopnea.  Chronically tired.  His voiding is improved with medication.  Physical Exam   Vitals & Measurements    HR: 84(Peripheral)  BP: 108/65       Patient appears comfortable.  Alert and oriented.  HEENT exam reveals red macules nose and mouth.  Chest reveals decreased breath sounds at the left base otherwise clear.  Cardiac exam regular.  He does have a device.  Trace ankle edema.  Golf ball sized right olecranon bursa which is not particularly tender.  There is no warmth or erythema.  Assessment/Plan       Anticoagulated(Z79.01)        Stable         Orders:          79437 office outpatient visit 25 minutes (Charge), Quantity: 1, Olecranon bursitis of right elbow  Anticoagulated  Stage 3 chronic kidney disease  Gout  Vitamin B 12 deficiency  Right heart failure  Seborrheic dermatitis       BPH with urinary obstruction(N40.1)        Improved with medication.       Gout(M10.9)         Orders:          [id number] office outpatient visit 25 minutes (Charge), Quantity: 1, Olecranon bursitis of right elbow  Anticoagulated  Stage 3 chronic kidney disease  Gout  Vitamin B 12 deficiency  Right heart failure  Seborrheic dermatitis          Referral (Request), 08/20/18 14:33:00 CDT, Referred to: Orthopaedics, Reason for referral: Chronic Olecranon bursitis. Gout crystals present in the past., Olecranon bursitis of right elbow  Gout       Olecranon bursitis of right elbow(M70.21)        This is been a chronic issue it is not more inflamed today.  in 2010 gout crystals were  present.         Orders:          [id number] office outpatient visit 25 minutes (Charge), Quantity: 1, Olecranon bursitis of right elbow  Anticoagulated  Stage 3 chronic kidney disease  Gout  Vitamin B 12 deficiency  Right heart failure  Seborrheic dermatitis          Referral (Request), 08/20/18 14:33:00 CDT, Referred to: Orthopaedics, Reason for referral: Chronic Olecranon bursitis. Gout crystals present in the past., Olecranon bursitis of right elbow  Gout       Right heart failure(I50.9)        Stable.  Has follow-up with cardiology.         Orders:          [id number] office outpatient visit 25 minutes (Charge), Quantity: 1, Olecranon bursitis of right elbow  Anticoagulated  Stage 3 chronic kidney disease  Gout  Vitamin B 12 deficiency  Right heart failure  Seborrheic dermatitis          Return to Clinic (Request), RFV: Chronic disease f/u/ med check w/ JDL, Return in 6/29/18          Return to Clinic (Request), RFV: CHF, Return in 4-6 weeks       Seborrheic dermatitis(L21.9)        Resume metronidazole topical.         Orders:          21203 office outpatient visit 25 minutes (Charge), Quantity: 1, Olecranon bursitis of right elbow  Anticoagulated  Stage 3 chronic kidney disease  Gout  Vitamin B 12 deficiency  Right heart failure  Seborrheic dermatitis       Stage 3 chronic kidney disease(N18.3)         Orders:          [id number] office outpatient visit 25 minutes (Charge), Quantity: 1, Olecranon bursitis of right elbow  Anticoagulated  Stage 3 chronic kidney disease  Gout  Vitamin B 12 deficiency  Right heart failure  Seborrheic dermatitis       Vitamin B 12 deficiency(E53.8)        CBC twice per year.  He has hematology follow-up in the near future.  He has monoclonal gammopathy.         Orders:          97077 office outpatient visit 25 minutes (Charge), Quantity: 1, Olecranon bursitis of right elbow  Anticoagulated  Stage 3 chronic kidney disease  Gout  Vitamin B 12 deficiency  Right heart  failure  Seborrheic dermatitis          Return to Clinic (Request), RFV: Chronic disease f/u/ med check w/ JDL, Return in 6/29/18       Orders:         Return to Clinic (Request), RFV: BMP and CBC q6. Annual lipids.         Return to Clinic (Request), RFV: follow up dermatitis, Return in 2 weeks  Patient Information     Name:JACINTO JIN      Address:      [address] [place] [address]      Sex:Male      YOB: 1935      [phone]      Emergency Contact:GERA JIN     MRN:[id number]     FIN:[id number]     Location:[facility]     Date of Service:08/20/2018      Primary Care Physician:       Shravan Berrios MD, [phone]      Attending Physician:       Shravan Berrios MD, [phone]  Problem List/Past Medical History    Ongoing     Acute kidney injury (nontraumatic)     AF (Atrial Fibrillation)     Anemia of renal disease     Anticardiolipin syndrome     Anticoagulated     Ascites     B12 deficiency     Benign hypertensive CKD     Benign prostatic hyperplasia (BPH) with urinary urgency     BPH with urinary obstruction     CAD (coronary artery disease)     Cardiorenal syndrome     CHB (complete heart block)     CHF (Congestive Heart Failure)     Depression     Diverticulosis     Dupuytren's contracture of right hand     Gout     H/O acute pancreatitis     High cholesterol     History of acute bacterial endocarditis     History of coronary artery bypass graft     History of tricuspid valve replacement     Hx of colonic polyp     Hypertensive HF (heart failure)     IBS (irritable bowel syndrome)     ICD (implantable cardioverter-defibrillator) infection     Inguinal hernia, right     Iron deficiency anemia     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     Nonischemic dilated cardiomyopathy     Obesity     KEYONA (obstructive sleep apnea)       Comments: Adequate titration to BIPAP 16/11 on 10/15/2013     Osteoarthritis of both knees      Paralysis, diaphragm       Comments: Left     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 Chronic right heart failure.     Inpatient stay       Comments: @[facility] - Anemia     Inpatient stay       Comments: @[facility] - Jaw pain, possible anginal equivalent     Inpatient stay       Comments: @[facility] - Infected pacemaker     Inpatient stay       Comments: @[facility] - Acute on chronic right heart failure     Inpatient stay       Comments: @[facility] - S/P tricuspid valve replacement with worsening right ventricular function     Other and Unspecified Noninfectious Gastroenteritis and Colitis  Procedure/Surgical History     Bone marrow biopsy (07/12/2017)     Colonoscopy (05/27/2017)     Esophagogastroduodenoscopy (05/27/2017)     TVR - Tricuspid valve replacement (11/29/2016)     Cardiac angiogram (11/01/2016)     Limited thoracotomy (08/23/2016)     Implantation of intravenous single chamber cardiac pacemaker system (08/04/2016)     Removal of automatic cardiac defibrillator (08/04/2016)     Pacemaker change (04/08/2015)     Cardiac angiogram (08/02/2013)     angiogrqam (02/03/2012)     Colonoscopy (12/15/2006)     Colonoscopy (06/22/2001)     Cholecystectomy (06/2001)     CABG - Coronary artery bypass graft (2000)     History of Back Surgery (2000)     Colonoscopy (02/17/1993)     Left shoulder surgery (1991)     Ablation for atrial fibrillation     Cholecystectomy     Colonoscopy     Esophagogastroduodenoscopy  Medications        nitroglycerin 0.4 mg sublingual tablet: 0.4 mg, 1 tab(s), SL, q5min, not to exceed 3  doses/15 min--if pain persists, seek medical attention, 25 tab(s), PRN: for chest pain.        Protegra oral tablet: 1 tab(s), po, daily.        potassium chloride 20 mEq oral tablet, extended release: See Instructions, 3 tab BID, 30 unknown unit, 5 Refill(s).        Senexon-S: 2 tab(s), po, hs, 0 Refill(s).        acetaminophen 325 mg oral tablet: 650 mg, 2 tab(s), po, q4 hrs, not to exceed 4000 mg/day, 0 Refill(s).        Senokot 8.6 mg oral tablet: 1-2 tab(s), PO, Once a day (at bedtime), PRN: for constipation.        omeprazole 20 mg oral delayed release capsule: 40 mg, 2 cap(s), po, bid, 90 cap(s), 3 Refill(s).        furosemide 80 mg oral tablet: 80 mg, 1 tab(s), po, bid, Per Dr. Quintana on 3/7/2017, 0 Refill(s).        digoxin 125 mcg (0.125 mg) oral tablet: 125 mcg, 1 tab(s), po, daily, 0 Refill(s).        metOLazone 2.5 mg oral tablet: See Instructions, 1 tab(s) po daily for weight > 222#, 0 Refill(s).        Vitamin B12 1000 mcg/mL injectable solution: 1,000 mcg, im, qmonth, 0 Refill(s).        Metoprolol Succinate ER 25 mg oral tablet, extended release: See Instructions, TAKE ONE-HALF TABLET BY MOUTH TWICE DAILY, 30 unknown unit, 11 Refill(s).        atorvastatin 40 mg oral tablet: See Instructions, TAKE ONE TABLET BY MOUTH AT BEDTIME, 90 unknown unit.        predniSONE 20 mg oral tablet: See Instructions, 2 po daily for 3-5 days prn gout, 30 EA, 0 Refill(s).        mirtazapine 15 mg oral tablet: 15 mg, 1 tab(s), PO, Once a day (at bedtime), 30 tab(s), 5 Refill(s).        allopurinol 300 mg oral tablet: See Instructions, TAKE ONE TABLET BY MOUTH ONCE DAILY, 90 unknown unit, 3 Refill(s).        [id number] URINARY DRAIN BAG 2000ML MG BEAD: See Instructions, USE AS DIRECTED, 1 unknown unit.        clotrimazole 1% topical cream: 1 nazario, TOP, BID, for 7 day(s), 30 gm, 0 Refill(s).        MetroGel 1% topical gel: 1 nazario, Topical, daily, 1 tubes, 5 Refill(s).        tamsulosin 0.4 mg oral capsule: 0.4 mg, 1 cap(s),  Oral, daily, 30 cap(s), 0 Refill(s).        warfarin 3 mg oral tablet: 3 mg, 1 tab(s), Oral, daily, 30 tab(s), 0 Refill(s).                Allergies    No Known Medication Allergies    adhesive tape  Social History    Smoking Status - 08/20/2018     Never smoker     Alcohol      Past, 03/30/2018     Employment and Education      Employed, Work/School description: [employer] 11/11/2010     Exercise and Physical Activity      Exercise type: Walking., 11/11/2010  Family History    Aneurysm: Father.    Heart disease: Mother.  Immunizations      Vaccine Date Status      influenza virus vaccine, inactivated 09/28/2015 Given      pneumococcal (PCV13) 04/21/2015 Given      influenza virus vaccine, inactivated 08/28/2014 Given      influenza virus vaccine, inactivated 10/29/2012 Given      influenza virus vaccine, inactivated 09/23/2011 Given      influenza virus vaccine, inactivated 10/26/2010 Given      influenza 10/23/2008 Recorded      Td 09/01/2005 Recorded      pneumococcal 11/28/2001 Recorded  Lab Results          Lab Results (Last 4 results within 90 days)           Sodium Level: 141 mmol/L [135 mmol/L - 146 mmol/L] (07/27/18 10:46:00)          Sodium Level: 138 mmol/L [135 mmol/L - 146 mmol/L] (07/10/18 10:21:00)          Sodium Level: 138 mmol/L [135 mmol/L - 146 mmol/L] (06/18/18 10:59:00)          Sodium Level: 135 mmol/L [135 mmol/L - 146 mmol/L] (05/31/18 14:13:00)          Sodium Level TR: 139 mEq/L [11.9  - 13.9] (06/04/18 14:18:00)          Potassium Level: 3.7 mmol/L [3.5 mmol/L - 5.3 mmol/L] (07/27/18 10:46:00)          Potassium Level: 3.7 mmol/L [3.5 mmol/L - 5.3 mmol/L] (07/10/18 10:21:00)          Potassium Level: 3.5 mmol/L [3.5 mmol/L - 5.3 mmol/L] (06/18/18 10:59:00)          Potassium Level: 3.5 mmol/L [3.5 mmol/L - 5.3 mmol/L] (05/31/18 14:13:00)          Potassium Level TR: 3.8 mEq/L [11.9  - 13.9] (06/04/18 14:18:00)          Chloride Level: 99 mmol/L [98 mmol/L - 110 mmol/L] (07/27/18 10:46:00)           Chloride Level: 98 mmol/L [98 mmol/L - 110 mmol/L] (07/10/18 10:21:00)          Chloride Level: 96 mmol/L Low [98 mmol/L - 110 mmol/L] (06/18/18 10:59:00)          Chloride Level: 93 mmol/L Low [98 mmol/L - 110 mmol/L] (05/31/18 14:13:00)          Chloride TR: 95 mEq/L [11.9  - 13.9] (06/04/18 14:18:00)          CO2 Level: 31 mmol/L [20 mmol/L - 31 mmol/L] (07/27/18 10:46:00)          CO2 Level: 31 mmol/L [20 mmol/L - 31 mmol/L] (07/10/18 10:21:00)          CO2 Level: 32 mmol/L High [20 mmol/L - 31 mmol/L] (06/18/18 10:59:00)          CO2 Level: 34 mmol/L High [20 mmol/L - 31 mmol/L] (05/31/18 14:13:00)          CO2 TR: 33 mEq/L [11.9  - 13.9] (06/04/18 14:18:00)          Anion Gap TR: 11 mEq/L [20 mmol/L - 31 mmol/L] (06/04/18 14:18:00)          Glucose Level: 129 mg/dL High [65 mg/dL - 99 mg/dL] (07/27/18 10:46:00)          Glucose Level: 108 mg/dL High [65 mg/dL - 99 mg/dL] (07/10/18 10:21:00)          Glucose Level: 114 mg/dL High [65 mg/dL - 99 mg/dL] (06/18/18 10:59:00)          Glucose Level: 92 mg/dL [65 mg/dL - 99 mg/dL] (05/31/18 14:13:00)          Glucose Level TR: 98 mg/dL [11.9  - 13.9] (06/04/18 14:18:00)          BUN: 40 mg/dL High [7 mg/dL - 25 mg/dL] (07/27/18 10:46:00)          BUN: 40 mg/dL High [7 mg/dL - 25 mg/dL] (07/10/18 10:21:00)          BUN: 32 mg/dL High [7 mg/dL - 25 mg/dL] (06/18/18 10:59:00)          BUN: 35 mg/dL High [7 mg/dL - 25 mg/dL] (05/31/18 14:13:00)          BUN TR: 43 mg/dL [11.9  - 13.9] (06/04/18 14:18:00)          Creatinine Level: 1.82 mg/dL High [0.7 mg/dL - 1.11 mg/dL] (07/27/18 10:46:00)          Creatinine Level: 1.92 mg/dL High [0.7 mg/dL - 1.11 mg/dL] (07/10/18 10:21:00)          Creatinine Level: 1.83 mg/dL High [0.7 mg/dL - 1.11 mg/dL] (06/18/18 10:59:00)          Creatinine Level: 2.03 mg/dL High [0.7 mg/dL - 1.11 mg/dL] (05/31/18 14:13:00)          Creatinine TR: 2.02 mg/dL [11.9  - 13.9] (06/04/18 14:18:00)          BUN/Creat Ratio: 22 [6  - 22]  (07/27/18 10:46:00)          BUN/Creat Ratio: 21 [6  - 22] (07/10/18 10:21:00)          BUN/Creat Ratio: 17 [6  - 22] (06/18/18 10:59:00)          BUN/Creat Ratio: 17 [6  - 22] (05/31/18 14:13:00)          BUN/Creatinine Ratio TR: 21 [20 mmol/L - 31 mmol/L] (06/04/18 14:18:00)          eGFR: 34 mL/min/1.73m2 Low [8.6 mg/dL - 10.3 mg/dL] (07/27/18 10:46:00)          eGFR: 32 mL/min/1.73m2 Low [8.6 mg/dL - 10.3 mg/dL] (07/10/18 10:21:00)          eGFR: 34 mL/min/1.73m2 Low [8.6 mg/dL - 10.3 mg/dL] (06/18/18 10:59:00)          eGFR: 30 mL/min/1.73m2 Low [8.6 mg/dL - 10.3 mg/dL] (05/31/18 14:13:00)          eGFR TR: 39 mL/min [11.9  - 13.9] (06/04/18 14:18:00)          eGFR African American: 40 mL/min/1.73m2 Low [6  - 22] (07/27/18 10:46:00)          eGFR African American: 37 mL/min/1.73m2 Low [6  - 22] (07/10/18 10:21:00)          eGFR African American: 40 mL/min/1.73m2 Low [6  - 22] (06/18/18 10:59:00)          eGFR African American: 35 mL/min/1.73m2 Low [6  - 22] (05/31/18 14:13:00)          Calcium Level: 9.7 mg/dL [8.6 mg/dL - 10.3 mg/dL] (07/27/18 10:46:00)          Calcium Level: 9.2 mg/dL [8.6 mg/dL - 10.3 mg/dL] (07/10/18 10:21:00)          Calcium Level: 9.5 mg/dL [8.6 mg/dL - 10.3 mg/dL] (06/18/18 10:59:00)          Calcium Level: 9.7 mg/dL [8.6 mg/dL - 10.3 mg/dL] (05/31/18 14:13:00)          Calcium TR: 10.2 mEq/dL [11.9  - 13.9] (06/04/18 14:18:00)          Magnesium Level TR: 2 mg/dL [11.9  - 13.9] (06/04/18 14:18:00)          Hgb A1c: 5.9 High [65 mg/dL - 99 mg/dL] (07/20/18 15:19:00)          B-Natriuretic Peptide (BNP) TR: 136 pg/mL [11.9  - 13.9] (06/04/18 14:18:00)          Protime: 33.0 High [11.9  - 13.9] (07/27/18 10:53:00)          Protime: 38.4 High [11.9  - 13.9] (07/20/18 15:25:00)          PT: 24.3 High [9  - 11.5] (06/18/18 11:02:00)          PT: 23.9 High [9  - 11.5] (05/31/18 14:13:00)          INR: 3.3 [0.7 mg/dL - 1.11 mg/dL] (08/07/18 15:39:00)          INR: 3.3 High [0.7 mg/dL - 1.11  mg/dL] (07/27/18 10:53:00)          INR: 4.0 High [0.7 mg/dL - 1.11 mg/dL] (07/20/18 15:25:00)          INR: 2.4 High [0.7 mg/dL - 1.11 mg/dL] (06/18/18 11:02:00)

## 2022-02-16 NOTE — PROGRESS NOTES
Chief Complaint    c/o coughing up blood for the past week, lightheaded, chills, SOB  History of Present Illness      Juan Pablo has had 3 or 4 episodes of blood-tinged otherwise clear sputum in the past week.  He has been getting over an upper respiratory infection with cough weakness fatigue persisting.  Fever and chills resolved.  Chronically anticoagulated and has a history of heart failure.  No PND orthopnea.  No edema.  Review of Systems      Review of systems he does admit to feeling depressed this PHQ 9 score is 18.  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  Appetite has been poor poor sleep as well.  He no longer snores due to his weight loss.  He has not had any bleeding symptoms.  Physical Exam   Vitals & Measurements    T: 97.7(Tympanic)  HR: 74(Peripheral)  BP: 102/64  SpO2: 96%     HT: 75 in       Patient appears comfortable and in no acute distress.  Alert and oriented.  Not tachypneic.  Speaks in complete sentences.  HEENT exam unremarkable.  Neck supple no thyromegaly.  Chest reveals slightly diminished breath sounds at the right base due to his chronically paralyzed right hemidiaphragm lungs otherwise clear.  Cardiac exam is regular he has a pacemaker.  No edema.  Soft systolic murmur.  Assessment/Plan       Acute URI         Resolving.         Ordered:          49923 office outpatient visit 25 minutes (Charge), Quantity: 1, Anticoagulated  Acute URI  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  Nonischemic dilated cardiomyopathy  Right heart failure                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         Ordered:          [id number] office outpatient visit 25 minutes (Charge), Quantity: 1, Anticoagulated  Acute URI  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  Nonischemic dilated cardiomyopathy  Right heart failure          CBC (h/h, RBC, indices, WBC, Plt)* ([facility]), Specimen Type: Blood, Collection Date: 03/27/18 11:35:00 CDT                Anticoagulated         INR therapeutic she has  had small amounts of hemoptysis likely due to recent upper respiratory infection.  Doubt pneumonia given negative chest x-ray.  Doubt congestive heart failure as he seems well compensated.  Doubt pulmonary embolism and he is as he has been maintained on therapeutic warfarin.         Ordered:          68299 prothrombin time (Form/Charge), Anticoagulated  AF (Atrial Fibrillation)          [id number] office outpatient visit 25 minutes (Charge), Quantity: 1, Anticoagulated  Acute URI  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  Nonischemic dilated cardiomyopathy  Right heart failure          CBC (h/h, RBC, indices, WBC, Plt)* ([facility]), Specimen Type: Blood, Collection Date: 03/27/18 11:35:00 CDT                Depression         Patient is more depressed and willing to start medication will start mirtazapine 15 mg at bedtime follow-up in 2-4 weeks.                Nonischemic dilated cardiomyopathy         Well compensated.  We will get a CBC, BNP, BMP.         Ordered:          75415 office outpatient visit 25 minutes (Charge), Quantity: 1, Anticoagulated  Acute URI  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  Nonischemic dilated cardiomyopathy  Right heart failure          B type natriuretic peptide (BNP)* ([facility]), Specimen Type: Plasma, Collection Date: 03/27/18 11:35:00 CDT          Basic Metabolic Panel* ([employer]), Specimen Type: Serum, Collection Date: 03/27/18 11:35:00 CDT                Right heart failure         Well compensated.         Ordered:          [id number] office outpatient visit 25 minutes (Charge), Quantity: 1, Anticoagulated  Acute URI  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  Nonischemic dilated cardiomyopathy  Right heart failure                Orders:         mirtazapine, 1 tab(s) ( 15 mg ), PO, Once a day (at bedtime), # 30 tab(s), 5 Refill(s), Type: Maintenance, Pharmacy: [facility], 1 tab(s) po hs         Return to Clinic (Request), Return in 3-4 weeks         Return to Clinic (Request), RFV: Depression, Return in 2-4  weeks         XR Chest PA/Lat (Request), Priority: STAT, Cough  Shortness of breath  Hemoptysis  Patient Information     Name:JACINTO JIN      Address:      [address] [place] [id number]     Sex:Male     YOB: 1935     [phone]     Emergency Contact:GERA JIN     MRN:[id number]     FIN:[id number]     Location:[facility]     Date of Service:03/27/2018      Primary Care Physician:       Shravan Berrios MD, [phone]  Problem List/Past Medical History    Ongoing     Acute kidney injury (nontraumatic)     AF (Atrial Fibrillation)     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     Anticardiolipin syndrome     Anticoagulated     Ascites     B12 deficiency     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     CAD (coronary artery disease)     Cardiorenal syndrome     CHB (complete heart block)     CHF (Congestive Heart Failure)     Colon polyps     Depression     Diverticulosis     Dupuytren's contracture of right hand     Gout     H/O acute pancreatitis     High cholesterol     History of acute bacterial endocarditis     History of coronary artery bypass graft     History of tricuspid valve replacement     IBS (irritable bowel syndrome)     ICD (implantable cardioverter-defibrillator) infection     Iron deficiency anemia     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     Nonischemic dilated cardiomyopathy     Obesity     KEYONA (obstructive sleep apnea)      Comments: Adequate titration to BIPAP 16/11 on 10/15/2013     Osteoarthritis of both knees     Paralysis, diaphragm      Comments: Left     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     PUD (peptic ulcer disease)     Right heart failure     Tricuspid valve insufficiency     Vitamin B 12 deficiency    Historical     BE (bacterial endocarditis)     History of sepsis     Inpatient stay      Comments: @[facility] - Diverticulitis      Inpatient stay      Comments: @[facility] - Severe symptomatic tricuspid valvular insufficiency. Chronic right heart failure.     Inpatient stay      Comments: [facility] - Anemia     Inpatient stay      Comments: @[facility] - Jaw pain, possible anginal equivalent     Inpatient stay      Comments: @[facility] - Infected pacemaker     Inpatient stay      Comments: @[facility] - Acute on chronic right heart failure     Inpatient stay      Comments: @[facility] - S/P tricuspid valve replacement with worsening right ventricular function     Other and Unspecified Noninfectious Gastroenteritis and Colitis  Procedure/Surgical History     Bone marrow biopsy (07/12/2017)     Colonoscopy (05/27/2017)     Esophagogastroduodenoscopy (05/27/2017)     TVR - Tricuspid valve replacement (11/29/2016)     Cardiac angiogram (11/01/2016)     Limited thoracotomy (08/23/2016)     Implantation of intravenous single chamber cardiac pacemaker system (08/04/2016)     Removal of automatic cardiac defibrillator (08/04/2016)     Pacemaker change (04/08/2015)     Cardiac angiogram (08/02/2013)     angiogrqam (02/03/2012)     Colonoscopy (12/15/2006)     Colonoscopy (06/22/2001)     Cholecystectomy (06/2001)     CABG - Coronary artery bypass graft (2000)     History of Back Surgery (2000)     Colonoscopy (02/17/1993)     Left shoulder surgery (1991)     Ablation for atrial fibrillation     Cholecystectomy     Colonoscopy     Esophagogastroduodenoscopy  Medications        nitroglycerin 0.4 mg sublingual tablet: 0.4 mg, 1 tab(s), SL, q5min, not to exceed 3 doses/15 min--if pain persists, seek medical attention, 25 tab(s), PRN: for chest pain.        Protegra oral tablet: 1 tab(s), po, daily.        potassium chloride 20 mEq oral tablet, extended release: See Instructions, 1 tab TID, 30 unknown unit, 5 Refill(s).        Senexon-S: 2 tab(s), po, hs, 0 Refill(s).        acetaminophen 325 mg oral tablet: 650 mg, 2 tab(s), po, q4 hrs, not to exceed 4000 mg/day, 0 Refill(s).         Senokot 8.6 mg oral tablet: 1-2 tab(s), PO, Once a day (at bedtime), PRN: for constipation.        omeprazole 20 mg oral delayed release capsule: 40 mg, 2 cap(s), po, bid, 90 cap(s), 3 Refill(s).        furosemide 80 mg oral tablet: 80 mg, 1 tab(s), po, bid, Per Dr. Quintana on 3/7/2017, 0 Refill(s).        digoxin 125 mcg (0.125 mg) oral tablet: 125 mcg, 1 tab(s), po, daily, 0 Refill(s).        metOLazone 2.5 mg oral tablet: See Instructions, 1 tab(s) po daily for weight > 222#, 0 Refill(s).        warfarin 3 mg oral tablet: See Instructions, Take 4.5mg, 3mg, 3mg on an alt. qd schedule, 40 unknown unit, 11 Refill(s).        allopurinol: 300 mg, po, daily, 0 Refill(s).        Vitamin B12 1000 mcg/mL injectable solution: 1,000 mcg, im, qmonth, 0 Refill(s).        Metoprolol Succinate ER 25 mg oral tablet, extended release: See Instructions, TAKE ONE-HALF TABLET BY MOUTH TWICE DAILY, 30 unknown unit, 11 Refill(s).        atorvastatin 40 mg oral tablet: See Instructions, TAKE ONE TABLET BY MOUTH AT BEDTIME, 90 unknown unit.        predniSONE 20 mg oral tablet: See Instructions, 2 po daily for 3-5 days prn gout, 30 EA, 0 Refill(s).        mirtazapine 15 mg oral tablet: 15 mg, 1 tab(s), PO, Once a day (at bedtime), 30 tab(s), 5 Refill(s).                Allergies    No Known Medication Allergies    adhesive tape  Social History    Smoking Status - 03/27/2018     Never smoker     Alcohol - Current, 11/11/2010      Current     Employment and Education - 11/11/2010      Employed, Work/School description: Farmer.     Exercise and Physical Activity - 11/11/2010      Exercise type: Walking.     Tobacco - Denies Tobacco Use, 11/11/2010  Family History    Aneurysm: Father.    Heart disease: Mother.  Immunizations      Vaccine Date Status      influenza virus vaccine, inactivated 09/28/2015 Given      pneumococcal (PCV13) 04/21/2015 Given      influenza virus vaccine, inactivated 08/28/2014 Given      influenza virus vaccine,  inactivated 10/29/2012 Given      influenza virus vaccine, inactivated 09/23/2011 Given      influenza virus vaccine, inactivated 10/26/2010 Given      influenza 10/23/2008 Recorded      Td 09/01/2005 Recorded      pneumococcal 11/28/2001 Recorded  Lab Results      Results (Last 90 days)                Laboratory                     Chemistry                          General Chemistry                               BUN                                     H 29 mg/dL (Range 7 - 25)  (12/29/17 10:18 AM CST)                                                                                                                                                H 28 mg/dL (Range 7 - 25)  (02/26/18 02:47 PM CST)                                                                                                                                          BUN/Creat Ratio                                     19   (12/29/17 10:18 AM CST)                                                                                                                                                18   (02/26/18 02:47 PM CST)                                                                                                                                          Basic Metabolic Profile                                        (12/29/17 10:18 AM CST)                                                                                                                                                   (02/26/18 02:47 PM CST)                                                                                                                                          CO2 Level                                     H 34 mmol/L (Range 20 - 31)  (12/29/17 10:18 AM CST)                                                                                                                                                31 mmol/L  (02/26/18 02:47 PM CST)                                                                                                                                           Calcium Level                                     9.5 mg/dL  (12/29/17 10:18 AM CST)                                                                                                                                                9.2 mg/dL  (02/26/18 02:47 PM CST)                                                                                                                                          Chloride Level                                     99 mmol/L  (12/29/17 10:18 AM CST)                                                                                                                                                104 mmol/L  (02/26/18 02:47 PM CST)                                                                                                                                          Creatinine Level                                     H 1.55 mg/dL (Range 0.70 - 1.11)  (12/29/17 10:18 AM CST)                                                                                                                                                H 1.55 mg/dL (Range 0.70 - 1.11)  (02/26/18 02:47 PM CST)                                                                                                                                          Glucose Level                                     95 mg/dL  (12/29/17 10:18 AM CST)                                                                                                                                                79 mg/dL  (02/26/18 02:47 PM CST)                                                                                                                                          Hgb A1c                                        (12/29/17 10:18 AM CST)                                                                                                                                                 5.5   (12/29/17 10:18 AM CST)                                                                                                                                          Potassium Level                                     3.6 mmol/L  (12/29/17 10:18 AM CST)                                                                                                                                                4.4 mmol/L  (02/26/18 02:47 PM CST)                                                                                                                                          Sodium Level                                     141 mmol/L  (12/29/17 10:18 AM CST)                                                                                                                                                141 mmol/L  (02/26/18 02:47 PM CST)                                                                                                                                          Uric Acid                                        (12/29/17 10:18 AM CST)                                                                                                                                                5.4 mg/dL  (12/29/17 10:18 AM CST)                                                                                                                                          eGFR                                     L 42 mL/min/1.73m2 (Range > OR = 60 - )  (12/29/17 10:18 AM CST)                                                                                                                                                L 42 mL/min/1.73m2 (Range > OR = 60 - )  (02/26/18 02:47 PM CST)                                                                                                                                          eGFR                                      L 48 mL/min/1.73m2 (Range > OR = 60 - )  (12/29/17 10:18 AM CST)                                                                                                                                                 L 48 mL/min/1.73m2 (Range > OR = 60 - )  (02/26/18 02:47 PM CST)                                                                                                                                     Lipids                               Cholesterol:      89 mg/dL  (03/13/18 08:45 AM CDT)                                                                                                                                          Cholesterol/HDL Ratio:      3.0   (03/13/18 08:45 AM CDT)                                                                                                                                          HDL:      L 30 mg/dL (Range >40 - )  (03/13/18 08:45 AM CDT)                                                                                                                                          LDL:      42   (03/13/18 08:45 AM CDT)                                                                                                                                          Lipid Profile:         (03/13/18 08:45 AM CDT)                                                                                                                                          Non-HDL Cholesterol:      59   (03/13/18 08:45 AM CDT)                                                                                                                                          Triglyceride:      85 mg/dL  (03/13/18 08:45 AM CDT)                                                                                                                                     Other Chemistry                               B-Natriuretic Peptide (BNP)                                        (02/26/18 02:47 PM CST)                                                                                                                                                 87 pg/mL  (02/26/18 02:47 PM CST)                                                                                                                                Coagulation                          INR                               INR                                     H 2.9   (12/29/17 10:18 AM CST)                                                                                                                                                1.9   (02/05/18 11:39 AM CST)                                                                                                                                                1.7   (02/23/18 08:34 AM CST)                                                                                                                                                H 2.3   (02/26/18 02:47 PM CST)                                                                                                                                                H 3.0  (Range  - <1.3)  (03/13/18 08:51 AM CDT)                                                                                                                                     PT                               PT                                        (12/29/17 10:18 AM CST)                                                                                                                                                H 28.7  (Range 9.0 - 11.5)  (12/29/17 10:18 AM CST)                                                                                                                                                   (02/26/18 02:47 PM CST)                                                                                                                                                H 23.2  (Range 9.0 - 11.5)  (02/26/18 02:47 PM CST)                                                                                                                                      "              (03/13/18 08:51 AM CDT)                                                                                                                                     Protime                               Protime:      H 30.2  (Range 11.7 - 14.1)  (03/13/18 08:51 AM CDT)                                                                                                                                Hematology                          CBC                               Hgb                                        (12/29/17 10:18 AM CST)                                                                                                                                                L 11.5 gm/dL (Range 13.2 - 17.1)  (12/29/17 10:18 AM CST)                                                                                                                                                   (02/26/18 02:47 PM CST)                                                                                                                                                L 11.0 gm/dL (Range 13.2 - 17.1)  (02/26/18 02:47 PM CST)                                                                                                                    Diagnostic Results   INR today 2.8.  March 13 was 3.0.  26 2.3.    Chest x-ray is unchanged.  \"As on the previous study there are small bilateral pleural effusions no focal infiltrate.\"  "

## 2022-02-16 NOTE — PROGRESS NOTES
Chief Complaint    SOB, bloating  History of Present Illness      Patient with history of congestive heart failure complains of increased shortness of breath, abdominal bloating, and weight increase of 15 pounds over his dry weight.  Patient has not used metolazone as has been emphasized on many similar occasions.  No PND orthopnea.  Does have some edema.  No angina.  No bleeding.       Child has been removed and he will be seeing urology he is on tamsulosin and feels like he is voiding well.  Review of Systems      No fever, cough, headache, myalgia, falls.  Denies snoring.  Physical Exam   Vitals & Measurements    HR: 91(Peripheral)  BP: 120/79       Patient appears comfortable.  Not tachypneic.  Alert and oriented.  Chest reveals mildly diminished breath sounds left base chronically unchanged.  Cardiac exam is regular normal gallop or jugular venous distention soft systolic murmur.  Abdomen obese but nontender.  1+ankle edema.  Neurologist told him he had a right inguinal hernia and indeed there is a bulging there without tenderness.  Assessment/Plan       AF (Atrial Fibrillation)(I48.2)        Stable       Anticoagulated(Z79.01)        INR today       BPH with urinary obstruction(N40.1)        Improved on tamsulosin.  Urology follow-up next week.       Depression(F32.9)        Improved with medication.         Orders:          Return to Clinic (Request), RFV: CHF, Return in 4-6 weeks       Inguinal hernia, right(K40.90)        Asymptomatic.  Poor operative candidate.  Discussed the signs of strangulation which would require immediate intervention.       KEYONA (obstructive sleep apnea)(G47.33)        Improved with weight loss.       Right heart failure(I50.9)        Weight and symptoms increased.  Metolazone as prescribed daily for weight over 224 pounds.  Follow-up next week with Dr. Vazquez scheduled.  Will likely need a chemistry at follow-up         Orders:          Return to Clinic (Request), RFV: CHF, Return  in 4-6 weeks       Orders:         tamsulosin, 1 cap(s) ( 0.4 mg ), Oral, daily, # 30 cap(s), 0 Refill(s), Type: Maintenance, Pharmacy: [facility], 1 cap(s) Oral daily, (Ordered)  Patient Information     Name:JACINTO JIN      Address:      [address] [place] [address]      Sex:Male      YOB: 1935      [phone]      Emergency Contact:GERA JIN     MRN:[id number]     FIN:[id number]     Location:[facility]     Date of Service:07/20/2018      Primary Care Physician:       Shravan Berrios MD, [phone]      Attending Physician:       Shravan Berrios MD, [phone]  Problem List/Past Medical History    Ongoing     Acute kidney injury (nontraumatic)     AF (Atrial Fibrillation)     Anemia of renal disease     Anticardiolipin syndrome     Anticoagulated     Ascites     B12 deficiency     Benign hypertensive CKD     Benign prostatic hyperplasia (BPH) with urinary urgency     BPH with urinary obstruction     CAD (coronary artery disease)     Cardiorenal syndrome     CHB (complete heart block)     CHF (Congestive Heart Failure)     Depression     Diverticulosis     Dupuytren's contracture of right hand     Gout     H/O acute pancreatitis     High cholesterol     History of acute bacterial endocarditis     History of coronary artery bypass graft     History of tricuspid valve replacement     Hx of colonic polyp     Hypertensive HF (heart failure)     IBS (irritable bowel syndrome)     ICD (implantable cardioverter-defibrillator) infection     Inguinal hernia, right     Iron deficiency anemia     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     Nonischemic dilated cardiomyopathy     Obesity     KEYONA (obstructive sleep apnea)       Comments: Adequate titration to BIPAP 16/11 on 10/15/2013     Osteoarthritis of both knees     Paralysis, diaphragm       Comments: Left     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 Chronic right heart failure.     Inpatient stay       Comments: @[facility] - Anemia     Inpatient stay       Comments: @[facility] - Jaw pain, possible anginal equivalent     Inpatient stay       Comments: @[facility] - Infected pacemaker     Inpatient stay       Comments: @[facility] - Acute on chronic right heart failure     Inpatient stay       Comments: @[facility] - S/P tricuspid valve replacement with worsening right ventricular function     Other and Unspecified Noninfectious Gastroenteritis and Colitis  Procedure/Surgical History     Bone marrow biopsy (07/12/2017)     Colonoscopy (05/27/2017)     Esophagogastroduodenoscopy (05/27/2017)     TVR - Tricuspid valve replacement (11/29/2016)     Cardiac angiogram (11/01/2016)     Limited thoracotomy (08/23/2016)     Implantation of intravenous single chamber cardiac pacemaker system (08/04/2016)     Removal of automatic cardiac defibrillator (08/04/2016)     Pacemaker change (04/08/2015)     Cardiac angiogram (08/02/2013)     angiogrqam (02/03/2012)     Colonoscopy (12/15/2006)     Colonoscopy (06/22/2001)     Cholecystectomy (06/2001)     CABG - Coronary artery bypass graft (2000)     History of Back Surgery (2000)     Colonoscopy (02/17/1993)     Left shoulder surgery (1991)     Ablation for atrial fibrillation     Cholecystectomy     Colonoscopy     Esophagogastroduodenoscopy  Medications        nitroglycerin 0.4 mg sublingual tablet: 0.4 mg, 1 tab(s), SL, q5min, not to exceed 3 doses/15 min--if pain persists, seek medical attention, 25 tab(s), PRN: for chest pain.        Protegra oral tablet: 1 tab(s),  po, daily.        potassium chloride 20 mEq oral tablet, extended release: See Instructions, 1 tab TID, 30 unknown unit, 5 Refill(s).        Senexon-S: 2 tab(s), po, hs, 0 Refill(s).        acetaminophen 325 mg oral tablet: 650 mg, 2 tab(s), po, q4 hrs, not to exceed 4000 mg/day, 0 Refill(s).        Senokot 8.6 mg oral tablet: 1-2 tab(s), PO, Once a day (at bedtime), PRN: for constipation.        omeprazole 20 mg oral delayed release capsule: 40 mg, 2 cap(s), po, bid, 90 cap(s), 3 Refill(s).        furosemide 80 mg oral tablet: 80 mg, 1 tab(s), po, bid, Per Dr. Quintana on 3/7/2017, 0 Refill(s).        digoxin 125 mcg (0.125 mg) oral tablet: 125 mcg, 1 tab(s), po, daily, 0 Refill(s).        metOLazone 2.5 mg oral tablet: See Instructions, 1 tab(s) po daily for weight > 222#, 0 Refill(s).        Vitamin B12 1000 mcg/mL injectable solution: 1,000 mcg, im, qmonth, 0 Refill(s).        Metoprolol Succinate ER 25 mg oral tablet, extended release: See Instructions, TAKE ONE-HALF TABLET BY MOUTH TWICE DAILY, 30 unknown unit, 11 Refill(s).        atorvastatin 40 mg oral tablet: See Instructions, TAKE ONE TABLET BY MOUTH AT BEDTIME, 90 unknown unit.        predniSONE 20 mg oral tablet: See Instructions, 2 po daily for 3-5 days prn gout, 30 EA, 0 Refill(s).        mirtazapine 15 mg oral tablet: 15 mg, 1 tab(s), PO, Once a day (at bedtime), 30 tab(s), 5 Refill(s).        allopurinol 300 mg oral tablet: See Instructions, TAKE ONE TABLET BY MOUTH ONCE DAILY, 90 unknown unit, 3 Refill(s).        [id number] URINARY DRAIN BAG 2000ML MG BEAD: See Instructions, USE AS DIRECTED, 1 unknown unit.        clotrimazole 1% topical cream: 1 nazario, TOP, BID, for 7 day(s), 30 gm, 0 Refill(s).        warfarin 3 mg oral tablet: See Instructions, TAKE 1 & 1/2 TABLETS BY MOUTH ONCE DAILY SUN,TUES,THURS,SAT TAKE ONE TABLET MON,WED,FRI, 40 unknown unit.        MetroGel 1% topical gel: 1 nazario, Topical, daily, 1 tubes, 5 Refill(s).        tamsulosin 0.4 mg oral  capsule: 0.4 mg, 1 cap(s), Oral, daily, 30 cap(s), 0 Refill(s).                Allergies    No Known Medication Allergies    adhesive tape  Social History    Smoking Status - 06/29/2018     Never smoker     Alcohol      Past, 03/30/2018     Employment and Education      Employed, Work/School description: [employer] 11/11/2010     Exercise and Physical Activity      Exercise type: Walking., 11/11/2010  Family History    Aneurysm: Father.    Heart disease: Mother.  Immunizations      Vaccine Date Status      influenza virus vaccine, inactivated 09/28/2015 Given      pneumococcal (PCV13) 04/21/2015 Given      influenza virus vaccine, inactivated 08/28/2014 Given      influenza virus vaccine, inactivated 10/29/2012 Given      influenza virus vaccine, inactivated 09/23/2011 Given      influenza virus vaccine, inactivated 10/26/2010 Given      influenza 10/23/2008 Recorded      Td 09/01/2005 Recorded      pneumococcal 11/28/2001 Recorded  Lab Results          Lab Results (Last 4 results within 90 days)           Sodium Level: 138 mmol/L [135 mmol/L - 146 mmol/L] (07/10/18 10:21:00)          Sodium Level: 138 mmol/L [135 mmol/L - 146 mmol/L] (06/18/18 10:59:00)          Sodium Level: 135 mmol/L [135 mmol/L - 146 mmol/L] (05/31/18 14:13:00)          Sodium Level TR: 139 mEq/L [11.7  - 14.1] (06/04/18 14:18:00)          Potassium Level: 3.7 mmol/L [3.5 mmol/L - 5.3 mmol/L] (07/10/18 10:21:00)          Potassium Level: 3.5 mmol/L [3.5 mmol/L - 5.3 mmol/L] (06/18/18 10:59:00)          Potassium Level: 3.5 mmol/L [3.5 mmol/L - 5.3 mmol/L] (05/31/18 14:13:00)          Potassium Level TR: 3.8 mEq/L [11.7  - 14.1] (06/04/18 14:18:00)          Chloride Level: 98 mmol/L [98 mmol/L - 110 mmol/L] (07/10/18 10:21:00)          Chloride Level: 96 mmol/L Low [98 mmol/L - 110 mmol/L] (06/18/18 10:59:00)          Chloride Level: 93 mmol/L Low [98 mmol/L - 110 mmol/L] (05/31/18 14:13:00)          Chloride TR: 95 mEq/L [11.7  - 14.1] (06/04/18  14:18:00)          CO2 Level: 31 mmol/L [20 mmol/L - 31 mmol/L] (07/10/18 10:21:00)          CO2 Level: 32 mmol/L High [20 mmol/L - 31 mmol/L] (06/18/18 10:59:00)          CO2 Level: 34 mmol/L High [20 mmol/L - 31 mmol/L] (05/31/18 14:13:00)          CO2 TR: 33 mEq/L [11.7  - 14.1] (06/04/18 14:18:00)          Anion Gap TR: 11 mEq/L [20 mmol/L - 31 mmol/L] (06/04/18 14:18:00)          Glucose Level: 108 mg/dL High [65 mg/dL - 99 mg/dL] (07/10/18 10:21:00)          Glucose Level: 114 mg/dL High [65 mg/dL - 99 mg/dL] (06/18/18 10:59:00)          Glucose Level: 92 mg/dL [65 mg/dL - 99 mg/dL] (05/31/18 14:13:00)          Glucose Level TR: 98 mg/dL [11.7  - 14.1] (06/04/18 14:18:00)          BUN: 40 mg/dL High [7 mg/dL - 25 mg/dL] (07/10/18 10:21:00)          BUN: 32 mg/dL High [7 mg/dL - 25 mg/dL] (06/18/18 10:59:00)          BUN: 35 mg/dL High [7 mg/dL - 25 mg/dL] (05/31/18 14:13:00)          BUN TR: 43 mg/dL [11.7  - 14.1] (06/04/18 14:18:00)          Creatinine Level: 1.92 mg/dL High [0.7 mg/dL - 1.11 mg/dL] (07/10/18 10:21:00)          Creatinine Level: 1.83 mg/dL High [0.7 mg/dL - 1.11 mg/dL] (06/18/18 10:59:00)          Creatinine Level: 2.03 mg/dL High [0.7 mg/dL - 1.11 mg/dL] (05/31/18 14:13:00)          Creatinine TR: 2.02 mg/dL [11.7  - 14.1] (06/04/18 14:18:00)          BUN/Creat Ratio: 21 [6  - 22] (07/10/18 10:21:00)          BUN/Creat Ratio: 17 [6  - 22] (06/18/18 10:59:00)          BUN/Creat Ratio: 17 [6  - 22] (05/31/18 14:13:00)          BUN/Creatinine Ratio TR: 21 [20 mmol/L - 31 mmol/L] (06/04/18 14:18:00)          eGFR: 32 mL/min/1.73m2 Low [8.6 mg/dL - 10.3 mg/dL] (07/10/18 10:21:00)          eGFR: 34 mL/min/1.73m2 Low [8.6 mg/dL - 10.3 mg/dL] (06/18/18 10:59:00)          eGFR: 30 mL/min/1.73m2 Low [8.6 mg/dL - 10.3 mg/dL] (05/31/18 14:13:00)          eGFR TR: 39 mL/min [11.7  - 14.1] (06/04/18 14:18:00)          eGFR African American: 37 mL/min/1.73m2 Low [None  - Few] (07/10/18 10:21:00)           eGFR African American: 40 mL/min/1.73m2 Low [None  - Few] (06/18/18 10:59:00)          eGFR African American: 35 mL/min/1.73m2 Low [None  - Few] (05/31/18 14:13:00)          Calcium Level: 9.2 mg/dL [8.6 mg/dL - 10.3 mg/dL] (07/10/18 10:21:00)          Calcium Level: 9.5 mg/dL [8.6 mg/dL - 10.3 mg/dL] (06/18/18 10:59:00)          Calcium Level: 9.7 mg/dL [8.6 mg/dL - 10.3 mg/dL] (05/31/18 14:13:00)          Calcium TR: 10.2 mEq/dL [11.7  - 14.1] (06/04/18 14:18:00)          Magnesium Level TR: 2 mg/dL [11.7  - 14.1] (06/04/18 14:18:00)          B-Natriuretic Peptide (BNP) TR: 136 pg/mL [11.7  - 14.1] (06/04/18 14:18:00)          PSA: 0.5 ng/mL [3.5 mmol/L - 5.3 mmol/L] (05/01/18 10:42:00)          WBC: 5.6 [3.8  - 10.8] (04/24/18 10:56:00)          RBC: 3.15 Low [4.2  - 5.8] (04/24/18 10:56:00)          Hgb: 10.1 gm/dL Low [13.2 gm/dL - 17.1 gm/dL] (04/24/18 10:56:00)          Hct: 30.7 % Low [38.5 % - 50 %] (04/24/18 10:56:00)          MCV: 97.5 fL [80 fL - 100 fL] (04/24/18 10:56:00)          MCH: 32.1 pg [27 pg - 33 pg] (04/24/18 10:56:00)          MCHC: 32.9 gm/dL [32 gm/dL - 36 gm/dL] (04/24/18 10:56:00)          RDW: 14.8 % [11 % - 15 %] (04/24/18 10:56:00)          Platelet: 112 Low [140  - 400] (04/24/18 10:56:00)          MPV: 11.6 fL [7.5 fL - 12.5 fL] (04/24/18 10:56:00)          Protime: 22.6 High [11.7  - 14.1] (04/24/18 11:05:00)          PT: 24.3 High [9  - 11.5] (06/18/18 11:02:00)          PT: 23.9 High [9  - 11.5] (05/31/18 14:13:00)          INR: 2.4 High [None  - 5] (06/18/18 11:02:00)          INR: 2.4 High [None  - 5] (05/31/18 14:13:00)          INR: 2.0 High [None  - 5] (04/24/18 11:05:00)          UA pH: 7 [5  - 8] (05/04/18 14:32:00)          UA pH: 6.5 [5  - 8] (05/01/18 10:44:00)          UA Specific Gravity: 1.015 [1.001  - 1.035] (05/04/18 14:32:00)          UA Specific Gravity: 1.015 [1.001  - 1.035] (05/01/18 10:44:00)          UA Glucose: NEGATIVE [None  - Few] (05/04/18 14:32:00)           UA Glucose: NEGATIVE [None  - Few] (05/01/18 10:44:00)          UA Bilirubin: NEGATIVE [None  - Few] (05/04/18 14:32:00)          UA Bilirubin: NEGATIVE [None  - Few] (05/01/18 10:44:00)          UA Ketones: NEGATIVE [None  - Few] (05/04/18 14:32:00)          UA Ketones: NEGATIVE [None  - Few] (05/01/18 10:44:00)          Urine Occult Blood: 1+ Abnormal [None  - Few] (05/04/18 14:32:00)          Urine Occult Blood: TRACE Abnormal [None  - Few] (05/01/18 10:44:00)          UA Protein: TRACE Abnormal [None  - Few] (05/04/18 14:32:00)          UA Protein: NEGATIVE [None  - Few] (05/01/18 10:44:00)          UA Nitrite: NEGATIVE [None  - Few] (05/04/18 14:32:00)          UA Nitrite: NEGATIVE [None  - Few] (05/01/18 10:44:00)          UA Leukocyte Esterase: 2+ Abnormal [None  - Few] (05/04/18 14:32:00)          UA Leukocyte Esterase: 3+ Abnormal [None  - Few] (05/01/18 10:44:00)          UA WBC Clumps: Present [None  - Few] (05/04/18 14:51:00)          UA Epithelial Cells: None Seen [None  - Few] (05/04/18 14:51:00)          UA Epithelial Cells: Few [None  - Few] (05/01/18 11:06:00)          UA WBC: >100 [None  - 5] (05/04/18 14:51:00)          UA WBC: >100 [None  - 5] (05/01/18 11:06:00)          UA RBC: 6-10 [None  - 2] (05/04/18 14:51:00)          UA RBC: 11-25 [None  - 2] (05/01/18 11:06:00)          UA Bacteria: Many [None  - Few] (05/04/18 14:51:00)          UA Bacteria: Moderate [None  - Few] (05/01/18 11:06:00)          Culture Urine: See comment Abnormal [6  - 22] (05/04/18 14:37:00)          Culture Urine: See comment Abnormal [6  - 22] (05/01/18 10:46:00)

## 2022-02-16 NOTE — NURSING NOTE
Comprehensive Intake Entered On:  3/28/2019 11:56 AM CDT    Performed On:  3/28/2019 11:54 AM CDT by Ellen Parker MA               Summary   Chief Complaint :   follow up - medication questions   Menstrual Status :   N/A   Weight Measured :   218 lb(Converted to: 218 lb 0 oz, 98.88 kg)    Height Measured :   75 in(Converted to: 6 ft 3 in, 190.50 cm)    Body Mass Index :   27.25 kg/m2 (HI)    Body Surface Area :   2.29 m2   Systolic Blood Pressure :   112 mmHg   Diastolic Blood Pressure :   75 mmHg   Mean Arterial Pressure :   87 mmHg   Peripheral Pulse Rate :   89 bpm   BP Site :   Right arm   Ellen Parker MA - 3/28/2019 11:54 AM CDT   Health Status   Allergies Verified? :   Yes   Medication History Verified? :   Yes   Tobacco Use? :   Never smoker   Ellen Parker MA - 3/28/2019 11:54 AM CDT   Meds / Allergies   (As Of: 3/28/2019 11:56:55 AM CDT)   Allergies (Active)   adhesive tape  Estimated Onset Date:   Unspecified ; Created By:   Floridalma Calderon; Reaction Status:   Active ; Category:   Other ; Substance:   adhesive tape ; Type:   Allergy ; Updated By:   Floridalma Calderon; Reviewed Date:   3/21/2019 7:52 AM CDT      No Known Medication Allergies  Estimated Onset Date:   Unspecified ; Created By:   Ellen Parker MA; Reaction Status:   Active ; Category:   Drug ; Substance:   No Known Medication Allergies ; Type:   Allergy ; Updated By:   Ellen Parker MA; Reviewed Date:   3/21/2019 7:52 AM CDT        Medication List   (As Of: 3/28/2019 11:56:55 AM CDT)   Prescription/Discharge Order    allopurinol 300 mg oral tablet  :   allopurinol 300 mg oral tablet ; Status:   Prescribed ; Ordered As Mnemonic:   allopurinol 300 mg oral tablet ; Simple Display Line:   See Instructions, TAKE ONE TABLET BY MOUTH ONCE DAILY, 90 unknown unit, 3 Refill(s) ; Ordering Provider:   Shravan Berrios MD; Catalog Code:   allopurinol ; Order Dt/Tm:   5/10/2018 11:15:36 AM          atorvastatin 40 mg oral tablet  :   atorvastatin 40 mg oral tablet ;  Status:   Prescribed ; Ordered As Mnemonic:   atorvastatin 40 mg oral tablet ; Simple Display Line:   See Instructions, TAKE ONE TABLET BY MOUTH AT BEDTIME, 90 unknown unit ; Ordering Provider:   Shravan Berrios MD; Catalog Code:   atorvastatin ; Order Dt/Tm:   11/13/2017 11:13:28 AM          clotrimazole topical  :   clotrimazole topical ; Status:   Prescribed ; Ordered As Mnemonic:   clotrimazole 1% topical cream ; Simple Display Line:   1 nazario, TOP, BID, for 7 day(s), 30 gm, 0 Refill(s) ; Ordering Provider:   Rissa Arauz MD; Catalog Code:   clotrimazole topical ; Order Dt/Tm:   6/22/2018 3:35:41 PM          Metoprolol Succinate ER 25 mg oral tablet, extended release  :   Metoprolol Succinate ER 25 mg oral tablet, extended release ; Status:   Prescribed ; Ordered As Mnemonic:   Metoprolol Succinate ER 25 mg oral tablet, extended release ; Simple Display Line:   See Instructions, TAKE ONE-HALF TABLET BY MOUTH TWICE DAILY, 30 unknown unit, 11 Refill(s) ; Ordering Provider:   Shravan Berrios MD; Catalog Code:   metoprolol ; Order Dt/Tm:   9/25/2017 1:33:08 PM          metroNIDAZOLE topical  :   metroNIDAZOLE topical ; Status:   Prescribed ; Ordered As Mnemonic:   MetroGel 1% topical gel ; Simple Display Line:   1 nazario, Topical, daily, 1 tubes, 5 Refill(s) ; Ordering Provider:   Rissa Arauz MD; Catalog Code:   metroNIDAZOLE topical ; Order Dt/Tm:   6/29/2018 3:46:29 PM          mirtazapine  :   mirtazapine ; Status:   Prescribed ; Ordered As Mnemonic:   mirtazapine 15 mg oral tablet ; Simple Display Line:   15 mg, 1 tab(s), PO, Once a day (at bedtime), 30 tab(s), 5 Refill(s) ; Ordering Provider:   Shravan Berrios MD; Catalog Code:   mirtazapine ; Order Dt/Tm:   3/27/2018 11:38:20 AM          Misc Prescription  :   Misc Prescription ; Status:   Prescribed ; Ordered As Mnemonic:   [id number] URINARY DRAIN BAG 2000ML MG BEAD ; Simple Display Line:   See Instructions, USE AS DIRECTED, 1 unknown unit ; Ordering  Provider:   Shravan Berrios MD; Catalog Code:   Miscellaneous Prescription ; Order Dt/Tm:   6/4/2018 7:34:17 AM          nitroglycerin  :   nitroglycerin ; Status:   Prescribed ; Ordered As Mnemonic:   nitroglycerin 0.4 mg sublingual tablet ; Simple Display Line:   0.4 mg, 1 tab(s), SL, q5min, not to exceed 3 doses/15 min--if pain persists, seek medical attention, 25 tab(s), PRN: for chest pain ; Ordering Provider:   Shravan Berrios MD; Catalog Code:   nitroglycerin ; Order Dt/Tm:   8/28/2014 11:35:30 AM          omeprazole  :   omeprazole ; Status:   Prescribed ; Ordered As Mnemonic:   omeprazole 20 mg oral delayed release capsule ; Simple Display Line:   20 mg, 1 cap(s), po, daily, 90 cap(s), 3 Refill(s) ; Ordering Provider:   Shravan Berrios MD; Catalog Code:   omeprazole ; Order Dt/Tm:   1/9/2017 12:26:06 PM          potassium chloride 20 mEq oral tablet, extended release  :   potassium chloride 20 mEq oral tablet, extended release ; Status:   Prescribed ; Ordered As Mnemonic:   potassium chloride 20 mEq oral tablet, extended release ; Simple Display Line:   See Instructions, 3 tab BID, 30 unknown unit, 5 Refill(s) ; Ordering Provider:   Shravan Berrios MD; Catalog Code:   potassium chloride ; Order Dt/Tm:   4/13/2016 11:25:17 AM          predniSONE  :   predniSONE ; Status:   Prescribed ; Ordered As Mnemonic:   predniSONE 20 mg oral tablet ; Simple Display Line:   See Instructions, 2 po daily for 3-5 days prn gout, 30 EA, 0 Refill(s) ; Ordering Provider:   Shravan Berrios MD; Catalog Code:   predniSONE ; Order Dt/Tm:   12/29/2017 10:06:41 AM          tamsulosin  :   tamsulosin ; Status:   Prescribed ; Ordered As Mnemonic:   tamsulosin 0.4 mg oral capsule ; Simple Display Line:   0.4 mg, 1 cap(s), Oral, daily, 30 cap(s), 0 Refill(s) ; Ordering Provider:   Shravan Berrios MD; Catalog Code:   tamsulosin ; Order Dt/Tm:   7/20/2018 2:46:14 PM          warfarin 3 mg oral tablet  :   warfarin 3 mg oral tablet ;  Status:   Prescribed ; Ordered As Mnemonic:   warfarin 3 mg oral tablet ; Simple Display Line:   1 tab(s), Oral, daily, 90 unknown unit ; Ordering Provider:   Shravan Berrios MD; Catalog Code:   warfarin ; Order Dt/Tm:   1/8/2019 3:42:05 PM            Home Meds    acetaminophen  :   acetaminophen ; Status:   Documented ; Ordered As Mnemonic:   acetaminophen 325 mg oral tablet ; Simple Display Line:   650 mg, 2 tab(s), po, q4 hrs, not to exceed 4000 mg/day, 0 Refill(s) ; Catalog Code:   acetaminophen ; Order Dt/Tm:   12/20/2016 1:58:24 PM          cyanocobalamin  :   cyanocobalamin ; Status:   Documented ; Ordered As Mnemonic:   Vitamin B12 1000 mcg/mL injectable solution ; Simple Display Line:   1,000 mcg, im, qmonth, 0 Refill(s) ; Catalog Code:   cyanocobalamin ; Order Dt/Tm:   7/24/2017 11:12:05 AM          digoxin  :   digoxin ; Status:   Documented ; Ordered As Mnemonic:   digoxin 125 mcg (0.125 mg) oral tablet ; Simple Display Line:   125 mcg, 1 tab(s), po, daily, 0 Refill(s) ; Catalog Code:   digoxin ; Order Dt/Tm:   4/19/2017 10:47:22 AM          furosemide  :   furosemide ; Status:   Documented ; Ordered As Mnemonic:   furosemide 80 mg oral tablet ; Simple Display Line:   80 mg, 1 tab(s), po, bid, 80 mg AM and 40 mg PM, 0 Refill(s) ; Catalog Code:   furosemide ; Order Dt/Tm:   3/15/2017 3:19:58 PM          metOLazone  :   metOLazone ; Status:   Documented ; Ordered As Mnemonic:   metOLazone 2.5 mg oral tablet ; Simple Display Line:   See Instructions, 1 tab(s) po every other daily for weight > 224#. Per Dr Quintana 2/7/19 increase metolazone 1-2 times per week to acheive weight of 220 lbs, 0 Refill(s) ; Catalog Code:   metOLazone ; Order Dt/Tm:   4/19/2017 10:48:13 AM          multivitamin  :   multivitamin ; Status:   Documented ; Ordered As Mnemonic:   Protegra oral tablet ; Simple Display Line:   1 tab(s), po, daily ; Catalog Code:   multivitamin ; Order Dt/Tm:   6/1/2015 8:38:03 AM          leonelna  :    senna ; Status:   Documented ; Ordered As Mnemonic:   Senokot 8.6 mg oral tablet ; Simple Display Line:   1-2 tab(s), PO, Once a day (at bedtime), PRN: for constipation ; Catalog Code:   senna ; Order Dt/Tm:   12/20/2016 2:00:45 PM          spironolactone  :   spironolactone ; Status:   Documented ; Ordered As Mnemonic:   spironolactone 50 mg oral tablet ; Simple Display Line:   25 mg, 0.5 tab(s), Oral, daily, 0 Refill(s) ; Catalog Code:   spironolactone ; Order Dt/Tm:   3/1/2019 3:52:14 PM

## 2022-02-16 NOTE — PROGRESS NOTES
Chief Complaint    Patient here for low bp, patient took wife's bp medication  History of Present Illness      Patient noted to have low blood pressure in the infusion center where he had his Aranesp B12 and iron today.  Systolic in the 70s and 80s.  He is actually feeling quite well.  Perhaps a little lightheaded.  He took all of his wife's medications most remain being her irbesartan 300/hydrochlorthiazide 12.5.  Review of Systems      No falls, fever, chills, chest pain, dyspnea.  Edema resolved with his paracentesis.  Physical Exam   Vitals & Measurements    T: 96.8   F (Tympanic)  HR: 70(Peripheral)  BP: 80/60     HT: 75 in  WT: 195 lb  BMI: 24.37       Patient appears comfortable and in no distress.  Actually looking quite well.  Chest clear.  Cardiac exam is regular.  Device present.  Abdomen nontender.  Trace edema in the extremities.  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       Cardiorenal syndrome (I13.10)         Kidney function stable.       Hypotension due to medication (I95.2)         Relatively asymptomatic we will monitor here him here in clinic for stability and likely home with instructions to stay in bed this afternoon.       Iron deficiency anemia (D50.9)         Improved and over 8 this week.       Right heart failure (I50.9)         Stable  Patient Information     Name:JACINTO JIN      Address:      [address] [place] [id number]     Sex:Male     YOB: 1935     [phone]     Emergency Contact:GERA JIN     MRN:[id number]     FIN:[id number]     Location:[facility]     Date of Service:08/06/2019      Primary Care Physician:       Shravan Berrios MD, [phone]      Attending Physician:       Shravan Berrios MD, [phone]  Problem List/Past Medical History    Ongoing     Acquired arteriovenous malformation of colon       Comments: Treated.     Acute kidney injury  (nontraumatic)     AF (Atrial Fibrillation)     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, chronic     Anemia of renal disease     Anticardiolipin syndrome     Anticoagulated     Ascites       Comments: Negative cytology 12/6/18     B12 deficiency     BPH with urinary obstruction     CAD (coronary artery disease)     Cardiac cirrhosis     Cardiorenal syndrome     CHB (complete heart block)     Chronic diastolic CHF (congestive heart failure), NYHA class 3     Depression     Diverticulosis     Dupuytren's contracture of right hand     Gout     H/O acute pancreatitis     High cholesterol     History of acute bacterial endocarditis     History of coronary artery bypass graft     History of GI bleed       Comments: Again 04/30/2019     History of tricuspid valve replacement     Hx of colonic polyp     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     IBS (irritable bowel syndrome)     ICD (implantable cardioverter-defibrillator) infection     Inguinal hernia, right     Iron deficiency anemia     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     Nonischemic dilated cardiomyopathy     Obesity     KEYONA (obstructive sleep apnea)       Comments: Adequate titration to BIPAP 16/11 on 10/15/2013     Osteoarthritis of both knees     Paralysis, diaphragm       Comments: Left     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     PUD (peptic ulcer disease)     Right heart failure     Stage 3 chronic kidney disease     Tricuspid valve insufficiency     Vitamin B 12 deficiency    Historical     BE (bacterial endocarditis)     Colon polyps     History of sepsis     Inpatient stay       Comments: @[facility] - Jaw pain, possible anginal equivalent     Inpatient stay       Comments: @[facility] - Infected pacemaker     Inpatient stay       Comments: @[facility] - Diverticulitis     Inpatient stay       Comments: @[facility] - S/P tricuspid valve replacement with worsening right ventricular function      Inpatient stay       Comments: @[facility] - Severe symptomatic tricuspid valvular insufficiency. Chronic right heart failure.     Inpatient stay       Comments: @[facility] - Acute on chronic right heart failure     Inpatient stay       Comments: @[facility] - Anemia     Inpatient stay       Comments: @[facility] [place] [place] - GI bleeding, suspected upper source     Inpatient stay       Comments: @[facility] [place] [place] - Acute GI bleeding     Other and Unspecified Noninfectious Gastroenteritis and Colitis  Procedure/Surgical History     Extracapsular cataract extraction and insertion of intraocular lens (07/11/2019)      Comments: Right..     Esophagogastroduodenoscopy (06/08/2019)      Comments: Indication: GI bleeding      Sedation: Fentanyl 50 mcg, Versed 2 mg      Findings: Multiple gastric polyps one of which was oozing. Esophagus and duodenum normal      Rec: D/C PPI, H2 blocker, hold Warfarin.     Left Extracapsular Cataract extraction with intraocular lens implant (05/28/2019)      Comments: Left.. Dr. Cornelius.     Esophagogastroduodenoscopy (02/12/2019)     Colonoscopy (12/07/2018)      Comments: Indication: GIB      Sedation: fentanyl 50 mcg, Versed 1 mg      Findings: Diverticulosis, 2 small AVMs with bleeding in ascending treated with APC      Rec: Consider further workup based on tu to soletn.     Esophagogastroduodenoscopy and biopsy (12/07/2018)      Comments: Indication: GIB      Sedatoin: Fentanyl 50 mcg, Versed 2 mg      findings: Negative with negative duodenal biopsy      Rec: Colonoscopy.     Abdominal paracentesis (12/06/2018)      Comments: Negative cytology.     Bone marrow biopsy (07/12/2017)     Colonoscopy (05/27/2017)      Comments: Cecal tubular adenoma, pandiverticulosis. w/polypectomy.     Esophagogastroduodenoscopy (05/27/2017)      Comments: gastritis, fundic gland polyps.     TVR - Tricuspid valve replacement (11/29/2016)     Cardiac angiogram (11/01/2016)      Comments:  Summary/Conclusions      PRESENTATION / INDICATIONS        Pre-surgical evaluation for cardiac surgery        Severe TR by echo      DIAGNOSTIC - CORONARY        Co-dominant coronary artery system        The left main artery was normal in appearance and free of obstructive disease.        Chronic total occlusion of the proximal LAD        The circumflex artery has minimal disease.        The RCA has moderate disease.        No change since 2013      DIAGNOSTIC - GRAFTS        Chronic total occlusion in the proximal anastomosis of the SVG graft to the RCA        The SVG to the ramus intermediate is patent        The sequential graft to the 1st diagonal is patent.        The sequential graft limb from the diagonal to the LAD is patent.        No change since 2013      HEMODYNAMICS        The LVEDP is mildly elevated        Severely elevated right atrial pressures        No evidence of intracardiac shunting.     Limited thoracotomy (08/23/2016)      Comments: Left mini thoracotomy and placement of two epicardial screw in leads.  Implant of left pectoral pacer generatoe..     Implantation of intravenous single chamber cardiac pacemaker system (08/04/2016)     Removal of automatic cardiac defibrillator (08/04/2016)      Comments: Generator and leads.     Pacemaker change (04/08/2015)     Cardiac angiogram (08/02/2013)      Comments: Summary/Conclusions      PRESENTATION / INDICATIONS      Chest pain      DIAGNOSTIC - CORONARY      Right dominant coronary artery system      DIAGNOSTIC SUMMARY      100% stenosis in the Proximal LAD      30% stenosis in the 1st Marginal      30% stenosis in the Proximal RCA      50% stenosis in the Mid RCA      100% stenosis in the Aorta graft to Distal RCA      DIAGNOSTIC - GRAFTS      The sequential graft to the diagonal branch is patent.      The sequential graft from the diagonal to the LAD is patent.      The SVG to the ramus intermediate is patent      The SVG to RCA is chronically  "occluded      HEMODYNAMICS      The LVEDP is mildly elevated      RECOMMENDATIONS & PLAN      Medical Rx- no significant change from previous angiogram, there is dramatic mismatch in size between the SVG's and the target arteries with slow filling of the LAD.     angiogrqam (02/03/2012)      Comments: Summary/Conclusions      PRESENTATION / INDICATIONS      Dyspnea and fatigue.      DIAGNOSTIC - CORONARY      Right dominant coronary artery system.      LMCA is normal.      LAD is occluded proximally after the 1st septal branch.      LCx has mild luminal irregularities.  OM1 has 20-30% proximal stenosis.      RCA has 30-40% diffuse disease in the mid segment and otherwise has mild diffuse luminal irregularities.      DIAGNOSTIC - GRAFTS      The sequential SVG to the diagonal and then LAD is widely patent.  There is a significant size mismatch between the graft and the native vessels.  The diagonal and distal LAD have no obvious lesions but are small caliber (< 2.0 mm vessels).  There is some backfilling into the mid LAD.      The SVG to the ramus is widely patent.  There is a significant size mismatch between the graft and the native vessel.  The ramus has no obvious lesion but is small caliber (< 2.0 mm vessel).        The SVG to the RCA is chronically occluded.      LEFT VENTRICULAR FUNCTION      Left ventricular function is mildly reduced with EF of 42% and mild global hypokinesis.  No significant MR.      HEMODYNAMICS      The LVEDP is 6 mmHg.  No significant gradient across the aortic valve.      RA 11, RV 29/10, PA 34/17 (mean 25), PCWP 18      Amarilys CO 5.1, TDCO 4.6      RECOMMENDATIONS & PLAN      Consider alternative etiology for symptoms.      Lasix dose decreased to 40 mg QD given relative hypotension and patient reporting feeling \"dry membranes.\".     Colonoscopy (12/15/2006)      Comments: Diverticulosis. No polyps..     Colonoscopy (06/22/2001)      Comments: hyperplastic polyp.     Cholecystectomy " (06.2001)     CABG - Coronary artery bypass graft (2000)     History of Back Surgery (2000)     Colonoscopy (02/17/1993)      Comments: 1 adenoma, 1 hyperplastic, diverticulosis..     Left shoulder surgery (1991)     Ablation for atrial fibrillation      Comments: Subsequent pacemaker dependence..     Cholecystectomy     Colonoscopy     Esophagogastroduodenoscopy  Medications    [id number] URINARY DRAIN BAG 2000ML MG BEAD, See Instructions    acetaminophen 325 mg oral tablet, 650 mg= 2 tab(s), Oral, q4 hrs    allopurinol 300 mg oral tablet, 300 mg= 1 tab(s), Oral, daily, 3 refills    atorvastatin 10 mg oral tablet, 10 mg= 1 tab(s), Oral, hs    clotrimazole 1% topical cream, 1 nazario, Topical, bid    digoxin 125 mcg (0.125 mg) oral tablet, 125 mcg= 1 tab(s), Oral, daily    famotidine 20 mg oral tablet, 1 tab(s), Oral, bid, 3 refills    Flonase 50 mcg/inh nasal spray, 2 spray(s), Nasal, daily    furosemide 80 mg oral tablet, 40 mg= 0.5 tab(s), Oral, bid    Metoprolol Succinate ER 25 mg oral tablet, extended release, See Instructions, 3 refills    MetroGel 1% topical gel, 1 nazario, Topical, daily, 5 refills    midodrine 2.5 mg oral tablet, 5 mg= 2 tab(s), Oral, tid, 2 refills    mirtazapine 15 mg oral tablet, 1 tab(s), Oral, qhs, 11 refills    nitroglycerin 0.4 mg sublingual tablet, 0.4 mg= 1 tab(s), SL, q5 min, PRN, 3 refills    predniSONE 20 mg oral tablet, See Instructions    Protegra oral tablet, 1 tab(s), Oral, daily    spironolactone 50 mg oral tablet, 25 mg= 0.5 tab(s), Oral, bid, 2 refills    Vitamin B12 1000 mcg/mL injectable solution, 1000 mcg, IM, qmonth  Allergies    No Known Medication Allergies    adhesive tape  Social History    Smoking Status - 08/06/2019     Former smoker     Alcohol      Past, 03/30/2018     Employment/School      Employed, Work/School description: [employer] 11/11/2010     Exercise      Exercise type: Walking., 11/11/2010     Tobacco      Former smoker, quit more than 30 days ago,  08/23/2018  Family History    Aneurysm: Father.    Heart disease: Mother.  Immunizations      Vaccine Date Status      influenza virus vaccine, inactivated 11/27/2018 Given      influenza virus vaccine, inactivated 09/28/2015 Given      pneumococcal (PCV13) 04/21/2015 Given      influenza virus vaccine, inactivated 08/28/2014 Given      influenza virus vaccine, inactivated 10/29/2012 Given      influenza virus vaccine, inactivated 09/23/2011 Given      influenza virus vaccine, inactivated 10/26/2010 Given      influenza 10/23/2008 Recorded      Td 09/01/2005 Recorded      pneumococcal 11/28/2001 Recorded  Lab Results          Lab Results (Last 4 results within 90 days)           Sodium Level: 134 mmol/L Low [135 mmol/L - 146 mmol/L] (07/08/19 08:36:00)          Sodium Level: 131 mmol/L Low [135 mmol/L - 146 mmol/L] (06/18/19 12:31:00)          Sodium Level TR: 136 mEq/L (07/18/19 08:29:00)          Sodium Level TR: 134 mEq/L (06/28/19 13:43:00)          Sodium Level TR: 139 mEq/L (06/27/19 13:51:00)          Sodium Level TR: 132 mEq/L (06/14/19 09:51:00)          Potassium Level: 4.5 mmol/L [3.5 mmol/L - 5.3 mmol/L] (07/08/19 08:36:00)          Potassium Level: 5 mmol/L [3.5 mmol/L - 5.3 mmol/L] (06/18/19 12:31:00)          Potassium Level TR: 4.2 mEq/L (07/18/19 08:29:00)          Potassium Level TR: 4.2 mEq/L (06/28/19 13:43:00)          Potassium Level TR: 3.4 mEq/L (06/27/19 13:51:00)          Potassium Level TR: 5 mEq/L (06/14/19 09:51:00)          Chloride Level: 100 mmol/L [98 mmol/L - 110 mmol/L] (07/08/19 08:36:00)          Chloride Level: 99 mmol/L [98 mmol/L - 110 mmol/L] (06/18/19 12:31:00)          Chloride TR: 101 mEq/L (07/18/19 08:29:00)          Chloride TR: 99 mEq/L (06/28/19 13:43:00)          Chloride TR: 102 mEq/L (06/27/19 13:51:00)          Chloride TR: 100 mEq/L (06/14/19 09:51:00)          CO2 Level: 26 mmol/L [20 mmol/L - 32 mmol/L] (07/08/19 08:36:00)          CO2 Level: 29 mmol/L [20  mmol/L - 32 mmol/L] (06/18/19 12:31:00)          CO2 TR: 27 mEq/L (07/18/19 08:29:00)          CO2 TR: 26 mEq/L (06/28/19 13:43:00)          CO2 TR: 26 mEq/L (06/27/19 13:51:00)          CO2 TR: 25 mEq/L (06/14/19 09:51:00)          Anion Gap TR: 8 mEq/L (07/18/19 08:29:00)          Anion Gap TR: 9 mEq/L (06/28/19 13:43:00)          Anion Gap TR: 11 mEq/L (06/27/19 13:51:00)          Anion Gap TR: 7 mEq/L (06/14/19 09:51:00)          Glucose Level: 97 mg/dL [65 mg/dL - 99 mg/dL] (07/08/19 08:36:00)          Glucose Level: 97 mg/dL [65 mg/dL - 99 mg/dL] (06/18/19 12:31:00)          Glucose Level TR: 90 mg/dL (07/18/19 08:29:00)          Glucose Level TR: 100 mg/dL (06/28/19 13:43:00)          Glucose Level TR: 117 mg/dL (06/27/19 13:51:00)          Glucose Level TR: 91 mg/dL (06/14/19 09:51:00)          BUN: 45 mg/dL High [7 mg/dL - 25 mg/dL] (07/08/19 08:36:00)          BUN: 39 mg/dL High [7 mg/dL - 25 mg/dL] (06/18/19 12:31:00)          BUN TR: 41 mg/dL (07/18/19 08:29:00)          BUN TR: 45 mg/dL (06/28/19 13:43:00)          BUN TR: 16 mg/dL (06/27/19 13:51:00)          BUN TR: 37 mg/dL (06/14/19 09:51:00)          Creatinine Level: 1.63 mg/dL High [0.7 mg/dL - 1.11 mg/dL] (07/08/19 08:36:00)          Creatinine Level: 1.64 mg/dL High [0.7 mg/dL - 1.11 mg/dL] (06/18/19 12:31:00)          Creatinine TR: 1.58 mg/dL (07/18/19 08:29:00)          Creatinine TR: 1.73 mg/dL (06/28/19 13:43:00)          Creatinine TR: 0.84 mg/dL (06/27/19 13:51:00)          Creatinine TR: 1.66 mg/dL (06/14/19 09:51:00)          BUN/Creat Ratio: 28 High [6  - 22] (07/08/19 08:36:00)          BUN/Creat Ratio: 24 High [6  - 22] (06/18/19 12:31:00)          BUN/Creatinine Ratio TR: 26 (07/18/19 08:29:00)          BUN/Creatinine Ratio TR: 26 (06/28/19 13:43:00)          BUN/Creatinine Ratio TR: 19 (06/27/19 13:51:00)          BUN/Creatinine Ratio TR: 22 (06/14/19 09:51:00)          eGFR: 39 mL/min/1.73m2 Low (07/08/19 08:36:00)          eGFR:  39 mL/min/1.73m2 Low (06/18/19 12:31:00)          eGFR TR: 42 mL/min (07/18/19 08:29:00)          eGFR TR: 38 mL/min (06/28/19 13:43:00)          eGFR TR: 40 mL/min (06/14/19 09:51:00)          eGFR TR: 26 mL/min (05/17/19 12:02:00)          eGFR African American: 45 mL/min/1.73m2 Low (07/08/19 08:36:00)          eGFR African American: 45 mL/min/1.73m2 Low (06/18/19 12:31:00)          Calcium Level: 8.6 mg/dL [8.6 mg/dL - 10.3 mg/dL] (07/08/19 08:36:00)          Calcium Level: 8.5 mg/dL Low [8.6 mg/dL - 10.3 mg/dL] (06/18/19 12:31:00)          Calcium TR: 9.1 mEq/dL (07/18/19 08:29:00)          Calcium TR: 8.8 mEq/dL (06/28/19 13:43:00)          Calcium TR: 9.7 mEq/dL (06/27/19 13:51:00)          Calcium TR: 8.7 mEq/dL (06/14/19 09:51:00)          Bilirubin Total TR: 0.5 mg/dL (07/18/19 08:29:00)          Bilirubin Total TR: 0.5 mg/dL (06/28/19 13:43:00)          Bilirubin Total TR: 0.9 mg/dL (06/14/19 09:51:00)          Alkaline Phosphatase TR: 332 unit/L (07/18/19 08:29:00)          Alkaline Phosphatase TR: 317 unit/L (06/28/19 13:43:00)          Alkaline Phosphatase TR: 294 unit/L (06/14/19 09:51:00)          AST TR: 27 unit/L (07/18/19 08:29:00)          AST TR: 24 unit/L (06/28/19 13:43:00)          AST TR: 17 unit/L (06/14/19 09:51:00)          ALT TR: 25 unit/L (07/18/19 08:29:00)          ALT TR: 20 unit/L (06/28/19 13:43:00)          ALT TR: 11 unit/L (06/14/19 09:51:00)          Protein Total TR: 7 gm/dL (07/18/19 08:29:00)          Protein Total TR: 6.5 gm/dL (06/28/19 13:43:00)          Protein Total TR: 6.3 gm/dL (06/14/19 09:51:00)          Albumin Level TR: 3.1 g/dL (07/18/19 08:29:00)          Albumin Level TR: 2.9 g/dL (06/28/19 13:43:00)          Albumin Level TR: 2.9 g/dL (06/14/19 09:51:00)          Globulin TR: 3.9 g/dL (07/18/19 08:29:00)          Globulin TR: 3.6 g/dL (06/28/19 13:43:00)          Globulin TR: 3.4 g/dL (06/14/19 09:51:00)          A/G Ratio TR: 0.8 mg/dL (07/18/19 08:29:00)           A/G Ratio TR: 0.8 mg/dL (06/28/19 13:43:00)          A/G Ratio TR: 0.9 (06/14/19 09:51:00)          Iron TR: 21 nmol/L (07/18/19 08:29:00)          Iron TR: 15 nmol/L (06/14/19 09:51:00)          Iron TR: 22 nmol/L (05/30/19 12:10:00)          Ferritin TR: 754.1 ng/mL (07/18/19 08:29:00)          Ferritin TR: 919.4 ng/mL (06/14/19 09:51:00)          WBC: 5.4 [3.8  - 10.8] (07/08/19 08:36:00)          WBC: 5.3 [3.8  - 10.8] (06/18/19 12:31:00)          WBC TR: 5.1 x10^3/uL (07/18/19 08:29:00)          WBC TR: 5.5 x10^3/uL (06/28/19 13:43:00)          WBC TR: 4.7 x10^3/uL (06/27/19 13:51:00)          WBC TR: 5.3 x10^3/uL (06/14/19 09:42:00)          RBC: 2.74 Low [4.2  - 5.8] (07/08/19 08:36:00)          RBC: 2.85 Low [4.2  - 5.8] (06/18/19 12:31:00)          RBC TR: 2.66 x10^6/uL (07/18/19 08:29:00)          RBC TR: 2.76 x10^6/uL (06/28/19 13:43:00)          RBC TR: 4.14 x10^6/uL (06/27/19 13:51:00)          RBC TR: 2.86 x10^6/uL (06/14/19 09:42:00)          Hgb: 7.7 gm/dL Low [13.2 gm/dL - 17.1 gm/dL] (07/08/19 08:36:00)          Hgb: 8.2 gm/dL Low [13.2 gm/dL - 17.1 gm/dL] (06/18/19 12:31:00)          Hgb TR: 7.7 g/dL (07/18/19 08:29:00)          Hgb TR: 7.9 g/dL (06/28/19 13:43:00)          Hgb TR: 12.9 g/dL (06/27/19 13:51:00)          Hgb TR: 8.1 g/dL (06/14/19 09:42:00)          Hct: 25.4 % Low [38.5 % - 50 %] (07/08/19 08:36:00)          Hct: 26.1 % Low [38.5 % - 50 %] (06/18/19 12:31:00)          Hct TR: 25.4 % (07/18/19 08:29:00)          Hct TR: 25.9 % (06/28/19 13:43:00)          Hct TR: 38.2 % (06/27/19 13:51:00)          Hct TR: 26.5 % (06/14/19 09:42:00)          MCV: 92.7 fL [80 fL - 100 fL] (07/08/19 08:36:00)          MCV: 91.6 fL [80 fL - 100 fL] (06/18/19 12:31:00)          MCV TR: 96 fL (07/18/19 08:29:00)          MCV TR: 94 fL (06/28/19 13:43:00)          MCV TR: 92 fL (06/27/19 13:51:00)          MCV TR: 93 fL (06/14/19 09:42:00)          MCH: 28.1 pg [27 pg - 33 pg] (07/08/19 08:36:00)           MCH: 28.8 pg [27 pg - 33 pg] (06/18/19 12:31:00)          MCH TR: 28.9 pg (07/18/19 08:29:00)          MCH TR: 28.6 pg (06/28/19 13:43:00)          MCH TR: 31.2 pg (06/27/19 13:51:00)          MCH TR: 28.3 pg (06/14/19 09:42:00)          MCHC: 30.3 gm/dL Low [32 gm/dL - 36 gm/dL] (07/08/19 08:36:00)          MCHC: 31.4 gm/dL Low [32 gm/dL - 36 gm/dL] (06/18/19 12:31:00)          MCHC TR: 30.3 gm/dL (07/18/19 08:29:00)          MCHC TR: 30.5 gm/dL (06/28/19 13:43:00)          MCHC TR: 33.8 gm/dL (06/27/19 13:51:00)          MCHC TR: 30.6 gm/dL (06/14/19 09:42:00)          RDW: 16.9 % High [11 % - 15 %] (07/08/19 08:36:00)          RDW: 15.6 % High [11 % - 15 %] (06/18/19 12:31:00)          RDW TR: 19.1 % (07/18/19 08:29:00)          RDW TR: 18.6 % (06/28/19 13:43:00)          RDW TR: 14.9 % (06/27/19 13:51:00)          RDW TR: 17.6 % (06/14/19 09:42:00)          Platelet: 217 [140  - 400] (07/08/19 08:36:00)          Platelet: 231 [140  - 400] (06/18/19 12:31:00)          Platelet TR: 240 x10^3/uL (07/18/19 08:29:00)          Platelet TR: 228 x10^3/uL (06/28/19 13:43:00)          Platelet TR: 210 x10^3/uL (06/27/19 13:51:00)          Platelet TR: 203 x10^3/uL (06/14/19 09:42:00)          MPV: 9.3 fL [7.5 fL - 12.5 fL] (07/08/19 08:36:00)          MPV: 9.9 fL [7.5 fL - 12.5 fL] (06/18/19 12:31:00)          MPV (Mean Platelet Volume) TR: 8.2 fL (07/18/19 08:29:00)          MPV (Mean Platelet Volume) TR: 8.6 fL (06/28/19 13:43:00)          MPV (Mean Platelet Volume) TR: 10.2 fL (06/27/19 13:51:00)          MPV (Mean Platelet Volume) TR: 8.9 fL (06/14/19 09:42:00)          Neutrophils Abs# TR: 75.9 % (05/30/19 12:10:00)          Neutrophils Abs# TR: 76.4 % (05/17/19 12:02:00)          Lymphocytes TR: 18.7 % (07/18/19 08:29:00)          Lymphocytes TR: 13.6 % (06/28/19 13:43:00)          Lymphocytes TR: 11 % (06/14/19 09:42:00)          Lymphocytes TR: 8.3 % (05/30/19 12:10:00)          Absolute Lymphocytes TR: 1  cells/uL (07/18/19 08:29:00)          Absolute Lymphocytes TR: 0.8 cells/uL (06/28/19 13:43:00)          Absolute Lymphocytes TR: 0.6 cells/uL (06/14/19 09:42:00)          Absolute Lymphocytes TR: 0.6 cells/uL (05/30/19 12:10:00)          Neutrophils TR: 65.9 % (07/18/19 08:29:00)          Neutrophils TR: 73 % (06/28/19 13:43:00)          Neutrophils TR: 75.3 % (06/14/19 09:42:00)          Absolute Neutrophils TR: 3.3 cells/uL (07/18/19 08:29:00)          Absolute Neutrophils TR: 4 cells/uL (06/28/19 13:43:00)          Absolute Neutrophils TR: 4 cells/uL (06/14/19 09:42:00)          Absolute Neutrophils TR: 5 cells/uL (05/30/19 12:10:00)          Monocytes TR: 12 % (07/18/19 08:29:00)          Monocytes TR: 10.5 % (06/28/19 13:43:00)          Monocytes TR: 9 % (06/14/19 09:42:00)          Monocytes TR: 9 % (05/30/19 12:10:00)          Absolute Monocytes TR: 0.6 cells/uL (07/18/19 08:29:00)          Absolute Monocytes TR: 0.6 cells/uL (06/28/19 13:43:00)          Absolute Monocytes TR: 0.5 cells/uL (06/14/19 09:42:00)          Absolute Monocytes TR: 0.6 cells/uL (05/30/19 12:10:00)          Eosinophils TR: 3 % (07/18/19 08:29:00)          Eosinophils TR: 2.5 % (06/28/19 13:43:00)          Eosinophils TR: 4.3 % (06/14/19 09:42:00)          Eosinophils TR: 6.5 % (05/30/19 12:10:00)          Absolute Eosinophils TR: 0.2 cells/uL (07/18/19 08:29:00)          Absolute Eosinophils TR: 0.1 cells/uL (06/28/19 13:43:00)          Absolute Eosinophils TR: 0.2 cells/uL (06/14/19 09:42:00)          Absolute Eosinophils TR: 0.4 cells/uL (05/30/19 12:10:00)          Basophils TR: 0.4 % (07/18/19 08:29:00)          Basophils TR: 0.4 % (06/28/19 13:43:00)          Basophils TR: 0.4 % (06/14/19 09:42:00)          Basophils TR: 0.3 % (05/30/19 12:10:00)          Absolute Basophils TR: 0 cells/uL (07/18/19 08:29:00)          Absolute Basophils TR: 0 cells/uL (06/28/19 13:43:00)          Absolute Basophils TR: 0 cells/uL (06/14/19  09:42:00)          Absolute Basophils TR: 0 cells/uL (05/30/19 12:10:00)          PT: 10.8 [9  - 11.5] (06/18/19 12:31:00)          PT TR: 15.8 (06/13/19 10:07:00)          PT TR: 22 (05/17/19 12:02:00)          INR: 1.1 (06/18/19 12:31:00)          INR: 4.5 (06/07/19 14:33:00)          INR: 2 (05/17/19 17:18:00)          INR TR: 1.3 (06/13/19 10:07:00)          INR TR: 2 (05/17/19 12:02:00)          UA pH: 5.5 [5  - 8] (07/29/19 16:59:00)          UA Specific Gravity: 1.01 [1.001  - 1.035] (07/29/19 16:59:00)          UA Glucose: NEGATIVE [NEGATIVE  - NEGATIVE] (07/29/19 16:59:00)          UA Bilirubin: NEGATIVE [NEGATIVE  - NEGATIVE] (07/29/19 16:59:00)          UA Ketones: NEGATIVE [NEGATIVE  - NEGATIVE] (07/29/19 16:59:00)          Urine Occult Blood: NEGATIVE [NEGATIVE  - NEGATIVE] (07/29/19 16:59:00)          UA Protein: TRACE Abnormal [NEGATIVE  - NEGATIVE] (07/29/19 16:59:00)          UA Nitrite: NEGATIVE [NEGATIVE  - NEGATIVE] (07/29/19 16:59:00)          UA Leukocyte Esterase: NEGATIVE [NEGATIVE  - NEGATIVE] (07/29/19 16:59:00)          UA Epithelial Cells: Few [None  - Few] (07/29/19 17:13:00)          UA Hyaline Cast: 0-2 [0  - 5] (07/29/19 17:13:00)          UA WBC: 3-5 [None  - 5] (07/29/19 17:13:00)          UA RBC: 3-5 [None  - 2] (07/29/19 17:13:00)          UA Bacteria: Few [None  - Few] (07/29/19 17:13:00)          Culture Urine: See comment (07/29/19 17:00:00)

## 2022-02-16 NOTE — RESULTS
Anticoagulation Therapy Entered On:  1/11/2019 2:35 PM CST    Performed On:  1/11/2019 1:33 PM CST by Flavia Metzger RN               Warfarin Management   Week 1 Sunday Dose :   1.5    Week 1 Monday Dose :   3    Week 1 Tuesday Dose :   1.5    Week 1 Wednesday Dose :   1.5    Week 1 Thursday Dose :   3    Week 1 Friday Dose :   1.5    Week 1 Saturday Dose :   1.5    Week 1 Dose Total :   13.5    Indication :   Atrial fibrillation   Warfarin Management Comments :   Lab test performed by:  [facility]  [address] [place] [address]  Phone # [phone]  Fax# [phone]  Capillary   International Normalization Ratio :   4.0    INR Therapeutic Range :   No   Flavia Metzger RN - 1/11/2019 2:34 PM CST   Warfarin Management and Results Grid   Signs of Thrombolic :   No   Signs of Warfarin Intolerance :   No   Changes in Diet or Alcohol Intake :   No   Changes in Medication or Antibiotics :   No   Unusual Bleeding or Bruising :   No   Rectal Bleeding or Black Stools :   No   Vitamin K Food Handout :   No   Heart Valve Replacement :   No   Flavia Metzger RN - 1/11/2019 2:34 PM CST   Anticoagulation Recheck :   1 week   Warfarin Special Instructions :   Per [facility] decrease warfarin to 3 mg M/Th and 1.5 mg ROW; recheck 1 week; notified in office and given written directions; consent signed.   Flavia Metzger RN - 1/11/2019 2:34 PM CST

## 2022-02-16 NOTE — NURSING NOTE
Quick Intake Entered On:  8/6/2019 1:17 PM CDT    Performed On:  8/6/2019 1:17 PM CDT by Myrna Dsouza               Summary   Chief Complaint :   Vitals    Advance Directive :   Yes   Menstrual Status :   N/A   Systolic Blood Pressure :   90 mmHg   Diastolic Blood Pressure :   60 mmHg   Mean Arterial Pressure :   70 mmHg   BP Site :   Right arm   BP Method :   Manual   Myrna Dsouza - 8/6/2019 1:17 PM CDT

## 2022-02-16 NOTE — CARE COORDINATION
ACTION PLAN   Goal(s): Cardiac management    CC to follow-up with Mamta PRN. Do monthly chart review. [place]/wife very good about calling if they need something.     Today s Date: 12/16/15                                                                     Goal(s) completion date: ongoing     Steps to be taken to manage CHF (cardio has been managing)    Baseline weight: 213-214lbs When will I accomplish these steps Barriers Status   (4/19/17) -Bumex d/c    -Lasix 80mg BID    -Digoxin 0.125mcg    -Metolazone prn to get weights down    Cardio f/u in 3 mo (9/2017)   (4/26/27) ED visit- was advised to take Metolazone qd until appt w/ [facility] on 4/28/17. Has lost 1lbs so far.    Cardio appt scheduled w/ Lilian on 6/1.    (5/2/17) Appt w/ [facility] completed. Metolazone now on hold again. Wt down to 214 lbs which is his dry weight. Recheck w/ [facility] in 2 weeks. Labs at [facility]    (5/4/17) Weight up to 220. Per [facility] no med change/no Metolazone. Needs to be on TID dosing of potassium d/t hypokalemia. Recheck BMP on Sun (5/8). Discussed sodium intake-sticking to 1500mg and will monitor this closely. CC to check in next wk.     (5/9/17) Wt up to 223lbs. Per [facility] OK to take qd metolazone if weight >222lbs. F/u appt w/ [facility] on Friday 5/12, repeat BMP and INR in 1 week.     (6/27/17) Per cardio-cont. w/ current diuretics. Need to see hematology ([facility] [place]). Cardio f/u in 3 mo and BMP in 2 weeks.        Steps to be taken to manage When will I accomplish these steps Barriers Status               Steps to be taken to manage When will I accomplish these steps Barriers Status

## 2022-02-16 NOTE — PROGRESS NOTES
"Chief Complaint    Pt here for a 2 week FU on his Blood Transfusions.  Pt states he is \"blowing up with fluid\"  History of Present Illness      Patient had a blood transfusion earlier this week.  He has not had melena.  No confusion or abdominal pain though his ascites is more prominent as is the lower extremity edema.  There is some increase in dyspnea with the increased ascites.  Hematology recommended a follow-up to discuss end-of-life issues.  Review of Systems      Appetite decreased.  Weight increased.  No fever chills abdominal pain or confusion.  Physical Exam   Vitals & Measurements    T: 97.1   F (Tympanic)  HR: 80(Peripheral)  RR: 20  BP: 96/62  SpO2: 95%     HT: 75 in  WT: 218 lb  BMI: 27.25       Patient appears comfortable.  Not tachypneic.  Speaks in complete sentences.  Chest is clear.  Cardiac exam is regular pacemaker is present.  Abdomen is has ascites but is not tense or tender.  1+ bilateral pretibial edema.  No asterixis.  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         Ordered:          [id number] office outpatient visit 25 minutes (Charge), Quantity: 1, Ascites  Acquired arteriovenous malformation of colon  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, chronic  Anticoagulated  Tricuspid valve insufficiency  Stage 3 chronic kidney disease  Right heart failure                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, chronic (D50.0)         Blood loss is multifactorial as above.  Currently being managed by hematology with Aranesp iron and B12.  Transfusion earlier this week.  CBC next week.         Ordered:          [id number] office outpatient visit 25 minutes (Charge), Quantity: 1, Ascites  Acquired arteriovenous malformation of colon  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, chronic  Anticoagulated  Tricuspid valve insufficiency  Stage 3 chronic kidney disease  Right heart " failure                Anticoagulated (Z79.01)         Patient is at high risk for stroke.  Probably needs to continue anticoagulation.  INR next week.         Ordered:          [id number] office outpatient visit 25 minutes (Charge), Quantity: 1, Ascites  Acquired arteriovenous malformation of colon  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, chronic  Anticoagulated  Tricuspid valve insufficiency  Stage 3 chronic kidney disease  Right heart failure                Ascites (R18.8)         Will order paracentesis with 6-8 g of 25% albumin for every liter removed.  BMP next week.         Ordered:          [id number] office outpatient visit 25 minutes (Charge), Quantity: 1, Ascites  Acquired arteriovenous malformation of colon  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, chronic  Anticoagulated  Tricuspid valve insufficiency  Stage 3 chronic kidney disease  Right heart failure          US Paracentesis (Request), Instructions: Give 6-8 Grams of 25% albumin for each Liter of ascites removed., Ascites                Right heart failure (I50.9)         Ongoing problem.  Well owen prognosis with Dr. Quintana.  Will continue palliation with transfusions as needed and paracentesis as needed.  Furosemide increased as needed.         Ordered:          [id number] office outpatient visit 25 minutes (Charge), Quantity: 1, Ascites  Acquired arteriovenous malformation of colon  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, chronic  Anticoagulated  Tricuspid valve insufficiency  Stage 3 chronic kidney disease  Right heart failure                Stage 3 chronic kidney disease (N18.3)         Ordered:          46178 office outpatient visit 25 minutes (Charge), Quantity: 1, Ascites  Acquired arteriovenous malformation of colon  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, chronic  Anticoagulated  Tricuspid valve insufficiency  Stage 3 chronic kidney disease  Right heart failure                Tricuspid valve insufficiency (I07.1)         Ordered:          48577 office outpatient visit 25 minutes  (Charge), Quantity: 1, Ascites  Acquired arteriovenous malformation of colon  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, chronic  Anticoagulated  Tricuspid valve insufficiency  Stage 3 chronic kidney disease  Right heart failure                Orders:         Return to Clinic (Request), Return in 2 weeks         Return to Clinic (Request), Return in 2 weeks  Patient Information     Name:JACINTO JIN      Address:      [address] [place] [id number]     Sex:Male     YOB: 1935     [phone]     Emergency Contact:GERA JIN     MRN:[id number]     FIN:[id number]     Location:[facility]     Date of Service:05/31/2019      Primary Care Physician:       Shravan Berrios MD, [phone]      Attending Physician:       Shravan Berrios MD, [phone]  Problem List/Past Medical History    Ongoing     Acquired arteriovenous malformation of colon       Comments: Treated.     Acute kidney injury (nontraumatic)     AF (Atrial Fibrillation)     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, chronic     Anemia of renal disease     Anticardiolipin syndrome     Anticoagulated     Ascites       Comments: Negative cytology 12/6/18     B12 deficiency     BPH with urinary obstruction     CAD (coronary artery disease)     Cardiorenal syndrome     CHB (complete heart block)     Chronic diastolic CHF (congestive heart failure), NYHA class 3     Cirrhosis     Depression     Diverticulosis     Dupuytren's contracture of right hand     Gout     H/O acute pancreatitis     High cholesterol     History of acute bacterial endocarditis     History of coronary artery bypass graft     History of GI bleed       Comments: Again 04/30/2019     History of tricuspid valve replacement     Hx of colonic polyp     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     IBS (irritable bowel syndrome)     ICD (implantable cardioverter-defibrillator) infection     Inguinal hernia, right     Iron deficiency anemia      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     Nonischemic dilated cardiomyopathy     Obesity     KEYONA (obstructive sleep apnea)       Comments: Adequate titration to BIPAP 16/11 on 10/15/2013     Osteoarthritis of both knees     Paralysis, diaphragm       Comments: Left     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     PUD (peptic ulcer disease)     Right heart failure     Stage 3 chronic kidney disease     Tricuspid valve insufficiency     Vitamin B 12 deficiency    Historical     BE (bacterial endocarditis)     Colon polyps     History of sepsis     Inpatient stay       Comments: @[facility] - Jaw pain, possible anginal equivalent     Inpatient stay       Comments: @[facility] - Infected pacemaker     Inpatient stay       Comments: @[facility] - Diverticulitis     Inpatient stay       Comments: @[facility] - S/P tricuspid valve replacement with worsening right ventricular function     Inpatient stay       Comments: @[facility] - Severe symptomatic tricuspid valvular insufficiency. Chronic right heart failure.     Inpatient stay       Comments: @[facility] - Acute on chronic right heart failure     Inpatient stay       Comments: @[facility] - Anemia     Inpatient stay       Comments: @[facility] [place] - GI bleeding, suspected upper source     Inpatient stay       Comments: @[facility] [place] - Acute GI bleeding     Other and Unspecified Noninfectious Gastroenteritis and Colitis  Procedure/Surgical History     Left Extracapsular Cataract extraction with intraocular lens implant (05/28/2019)      Comments: Dr. Cornelius.     Esophagogastroduodenoscopy (02/12/2019)     Colonoscopy (12/07/2018)      Comments: Indication: GIB      Sedation: fentanyl 50 mcg, Versed 1 mg      Findings: Diverticulosis, 2 small AVMs with bleeding in ascending treated with APC      Rec: Consider further workup based on resoponse to treatemetn.     Esophagogastroduodenoscopy and biopsy  (12/07/2018)      Comments: Indication: GIB      Sedatoin: Fentanyl 50 mcg, Versed 2 mg      findings: Negative with negative duodenal biopsy      Rec: Colonoscopy.     Abdominal paracentesis (12/06/2018)      Comments: Negative cytology.     Bone marrow biopsy (07/12/2017)     Colonoscopy (05/27/2017)      Comments: Cecal tubular adenoma, pandiverticulosis. w/polypectomy.     Esophagogastroduodenoscopy (05/27/2017)      Comments: gastritis, fundic gland polyps.     TVR - Tricuspid valve replacement (11/29/2016)     Cardiac angiogram (11/01/2016)      Comments: Summary/Conclusions      PRESENTATION / INDICATIONS        Pre-surgical evaluation for cardiac surgery        Severe TR by echo      DIAGNOSTIC - CORONARY        Co-dominant coronary artery system        The left main artery was normal in appearance and free of obstructive disease.        Chronic total occlusion of the proximal LAD        The circumflex artery has minimal disease.        The RCA has moderate disease.        No change since 2013      DIAGNOSTIC - GRAFTS        Chronic total occlusion in the proximal anastomosis of the SVG graft to the RCA        The SVG to the ramus intermediate is patent        The sequential graft to the 1st diagonal is patent.        The sequential graft limb from the diagonal to the LAD is patent.        No change since 2013      HEMODYNAMICS        The LVEDP is mildly elevated        Severely elevated right atrial pressures        No evidence of intracardiac shunting.     Limited thoracotomy (08/23/2016)      Comments: Left mini thoracotomy and placement of two epicardial screw in leads.  Implant of left pectoral pacer generatoe..     Implantation of intravenous single chamber cardiac pacemaker system (08/04/2016)     Removal of automatic cardiac defibrillator (08/04/2016)      Comments: Generator and leads.     Pacemaker change (04/08/2015)     Cardiac angiogram (08/02/2013)      Comments: Summary/Conclusions       PRESENTATION / INDICATIONS      Chest pain      DIAGNOSTIC - CORONARY      Right dominant coronary artery system      DIAGNOSTIC SUMMARY      100% stenosis in the Proximal LAD      30% stenosis in the 1st Marginal      30% stenosis in the Proximal RCA      50% stenosis in the Mid RCA      100% stenosis in the Aorta graft to Distal RCA      DIAGNOSTIC - GRAFTS      The sequential graft to the diagonal branch is patent.      The sequential graft from the diagonal to the LAD is patent.      The SVG to the ramus intermediate is patent      The SVG to RCA is chronically occluded      HEMODYNAMICS      The LVEDP is mildly elevated      RECOMMENDATIONS & PLAN      Medical Rx- no significant change from previous angiogram, there is dramatic mismatch in size between the SVG's and the target arteries with slow filling of the LAD.     angiogrqam (02/03/2012)      Comments: Summary/Conclusions      PRESENTATION / INDICATIONS      Dyspnea and fatigue.      DIAGNOSTIC - CORONARY      Right dominant coronary artery system.      LMCA is normal.      LAD is occluded proximally after the 1st septal branch.      LCx has mild luminal irregularities.  OM1 has 20-30% proximal stenosis.      RCA has 30-40% diffuse disease in the mid segment and otherwise has mild diffuse luminal irregularities.      DIAGNOSTIC - GRAFTS      The sequential SVG to the diagonal and then LAD is widely patent.  There is a significant size mismatch between the graft and the native vessels.  The diagonal and distal LAD have no obvious lesions but are small caliber (< 2.0 mm vessels).  There is some backfilling into the mid LAD.      The SVG to the ramus is widely patent.  There is a significant size mismatch between the graft and the native vessel.  The ramus has no obvious lesion but is small caliber (< 2.0 mm vessel).        The SVG to the RCA is chronically occluded.      LEFT VENTRICULAR FUNCTION      Left ventricular function is mildly reduced with EF of  "42% and mild global hypokinesis.  No significant MR.      HEMODYNAMICS      The LVEDP is 6 mmHg.  No significant gradient across the aortic valve.      RA 11, RV 29/10, PA 34/17 (mean 25), PCWP 18      Amarilys CO 5.1, TDCO 4.6      RECOMMENDATIONS & PLAN      Consider alternative etiology for symptoms.      Lasix dose decreased to 40 mg QD given relative hypotension and patient reporting feeling \"dry membranes.\".     Colonoscopy (12/15/2006)      Comments: Diverticulosis. No polyps..     Colonoscopy (06/22/2001)      Comments: hyperplastic polyp.     Cholecystectomy (06.2001)     CABG - Coronary artery bypass graft (2000)     History of Back Surgery (2000)     Colonoscopy (02/17/1993)      Comments: 1 adenoma, 1 hyperplastic, diverticulosis..     Left shoulder surgery (1991)     Ablation for atrial fibrillation      Comments: Subsequent pacemaker dependence..     Cholecystectomy     Colonoscopy     Esophagogastroduodenoscopy  Medications        nitroglycerin 0.4 mg sublingual tablet: 0.4 mg, 1 tab(s), SL, q5min, not to exceed 3 doses/15 min--if pain persists, seek medical attention, 25 tab(s), PRN: for chest pain.        Protegra oral tablet: 1 tab(s), po, daily.        potassium chloride 20 mEq oral tablet, extended release: See Instructions, 2 tab BID, 30 unknown unit, 5 Refill(s).        acetaminophen 325 mg oral tablet: 650 mg, 2 tab(s), po, q4 hrs, not to exceed 4000 mg/day, 0 Refill(s).        Senokot 8.6 mg oral tablet: 1-2 tab(s), PO, Once a day (at bedtime), PRN: for constipation.        omeprazole 20 mg oral delayed release capsule: 20 mg, 1 cap(s), po, daily, 90 cap(s), 3 Refill(s).        furosemide 80 mg oral tablet: 40 mg, 0.5 tab(s), po, bid, 0 Refill(s).        digoxin 125 mcg (0.125 mg) oral tablet: 125 mcg, 1 tab(s), po, daily, 0 Refill(s).        Vitamin B12 1000 mcg/mL injectable solution: 1,000 mcg, im, qmonth, 0 Refill(s).        predniSONE 20 mg oral tablet: See Instructions, 2 po daily for 3-5 " days prn gout, 30 EA, 0 Refill(s).        694367 URINARY DRAIN BAG 2000ML MG BEAD: See Instructions, USE AS DIRECTED, 1 unknown unit.        clotrimazole 1% topical cream: 1 nazario, TOP, BID, for 7 day(s), 30 gm, 0 Refill(s).        MetroGel 1% topical gel: 1 nazario, Topical, daily, 1 tubes, 5 Refill(s).        tamsulosin 0.4 mg oral capsule: 0.4 mg, 1 cap(s), Oral, daily, 30 cap(s), 0 Refill(s).        spironolactone 50 mg oral tablet: 50 mg, 1 tab(s), Oral, bid, increased to 50 mg BID per Dr Quintana 3/21/19, 0 Refill(s).        mirtazapine 15 mg oral tablet: 1 tab(s), Oral, qhs, 30 unknown unit, 11 Refill(s).        warfarin 3 mg oral tablet: See Instructions, 3mg T, TH, S Yung. 1.5 mg MWF, 100 EA, 3 Refill(s).        ferrous sulfate: Oral, 0 Refill(s).        atorvastatin 10 mg oral tablet: 10 mg, 1 tab(s), Oral, hs, 30 tab(s), 0 Refill(s).        Metoprolol Succinate ER 25 mg oral tablet, extended release: See Instructions, TAKE ONE-HALF TABLET BY MOUTH TWICE DAILY, 180 EA, 3 Refill(s).        allopurinol 300 mg oral tablet: 300 mg, 1 tab(s), Oral, daily, for 90 day(s), 90 tab(s), 3 Refill(s).         Allergies    No Known Medication Allergies    adhesive tape  Social History    Smoking Status - 05/31/2019     Former smoker     Alcohol      Past, 03/30/2018     Employment and Education      Employed, Work/School description: [employer] 11/11/2010     Exercise and Physical Activity      Exercise type: Walking., 11/11/2010     Tobacco      Former smoker, quit more than 30 days ago, 08/23/2018  Family History    Aneurysm: Father.    Heart disease: Mother.  Immunizations      Vaccine Date Status      influenza virus vaccine, inactivated 11/27/2018 Given      influenza virus vaccine, inactivated 09/28/2015 Given      pneumococcal (PCV13) 04/21/2015 Given      influenza virus vaccine, inactivated 08/28/2014 Given      influenza virus vaccine, inactivated 10/29/2012 Given      influenza virus vaccine, inactivated 09/23/2011  Given      influenza virus vaccine, inactivated 10/26/2010 Given      influenza 10/23/2008 Recorded      Td 09/01/2005 Recorded      pneumococcal 11/28/2001 Recorded  Lab Results          Lab Results (Last 4 results within 90 days)           Sodium Level: 136 [135 mmol/L - 145 mmol/L] (05/08/19 08:48:00)          Sodium Level: 135 [135 mmol/L - 145 mmol/L] (04/30/19 14:45:00)          Sodium Level: 136 mmol/L [135 mmol/L - 146 mmol/L] (04/18/19 08:48:00)          Sodium Level: 137 mmol/L [135 mmol/L - 146 mmol/L] (04/02/19 09:01:00)          Potassium Level: 5.5 High [3.5 mmol/L - 5 mmol/L] (05/08/19 08:48:00)          Potassium Level: 4.8 [3.5 mmol/L - 5 mmol/L] (04/30/19 14:45:00)          Potassium Level: 4.6 mmol/L [3.5 mmol/L - 5.3 mmol/L] (04/18/19 08:48:00)          Potassium Level: 4.8 mmol/L [3.5 mmol/L - 5.3 mmol/L] (04/02/19 09:01:00)          Chloride Level: 100 [98 mmol/L - 110 mmol/L] (05/08/19 08:48:00)          Chloride Level: 99 [98 mmol/L - 110 mmol/L] (04/30/19 14:45:00)          Chloride Level: 102 mmol/L [98 mmol/L - 110 mmol/L] (04/18/19 08:48:00)          Chloride Level: 102 mmol/L [98 mmol/L - 110 mmol/L] (04/02/19 09:01:00)          CO2 Level: 27 [21 mmol/L - 31 mmol/L] (05/08/19 08:48:00)          CO2 Level: 27 [21 mmol/L - 31 mmol/L] (04/30/19 14:45:00)          CO2 Level: 27 mmol/L [20 mmol/L - 32 mmol/L] (04/18/19 08:48:00)          CO2 Level: 26 mmol/L [20 mmol/L - 32 mmol/L] (04/02/19 09:01:00)          AGAP: 9 [5  - 18] (05/08/19 08:48:00)          AGAP: 9 [5  - 18] (04/30/19 14:45:00)          Glucose Level: 109 High [65 mg/dL - 100 mg/dL] (05/08/19 08:48:00)          Glucose Level: 109 High [65 mg/dL - 100 mg/dL] (04/30/19 14:45:00)          Glucose Level: 98 mg/dL [65 mg/dL - 99 mg/dL] (04/18/19 08:48:00)          Glucose Level: 98 mg/dL [65 mg/dL - 99 mg/dL] (04/02/19 09:01:00)          BUN: 64 High [8 mg/dL - 25 mg/dL] (05/08/19 08:48:00)          BUN: 67 High [8 mg/dL - 25  mg/dL] (04/30/19 14:45:00)          BUN: 55 mg/dL High [7 mg/dL - 25 mg/dL] (04/18/19 08:48:00)          BUN: 61 mg/dL High [7 mg/dL - 25 mg/dL] (04/02/19 09:01:00)          Creatinine Level: 2.51 High [0.72 mg/dL - 1.25 mg/dL] (05/08/19 08:48:00)          Creatinine Level: 2.46 High [0.72 mg/dL - 1.25 mg/dL] (04/30/19 14:45:00)          Creatinine Level: 2.34 mg/dL High [0.7 mg/dL - 1.11 mg/dL] (04/18/19 08:48:00)          Creatinine Level: 2.26 mg/dL High [0.7 mg/dL - 1.11 mg/dL] (04/02/19 09:01:00)          Creatinine TR: 2.56 mg/dL (05/02/19 05:32:00)          BUN/Creat Ratio: 25 High [10  - 20] (05/08/19 08:48:00)          BUN/Creat Ratio: 27 High [10  - 20] (04/30/19 14:45:00)          BUN/Creat Ratio: 24 High [6  - 22] (04/18/19 08:48:00)          BUN/Creat Ratio: 27 High [6  - 22] (04/02/19 09:01:00)          eGFR: 25 mL/min/1.73m2 Low (04/18/19 08:48:00)          eGFR: 26 mL/min/1.73m2 Low (04/02/19 09:01:00)          eGFR: 32 mL/min/1.73m2 Low (03/14/19 08:54:00)          eGFR : 30 Low (05/08/19 08:48:00)          eGFR : 31 Low (04/30/19 14:45:00)          eGFR African American: 29 mL/min/1.73m2 Low (04/18/19 08:48:00)          eGFR African American: 30 mL/min/1.73m2 Low (04/02/19 09:01:00)          eGFR Non-: 25 Low (05/08/19 08:48:00)          eGFR Non-: 25 Low (04/30/19 14:45:00)          Calcium Level: 9.5 [8.5 mg/dL - 10.5 mg/dL] (05/08/19 08:48:00)          Calcium Level: 9.7 [8.5 mg/dL - 10.5 mg/dL] (04/30/19 14:45:00)          Calcium Level: 9.4 mg/dL [8.6 mg/dL - 10.3 mg/dL] (04/18/19 08:48:00)          Calcium Level: 9.5 mg/dL [8.6 mg/dL - 10.3 mg/dL] (04/02/19 09:01:00)          Bilirubin Total: 0.8 mg/dL [0.2 mg/dL - 1.2 mg/dL] (03/14/19 08:54:00)          Alkaline Phosphatase: 247 unit/L High [40 unit/L - 115 unit/L] (03/14/19 08:54:00)          AST/SGOT: 20 unit/L [10 unit/L - 35 unit/L] (03/14/19 08:54:00)          ALT/SGPT: 17  unit/L [9 unit/L - 46 unit/L] (03/14/19 08:54:00)          Protein Total: 6.9 gm/dL [6.1 gm/dL - 8.1 gm/dL] (03/14/19 08:54:00)          Albumin Level: 4 gm/dL [3.6 gm/dL - 5.1 gm/dL] (03/14/19 08:54:00)          Globulin: 2.9 [1.9  - 3.7] (03/14/19 08:54:00)          A/G Ratio: 1.4 [1  - 2.5] (03/14/19 08:54:00)          Cholesterol: 82 mg/dL (04/02/19 09:01:00)          Non-HDL Cholesterol: 49 (04/02/19 09:01:00)          HDL: 33 mg/dL Low (04/02/19 09:01:00)          Cholesterol/HDL Ratio: 2.5 (04/02/19 09:01:00)          LDL: 35 (04/02/19 09:01:00)          Triglyceride: 64 mg/dL (04/02/19 09:01:00)          WBC: 6.5 [4.5  - 11] (05/08/19 08:48:00)          RBC: 2.95 Low [4.3  - 5.9] (05/08/19 08:48:00)          Hgb: 8.6 Low [13.5 g/dL - 17.5 g/dL] (05/08/19 08:48:00)          Hgb: 7.7 Low [13.5 g/dL - 17.5 g/dL] (04/30/19 14:45:00)          Hgb: 8.6 gm/dL Low [13.2 gm/dL - 17.1 gm/dL] (04/18/19 08:48:00)          Hgb TR: 8.4 g/dL (05/02/19 05:32:00)          Hct: 28.0 Low [37 % - 53 %] (05/08/19 08:48:00)          MCV: 95 [80 fL - 100 fL] (05/08/19 08:48:00)          MCV: 94 [80 fL - 100 fL] (04/30/19 14:45:00)          MCH: 29.2 [26 pg - 34 pg] (05/08/19 08:48:00)          MCHC: 30.7 Low [32 g/dL - 36 g/dL] (05/08/19 08:48:00)          RDW: 17.4 High [11.5 % - 15.5 %] (05/08/19 08:48:00)          Platelet: 143 [140  - 440] (05/08/19 08:48:00)          MPV: 11.0 [6.5 fL - 11 fL] (05/08/19 08:48:00)          Protime: 21.0 High [11.9  - 13.9] (05/08/19 08:48:00)          Protime: 24.1 High [11.9  - 13.9] (04/30/19 14:45:00)          PT: 17.5 High [9  - 11.5] (04/02/19 09:01:00)          INR: 2 (05/17/19 17:18:00)          INR: 1.9 (05/08/19 12:23:00)          INR: 1.9 High (05/08/19 08:48:00)          INR: 2.2 High (04/30/19 14:45:00)          INR TR: 1.9 (05/02/19 05:32:00)          Digoxin Level: 1.2 mcg/L [0.8 mcg/L - 2 mcg/L] (04/18/19 08:48:00)

## 2022-02-16 NOTE — PROGRESS NOTES
Chief Complaint  Pt here for f/u hospital  History of Present Illness  Oriented was hospitalized with black watery stools and is here for follow-up. ?Upper endoscopy revealed some gastric polyps and a little bit of erythema but was otherwise negative. ?Colonoscopy revealed pandiverticulosis and a cecal polyp. ?No explanation for bleeding found. ?The patient has been anemic since last summer at that time he had culture-negative endocarditis and prolonged course with eventual tricuspid valve replacement. ?Continues to ochoa congestive heart failure. ?His atrial fibrillation on warfarin.  Review of Systems  Weight is been stable. ?No fevers, chills, abdominal pain, dyspnea, edema. ?Saw Dr. alcocer today.  Problem List/Past Medical History  Ongoing  Acute kidney injury (nontraumatic)  AF (Atrial Fibrillation)  Anticardiolipin syndrome  Anticoagulated  Ascites  CAD (coronary artery disease)  Cardiorenal syndrome  CHB (complete heart block)  CHF (Congestive Heart Failure)  Colon polyps  Diverticulosis  Dupuytren's contracture of right hand  Gout  H/O acute pancreatitis  High cholesterol  History of acute bacterial endocarditis  History of coronary artery bypass graft  History of tricuspid valve replacement  IBS (irritable bowel syndrome)  ICD (implantable cardioverter-defibrillator) infection  Iron deficiency anemia  Nonischemic dilated cardiomyopathy  Obesity  KEYONA (obstructive sleep apnea)  Osteoarthritis of both knees  Paralysis, diaphragm  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  Right heart failure  Tricuspid valve insufficiency  Type 2 diabetes mellitus  Resolved  BE (bacterial endocarditis)  History of sepsis  Inpatient stay  Inpatient stay  Inpatient stay  Inpatient stay  Inpatient stay  Inpatient stay  Inpatient stay  Other and Unspecified Noninfectious Gastroenteritis and Colitis  Procedure/Surgical History  Colonoscopy (05/27/2017),  Esophagogastroduodenoscopy (05/27/2017),   TVR - Tricuspid valve replacement (11/29/2016),  Cardiac angiogram (11/01/2016),  Limited thoracotomy (08/23/2016),  Implantation of intravenous single chamber cardiac pacemaker system (08/04/2016),  Removal of automatic cardiac defibrillator (08/04/2016),  Pacemaker change (04/08/2015),  Cardiac angiogram (08/02/2013),  angiogrqam (02/03/2012),  Colonoscopy (12/15/2006),  Colonoscopy (06/22/2001),  Cholecystectomy (06/2001),  CABG - Coronary artery bypass graft (2000),  History of Back Surgery (2000),  Colonoscopy (02/17/1993),  Left shoulder surgery (1991),  Ablation for atrial fibrillation,  Cholecystectomy,  Colonoscopy,  Esophagogastroduodenoscopy.  Home Medications  acetaminophen 325 mg oral tablet, 650 mg, 2 tab(s), po, q4 hrs  aspirin 81 mg oral tablet, 81 mg, 1 tab(s), po, daily  atorvastatin 40 mg oral tablet, 2 refills  digoxin 125 mcg (0.125 mg) oral tablet, 125 mcg, 1 tab(s), po, daily  furosemide 80 mg oral tablet, 80 mg, 1 tab(s), po, bid  metOLazone 2.5 mg oral tablet, See Instructions  Metoprolol Succinate ER 25 mg oral tablet, extended release, 5 refills  nitroglycerin 0.4 mg sublingual tablet, 0.4 mg, 1 tab(s), sl, q5 min, PRN, 3 refills  omeprazole 20 mg oral delayed release capsule, 40 mg, 2 cap(s), po, bid, 3 refills  potassium chloride 20 mEq oral tablet, extended release, 5 refills  Protegra oral tablet, 1 tab(s), po, daily  Senexon-S, 2 tab(s), po, hs  Senokot 8.6 mg oral tablet, 1-2 tab(s), po, hs, PRN  triamcinolone 0.1% topical cream, 1 nazario, top, bid  warfarin 3 mg oral tablet, 11 refills  Allergies  adhesive tape  Social History  Employment and Education  Employed, Work/School description: Farmer.  Exercise and Physical Activity  Exercise type: Walking.  Family History  Aneurysm: Father.  Heart disease: Mother.  Immunizations  Physical Exam  Vitals & Measurements  HR:?72?(Peripheral)?  BP:?100/70?  HT:?73?in?  WT:?229?lb?  BMI:?30.21?  Patient appears comfortable. ?Alert and oriented.  ?Chest reveals mildly diminished breath sounds left base consistent with his known paralyzed hemidiaphragm. ?Cardiac exam is regular no gallop. ?2/6 systolic murmur unchanged. ?No edema. ?Abdomen nontender.  Lab Results  Diagnostic Results  Assessment/Plan  AF (Atrial Fibrillation)  Stable.  Anticoagulated  INR today.  Cardiorenal syndrome  Iron deficiency anemia  Patient did not have any point a ferritin that was below the normal limits but it did drop and he subsequently been treated with 1.2 g of Venofer. ?In the hospital ferritin was in the 90s. ?EGD and colonoscopy did not find a source for blood loss that is concerning..  Lab work in 2 weeks. ?Hematology consultation.  Ordered:  Referral (Request)  Nonischemic dilated cardiomyopathy  Stable.  Paralysis, diaphragm  Unchanged.  Right heart failure  Stable.  Tricuspid valve insufficiency  Replaced valve.  Orders:  Return (Request)

## 2022-02-16 NOTE — TELEPHONE ENCOUNTER
---------------------  From: Ruthy Augustin LPN   To: [facility] Message Pool ([id number]_[place] [facility] [place]);     Sent: 4/24/2019 2:24:35 PM CDT  Subject: Care Coordination Communication       Pt appears on [facility] chronic disease panel as out of parameters for YG.    Pt scheduled for follow up appointment 4-.     Pt due for A1c, please have this done at his appointment

## 2022-02-16 NOTE — PROGRESS NOTES
Chief Complaint    f/u labs/anxiety  History of Present Illness      Patient is here for follow-up of depression he has been on mirtazapine for about 3 weeks.  Has not noticed that his sleep.  Better.  PHQ 9 score is improved from 18-15.  He still feels tired anhedonia depressed mood, however he has no suicidal ideation.  His chronic anemia and monoclonal gammopathy will be seeing hematology for follow-up week.  No PND orthopnea or edema.  Weight has been stable.  No bleeding.  Review of Systems      Rectal bleeding, nausea vomiting hematemesis abdominal pain.  Physical Exam   Vitals & Measurements    T: 97.9(Tympanic)  HR: 88(Peripheral)  BP: 122/70     HT: 75 in  WT: 231.4 lb  BMI: 28.92       Patient appears comfortable.  Not tachypneic.  Affect little depressed.  Is alert and oriented.  Converses easily.  Chest reveals chronically diminished breath sounds at the left base unchanged.  Cardiac exam is regular.  No pitting edema.  Assessment/Plan       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 (N18.9)         Improved         Orders:          75886 office outpatient visit 25 minutes (Charge), Quantity: 1, Depression  Right heart failure  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  Anticoagulated  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          CBC (h/h, RBC, indices, WBC, Plt)* ([facility]), Specimen Type: Blood, Collection Date: 04/24/18 8:37:00 CDT                Anticoagulated (Z79.01)         Stable         Orders:          03079 office outpatient visit 25 minutes (Charge), Quantity: 1, Depression  Right heart failure  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  Anticoagulated  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          PT (Request), Priority: Urgent, Instructions: Notify Unit 2 INR staff with results please, Anticoagulated  AF (Atrial Fibrillation)          Return to Clinic (Request), RFV: Depression, Return in 4-6 weeks                Benign hypertension with CKD (chronic kidney  disease) stage III (I12.9)         Stable         Orders:          02953 office outpatient visit 25 minutes (Charge), Quantity: 1, Depression  Right heart failure  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  Anticoagulated  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                Depression (F32.9)         Modest improvement.  Continue mirtazapine and follow-up in 6-8 weeks.         Orders:          06400 office outpatient visit 25 minutes (Charge), Quantity: 1, Depression  Right heart failure  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  Anticoagulated  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          Return to Clinic (Request), RFV: Depression, Return in 4-6 weeks                Right heart failure (I50.9)         Stable         Orders:          47915 office outpatient visit 25 minutes (Charge), Quantity: 1, Depression  Right heart failure  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  Anticoagulated  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          Return to Clinic (Request), RFV: Depression, Return in 4-6 weeks                Orders:         Return to Clinic (Request), RFV: Depression, Return in 2-4 weeks         XR Chest PA/Lat (Request), Priority: STAT, Cough  Shortness of breath  Hemoptysis  Patient Information     Name:JACINTO JIN      Address:      [address] [place] [id number]     Sex:Male     YOB: 1935     [phone]     Emergency Contact:GERA JIN     MRN:[id number]     FIN:[id number]     Location:[facility]     Date of Service:04/24/2018      Primary Care Physician:       Shravan Berrios MD, [phone]      Attending Physician:       Shravan Berrios MD, [phone]  Problem List/Past Medical History    Ongoing     Acute kidney injury (nontraumatic)     AF (Atrial Fibrillation)     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     Anticardiolipin syndrome     Anticoagulated     Ascites     B12  deficiency     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     CAD (coronary artery disease)     Cardiorenal syndrome     CHB (complete heart block)     CHF (Congestive Heart Failure)     Colon polyps     Depression     Diverticulosis     Dupuytren's contracture of right hand     Gout     H/O acute pancreatitis     High cholesterol     History of acute bacterial endocarditis     History of coronary artery bypass graft     History of tricuspid valve replacement     IBS (irritable bowel syndrome)     ICD (implantable cardioverter-defibrillator) infection     Iron deficiency anemia     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     Nonischemic dilated cardiomyopathy     Obesity     KEYONA (obstructive sleep apnea)       Comments: Adequate titration to BIPAP 16/11 on 10/15/2013     Osteoarthritis of both knees     Paralysis, diaphragm       Comments: Left     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     PUD (peptic ulcer disease)     Right heart failure     Tricuspid valve insufficiency     Vitamin B 12 deficiency    Historical     BE (bacterial endocarditis)     History of sepsis     Inpatient stay       Comments: @[facility] - Diverticulitis     Inpatient stay       Comments: @[facility] - Severe symptomatic tricuspid valvular insufficiency. Chronic right heart failure.     Inpatient stay       Comments: @[facility] - Anemia     Inpatient stay       Comments: @[facility] - Jaw pain, possible anginal equivalent     Inpatient stay       Comments: @[facility] - Infected pacemaker     Inpatient stay       Comments: @[facility] - Acute on chronic right heart failure     Inpatient stay       Comments: @[facility] - S/P tricuspid valve replacement with worsening right ventricular function     Other and Unspecified Noninfectious Gastroenteritis and Colitis  Procedure/Surgical History     Bone marrow biopsy (07/12/2017)     Colonoscopy (05/27/2017)       Comments: Cecal  tubular adenoma, pandiverticulosis w/polypectomy     Esophagogastroduodenoscopy (05/27/2017)       Comments: gastritis, fundic gland polyps     TVR - Tricuspid valve replacement (11/29/2016)     Cardiac angiogram (11/01/2016)       Comments: Summary/Conclusions<br/>PRESENTATION / INDICATIONS<br/>&middot; Pre-surgical evaluation for cardiac surgery<br/>&middot; Severe TR by echo<br/>DIAGNOSTIC - CORONARY<br/>&middot; Co-dominant coronary artery system<br/>&middot; The left main artery was normal in appearance and free of obstructive disease.<br/>&middot; Chronic total occlusion of the proximal LAD<br/>&middot; The circumflex artery has minimal disease.<br/>&middot; The RCA has moderate disease.<br/>&middot; No change since 2013<br/>DIAGNOSTIC - GRAFTS<br/>&middot; Chronic total occlusion in the proximal anastomosis of the SVG graft to the RCA<br/>&middot; The SVG to the ramus intermediate is patent<br/>&middot; The sequential graft to the 1st diagonal is patent.<br/>&middot; The sequential graft limb from the diagonal to the LAD is patent.<br/>&middot; No change since 2013<br/>HEMODYNAMICS<br/>&middot; The LVEDP is mildly elevated<br/>&middot; Severely elevated right atrial pressures<br/>&middot; No evidence of intracardiac shunting     Limited thoracotomy (08/23/2016)       Comments: Left mini thoracotomy and placement of two epicardial screw in leads. &nbsp;Implant of left pectoral pacer generatoe.     Implantation of intravenous single chamber cardiac pacemaker system (08/04/2016)     Removal of automatic cardiac defibrillator (08/04/2016)       Comments: Generator and leads     Pacemaker change (04/08/2015)     Cardiac angiogram (08/02/2013)       Comments: Summary/Conclusions<br/>PRESENTATION / INDICATIONS <br/>Chest pain<br/>DIAGNOSTIC - CORONARY<br/>Right dominant coronary artery system<br/>DIAGNOSTIC SUMMARY<br/>100% stenosis in the Proximal LAD<br/>30% stenosis in the 1st Marginal<br/>30% stenosis in the  Proximal RCA<br/>50% stenosis in the Mid RCA<br/>100% stenosis in the Aorta graft to Distal RCA<br/>DIAGNOSTIC - GRAFTS<br/>The sequential graft to the diagonal branch is patent.<br/>The sequential graft from the diagonal to the LAD is patent.<br/>The SVG to the ramus intermediate is patent<br/>The SVG to RCA is chronically occluded<br/>HEMODYNAMICS<br/>The LVEDP is mildly elevated<br/>RECOMMENDATIONS &amp; PLAN<br/>Medical Rx- no significant change from previous angiogram, there is dramatic mismatch in size between the SVG's and the target arteries with slow filling of the LAD     angiogrqam (02/03/2012)       Comments: Summary/Conclusions<br/>PRESENTATION / INDICATIONS <br/>Dyspnea and fatigue.<br/>DIAGNOSTIC - CORONARY<br/>Right dominant coronary artery system.<br/>LMCA is normal.<br/>LAD is occluded proximally after the 1st septal branch.<br/>LCx has mild luminal irregularities. &nbsp;OM1 has 20-30% proximal stenosis.<br/>RCA has 30-40% diffuse disease in the mid segment and otherwise has mild diffuse luminal irregularities.<br/>DIAGNOSTIC - GRAFTS<br/>The sequential SVG to the diagonal and then LAD is widely patent. &nbsp;There is a significant size mismatch between the graft and the native vessels. &nbsp;The diagonal and distal LAD have no obvious lesions but are small caliber (&lt; 2.0 mm vessels). &nbsp;There is some backfilling into the mid LAD.<br/>The SVG to the ramus is widely patent. &nbsp;There is a significant size mismatch between the graft and the native vessel. &nbsp;The ramus has no obvious lesion but is small caliber (&lt; 2.0 mm vessel). &nbsp;<br/>The SVG to the RCA is chronically occluded.<br/>LEFT VENTRICULAR FUNCTION<br/>Left ventricular function is mildly reduced with EF of 42% and mild global hypokinesis. &nbsp;No significant MR.<br/>HEMODYNAMICS<br/>The LVEDP is 6 mmHg. &nbsp;No significant gradient across the aortic valve.<br/>RA 11, RV 29/10, PA 34/17 (mean 25), PCWP 18<br/>Amarilys CO 5.1,  TDCO 4.6<br/>RECOMMENDATIONS &amp; PLAN<br/>Consider alternative etiology for symptoms.<br/>Lasix dose decreased to 40 mg QD given relative hypotension and patient reporting feeling &quot;dry membranes.&quot;     Colonoscopy (12/15/2006)       Comments: Diverticulosis. No polyps.     Colonoscopy (06/22/2001)       Comments: hyperplastic polyp     Cholecystectomy (06/2001)     CABG - Coronary artery bypass graft (2000)     History of Back Surgery (2000)     Colonoscopy (02/17/1993)       Comments: 1 adenoma, 1 hyperplastic, diverticulosis.     Left shoulder surgery (1991)     Ablation for atrial fibrillation       Comments: Subsequent pacemaker dependence.     Cholecystectomy     Colonoscopy     Esophagogastroduodenoscopy  Medications        nitroglycerin 0.4 mg sublingual tablet: 0.4 mg, 1 tab(s), SL, q5min, not to exceed 3 doses/15 min--if pain persists, seek medical attention, 25 tab(s), PRN: for chest pain.        Protegra oral tablet: 1 tab(s), po, daily.        potassium chloride 20 mEq oral tablet, extended release: See Instructions, 1 tab TID, 30 unknown unit, 5 Refill(s).        Senexon-S: 2 tab(s), po, hs, 0 Refill(s).        acetaminophen 325 mg oral tablet: 650 mg, 2 tab(s), po, q4 hrs, not to exceed 4000 mg/day, 0 Refill(s).        Senokot 8.6 mg oral tablet: 1-2 tab(s), PO, Once a day (at bedtime), PRN: for constipation.        omeprazole 20 mg oral delayed release capsule: 40 mg, 2 cap(s), po, bid, 90 cap(s), 3 Refill(s).        furosemide 80 mg oral tablet: 80 mg, 1 tab(s), po, bid, Per Dr. Quintana on 3/7/2017, 0 Refill(s).        digoxin 125 mcg (0.125 mg) oral tablet: 125 mcg, 1 tab(s), po, daily, 0 Refill(s).        metOLazone 2.5 mg oral tablet: See Instructions, 1 tab(s) po daily for weight > 222#, 0 Refill(s).        warfarin 3 mg oral tablet: See Instructions, Take 4.5mg, 3mg, 3mg on an alt. qd schedule, 40 unknown unit, 11 Refill(s).        allopurinol: 300 mg, po, daily, 0 Refill(s).         Vitamin B12 1000 mcg/mL injectable solution: 1,000 mcg, im, qmonth, 0 Refill(s).        Metoprolol Succinate ER 25 mg oral tablet, extended release: See Instructions, TAKE ONE-HALF TABLET BY MOUTH TWICE DAILY, 30 unknown unit, 11 Refill(s).        atorvastatin 40 mg oral tablet: See Instructions, TAKE ONE TABLET BY MOUTH AT BEDTIME, 90 unknown unit.        predniSONE 20 mg oral tablet: See Instructions, 2 po daily for 3-5 days prn gout, 30 EA, 0 Refill(s).        mirtazapine 15 mg oral tablet: 15 mg, 1 tab(s), PO, Once a day (at bedtime), 30 tab(s), 5 Refill(s).                Allergies    No Known Medication Allergies    adhesive tape  Social History    Smoking Status - 04/24/2018     Never smoker     Alcohol      Past, 03/30/2018     Employment and Education      Employed, Work/School description: [employer] 11/11/2010     Exercise and Physical Activity      Exercise type: Walking., 11/11/2010  Family History    Aneurysm: Father.    Heart disease: Mother.  Immunizations      Vaccine Date Status      influenza virus vaccine, inactivated 09/28/2015 Given      pneumococcal (PCV13) 04/21/2015 Given      influenza virus vaccine, inactivated 08/28/2014 Given      influenza virus vaccine, inactivated 10/29/2012 Given      influenza virus vaccine, inactivated 09/23/2011 Given      influenza virus vaccine, inactivated 10/26/2010 Given      influenza 10/23/2008 Recorded      Td 09/01/2005 Recorded      pneumococcal 11/28/2001 Recorded  Lab Results          Lab Results (Last 4 results within 90 days)           Sodium Level: 140 mmol/L [135 mmol/L - 146 mmol/L] (03/27/18 12:00:00)          Sodium Level: 141 mmol/L [135 mmol/L - 146 mmol/L] (02/26/18 14:47:00)          Potassium Level: 3.9 mmol/L [3.5 mmol/L - 5.3 mmol/L] (03/27/18 12:00:00)          Potassium Level: 4.4 mmol/L [3.5 mmol/L - 5.3 mmol/L] (02/26/18 14:47:00)          Chloride Level: 99 mmol/L [98 mmol/L - 110 mmol/L] (03/27/18 12:00:00)          Chloride Level:  104 mmol/L [98 mmol/L - 110 mmol/L] (02/26/18 14:47:00)          CO2 Level: 36 mmol/L High [20 mmol/L - 31 mmol/L] (03/27/18 12:00:00)          CO2 Level: 31 mmol/L [20 mmol/L - 31 mmol/L] (02/26/18 14:47:00)          Glucose Level: 98 mg/dL [65 mg/dL - 99 mg/dL] (03/27/18 12:00:00)          Glucose Level: 79 mg/dL [65 mg/dL - 99 mg/dL] (02/26/18 14:47:00)          BUN: 30 mg/dL High [7 mg/dL - 25 mg/dL] (03/27/18 12:00:00)          BUN: 28 mg/dL High [7 mg/dL - 25 mg/dL] (02/26/18 14:47:00)          Creatinine Level: 1.56 mg/dL High [0.7 mg/dL - 1.11 mg/dL] (03/27/18 12:00:00)          Creatinine Level: 1.55 mg/dL High [0.7 mg/dL - 1.11 mg/dL] (02/26/18 14:47:00)          BUN/Creat Ratio: 19 [6  - 22] (03/27/18 12:00:00)          BUN/Creat Ratio: 18 [6  - 22] (02/26/18 14:47:00)          eGFR: 41 mL/min/1.73m2 Low [8.6 mg/dL - 10.3 mg/dL] (03/27/18 12:00:00)          eGFR: 42 mL/min/1.73m2 Low [8.6 mg/dL - 10.3 mg/dL] (02/26/18 14:47:00)          eGFR African American: 48 mL/min/1.73m2 Low [6  - 22] (03/27/18 12:00:00)          eGFR African American: 48 mL/min/1.73m2 Low [6  - 22] (02/26/18 14:47:00)          Calcium Level: 9.6 mg/dL [8.6 mg/dL - 10.3 mg/dL] (03/27/18 12:00:00)          Calcium Level: 9.2 mg/dL [8.6 mg/dL - 10.3 mg/dL] (02/26/18 14:47:00)          Cholesterol: 89 mg/dL [8.6 mg/dL - 10.3 mg/dL] (03/13/18 08:45:00)          Non-HDL Cholesterol: 59 [20 mmol/L - 31 mmol/L] (03/13/18 08:45:00)          HDL: 30 mg/dL Low [38.5 % - 50 %] (03/13/18 08:45:00)          Cholesterol/HDL Ratio: 3 [0.7 mg/dL - 1.11 mg/dL] (03/13/18 08:45:00)          LDL: 42 [0.7 mg/dL - 1.11 mg/dL] (03/13/18 08:45:00)          Triglyceride: 85 mg/dL [8.6 mg/dL - 10.3 mg/dL] (03/13/18 08:45:00)          B-Natriuretic Peptide (BNP): 91 pg/mL [6  - 22] (03/27/18 12:00:00)          B-Natriuretic Peptide (BNP): 87 pg/mL [6  - 22] (02/26/18 14:47:00)          WBC: 4.5 [3.8  - 10.8] (03/27/18 12:00:00)          RBC: 3.51 Low [4.2  -  5.8] (03/27/18 12:00:00)          Hgb: 11.4 gm/dL Low [13.2 gm/dL - 17.1 gm/dL] (03/27/18 12:00:00)          Hgb: 11 gm/dL Low [13.2 gm/dL - 17.1 gm/dL] (02/26/18 14:47:00)          Hct: 33.4 % Low [38.5 % - 50 %] (03/27/18 12:00:00)          MCV: 95.2 fL [80 fL - 100 fL] (03/27/18 12:00:00)          MCH: 32.5 pg [27 pg - 33 pg] (03/27/18 12:00:00)          MCHC: 34.1 gm/dL [32 gm/dL - 36 gm/dL] (03/27/18 12:00:00)          RDW: 15.2 % High [11 % - 15 %] (03/27/18 12:00:00)          Platelet: 98 Low [140  - 400] (03/27/18 12:00:00)          MPV: 11.3 fL [7.5 fL - 12.5 fL] (03/27/18 12:00:00)          Protime: 22.6 High [11.7  - 14.1] (04/24/18 11:05:00)          Protime: 30.2 High [11.7  - 14.1] (03/13/18 08:51:00)          PT: 23.2 High [9  - 11.5] (02/26/18 14:47:00)          INR: 2.0 High [0.7 mg/dL - 1.11 mg/dL] (04/24/18 11:05:00)          INR: 2.8 [0.7 mg/dL - 1.11 mg/dL] (03/27/18 10:51:00)          INR: 3.0 High [0.7 mg/dL - 1.11 mg/dL] (03/13/18 08:51:00)          INR: 2.3 High [0.7 mg/dL - 1.11 mg/dL] (02/26/18 14:47:00)

## 2022-11-25 NOTE — TELEPHONE ENCOUNTER
---------------------  From: Mag Vargas CMA   To: Shravan Berrios MD;     Sent: 6/19/2019 1:40:51 PM CDT  Subject: Diuretics     Recommend to double diuretics? Labs looked ok..---------------------  From: Shravan Berrios MD   To: Mag Vargas CMA;     Sent: 6/20/2019 9:58:28 AM CDT  Subject: RE: Diuretics     yes. Both Furosemide and Spironolactone BID and F/U next week.Pts wife advised re: diuretics-she will come call back to schedule an appt next week for visit.   DISPLAY PLAN FREE TEXT

## 2023-07-30 NOTE — TELEPHONE ENCOUNTER
---------------------  From: Aneta Brewer CMA (Phone Messages Pool ([id number]_[facility]))   To: Phone Messages Pool ([id number]_[place]);     Sent: 5/2/2019 1:02:45 PM CDT  Subject: Appointment       Phone Message    PCP:    RIAN      Time of Call:  11:52        Person Calling:  Antonia from [facility]  med surg  Phone number:  [phone]    Time returned call:  12:46    Note:  Patient needs a hospital f/u with IRAN. His schedule was full so they were requesting a work in.  Per schedule RIAN had a urgent care open at 4:30.  I attempted to contact patient at 12:54 let him know clinic appointment time. LMTCB      Transferred to: phone poolContacted patient.  
---------------------  From: Flavia Metzger RN   To: Shravan Berrios MD;     Cc: INR Pool ( [id number]_[facility]);      Sent: 4/29/2019 2:14:43 PM CDT  Subject: INR and other labs     Patient is coming in for INR before f/u appointment with you tomorrow.    Would you like any urgent labs ran before he sees you? CBC? Hgb? INR? BMP?    If not, I will just plan on doing a finger poke.    Thank you,    Flavia Metzger RN---------------------  From: Shravan Berrios MD   To: Flavia Metzger RN;     Sent: 4/29/2019 2:38:09 PM CDT  Subject: RE: INR and other labs     BMP, INR, HgbOrdering per JDL.  
Yes